# Patient Record
Sex: FEMALE | Race: WHITE | NOT HISPANIC OR LATINO | Employment: OTHER | URBAN - METROPOLITAN AREA
[De-identification: names, ages, dates, MRNs, and addresses within clinical notes are randomized per-mention and may not be internally consistent; named-entity substitution may affect disease eponyms.]

---

## 2018-01-15 NOTE — MISCELLANEOUS
Message  Patient had laparoscopic cholecystectomy done on 4/26/16 by Kirk Gill  Called today to state she feels she is not urinating "fully" Has been drinking plenty of fluids she states  Explained to patient that when she has anesthesia, it call linger in your body and make other organs sluggish  She should continue drinking fluids and if still has issues, go to emergency room for evaluation  Is also c/o cough  Explained again to her that this is related to the surgery from when they put the "tube" down her throat  She can try warm salt water gargles or Chloraseptic spray/lozenges  Active Problems    1  Atrial fibrillation (427 31) (I48 91)   2  Benign pigmented nevus (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 9) (D22 9)   3  Chest pain (786 50) (R07 9)   4  Cutaneous skin tags (701 9) (L91 8)   5  Hyperlipidemia (272 4) (E78 5)   6  Hypertension (401 9) (I10)   7  Obesity (278 00) (E66 9)   8  Seborrheic keratosis (702 19) (L82 1)   9  Thyroid disorder (246 9) (E07 9)    Current Meds   1  Betapace 80 MG Oral Tablet (Sotalol HCl); Therapy: (Recorded:14Apr2016) to Recorded   2  Diovan 320 MG Oral Tablet (Valsartan); Therapy: (Recorded:[id number]) to Recorded   3  Hydrochlorothiazide 25 MG Oral Tablet; Therapy: (Recorded:31Uvr0557) to Recorded   4  Lexapro 10 MG Oral Tablet (Escitalopram Oxalate); Therapy: (Recorded:28Qby7090) to Recorded   5  Lipitor 20 MG Oral Tablet (Atorvastatin Calcium); Therapy: (Recorded:75Mcp2270) to Recorded   6  Synthroid 175 MCG Oral Tablet (Levothyroxine Sodium); Therapy: (Recorded:[id number]) to Recorded   7  Thyrolar-1 60 (12 5-50) MG (MCG) Oral Tablet; Therapy: (Recorded:56But7522) to Recorded   8  Tiazac 360 MG Oral Capsule Extended Release 24 Hour (Diltiazem HCl ER Beads); Therapy: (Recorded:[id number]) to Recorded   9  Xarelto 20 MG Oral Tablet; Therapy: (Recorded:[id number]) to Recorded    Allergies    1  iodine   2  Penicillins    3  Nuts   4   Tape    Signatures   Electronically signed by : Mir Hsieh, ; Apr 27 2016  3:03PM EST                       (Author)

## 2018-01-15 NOTE — MISCELLANEOUS
Message  Return to work or school:   Fran French is under my professional care  She was seen in my office on 4/14/2016       Tatumevaristotorsten Mark was seen in the ER on 4/13/16 and in our office on 4/14/16  She is schedule to have surgery on 4/26/2016 and is unable to fly until after her surgery date do to her medical condition  Signatures   Electronically signed by : Renetta To OM; Apr 15 2016 11:53AM EST                       (Author)    Electronically signed by : Renetta To OM;  Apr 15 2016 11:55AM EST                       (Author)

## 2018-04-26 RX ORDER — SOTALOL HYDROCHLORIDE 80 MG/1
TABLET ORAL
COMMUNITY
End: 2018-04-27 | Stop reason: SDUPTHER

## 2018-04-26 RX ORDER — LEVOTHYROXINE SODIUM 175 UG/1
1 TABLET ORAL DAILY
COMMUNITY
End: 2018-04-27 | Stop reason: SDUPTHER

## 2018-04-26 RX ORDER — DOXYCYCLINE HYCLATE 100 MG/1
CAPSULE ORAL
Refills: 0 | COMMUNITY
Start: 2018-02-01 | End: 2019-04-29

## 2018-04-27 ENCOUNTER — OFFICE VISIT (OUTPATIENT)
Dept: FAMILY MEDICINE CLINIC | Facility: HOSPITAL | Age: 78
End: 2018-04-27
Payer: MEDICARE

## 2018-04-27 VITALS
DIASTOLIC BLOOD PRESSURE: 74 MMHG | HEART RATE: 72 BPM | SYSTOLIC BLOOD PRESSURE: 136 MMHG | WEIGHT: 224 LBS | TEMPERATURE: 99.7 F | HEIGHT: 64 IN | BODY MASS INDEX: 38.24 KG/M2

## 2018-04-27 DIAGNOSIS — I10 ESSENTIAL HYPERTENSION: ICD-10-CM

## 2018-04-27 DIAGNOSIS — E78.2 MIXED HYPERLIPIDEMIA: Primary | ICD-10-CM

## 2018-04-27 DIAGNOSIS — E66.01 CLASS 2 SEVERE OBESITY DUE TO EXCESS CALORIES WITH SERIOUS COMORBIDITY AND BODY MASS INDEX (BMI) OF 38.0 TO 38.9 IN ADULT (HCC): ICD-10-CM

## 2018-04-27 DIAGNOSIS — R73.9 HYPERGLYCEMIA: ICD-10-CM

## 2018-04-27 DIAGNOSIS — I48.19 PERSISTENT ATRIAL FIBRILLATION (HCC): ICD-10-CM

## 2018-04-27 DIAGNOSIS — E07.9 THYROID DISORDER: ICD-10-CM

## 2018-04-27 PROCEDURE — 99203 OFFICE O/P NEW LOW 30 MIN: CPT | Performed by: FAMILY MEDICINE

## 2018-04-27 NOTE — PROGRESS NOTES
Assessment/Plan:         Diagnoses and all orders for this visit:    Mixed hyperlipidemia  -     Cancel: Lipid Panel with Direct LDL reflex; Future  -     Cancel: CK (with reflex to MB); Future  -     Lipid Panel with Direct LDL reflex; Future  -     CK (with reflex to MB); Future  -     Lipid Panel with Direct LDL reflex    Essential hypertension  -     Cancel: CBC and differential; Future  -     Cancel: Comprehensive metabolic panel; Future  -     CBC and differential; Future  -     Comprehensive metabolic panel; Future  -     CBC and differential  -     Comprehensive metabolic panel    Persistent atrial fibrillation (HCC)    Thyroid disorder  -     Cancel: TSH, 3rd generation with T4 reflex; Future  -     TSH, 3rd generation with T4 reflex; Future  -     TSH, 3rd generation with T4 reflex    Class 2 severe obesity due to excess calories with serious comorbidity and body mass index (BMI) of 38 0 to 38 9 in adult (HCC)    Hyperglycemia  -     Cancel: Hemoglobin A1c; Future  -     Hemoglobin A1c; Future  -     Hemoglobin A1c          Subjective:      Patient ID: Joaquín Saldivar is a 69 y o  female  New patient introduction  Previous patient of Dr Josephine Michele for regular follow up every six months    Had a fib ablation in 2016 with good result  No a fib episodes since then  Had discussed on or off of Sotolol and Xarelto but prefers to continue    Has  and gets mail away Rxes    Had both knees replaced and did well     Had an episode of severe back pain from L4-5 disc  Did better with [facility]  The following portions of the patient's history were reviewed and updated as appropriate: allergies, current medications, past family history, past medical history, past social history, past surgical history and problem list     Review of Systems   Constitutional: Negative for fatigue and fever  HENT: Negative for congestion and sinus pressure  Respiratory: Negative  Cardiovascular: Negative  Gastrointestinal: Negative for abdominal pain  Musculoskeletal: Positive for back pain  Negative for myalgias  Psychiatric/Behavioral: Negative for dysphoric mood and sleep disturbance  Objective:      /74 (BP Location: Left arm, Patient Position: Sitting, Cuff Size: Large)   Pulse 72   Temp 99 7 °F (37 6 °C) (Tympanic)   Ht 5' 4" (1 626 m)   Wt 102 kg (224 lb)   BMI 38 45 kg/m²          Physical Exam   Constitutional: She is oriented to person, place, and time  She appears well-developed and well-nourished  Neck: Neck supple  No thyromegaly present  Cardiovascular: Normal rate, regular rhythm, normal heart sounds and intact distal pulses  No murmur heard  Musculoskeletal: She exhibits no edema  Neurological: She is alert and oriented to person, place, and time  Psychiatric: She has a normal mood and affect  Her behavior is normal  Judgment and thought content normal    Nursing note and vitals reviewed

## 2018-06-25 ENCOUNTER — OFFICE VISIT (OUTPATIENT)
Dept: FAMILY MEDICINE CLINIC | Facility: HOSPITAL | Age: 78
End: 2018-06-25
Payer: MEDICARE

## 2018-06-25 VITALS
TEMPERATURE: 96.9 F | HEART RATE: 68 BPM | SYSTOLIC BLOOD PRESSURE: 132 MMHG | WEIGHT: 219 LBS | HEIGHT: 64 IN | DIASTOLIC BLOOD PRESSURE: 78 MMHG | BODY MASS INDEX: 37.39 KG/M2

## 2018-06-25 DIAGNOSIS — L23.7 CONTACT DERMATITIS DUE TO POISON IVY: Primary | ICD-10-CM

## 2018-06-25 PROBLEM — Z98.890 HISTORY OF RADIOFREQUENCY ABLATION PROCEDURE FOR CARDIAC ARRHYTHMIA: Status: RESOLVED | Noted: 2018-06-25 | Resolved: 2018-06-25

## 2018-06-25 PROBLEM — F41.9 ANXIETY: Status: RESOLVED | Noted: 2018-06-25 | Resolved: 2018-06-25

## 2018-06-25 PROBLEM — Z98.890 HISTORY OF CARDIOVERSION: Status: RESOLVED | Noted: 2018-06-25 | Resolved: 2018-06-25

## 2018-06-25 PROBLEM — K81.9 CHOLECYSTITIS: Status: RESOLVED | Noted: 2018-06-25 | Resolved: 2018-06-25

## 2018-06-25 PROBLEM — Z92.89 HISTORY OF CARDIOVERSION: Status: RESOLVED | Noted: 2018-06-25 | Resolved: 2018-06-25

## 2018-06-25 PROCEDURE — 99213 OFFICE O/P EST LOW 20 MIN: CPT | Performed by: INTERNAL MEDICINE

## 2018-06-25 RX ORDER — PREDNISONE 10 MG/1
TABLET ORAL
Qty: 24 TABLET | Refills: 0 | Status: SHIPPED | OUTPATIENT
Start: 2018-06-25 | End: 2018-11-07 | Stop reason: ALTCHOICE

## 2018-06-25 NOTE — PROGRESS NOTES
Assessment/Plan:   Diagnoses and all orders for this visit:    Contact dermatitis due to poison ivy  Comments:  Reassured pt not severe enough I would recommend steroid shot - will send rx for Prednisone to pharmacy, encouraged to con't Benadry or Zyrtec/Claritin, may add Cimetidine or Ranitidine if itch still severe, urged not to scratch and told to call with any s/sx of secondary infection - increase redness/warmth/purulent discharge/F/C  Orders:  -     predniSONE 10 mg tablet; 3 tab PO x 1 today then 2 tab PO Bid x 3 days then 1 tab PO bid x 3 days then 1 tab PO q day x 3 days then stop          Subjective:      Patient ID: Antonella Monet is a 69 y o  female  HPI Pt here with concern over poison ivy which is spreading  She was exposed last week to poison ivy while weeding in the garden  She states the rash started on R hand and has spread and on R hand and wrist and now L wrist and R lateral abd  She notes no lesion on the face  She has been using Calamine lotion and Benadryl lotion w/o much benefit  She has used an OTC scrub and has had some drainage since then  Review of Systems   Constitutional: Negative for chills and fever  Respiratory: Negative for cough  Gastrointestinal: Negative for diarrhea and vomiting  Skin: Positive for rash  Objective:    /78   Pulse 68   Temp (!) 96 9 °F (36 1 °C)   Ht 5' 4" (1 626 m)   Wt 99 3 kg (219 lb)   BMI 37 59 kg/m²      Physical Exam   Constitutional: She appears well-developed and well-nourished  No distress  Cardiovascular: Normal rate, regular rhythm and normal heart sounds  No murmur heard  Pulmonary/Chest: Effort normal and breath sounds normal  She has no wheezes  Skin: Skin is warm and dry  Scabbed papules in clusters and lines on R > L wrist and R anterolateral abd region   Psychiatric: She has a normal mood and affect  Her behavior is normal  Judgment normal    Nursing note and vitals reviewed

## 2018-06-28 LAB
ALBUMIN SERPL-MCNC: 4 G/DL (ref 3.6–5.1)
ALBUMIN/GLOB SERPL: 1.6 (CALC) (ref 1–2.5)
ALP SERPL-CCNC: 76 U/L (ref 33–130)
ALT SERPL-CCNC: 20 U/L (ref 6–29)
AST SERPL-CCNC: 15 U/L (ref 10–35)
BASOPHILS # BLD AUTO: 40 CELLS/UL (ref 0–200)
BASOPHILS NFR BLD AUTO: 0.4 %
BILIRUB SERPL-MCNC: 0.6 MG/DL (ref 0.2–1.2)
BUN SERPL-MCNC: 18 MG/DL (ref 7–25)
BUN/CREAT SERPL: ABNORMAL (CALC) (ref 6–22)
CALCIUM SERPL-MCNC: 9.6 MG/DL (ref 8.6–10.4)
CHLORIDE SERPL-SCNC: 101 MMOL/L (ref 98–110)
CHOLEST SERPL-MCNC: 139 MG/DL
CHOLEST/HDLC SERPL: 2.7 (CALC)
CO2 SERPL-SCNC: 32 MMOL/L (ref 20–31)
CREAT SERPL-MCNC: 0.65 MG/DL (ref 0.6–0.93)
EOSINOPHIL # BLD AUTO: 59 CELLS/UL (ref 15–500)
EOSINOPHIL NFR BLD AUTO: 0.6 %
ERYTHROCYTE [DISTWIDTH] IN BLOOD BY AUTOMATED COUNT: 12.9 % (ref 11–15)
GLOBULIN SER CALC-MCNC: 2.5 G/DL (CALC) (ref 1.9–3.7)
GLUCOSE SERPL-MCNC: 105 MG/DL (ref 65–99)
HBA1C MFR BLD: 6.3 % OF TOTAL HGB
HCT VFR BLD AUTO: 41.6 % (ref 35–45)
HDLC SERPL-MCNC: 52 MG/DL
HGB BLD-MCNC: 14.1 G/DL (ref 11.7–15.5)
LDLC SERPL CALC-MCNC: 65 MG/DL (CALC)
LYMPHOCYTES # BLD AUTO: 2208 CELLS/UL (ref 850–3900)
LYMPHOCYTES NFR BLD AUTO: 22.3 %
MCH RBC QN AUTO: 29.4 PG (ref 27–33)
MCHC RBC AUTO-ENTMCNC: 33.9 G/DL (ref 32–36)
MCV RBC AUTO: 86.8 FL (ref 80–100)
MONOCYTES # BLD AUTO: 1020 CELLS/UL (ref 200–950)
MONOCYTES NFR BLD AUTO: 10.3 %
NEUTROPHILS # BLD AUTO: 6574 CELLS/UL (ref 1500–7800)
NEUTROPHILS NFR BLD AUTO: 66.4 %
NONHDLC SERPL-MCNC: 87 MG/DL (CALC)
PLATELET # BLD AUTO: 248 THOUSAND/UL (ref 140–400)
PMV BLD REES-ECKER: 9.3 FL (ref 7.5–12.5)
POTASSIUM SERPL-SCNC: 3.7 MMOL/L (ref 3.5–5.3)
PROT SERPL-MCNC: 6.5 G/DL (ref 6.1–8.1)
RBC # BLD AUTO: 4.79 MILLION/UL (ref 3.8–5.1)
SL AMB EGFR AFRICAN AMERICAN: 99 ML/MIN/1.73M2
SL AMB EGFR NON AFRICAN AMERICAN: 86 ML/MIN/1.73M2
SODIUM SERPL-SCNC: 139 MMOL/L (ref 135–146)
TRIGL SERPL-MCNC: 138 MG/DL
TSH SERPL-ACNC: 3.25 MIU/L (ref 0.4–4.5)
WBC # BLD AUTO: 9.9 THOUSAND/UL (ref 3.8–10.8)

## 2018-09-25 RX ORDER — OXYCODONE HYDROCHLORIDE AND ACETAMINOPHEN 5; 325 MG/1; MG/1
TABLET ORAL
Refills: 0 | COMMUNITY
Start: 2018-09-06 | End: 2019-04-11 | Stop reason: ALTCHOICE

## 2018-09-25 RX ORDER — GABAPENTIN 300 MG/1
300 CAPSULE ORAL 3 TIMES DAILY
Refills: 2 | COMMUNITY
Start: 2018-08-21

## 2018-09-25 RX ORDER — CYCLOBENZAPRINE HCL 10 MG
10 TABLET ORAL
Refills: 0 | COMMUNITY
Start: 2018-08-14 | End: 2021-07-14 | Stop reason: ALTCHOICE

## 2018-09-25 RX ORDER — OXYCODONE AND ACETAMINOPHEN 10; 325 MG/1; MG/1
1 TABLET ORAL EVERY 6 HOURS PRN
Refills: 0 | COMMUNITY
Start: 2018-08-14 | End: 2019-04-11 | Stop reason: ALTCHOICE

## 2018-09-25 RX ORDER — DILTIAZEM HYDROCHLORIDE 360 MG/1
CAPSULE, EXTENDED RELEASE ORAL
Refills: 0 | COMMUNITY
Start: 2018-07-30

## 2018-09-26 ENCOUNTER — OFFICE VISIT (OUTPATIENT)
Dept: FAMILY MEDICINE CLINIC | Facility: HOSPITAL | Age: 78
End: 2018-09-26
Payer: MEDICARE

## 2018-09-26 VITALS
HEIGHT: 63 IN | BODY MASS INDEX: 39.51 KG/M2 | TEMPERATURE: 97.9 F | HEART RATE: 72 BPM | SYSTOLIC BLOOD PRESSURE: 128 MMHG | DIASTOLIC BLOOD PRESSURE: 72 MMHG | WEIGHT: 223 LBS

## 2018-09-26 DIAGNOSIS — Z00.00 MEDICARE ANNUAL WELLNESS VISIT, INITIAL: Primary | ICD-10-CM

## 2018-09-26 DIAGNOSIS — L57.0 ACTINIC KERATOSIS: ICD-10-CM

## 2018-09-26 DIAGNOSIS — M54.17 LUMBOSACRAL RADICULOPATHY DUE TO INTERVERTEBRAL DISC DISORDER: ICD-10-CM

## 2018-09-26 PROCEDURE — 17000 DESTRUCT PREMALG LESION: CPT | Performed by: FAMILY MEDICINE

## 2018-09-26 PROCEDURE — G0438 PPPS, INITIAL VISIT: HCPCS | Performed by: FAMILY MEDICINE

## 2018-09-26 NOTE — PROGRESS NOTES
Lesion Destruction  Date/Time: 9/26/2018 9:10 AM  Performed by: Anup Mccarthy  Authorized by: Anup Mccarthy     Procedure Details - Lesion Destruction:     Number of Lesions:  1  Lesion 1:     Body area:  Upper extremity    Upper extremity location:  L upper arm    Malignancy: pre-malignant lesion      Destruction method: cryotherapy

## 2018-09-26 NOTE — PROGRESS NOTES
Assessment and Plan:  Problem List Items Addressed This Visit     Medicare annual wellness visit, initial - Primary    Lumbosacral radiculopathy due to intervertebral disc disorder    Relevant Orders    Ambulatory referral to Neurosurgery    Actinic keratosis    Relevant Orders    Lesion Destruction        Health Maintenance Due   Topic Date Due    DXA SCAN  1940    DTaP,Tdap,and Td Vaccines (1 - Tdap) 02/21/1962    Pneumococcal PPSV23/PCV13 65+ Years / High and Highest Risk (2 of 2 - PCV13) 10/18/2015    INFLUENZA VACCINE  09/01/2018         HPI:  Patient Active Problem List   Diagnosis    Atrial fibrillation ([address] )    Hyperlipidemia    Hypertension    Obesity    Thyroid disorder    Medicare annual wellness visit, initial    Lumbosacral radiculopathy due to intervertebral disc disorder    Actinic keratosis     Past Medical History:   Diagnosis Date    Anxiety     Cholecystitis     Chronic calculous cholecystitis     last assessed 4/25/16    History of cardioversion     History of radiofrequency ablation procedure for cardiac arrhythmia     Hyperlipidemia     Hypertension     Sleep apnea     no trouble since Afib corrected     Past Surgical History:   Procedure Laterality Date    APPENDECTOMY      BLADDER SUSPENSION      CARDIOVERSION      atrial - onset 2015 - x5 in 2014 and 2015    CHOLECYSTECTOMY      HYSTERECTOMY      KNEE ARTHROSCOPY W/ MENISCAL REPAIR Bilateral     therapeutic - last assessed 4/14/16    KNEE SURGERY      NV LAP,CHOLECYSTECTOMY N/A 4/26/2016    Procedure: CHOLECYSTECTOMY LAPAROSCOPIC;  Surgeon: Jason Quiñones MD;  Location:  MAIN OR;  Service: General    TONSILLECTOMY      WISDOM TOOTH EXTRACTION       Family History   Problem Relation Age of Onset    Cancer Mother         ovarian    Heart disease Father         cardiac disorder    Cancer Father     Heart disease Brother     Heart disease Paternal Aunt     Heart disease Paternal Uncle      History Smoking Status    Never Smoker   Smokeless Tobacco    Never Used     History   Alcohol Use    Yes     Comment: social; occasional      History   Drug Use No         Current Outpatient Prescriptions   Medication Sig Dispense Refill    atorvastatin (LIPITOR) 20 mg tablet Take 20 mg by mouth daily   cyclobenzaprine (FLEXERIL) 10 mg tablet Take 10 mg by mouth daily at bedtime  0    diltiazem (CARDIZEM CD) 360 MG 24 hr capsule   0    diltiazem (TIAZAC) 360 MG 24 hr capsule Take 360 mg by mouth daily Indications: 4/21, cardiologist office reports pt to not be taking this med  they will call pt  Fredy Bernheim doxycycline hyclate (VIBRAMYCIN) 100 mg capsule TAKE ONE CAPSULE BY MOUTH DAILY FOR FOURTEEN DAYS THEN STOP  0    escitalopram (LEXAPRO) 10 mg tablet Take 10 mg by mouth daily   gabapentin (NEURONTIN) 300 mg capsule Take 300 mg by mouth 3 (three) times a day  2    hydrochlorothiazide (HYDRODIURIL) 25 mg tablet Take 25 mg by mouth daily Indications: High Blood Pressure   levothyroxine (SYNTHROID) 175 mcg tablet Take 175 mcg by mouth daily   liotrix (THYROLAR-1) 60 (12 5-50) MG (MCG) TABS Take 1 tablet by mouth daily   oxyCODONE-acetaminophen (PERCOCET)  mg per tablet Take 1 tablet by mouth every 6 (six) hours as needed  0    oxyCODONE-acetaminophen (PERCOCET) 5-325 mg per tablet TAKE ONE TABLET BY MOUTH EVERY 6 HOURS AS NEEDED FOR 10 DAYS  0    predniSONE 10 mg tablet 3 tab PO x 1 today then 2 tab PO Bid x 3 days then 1 tab PO bid x 3 days then 1 tab PO q day x 3 days then stop 24 tablet 0    rivaroxaban (XARELTO) tablet Take 20 mg by mouth daily with dinner Indications: LD 4/22   sotalol (BETAPACE) 80 mg tablet Take 80 mg by mouth 2 (two) times a day   valsartan (DIOVAN) 320 MG tablet Take 320 mg by mouth daily Indications: High Blood Pressure  No current facility-administered medications for this visit        Allergies   Allergen Reactions    Medical Tape     Nuts     Omnipaque [Iohexol] Hives    Other Hives     Pine nuts    Penicillins Hives    Sulfa Antibiotics Hives    Iodine Rash     Immunization History   Administered Date(s) Administered    Influenza 09/11/2014    Pneumococcal Polysaccharide PPV23 10/18/2014       Patient Care Team:  Noble Almeida MD as PCP - General (Family Medicine)    Medicare Screening Tests and Risk Assessments:  Grisel San is here for her Initial Wellness visit  Last Medicare Wellness visit information reviewed, patient interviewed, no change since last AWV  Health Risk Assessment:  Patient rates overall health as very good  Patient feels that their physical health rating is Same  Eyesight was rated as Same  Hearing was rated as Same  Patient feels that their emotional and mental health rating is Same  Pain experienced by patient in the last 7 days has been Some  Patient's pain rating has been 4/10  Patient states that she has experienced no weight loss or gain in last 6 months  Emotional/Mental Health:  Patient has been feeling nervous/anxious  PHQ-9 Depression Screening:    Frequency of the following problems over the past two weeks:      1  Little interest or pleasure in doing things: 0 - not at all      2  Feeling down, depressed, or hopeless: 0 - not at all  PHQ-2 Score: 0          Broken Bones/Falls: Fall Risk Assessment:    In the past year, patient has experienced: History of falling in past year     Number of falls: 1  Patient feels unsteady standing  Patient is not taking medication that can cause feelings of lightheadedness or tiredness  Bladder/Bowel:  Patient has not leaked urine accidently in the last six months  Patient reports no loss of bowel control  Immunizations:  Patient has not had a flu vaccination within the last year  Patient has not received a pneumonia shot  Patient has not received a shingles shot        Home Safety:  Patient does not have trouble with stairs inside or outside of their home    Patient currently reports that there are no safety hazards present in home, working smoke alarms, working carbon monoxide detectors  Nutrition:  Current diet: Regular and Limited junk food with servings of the following:    Medications:  Patient is not currently taking any over-the-counter supplements  Patient is able to manage medications  Lifestyle Choices:  Patient reports no tobacco use  Patient has not smoked or used tobacco in the past   Patient reports alcohol use  Alcohol use per week: 1  Patient drives a vehicle  Patient wears seat belt  Activities of Daily Living:  Can get out of bed by his or her self, able to dress self, able to make own meals, able to do own shopping, able to bathe self, can do own laundry/housekeeping, can manage own money, pay bills and track expenses    Previous Hospitalizations:  No hospitalization or ED visit in past 12 months        Advanced Directives:  Patient has decided on a power of   Patient has spoken to designated power of   Patient has not completed advanced directive  Preventative Screening/Counseling:      Cardiovascular:      General: Risks and Benefits Discussed      Counseling: Healthy Weight and Healthy Diet          Diabetes:      General: Screening Current          Colorectal Cancer:      General: Risks and Benefits Discussed          Breast Cancer:      General: Risks and Benefits Discussed          Cervical Cancer:      General: Screening Not Indicated          Osteoporosis:      General: Risks and Benefits Discussed          AAA:      General: Screening Not Indicated          Glaucoma:      General: Screening Current          HIV:      General: Screening Not Indicated          Hepatitis C:      General: Screening Not Indicated        Advanced Directives:   Patient has living will for healthcare, has durable POA for healthcare, patient has an advanced directive         Planning on back surgery and did discuss with Dr Awa Osei, would have sabino and screws L4-5 as main focus via DaVinci   Visual Acuity Screening    Right eye Left eye Both eyes   Without correction:      With correction: 20/20 20/20 20/20       Physical Exam:  Review of Systems   Cardiovascular: Negative for chest pain, palpitations and leg swelling  Gastrointestinal: Negative for bowel incontinence  Musculoskeletal: Positive for arthralgias, back pain, gait problem, joint swelling and myalgias  Neurological: Negative for weakness  Hematological: Does not bruise/bleed easily  Psychiatric/Behavioral: The patient is not nervous/anxious  All other systems reviewed and are negative  Vitals:    09/26/18 0830   BP: 128/72   Pulse: 72   Temp: 97 9 °F (36 6 °C)   Weight: 101 kg (223 lb)   Height: 5' 3" (1 6 m)   Body mass index is 39 5 kg/m²  Physical Exam   Constitutional: She is oriented to person, place, and time  She appears well-developed and well-nourished  HENT:   Head: Normocephalic and atraumatic  Cardiovascular: Normal rate, regular rhythm, normal heart sounds and intact distal pulses  Pulmonary/Chest: Effort normal and breath sounds normal    Musculoskeletal:        Lumbar back: She exhibits decreased range of motion and spasm  Neurological: She is alert and oriented to person, place, and time  Psychiatric: She has a normal mood and affect  Her behavior is normal  Judgment and thought content normal    Nursing note and vitals reviewed

## 2018-09-26 NOTE — PATIENT INSTRUCTIONS

## 2018-11-07 ENCOUNTER — OFFICE VISIT (OUTPATIENT)
Dept: FAMILY MEDICINE CLINIC | Facility: HOSPITAL | Age: 78
End: 2018-11-07

## 2018-11-07 VITALS
HEART RATE: 60 BPM | TEMPERATURE: 97.1 F | SYSTOLIC BLOOD PRESSURE: 138 MMHG | DIASTOLIC BLOOD PRESSURE: 76 MMHG | WEIGHT: 222 LBS | HEIGHT: 63 IN | BODY MASS INDEX: 39.34 KG/M2

## 2018-11-07 DIAGNOSIS — R73.03 PREDIABETES: ICD-10-CM

## 2018-11-07 DIAGNOSIS — E66.01 CLASS 2 SEVERE OBESITY DUE TO EXCESS CALORIES WITH SERIOUS COMORBIDITY AND BODY MASS INDEX (BMI) OF 39.0 TO 39.9 IN ADULT (HCC): ICD-10-CM

## 2018-11-07 DIAGNOSIS — Z01.818 PREOP EXAMINATION: Primary | ICD-10-CM

## 2018-11-07 DIAGNOSIS — I10 ESSENTIAL HYPERTENSION: ICD-10-CM

## 2018-11-07 DIAGNOSIS — I48.20 CHRONIC ATRIAL FIBRILLATION (HCC): ICD-10-CM

## 2018-11-07 DIAGNOSIS — M54.17 LUMBOSACRAL RADICULOPATHY DUE TO INTERVERTEBRAL DISC DISORDER: ICD-10-CM

## 2018-11-07 DIAGNOSIS — F41.9 ANXIETY: ICD-10-CM

## 2018-11-07 LAB — SL AMB POCT HEMOGLOBIN AIC: 6

## 2018-11-07 PROCEDURE — 99215 OFFICE O/P EST HI 40 MIN: CPT | Performed by: FAMILY MEDICINE

## 2018-11-07 PROCEDURE — 83036 HEMOGLOBIN GLYCOSYLATED A1C: CPT | Performed by: FAMILY MEDICINE

## 2018-11-07 RX ORDER — LORAZEPAM 0.5 MG/1
0.5 TABLET ORAL EVERY 8 HOURS PRN
Qty: 20 TABLET | Refills: 0 | Status: SHIPPED | OUTPATIENT
Start: 2018-11-07

## 2018-11-07 NOTE — PROGRESS NOTES
Assessment/Plan:         Diagnoses and all orders for this visit:    Preop examination  Comments:  Rosibel Reece is medically cleared for 11/15/18 surgery with Dr Chelo Hart for robotic lumbar laminectomy L4-5, posterolateral fusion L4-5-S1 with pedicle screws and rods     Lumbosacral radiculopathy due to intervertebral disc disorder    Prediabetes  Comments:  Although preop glucose was 185, A1C done in our office today was 6 0  Dietary management reviewed, and is not a hindrance to surgery  Class 2 severe obesity due to excess calories with serious comorbidity and body mass index (BMI) of 39 0 to 39 9 in adult [facility])  Comments: Will continue on weight loss efforts with more benefit after surgery    Chronic atrial fibrillation (HCC)  Comments:  Stable per Dr Leeann Solano hypertension  Comments:  Excellent BP control    Anxiety  Comments:  Short term anxiolytic from now until surgery with low dose Lorazepam   Orders:  -     LORazepam (ATIVAN) 0 5 mg tablet; Take 1 tablet (0 5 mg total) by mouth every 8 (eight) hours as needed for anxiety          Subjective:      Patient ID: Mariely Unger is a 69 y o  female  Preop evaluation for L4-5 and S1 lumbar laminectomy to be done 11/15/18 by Dr Chelo Hart robotically    She has received cardiac clearance with Dr Lara Level as of 11/1/18  She had requested sooner surgery than the original date in January  Pain is extreme and unable to sleep  Stopping Xarelto 5 days prior to surgery  Is very anxious and wondered about a mild medication to help with this leading up to surgery  The following portions of the patient's history were reviewed and updated as appropriate: allergies, current medications, past family history, past medical history, past social history, past surgical history and problem list     Review of Systems   Constitutional: Negative for unexpected weight change  HENT: Negative for congestion  Eyes: Negative for visual disturbance  Respiratory: Negative  Cardiovascular: Negative  Gastrointestinal: Negative for abdominal pain  Genitourinary: Negative for dysuria  Musculoskeletal: Positive for arthralgias, back pain and gait problem  Neurological: Positive for weakness  Negative for tremors and headaches  Hematological: Negative  Psychiatric/Behavioral: Negative for decreased concentration  The patient is nervous/anxious  All other systems reviewed and are negative  Objective:      /76   Pulse 60   Temp (!) 97 1 °F (36 2 °C)   Ht 5' 3" (1 6 m)   Wt 101 kg (222 lb)   BMI 39 33 kg/m²          Physical Exam   Constitutional: She is oriented to person, place, and time  She appears well-developed and well-nourished  HENT:   Mouth/Throat: She has dentures  No oropharyngeal exudate  Upper and lower dentures in place   Neck: Neck supple  No thyromegaly present  Cardiovascular: Normal rate, regular rhythm and normal heart sounds  Pulmonary/Chest: Effort normal and breath sounds normal  She has no rales  Musculoskeletal: She exhibits no edema  Neurological: She is alert and oriented to person, place, and time  Gait abnormal    Slow narrow gait using cane in hand   Psychiatric: She has a normal mood and affect  Her behavior is normal  Judgment and thought content normal    Nursing note and vitals reviewed

## 2018-12-04 DIAGNOSIS — F39 MOOD DISORDER (HCC): Primary | ICD-10-CM

## 2018-12-04 RX ORDER — ESCITALOPRAM OXALATE 10 MG/1
10 TABLET ORAL DAILY
Qty: 90 TABLET | Refills: 3 | Status: SHIPPED | OUTPATIENT
Start: 2018-12-04 | End: 2018-12-05 | Stop reason: SDUPTHER

## 2018-12-04 RX ORDER — ESCITALOPRAM OXALATE 10 MG/1
10 TABLET ORAL DAILY
Qty: 30 TABLET | Refills: 5 | Status: SHIPPED | OUTPATIENT
Start: 2018-12-04 | End: 2018-12-04 | Stop reason: SDUPTHER

## 2018-12-04 NOTE — TELEPHONE ENCOUNTER
Please renew Jhonny pro    send 30d spply to [facility] pharmacy and   90d supply with refills to [employer] away

## 2018-12-05 DIAGNOSIS — F39 MOOD DISORDER (HCC): ICD-10-CM

## 2018-12-05 RX ORDER — ESCITALOPRAM OXALATE 10 MG/1
10 TABLET ORAL DAILY
Qty: 90 TABLET | Refills: 3 | Status: SHIPPED | OUTPATIENT
Start: 2018-12-05 | End: 2019-11-08 | Stop reason: SDUPTHER

## 2019-01-23 ENCOUNTER — TRANSCRIBE ORDERS (OUTPATIENT)
Dept: ADMINISTRATIVE | Facility: HOSPITAL | Age: 79
End: 2019-01-23

## 2019-01-23 DIAGNOSIS — M48.062 SPINAL STENOSIS, LUMBAR REGION WITH NEUROGENIC CLAUDICATION: Primary | ICD-10-CM

## 2019-02-22 ENCOUNTER — TELEPHONE (OUTPATIENT)
Dept: FAMILY MEDICINE CLINIC | Facility: HOSPITAL | Age: 79
End: 2019-02-22

## 2019-02-22 DIAGNOSIS — M54.17 LUMBOSACRAL RADICULOPATHY DUE TO INTERVERTEBRAL DISC DISORDER: Primary | ICD-10-CM

## 2019-02-22 RX ORDER — METHYLPREDNISOLONE 4 MG/1
TABLET ORAL
Refills: 0 | COMMUNITY
Start: 2018-12-06 | End: 2019-04-11 | Stop reason: ALTCHOICE

## 2019-02-22 RX ORDER — HYDROCODONE BITARTRATE AND ACETAMINOPHEN 5; 325 MG/1; MG/1
TABLET ORAL
Refills: 0 | COMMUNITY
Start: 2018-12-18 | End: 2019-04-11 | Stop reason: ALTCHOICE

## 2019-02-22 RX ORDER — LORAZEPAM 1 MG/1
1 TABLET ORAL
Refills: 0 | COMMUNITY
Start: 2019-01-02 | End: 2019-04-11 | Stop reason: ALTCHOICE

## 2019-02-22 RX ORDER — PREDNISONE 10 MG/1
TABLET ORAL
Refills: 0 | COMMUNITY
Start: 2019-01-02 | End: 2019-04-11 | Stop reason: ALTCHOICE

## 2019-02-22 NOTE — TELEPHONE ENCOUNTER
pts otc drugs are not helping with her back pain, she wanted to know if we can call something in to [facility]] for back pain

## 2019-02-22 NOTE — TELEPHONE ENCOUNTER
She really should call Dr Torres Torres for medication  She is very limited what can be prescribed because of being on blood thinner

## 2019-03-07 ENCOUNTER — TELEPHONE (OUTPATIENT)
Dept: FAMILY MEDICINE CLINIC | Facility: HOSPITAL | Age: 79
End: 2019-03-07

## 2019-03-07 DIAGNOSIS — I10 ESSENTIAL HYPERTENSION: Primary | ICD-10-CM

## 2019-03-07 RX ORDER — CANDESARTAN 32 MG/1
32 TABLET ORAL DAILY
Qty: 30 TABLET | Refills: 5 | Status: SHIPPED | OUTPATIENT
Start: 2019-03-07 | End: 2021-07-14 | Stop reason: ALTCHOICE

## 2019-03-07 NOTE — TELEPHONE ENCOUNTER
Pt need a refil of valsartan 320mg  Its been recalled--please send a replacement  Thanks  She uses [facility] pharm

## 2019-04-08 ENCOUNTER — OFFICE VISIT (OUTPATIENT)
Dept: FAMILY MEDICINE CLINIC | Facility: HOSPITAL | Age: 79
End: 2019-04-08
Payer: MEDICARE

## 2019-04-08 VITALS
WEIGHT: 224 LBS | DIASTOLIC BLOOD PRESSURE: 78 MMHG | SYSTOLIC BLOOD PRESSURE: 134 MMHG | HEART RATE: 57 BPM | TEMPERATURE: 96.6 F | HEIGHT: 64 IN | BODY MASS INDEX: 38.24 KG/M2

## 2019-04-08 DIAGNOSIS — I48.20 CHRONIC ATRIAL FIBRILLATION (HCC): ICD-10-CM

## 2019-04-08 DIAGNOSIS — M54.17 LUMBOSACRAL RADICULOPATHY DUE TO INTERVERTEBRAL DISC DISORDER: ICD-10-CM

## 2019-04-08 DIAGNOSIS — I10 ESSENTIAL HYPERTENSION: Primary | ICD-10-CM

## 2019-04-08 DIAGNOSIS — E78.2 MIXED HYPERLIPIDEMIA: ICD-10-CM

## 2019-04-08 DIAGNOSIS — R73.03 PREDIABETES: ICD-10-CM

## 2019-04-08 DIAGNOSIS — E03.9 ACQUIRED HYPOTHYROIDISM: ICD-10-CM

## 2019-04-08 PROCEDURE — 99214 OFFICE O/P EST MOD 30 MIN: CPT | Performed by: FAMILY MEDICINE

## 2019-04-11 ENCOUNTER — CONSULT (OUTPATIENT)
Dept: NEUROSURGERY | Facility: CLINIC | Age: 79
End: 2019-04-11
Payer: MEDICARE

## 2019-04-11 ENCOUNTER — DOCUMENTATION (OUTPATIENT)
Dept: NEUROSURGERY | Facility: CLINIC | Age: 79
End: 2019-04-11

## 2019-04-11 VITALS
RESPIRATION RATE: 16 BRPM | HEART RATE: 60 BPM | SYSTOLIC BLOOD PRESSURE: 175 MMHG | HEIGHT: 64 IN | TEMPERATURE: 97.6 F | DIASTOLIC BLOOD PRESSURE: 96 MMHG | WEIGHT: 224.6 LBS | BODY MASS INDEX: 38.35 KG/M2

## 2019-04-11 DIAGNOSIS — M48.062 SPINAL STENOSIS, LUMBAR REGION WITH NEUROGENIC CLAUDICATION: ICD-10-CM

## 2019-04-11 DIAGNOSIS — M54.17 LUMBOSACRAL RADICULOPATHY DUE TO INTERVERTEBRAL DISC DISORDER: Primary | ICD-10-CM

## 2019-04-11 DIAGNOSIS — M96.1 FAILED BACK SURGICAL SYNDROME: ICD-10-CM

## 2019-04-11 PROCEDURE — 99204 OFFICE O/P NEW MOD 45 MIN: CPT | Performed by: NEUROLOGICAL SURGERY

## 2019-04-12 ENCOUNTER — TELEPHONE (OUTPATIENT)
Dept: FAMILY MEDICINE CLINIC | Facility: HOSPITAL | Age: 79
End: 2019-04-12

## 2019-04-19 DIAGNOSIS — M54.17 LUMBOSACRAL RADICULOPATHY: Primary | ICD-10-CM

## 2019-04-29 ENCOUNTER — OFFICE VISIT (OUTPATIENT)
Dept: FAMILY MEDICINE CLINIC | Facility: HOSPITAL | Age: 79
End: 2019-04-29
Payer: MEDICARE

## 2019-04-29 VITALS
BODY MASS INDEX: 39.41 KG/M2 | HEART RATE: 66 BPM | DIASTOLIC BLOOD PRESSURE: 78 MMHG | TEMPERATURE: 97.9 F | WEIGHT: 226 LBS | SYSTOLIC BLOOD PRESSURE: 132 MMHG

## 2019-04-29 DIAGNOSIS — J98.8 BACTERIAL RESPIRATORY INFECTION: Primary | ICD-10-CM

## 2019-04-29 DIAGNOSIS — B96.89 BACTERIAL RESPIRATORY INFECTION: Primary | ICD-10-CM

## 2019-04-29 PROCEDURE — 99213 OFFICE O/P EST LOW 20 MIN: CPT | Performed by: INTERNAL MEDICINE

## 2019-04-29 RX ORDER — BENZONATATE 100 MG/1
100 CAPSULE ORAL 3 TIMES DAILY PRN
Qty: 30 CAPSULE | Refills: 0 | Status: SHIPPED | OUTPATIENT
Start: 2019-04-29 | End: 2019-05-09

## 2019-04-29 RX ORDER — DOXYCYCLINE HYCLATE 100 MG/1
100 CAPSULE ORAL EVERY 12 HOURS SCHEDULED
Qty: 14 CAPSULE | Refills: 0 | Status: SHIPPED | OUTPATIENT
Start: 2019-04-29 | End: 2019-05-06

## 2019-06-12 DIAGNOSIS — E03.9 ACQUIRED HYPOTHYROIDISM: Primary | ICD-10-CM

## 2019-06-12 RX ORDER — LEVOTHYROXINE SODIUM 175 UG/1
175 TABLET ORAL DAILY
Qty: 90 TABLET | Refills: 3 | Status: SHIPPED | OUTPATIENT
Start: 2019-06-12 | End: 2019-06-13 | Stop reason: SDUPTHER

## 2019-06-13 DIAGNOSIS — E03.9 ACQUIRED HYPOTHYROIDISM: ICD-10-CM

## 2019-06-13 RX ORDER — LEVOTHYROXINE SODIUM 175 UG/1
175 TABLET ORAL DAILY
Qty: 90 TABLET | Refills: 3 | Status: SHIPPED | OUTPATIENT
Start: 2019-06-13 | End: 2020-06-08

## 2019-08-20 ENCOUNTER — OFFICE VISIT (OUTPATIENT)
Dept: FAMILY MEDICINE CLINIC | Facility: HOSPITAL | Age: 79
End: 2019-08-20
Payer: MEDICARE

## 2019-08-20 VITALS
BODY MASS INDEX: 37.05 KG/M2 | DIASTOLIC BLOOD PRESSURE: 80 MMHG | HEIGHT: 64 IN | SYSTOLIC BLOOD PRESSURE: 130 MMHG | HEART RATE: 52 BPM | TEMPERATURE: 97 F | WEIGHT: 217 LBS

## 2019-08-20 DIAGNOSIS — L23.7 CONTACT DERMATITIS DUE TO POISON IVY: Primary | ICD-10-CM

## 2019-08-20 PROCEDURE — 99213 OFFICE O/P EST LOW 20 MIN: CPT | Performed by: INTERNAL MEDICINE

## 2019-08-20 RX ORDER — PREDNISONE 10 MG/1
TABLET ORAL
Qty: 25 TABLET | Refills: 0 | Status: SHIPPED | OUTPATIENT
Start: 2019-08-20 | End: 2019-09-03 | Stop reason: SDUPTHER

## 2019-08-20 NOTE — PROGRESS NOTES
Assessment/Plan:     Diagnoses and all orders for this visit:    Contact dermatitis due to poison ivy  Comments:  Pt not aware of any poison ivy contact but rash very typical in nature and itches like poison ivy - d/t location on face and proximity to R eye will use oral Prednisone, urged not to itch and to use OTC Claritin/Zyrtec in am and Benadryl q pm as needed, call with any S/sx of infection, SE of steroid reviewed - call with significant SE or worse rash  Orders:  -     predniSONE 10 mg tablet; 3 tab PO x 1 today then 2 tab PO bid x 3 days then 1 tab PO bid x 3 days then 1 tab daily x 3 days then stop          Subjective:      Patient ID: Dianne Cowan is a 67 y o  female  HPI Pt here with c/o rash upon wakening Sunday am   She notes no new meds/soaps/lotions/detergents  She notes the rash itches like crazy but does not hurt  Rash started R side of her face and then spread down to R ant chest  She has tried an OTC allergy med w/o great benefit  She has had no poison ivy contact that she is aware of  Review of Systems   Constitutional: Negative for chills and fever  Eyes: Positive for itching  Negative for redness  Respiratory: Negative for cough and shortness of breath  Skin: Positive for rash  Objective:    /80 (BP Location: Right arm, Patient Position: Sitting, Cuff Size: Standard)   Pulse (!) 52   Temp (!) 97 °F (36 1 °C)   Ht 5' 3 5" (1 613 m)   Wt 98 4 kg (217 lb)   BMI 37 84 kg/m²      Physical Exam   Constitutional: She appears well-developed and well-nourished  No distress  Eyes: Conjunctivae are normal  Right eye exhibits no discharge  Left eye exhibits no discharge  Skin: Skin is warm and dry  R cheek, R anterolateral neck and R chest:  R cheek with erythematous plaque of skin with scabbed papules - linear rash down R lateral neck leading to similar larger plaque on R ant chest wall  Nursing note and vitals reviewed

## 2019-09-03 DIAGNOSIS — L23.7 CONTACT DERMATITIS DUE TO POISON IVY: ICD-10-CM

## 2019-09-03 RX ORDER — PREDNISONE 10 MG/1
TABLET ORAL
Qty: 25 TABLET | Refills: 0 | Status: SHIPPED | OUTPATIENT
Start: 2019-09-03 | End: 2021-07-14 | Stop reason: ALTCHOICE

## 2019-10-08 ENCOUNTER — OFFICE VISIT (OUTPATIENT)
Dept: FAMILY MEDICINE CLINIC | Facility: HOSPITAL | Age: 79
End: 2019-10-08
Payer: MEDICARE

## 2019-10-08 VITALS
BODY MASS INDEX: 38.45 KG/M2 | HEART RATE: 57 BPM | DIASTOLIC BLOOD PRESSURE: 82 MMHG | HEIGHT: 63 IN | TEMPERATURE: 96.1 F | SYSTOLIC BLOOD PRESSURE: 138 MMHG | WEIGHT: 217 LBS

## 2019-10-08 DIAGNOSIS — E03.9 ACQUIRED HYPOTHYROIDISM: ICD-10-CM

## 2019-10-08 DIAGNOSIS — I10 ESSENTIAL HYPERTENSION: ICD-10-CM

## 2019-10-08 DIAGNOSIS — M54.17 LUMBOSACRAL RADICULOPATHY DUE TO INTERVERTEBRAL DISC DISORDER: ICD-10-CM

## 2019-10-08 DIAGNOSIS — Z00.00 MEDICARE ANNUAL WELLNESS VISIT, SUBSEQUENT: Primary | ICD-10-CM

## 2019-10-08 DIAGNOSIS — E78.2 MIXED HYPERLIPIDEMIA: ICD-10-CM

## 2019-10-08 PROBLEM — Z01.818 PREOP EXAMINATION: Status: RESOLVED | Noted: 2018-11-07 | Resolved: 2019-10-08

## 2019-10-08 PROCEDURE — G0439 PPPS, SUBSEQ VISIT: HCPCS | Performed by: FAMILY MEDICINE

## 2019-10-08 PROCEDURE — 99213 OFFICE O/P EST LOW 20 MIN: CPT | Performed by: FAMILY MEDICINE

## 2019-10-08 RX ORDER — AZITHROMYCIN 250 MG/1
TABLET, FILM COATED ORAL
Refills: 0 | COMMUNITY
Start: 2019-08-05 | End: 2021-02-24 | Stop reason: ALTCHOICE

## 2019-10-08 RX ORDER — BENZONATATE 100 MG/1
100 CAPSULE ORAL 3 TIMES DAILY PRN
Refills: 0 | COMMUNITY
Start: 2019-08-05 | End: 2021-02-24 | Stop reason: ALTCHOICE

## 2019-10-08 NOTE — PROGRESS NOTES
Assessment/Plan:         Diagnoses and all orders for this visit:    Medicare annual wellness visit, subsequent    Essential hypertension    Acquired hypothyroidism    Mixed hyperlipidemia    Lumbosacral radiculopathy due to intervertebral disc disorder    Other orders  -     azithromycin (ZITHROMAX) 250 mg tablet; TAKE 2 TABLETS BY MOUTH ON DAY 1 THEN 1 DAILY ON DAYS 2 THROUGH 5  -     benzonatate (TESSALON PERLES) 100 mg capsule; Take 100 mg by mouth 3 (three) times a day as needed          Subjective:      Patient ID: Fernando Dubon is a 67 y o  female  6 month follow up    Feeling well  Did well with 4 months of therapy  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    No recent illness or injury  Will get immunizations through volunteer office  Has been swimming at the       The following portions of the patient's history were reviewed and updated as appropriate: allergies, current medications, past family history, past medical history, past social history, past surgical history and problem list     Review of Systems   Constitutional: Positive for unexpected weight change  Eyes: Negative for visual disturbance  Respiratory: Negative  Cardiovascular: Negative  Gastrointestinal: Negative  Musculoskeletal: Positive for arthralgias, back pain and gait problem  Hematological: Negative  All other systems reviewed and are negative  Objective:      /82   Pulse 57   Temp (!) 96 1 °F (35 6 °C)   Ht 5' 3" (1 6 m)   Wt 98 4 kg (217 lb)   BMI 38 44 kg/m²          Physical Exam   Constitutional: She is oriented to person, place, and time  She appears well-developed and well-nourished  HENT:   Head: Normocephalic  Eyes: Pupils are equal, round, and reactive to light  Neck: No thyromegaly present  Cardiovascular: Normal rate, regular rhythm and normal heart sounds  Pulmonary/Chest: Effort normal and breath sounds normal    Musculoskeletal: She exhibits no edema  Neurological: She is alert and oriented to person, place, and time  Psychiatric: She has a normal mood and affect  Her behavior is normal    Nursing note and vitals reviewed

## 2019-10-08 NOTE — PROGRESS NOTES
Assessment and Plan:     Problem List Items Addressed This Visit        Endocrine    Acquired hypothyroidism       Cardiovascular and Mediastinum    Essential hypertension       Nervous and Auditory    Lumbosacral radiculopathy due to intervertebral disc disorder       Other    Mixed hyperlipidemia    Medicare annual wellness visit, subsequent - Primary        BMI Counseling: Body mass index is 38 44 kg/m²  The BMI is above normal  Nutrition recommendations include decreasing portion sizes and moderation in carbohydrate intake  Exercise recommendations include exercising 3-5 times per week  Preventive health issues were discussed with patient, and age appropriate screening tests were ordered as noted in patient's After Visit Summary  Personalized health advice and appropriate referrals for health education or preventive services given if needed, as noted in patient's After Visit Summary       History of Present Illness:     Patient presents for Medicare Annual Wellness visit    Patient Care Team:  Roberta Botello MD as PCP - General (Family Medicine)     Problem List:     Patient Active Problem List   Diagnosis    Atrial fibrillation ([facility] [address] )    Mixed hyperlipidemia    Essential hypertension    Obesity    Acquired hypothyroidism    Medicare annual wellness visit, subsequent    Lumbosacral radiculopathy due to intervertebral disc disorder    Actinic keratosis    Prediabetes    Failed back surgical syndrome      Past Medical and Surgical History:     Past Medical History:   Diagnosis Date    Anxiety     Cholecystitis     Chronic calculous cholecystitis     last assessed 4/25/16    History of cardioversion     History of radiofrequency ablation procedure for cardiac arrhythmia     Hyperlipidemia     Hypertension     Sleep apnea     no trouble since Afib corrected     Past Surgical History:   Procedure Laterality Date    APPENDECTOMY      BACK SURGERY  11/15/2018    L4-5 Screws and rods    BLADDER SUSPENSION      CARDIOVERSION      atrial - onset 2015 - x5 in 2014 and 2015    CHOLECYSTECTOMY      April 2016    HYSTERECTOMY      KNEE ARTHROSCOPY W/ MENISCAL REPAIR Bilateral     therapeutic - last assessed 4/14/16    KNEE SURGERY      OH LAP,CHOLECYSTECTOMY N/A 4/26/2016    Procedure: CHOLECYSTECTOMY LAPAROSCOPIC;  Surgeon: Tammi Maciel MD;  Location: [facility];  Service: General    TONSILLECTOMY      WISDOM TOOTH EXTRACTION        Family History:     Family History   Problem Relation Age of Onset    Cancer Mother         ovarian    Heart disease Father         cardiac disorder    Cancer Father     Heart disease Brother     Heart disease Paternal Aunt     Heart disease Paternal Uncle       Social History:     Social History     Socioeconomic History    Marital status:       Spouse name: None    Number of children: None    Years of education: None    Highest education level: None   Occupational History    Occupation: retired   Social Needs    Financial resource strain: None    Food insecurity:     Worry: None     Inability: None    Transportation needs:     Medical: None     Non-medical: None   Tobacco Use    Smoking status: Never Smoker    Smokeless tobacco: Never Used   Substance and Sexual Activity    Alcohol use: Yes     Comment: social; occasional    Drug use: No    Sexual activity: None   Lifestyle    Physical activity:     Days per week: None     Minutes per session: None    Stress: None   Relationships    Social connections:     Talks on phone: None     Gets together: None     Attends Baptism service: None     Active member of club or organization: None     Attends meetings of clubs or organizations: None     Relationship status: None    Intimate partner violence:     Fear of current or ex partner: None     Emotionally abused: None     Physically abused: None     Forced sexual activity: None   Other Topics Concern    None   [facility]ative [facility]       Medications and Allergies:     Current Outpatient Medications   Medication Sig Dispense Refill    atorvastatin (LIPITOR) 20 mg tablet Take 20 mg by mouth daily   candesartan (ATACAND) 32 MG tablet Take 1 tablet (32 mg total) by mouth daily 30 tablet 5    cyclobenzaprine (FLEXERIL) 10 mg tablet Take 10 mg by mouth daily at bedtime  0    diltiazem (CARDIZEM CD) 360 MG 24 hr capsule   0    escitalopram (LEXAPRO) 10 mg tablet Take 1 tablet (10 mg total) by mouth daily 90 tablet 3    gabapentin (NEURONTIN) 300 mg capsule Take 300 mg by mouth 3 (three) times a day  2    hydrochlorothiazide (HYDRODIURIL) 25 mg tablet Take 25 mg by mouth daily Indications: High Blood Pressure   HYDROcodone-acetaminophen (NORCO) 7 5-325 mg per tablet Take 1 tablet by mouth every 4 to 6 hours as needed  0    levothyroxine (SYNTHROID) 175 mcg tablet Take 1 tablet (175 mcg total) by mouth daily 90 tablet 3    liotrix (THYROLAR-1) 60 (12 5-50) MG (MCG) TABS Take 1 tablet by mouth daily   LORazepam (ATIVAN) 0 5 mg tablet Take 1 tablet (0 5 mg total) by mouth every 8 (eight) hours as needed for anxiety 20 tablet 0    rivaroxaban (XARELTO) tablet Take 20 mg by mouth daily with dinner Indications: LD 4/22   sotalol (BETAPACE) 80 mg tablet Take 80 mg by mouth 2 (two) times a day   azithromycin (ZITHROMAX) 250 mg tablet TAKE 2 TABLETS BY MOUTH ON DAY 1 THEN 1 DAILY ON DAYS 2 THROUGH 5  0    benzonatate (TESSALON PERLES) 100 mg capsule Take 100 mg by mouth 3 (three) times a day as needed  0    predniSONE 10 mg tablet 3 tab PO x 1 today then 2 tab PO bid x 3 days then 1 tab PO bid x 3 days then 1 tab daily x 3 days then stop (Patient not taking: Reported on 10/8/2019) 25 tablet 0     No current facility-administered medications for this visit        Allergies   Allergen Reactions    Medical Tape     Nuts     Omnipaque [Iohexol] Hives    Other Hives     Pine nuts    Penicillins Hives    Sulfa Antibiotics Hives    Iodine Rash      Immunizations:     Immunization History   Administered Date(s) Administered    INFLUENZA 09/11/2014    Pneumococcal Polysaccharide PPV23 10/18/2014      Health Maintenance:         Topic Date Due    DXA SCAN  10/08/2019 (Originally 1940)         Topic Date Due    Pneumococcal Vaccine: 65+ Years (2 of 2 - PCV13) 10/18/2015    INFLUENZA VACCINE  07/01/2019      Medicare Health Risk Assessment:     /82   Pulse 57   Temp (!) 96 1 °F (35 6 °C)   Ht 5' 3" (1 6 m)   Wt 98 4 kg (217 lb)   BMI 38 44 kg/m²      Buck Mei is here for her Subsequent Wellness visit  Health Risk Assessment:   Patient rates overall health as very good  Patient feels that their physical health rating is slightly better  Eyesight was rated as same  Hearing was rated as same  Patient feels that their emotional and mental health rating is same  Pain experienced in the last 7 days has been none  Patient states that she has experienced no weight loss or gain in last 6 months  Depression Screening:   PHQ-2 Score: 0      Fall Risk Screening: In the past year, patient has experienced: history of falling in past year    Number of falls: 1  Injured during fall?: No    Feels unsteady when standing or walking?: Yes    Worried about falling?: Yes      Urinary Incontinence Screening:   Patient has not leaked urine accidently in the last six months  Home Safety:  Patient has trouble with stairs inside or outside of their home  Patient has working smoke alarms and has no working carbon monoxide detector  Home safety hazards include: none  Nutrition:   Current diet is Regular  Medications:   Patient is currently taking over-the-counter supplements  OTC medications include: see medication list  Patient is able to manage medications       Activities of Daily Living (ADLs)/Instrumental Activities of Daily Living (IADLs):   Walk and transfer into and out of bed and chair?: Yes  Dress and groom yourself?: Yes    Bathe or shower yourself?: Yes    Feed yourself? Yes  Do your laundry/housekeeping?: Yes  Manage your money, pay your bills and track your expenses?: Yes  Make your own meals?: Yes    Do your own shopping?: Yes    Previous Hospitalizations:   Any hospitalizations or ED visits within the last 12 months?: Yes    How many hospitalizations have you had in the last year?: 1-2    Advance Care Planning:   Living will: No    Durable POA for healthcare:  Yes    Advanced directive: No    Advanced directive counseling given: Yes    Five wishes given: Yes    Patient declined ACP directive: No      Cognitive Screening:   Provider or family/friend/caregiver concerned regarding cognition?: No    PREVENTIVE SCREENINGS      Cardiovascular Screening:    General: History Lipid Disorder and Screening Current      Diabetes Screening:     General: Screening Current      Colorectal Cancer Screening:     General: Screening Current      Breast Cancer Screening:     General: Screening Current      Cervical Cancer Screening:    General: Screening Not Indicated      Osteoporosis Screening:    General: Screening Current      Abdominal Aortic Aneurysm (AAA) Screening:        General: Screening Not Indicated      Lung Cancer Screening:     General: Screening Not Indicated      Hepatitis C Screening:    General: Screening Not Indicated      Kimmie Burdick MD

## 2019-10-08 NOTE — PATIENT INSTRUCTIONS
Medicare Preventive Visit Patient Instructions  Thank you for completing your Welcome to Medicare Visit or Medicare Annual Wellness Visit today  Your next wellness visit will be due in one year (10/8/2020)  The screening/preventive services that you may require over the next 5-10 years are detailed below  Some tests may not apply to you based off risk factors and/or age  Screening tests ordered at today's visit but not completed yet may show as past due  Also, please note that scanned in results may not display below  Preventive Screenings:  Service Recommendations Previous Testing/Comments   Colorectal Cancer Screening  * Colonoscopy    * Fecal Occult Blood Test (FOBT)/Fecal Immunochemical Test (FIT)  * Fecal DNA/Cologuard Test  * Flexible Sigmoidoscopy Age: 54-65 years old   Colonoscopy: every 10 years (may be performed more frequently if at higher risk)  OR  FOBT/FIT: every 1 year  OR  Cologuard: every 3 years  OR  Sigmoidoscopy: every 5 years  Screening may be recommended earlier than age 48 if at higher risk for colorectal cancer  Also, an individualized decision between you and your healthcare provider will decide whether screening between the ages of 74-80 would be appropriate  Colonoscopy: Not on file  FOBT/FIT: Not on file  Cologuard: Not on file  Sigmoidoscopy: Not on file         Breast Cancer Screening Age: 36 years old  Frequency: every 1-2 years  Not required if history of left and right mastectomy Mammogram: Not on file       Cervical Cancer Screening Between the ages of 21-29, pap smear recommended once every 3 years  Between the ages of 33-67, can perform pap smear with HPV co-testing every 5 years     Recommendations may differ for women with a history of total hysterectomy, cervical cancer, or abnormal pap smears in past  Pap Smear: Not on file    Screening Not Indicated   Hepatitis C Screening Once for adults born between [facility]  More frequently in patients at high risk for Hepatitis C Hep C Antibody: Not on file       Diabetes Screening 1-2 times per year if you're at risk for diabetes or have pre-diabetes Fasting glucose: No results in last 5 years   A1C: 6 0    Screening Current   Cholesterol Screening Once every 5 years if you don't have a lipid disorder  May order more often based on risk factors  Lipid panel: 06/27/2018    Screening Not Indicated  History Lipid Disorder     Other Preventive Screenings Covered by Medicare:  1  Abdominal Aortic Aneurysm (AAA) Screening: covered once if your at risk  You're considered to be at risk if you have a family history of AAA  2  Lung Cancer Screening: covers low dose CT scan once per year if you meet all of the following conditions: (1) Age 50-69; (2) No signs or symptoms of lung cancer; (3) Current smoker or have quit smoking within the last 15 years; (4) You have a tobacco smoking history of at least 30 pack years (packs per day multiplied by number of years you smoked); (5) You get a written order from a healthcare provider  3  Glaucoma Screening: covered annually if you're considered high risk: (1) You have diabetes OR (2) Family history of glaucoma OR (3)  aged 48 and older OR (3)  American aged 72 and older  3  Osteoporosis Screening: covered every 2 years if you meet one of the following conditions: (1) You're estrogen deficient and at risk for osteoporosis based off medical history and other findings; (2) Have a vertebral abnormality; (3) On glucocorticoid therapy for more than 3 months; (4) Have primary hyperparathyroidism; (5) On osteoporosis medications and need to assess response to drug therapy  · Last bone density test (DXA Scan): Not on file  5  HIV Screening: covered annually if you're between the age of 12-76  Also covered annually if you are younger than 13 and older than 72 with risk factors for HIV infection   For pregnant patients, it is covered up to 3 times per pregnancy  Immunizations:  Immunization Recommendations   Influenza Vaccine Annual influenza vaccination during flu season is recommended for all persons aged >= 6 months who do not have contraindications   Pneumococcal Vaccine (Prevnar and Pneumovax)  * Prevnar = PCV13  * Pneumovax = PPSV23   Adults 25-60 years old: 1-3 doses may be recommended based on certain risk factors  Adults 72 years old: Prevnar (PCV13) vaccine recommended followed by Pneumovax (PPSV23) vaccine  If already received PPSV23 since turning 65, then PCV13 recommended at least one year after PPSV23 dose  Hepatitis B Vaccine 3 dose series if at intermediate or high risk (ex: diabetes, end stage renal disease, liver disease)   Tetanus (Td) Vaccine - COST NOT COVERED BY MEDICARE PART B Following completion of primary series, a booster dose should be given every 10 years to maintain immunity against tetanus  Td may also be given as tetanus wound prophylaxis  Tdap Vaccine - COST NOT COVERED BY MEDICARE PART B Recommended at least once for all adults  For pregnant patients, recommended with each pregnancy  Shingles Vaccine (Shingrix) - COST NOT COVERED BY MEDICARE PART B  2 shot series recommended in those aged 48 and above     Health Maintenance Due:      Topic Date Due    DXA SCAN  10/08/2019 (Originally 1940)     Immunizations Due:      Topic Date Due    Pneumococcal Vaccine: 65+ Years (2 of 2 - PCV13) 10/18/2015    INFLUENZA VACCINE  07/01/2019     Advance Directives   What are advance directives? Advance directives are legal documents that state your wishes and plans for medical care  These plans are made ahead of time in case you lose your ability to make decisions for yourself  Advance directives can apply to any medical decision, such as the treatments you want, and if you want to donate organs  What are the types of advance directives?   There are many types of advance directives, and each state has rules about how to use them  You may choose a combination of any of the following:  · Living will: This is a written record of the treatment you want  You can also choose which treatments you do not want, which to limit, and which to stop at a certain time  This includes surgery, medicine, IV fluid, and tube feedings  · Durable power of  for healthcare [facility]): This is a written record that states who you want to make healthcare choices for you when you are unable to make them for yourself  This person, called a proxy, is usually a family member or a friend  You may choose more than 1 proxy  · Do not resuscitate (DNR) order:  A DNR order is used in case your heart stops beating or you stop breathing  It is a request not to have certain forms of treatment, such as CPR  A DNR order may be included in other types of advance directives  · Medical directive: This covers the care that you want if you are in a coma, near death, or unable to make decisions for yourself  You can list the treatments you want for each condition  Treatment may include pain medicine, surgery, blood transfusions, dialysis, IV or tube feedings, and a ventilator (breathing machine)  · Values history: This document has questions about your views, beliefs, and how you feel and think about life  This information can help others choose the care that you would choose  Why are advance directives important? An advance directive helps you control your care  Although spoken wishes may be used, it is better to have your wishes written down  Spoken wishes can be misunderstood, or not followed  Treatments may be given even if you do not want them  An advance directive may make it easier for your family to make difficult choices about your care  Fall Prevention    Fall prevention  includes ways to make your home and other areas safer  It also includes ways you can move more carefully to prevent a fall   Health conditions that cause changes in your blood pressure, vision, or muscle strength and coordination may increase your risk for falls  Medicines may also increase your risk for falls if they make you dizzy, weak, or sleepy  Fall prevention tips:   · Stand or sit up slowly  · Use assistive devices as directed  · Wear shoes that fit well and have soles that   · Wear a personal alarm  · Stay active  · Manage your medical conditions  Home Safety Tips:  · Add items to prevent falls in the bathroom  · Keep paths clear  · Install bright lights in your home  · Keep items you use often on shelves within reach  · Paint or place reflective tape on the edges of your stairs  Weight Management   Why it is important to manage your weight:  Being overweight increases your risk of health conditions such as heart disease, high blood pressure, type 2 diabetes, and certain types of cancer  It can also increase your risk for osteoarthritis, sleep apnea, and other respiratory problems  Aim for a slow, steady weight loss  Even a small amount of weight loss can lower your risk of health problems  How to lose weight safely:  A safe and healthy way to lose weight is to eat fewer calories and get regular exercise  You can lose up about 1 pound a week by decreasing the number of calories you eat by 500 calories each day  Healthy meal plan for weight management:  A healthy meal plan includes a variety of foods, contains fewer calories, and helps you stay healthy  A healthy meal plan includes the following:  · Eat whole-grain foods more often  A healthy meal plan should contain fiber  Fiber is the part of grains, fruits, and vegetables that is not broken down by your body  Whole-grain foods are healthy and provide extra fiber in your diet  Some examples of whole-grain foods are whole-wheat breads and pastas, oatmeal, brown rice, and bulgur  · Eat a variety of vegetables every day    Include dark, leafy greens such as spinach, kale, stephen greens, and mustard greens  Eat yellow and orange vegetables such as carrots, sweet potatoes, and winter squash  · Eat a variety of fruits every day  Choose fresh or canned fruit (canned in its own juice or light syrup) instead of juice  Fruit juice has very little or no fiber  · Eat low-fat dairy foods  Drink fat-free (skim) milk or 1% milk  Eat fat-free yogurt and low-fat cottage cheese  Try low-fat cheeses such as mozzarella and other reduced-fat cheeses  · Choose meat and other protein foods that are low in fat  Choose beans or other legumes such as split peas or lentils  Choose fish, skinless poultry (chicken or turkey), or lean cuts of red meat (beef or pork)  Before you cook meat or poultry, cut off any visible fat  · Use less fat and oil  Try baking foods instead of frying them  Add less fat, such as margarine, sour cream, regular salad dressing and mayonnaise to foods  Eat fewer high-fat foods  Some examples of high-fat foods include french fries, doughnuts, ice cream, and cakes  · Eat fewer sweets  Limit foods and drinks that are high in sugar  This includes candy, cookies, regular soda, and sweetened drinks  Exercise:  Exercise at least 30 minutes per day on most days of the week  Some examples of exercise include walking, biking, dancing, and swimming  You can also fit in more physical activity by taking the stairs instead of the elevator or parking farther away from stores  Ask your healthcare provider about the best exercise plan for you  © Copyright [employer] 2018 Information is for End User's use only and may not be sold, redistributed or otherwise used for commercial purposes   All illustrations and images included in CareNotes® are the copyrighted property of A D A M , Inc  or [address] [place] [address] [employer]

## 2019-10-11 ENCOUNTER — TELEPHONE (OUTPATIENT)
Dept: FAMILY MEDICINE CLINIC | Facility: HOSPITAL | Age: 79
End: 2019-10-11

## 2019-10-11 DIAGNOSIS — M54.17 LUMBOSACRAL RADICULOPATHY DUE TO INTERVERTEBRAL DISC DISORDER: Primary | ICD-10-CM

## 2019-10-11 RX ORDER — HYDROCODONE BITARTRATE AND ACETAMINOPHEN 7.5; 325 MG/1; MG/1
1 TABLET ORAL EVERY 6 HOURS PRN
Qty: 30 TABLET | Refills: 0 | Status: SHIPPED | OUTPATIENT
Start: 2019-10-11 | End: 2021-02-24 | Stop reason: SDUPTHER
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
## 2019-11-08 DIAGNOSIS — F39 MOOD DISORDER (HCC): ICD-10-CM

## 2019-11-08 RX ORDER — ESCITALOPRAM OXALATE 10 MG/1
10 TABLET ORAL DAILY
Qty: 90 TABLET | Refills: 3 | Status: SHIPPED | OUTPATIENT
Start: 2019-11-08 | End: 2019-11-13 | Stop reason: SDUPTHER

## 2019-11-13 DIAGNOSIS — F39 MOOD DISORDER (HCC): ICD-10-CM

## 2019-11-13 RX ORDER — ESCITALOPRAM OXALATE 10 MG/1
10 TABLET ORAL DAILY
Qty: 90 TABLET | Refills: 3 | Status: SHIPPED | OUTPATIENT
Start: 2019-11-13 | End: 2020-12-27

## 2020-02-26 ENCOUNTER — TELEPHONE (OUTPATIENT)
Dept: FAMILY MEDICINE CLINIC | Facility: HOSPITAL | Age: 80
End: 2020-02-26

## 2020-02-26 DIAGNOSIS — R26.9 GAIT ABNORMALITY: Primary | ICD-10-CM

## 2020-06-06 DIAGNOSIS — E03.9 ACQUIRED HYPOTHYROIDISM: ICD-10-CM

## 2020-06-08 RX ORDER — LEVOTHYROXINE SODIUM 175 UG/1
TABLET ORAL
Qty: 90 TABLET | Refills: 3 | Status: SHIPPED | OUTPATIENT
Start: 2020-06-08 | End: 2021-06-01

## 2020-06-11 ENCOUNTER — OFFICE VISIT (OUTPATIENT)
Dept: FAMILY MEDICINE CLINIC | Facility: HOSPITAL | Age: 80
End: 2020-06-11
Payer: MEDICARE

## 2020-06-11 VITALS
BODY MASS INDEX: 36.39 KG/M2 | TEMPERATURE: 97.1 F | OXYGEN SATURATION: 97 % | DIASTOLIC BLOOD PRESSURE: 78 MMHG | HEIGHT: 63 IN | WEIGHT: 205.4 LBS | SYSTOLIC BLOOD PRESSURE: 130 MMHG | HEART RATE: 66 BPM

## 2020-06-11 DIAGNOSIS — E78.2 MIXED HYPERLIPIDEMIA: ICD-10-CM

## 2020-06-11 DIAGNOSIS — L57.0 MULTIPLE ACTINIC KERATOSES: ICD-10-CM

## 2020-06-11 DIAGNOSIS — E66.01 CLASS 2 SEVERE OBESITY DUE TO EXCESS CALORIES WITH SERIOUS COMORBIDITY AND BODY MASS INDEX (BMI) OF 38.0 TO 38.9 IN ADULT (HCC): ICD-10-CM

## 2020-06-11 DIAGNOSIS — F39 MOOD DISORDER (HCC): ICD-10-CM

## 2020-06-11 DIAGNOSIS — R26.9 GAIT ABNORMALITY: ICD-10-CM

## 2020-06-11 DIAGNOSIS — E03.9 ACQUIRED HYPOTHYROIDISM: ICD-10-CM

## 2020-06-11 DIAGNOSIS — I10 ESSENTIAL HYPERTENSION: Primary | ICD-10-CM

## 2020-06-11 PROCEDURE — 4040F PNEUMOC VAC/ADMIN/RCVD: CPT | Performed by: FAMILY MEDICINE

## 2020-06-11 PROCEDURE — 99214 OFFICE O/P EST MOD 30 MIN: CPT | Performed by: FAMILY MEDICINE

## 2020-06-11 PROCEDURE — 3008F BODY MASS INDEX DOCD: CPT | Performed by: FAMILY MEDICINE

## 2020-06-11 PROCEDURE — 3078F DIAST BP <80 MM HG: CPT | Performed by: FAMILY MEDICINE

## 2020-06-11 PROCEDURE — 1036F TOBACCO NON-USER: CPT | Performed by: FAMILY MEDICINE

## 2020-06-11 PROCEDURE — 1160F RVW MEDS BY RX/DR IN RCRD: CPT | Performed by: FAMILY MEDICINE

## 2020-06-11 PROCEDURE — 3075F SYST BP GE 130 - 139MM HG: CPT | Performed by: FAMILY MEDICINE

## 2020-06-11 RX ORDER — VALSARTAN 320 MG
TABLET ORAL
COMMUNITY
Start: 2020-05-06

## 2020-08-11 ENCOUNTER — TELEPHONE (OUTPATIENT)
Dept: FAMILY MEDICINE CLINIC | Facility: HOSPITAL | Age: 80
End: 2020-08-11

## 2020-08-11 NOTE — TELEPHONE ENCOUNTER
PATIENT JUST GOT HIRED AT [facility] AND NEEDS A COVID TEST PRIOR TO ATTENDING ORIENTATION - COULD WE PLEASE ORDER FOR HER?

## 2020-08-12 DIAGNOSIS — Z11.59 ENCOUNTER FOR SCREENING FOR OTHER VIRAL DISEASES: Primary | ICD-10-CM

## 2020-08-12 DIAGNOSIS — Z11.59 ENCOUNTER FOR SCREENING FOR OTHER VIRAL DISEASES: ICD-10-CM

## 2020-08-12 PROCEDURE — U0003 INFECTIOUS AGENT DETECTION BY NUCLEIC ACID (DNA OR RNA); SEVERE ACUTE RESPIRATORY SYNDROME CORONAVIRUS 2 (SARS-COV-2) (CORONAVIRUS DISEASE [COVID-19]), AMPLIFIED PROBE TECHNIQUE, MAKING USE OF HIGH THROUGHPUT TECHNOLOGIES AS DESCRIBED BY CMS-2020-01-R: HCPCS | Performed by: FAMILY MEDICINE

## 2020-08-13 LAB — SARS-COV-2 RNA SPEC QL NAA+PROBE: NOT DETECTED

## 2020-09-02 ENCOUNTER — TELEPHONE (OUTPATIENT)
Dept: FAMILY MEDICINE CLINIC | Facility: HOSPITAL | Age: 80
End: 2020-09-02

## 2020-09-02 DIAGNOSIS — S99.929A INJURY OF TOE, UNSPECIFIED LATERALITY, INITIAL ENCOUNTER: Primary | ICD-10-CM

## 2020-09-02 RX ORDER — HYDROCODONE BITARTRATE AND ACETAMINOPHEN 5; 325 MG/1; MG/1
1 TABLET ORAL EVERY 6 HOURS PRN
Qty: 20 TABLET | Refills: 0 | Status: SHIPPED | OUTPATIENT
Start: 2020-09-02 | End: 2021-02-24 | Stop reason: SDUPTHER

## 2020-09-02 NOTE — TELEPHONE ENCOUNTER
[place] urgent care provider did not give her any pain medication because she is on xarelto  She is hoping we can send pain medication to her small [facility] pharmacy  She said tylenol is not working for her pain  She is elevating and icing the injury

## 2020-09-02 NOTE — TELEPHONE ENCOUNTER
Patient seen at [facility] urgent care today for broken toe  She is asking for something for pain  Xray report in media tab of pt's chart   PCB

## 2020-11-03 ENCOUNTER — IMMUNIZATIONS (OUTPATIENT)
Dept: FAMILY MEDICINE CLINIC | Facility: HOSPITAL | Age: 80
End: 2020-11-03
Payer: MEDICARE

## 2020-11-03 DIAGNOSIS — Z23 ENCOUNTER FOR IMMUNIZATION: ICD-10-CM

## 2020-11-03 PROCEDURE — G0008 ADMIN INFLUENZA VIRUS VAC: HCPCS

## 2020-11-03 PROCEDURE — 90662 IIV NO PRSV INCREASED AG IM: CPT

## 2020-12-15 ENCOUNTER — OFFICE VISIT (OUTPATIENT)
Dept: FAMILY MEDICINE CLINIC | Facility: HOSPITAL | Age: 80
End: 2020-12-15
Payer: MEDICARE

## 2020-12-15 VITALS
HEART RATE: 62 BPM | BODY MASS INDEX: 36.02 KG/M2 | HEIGHT: 64 IN | TEMPERATURE: 96.2 F | WEIGHT: 211 LBS | SYSTOLIC BLOOD PRESSURE: 134 MMHG | DIASTOLIC BLOOD PRESSURE: 84 MMHG

## 2020-12-15 DIAGNOSIS — I48.19 PERSISTENT ATRIAL FIBRILLATION (HCC): ICD-10-CM

## 2020-12-15 DIAGNOSIS — E78.2 MIXED HYPERLIPIDEMIA: ICD-10-CM

## 2020-12-15 DIAGNOSIS — Z00.00 MEDICARE ANNUAL WELLNESS VISIT, SUBSEQUENT: Primary | ICD-10-CM

## 2020-12-15 DIAGNOSIS — E66.01 CLASS 2 SEVERE OBESITY DUE TO EXCESS CALORIES WITH SERIOUS COMORBIDITY AND BODY MASS INDEX (BMI) OF 36.0 TO 36.9 IN ADULT (HCC): ICD-10-CM

## 2020-12-15 DIAGNOSIS — E03.9 ACQUIRED HYPOTHYROIDISM: ICD-10-CM

## 2020-12-15 DIAGNOSIS — I10 ESSENTIAL HYPERTENSION: ICD-10-CM

## 2020-12-15 DIAGNOSIS — R73.03 PREDIABETES: ICD-10-CM

## 2020-12-15 PROCEDURE — G0439 PPPS, SUBSEQ VISIT: HCPCS | Performed by: FAMILY MEDICINE

## 2020-12-15 PROCEDURE — 1123F ACP DISCUSS/DSCN MKR DOCD: CPT | Performed by: FAMILY MEDICINE

## 2020-12-15 PROCEDURE — 99214 OFFICE O/P EST MOD 30 MIN: CPT | Performed by: FAMILY MEDICINE

## 2020-12-26 DIAGNOSIS — F39 MOOD DISORDER (HCC): ICD-10-CM

## 2020-12-27 RX ORDER — ESCITALOPRAM OXALATE 10 MG/1
TABLET ORAL
Qty: 90 TABLET | Refills: 3 | Status: SHIPPED | OUTPATIENT
Start: 2020-12-27 | End: 2021-12-01

## 2021-02-24 ENCOUNTER — HOSPITAL ENCOUNTER (OUTPATIENT)
Dept: RADIOLOGY | Facility: HOSPITAL | Age: 81
Discharge: HOME | End: 2021-02-24
Payer: MEDICARE

## 2021-02-24 ENCOUNTER — OFFICE VISIT (OUTPATIENT)
Dept: FAMILY MEDICINE CLINIC | Facility: HOSPITAL | Age: 81
End: 2021-02-24
Payer: MEDICARE

## 2021-02-24 VITALS
DIASTOLIC BLOOD PRESSURE: 70 MMHG | TEMPERATURE: 96.3 F | HEIGHT: 64 IN | SYSTOLIC BLOOD PRESSURE: 120 MMHG | BODY MASS INDEX: 36.47 KG/M2 | HEART RATE: 56 BPM | WEIGHT: 213.6 LBS

## 2021-02-24 DIAGNOSIS — R26.9 ABNORMAL GAIT DUE TO MUSCLE WEAKNESS: ICD-10-CM

## 2021-02-24 DIAGNOSIS — M62.81 ABNORMAL GAIT DUE TO MUSCLE WEAKNESS: ICD-10-CM

## 2021-02-24 DIAGNOSIS — S49.92XA SHOULDER INJURY, LEFT, INITIAL ENCOUNTER: ICD-10-CM

## 2021-02-24 DIAGNOSIS — S49.92XA SHOULDER INJURY, LEFT, INITIAL ENCOUNTER: Primary | ICD-10-CM

## 2021-02-24 PROCEDURE — 73030 X-RAY EXAM OF SHOULDER: CPT

## 2021-02-24 PROCEDURE — 99213 OFFICE O/P EST LOW 20 MIN: CPT | Performed by: FAMILY MEDICINE

## 2021-02-24 RX ORDER — HYDROCODONE BITARTRATE AND ACETAMINOPHEN 5; 325 MG/1; MG/1
1 TABLET ORAL EVERY 6 HOURS PRN
Qty: 20 TABLET | Refills: 0 | Status: SHIPPED | OUTPATIENT
Start: 2021-02-24 | End: 2021-03-01 | Stop reason: SDUPTHER

## 2021-03-01 ENCOUNTER — TELEPHONE (OUTPATIENT)
Dept: FAMILY MEDICINE CLINIC | Facility: HOSPITAL | Age: 81
End: 2021-03-01

## 2021-03-01 DIAGNOSIS — M25.512 ACUTE PAIN OF LEFT SHOULDER: ICD-10-CM

## 2021-03-01 DIAGNOSIS — G47.01 INSOMNIA DUE TO MEDICAL CONDITION: Primary | ICD-10-CM

## 2021-03-01 DIAGNOSIS — S49.92XA SHOULDER INJURY, LEFT, INITIAL ENCOUNTER: ICD-10-CM

## 2021-03-01 RX ORDER — HYDROCODONE BITARTRATE AND ACETAMINOPHEN 5; 325 MG/1; MG/1
1 TABLET ORAL EVERY 6 HOURS PRN
Qty: 20 TABLET | Refills: 0 | Status: SHIPPED | OUTPATIENT
Start: 2021-03-01 | End: 2021-03-19 | Stop reason: SDUPTHER

## 2021-03-01 RX ORDER — ZOLPIDEM TARTRATE 5 MG/1
5 TABLET ORAL
Qty: 15 TABLET | Refills: 0 | Status: SHIPPED | OUTPATIENT
Start: 2021-03-01 | End: 2021-03-16 | Stop reason: SDUPTHER

## 2021-03-01 NOTE — TELEPHONE ENCOUNTER
Pt reports shoulder pain is a "10"  Pain wakes her up at night   Throbbing all the time   Asked about seeing an ortho??   Please advise  thanks

## 2021-03-01 NOTE — TELEPHONE ENCOUNTER
Pt does not want to wait to see Ortho - she can't sleep  Shoulder pain throbbing  Can you send in a pain med until she is able to see Ortho? Pharmacy closes at 7 tonight  Would like something sent in before then if you could  [place]

## 2021-03-03 ENCOUNTER — OFFICE VISIT (OUTPATIENT)
Dept: OBGYN CLINIC | Facility: CLINIC | Age: 81
End: 2021-03-03

## 2021-03-03 VITALS
DIASTOLIC BLOOD PRESSURE: 82 MMHG | WEIGHT: 213 LBS | TEMPERATURE: 97.8 F | BODY MASS INDEX: 36.37 KG/M2 | SYSTOLIC BLOOD PRESSURE: 126 MMHG | HEIGHT: 64 IN

## 2021-03-03 DIAGNOSIS — M75.32 CALCIFIC TENDINITIS OF LEFT SHOULDER: Primary | ICD-10-CM

## 2021-03-03 DIAGNOSIS — M19.012 PRIMARY OSTEOARTHRITIS OF LEFT SHOULDER: ICD-10-CM

## 2021-03-03 DIAGNOSIS — M25.512 ACUTE PAIN OF LEFT SHOULDER: ICD-10-CM

## 2021-03-03 RX ORDER — LIDOCAINE HYDROCHLORIDE 10 MG/ML
5 INJECTION, SOLUTION EPIDURAL; INFILTRATION; INTRACAUDAL; PERINEURAL
Status: COMPLETED | OUTPATIENT
Start: 2021-03-03 | End: 2021-03-03

## 2021-03-03 RX ORDER — BETAMETHASONE SODIUM PHOSPHATE AND BETAMETHASONE ACETATE 3; 3 MG/ML; MG/ML
6 INJECTION, SUSPENSION INTRA-ARTICULAR; INTRALESIONAL; INTRAMUSCULAR; SOFT TISSUE
Status: COMPLETED | OUTPATIENT
Start: 2021-03-03 | End: 2021-03-03

## 2021-03-03 RX ADMIN — BETAMETHASONE SODIUM PHOSPHATE AND BETAMETHASONE ACETATE 6 MG: 3; 3 INJECTION, SUSPENSION INTRA-ARTICULAR; INTRALESIONAL; INTRAMUSCULAR; SOFT TISSUE at 14:28

## 2021-03-03 RX ADMIN — LIDOCAINE HYDROCHLORIDE 5 ML: 10 INJECTION, SOLUTION EPIDURAL; INFILTRATION; INTRACAUDAL; PERINEURAL at 14:28

## 2021-03-03 NOTE — PROGRESS NOTES
Assessment:     1  Calcific tendinitis of left shoulder    2  Primary osteoarthritis of left shoulder    3  Acute pain of left shoulder        Plan:     Problem List Items Addressed This Visit        Musculoskeletal and Integument    Calcific tendinitis of left shoulder - Primary    Relevant Medications    lidocaine (PF) (XYLOCAINE-MPF) 1 % injection 5 mL (Completed)    betamethasone acetate-betamethasone sodium phosphate (CELESTONE) injection 6 mg (Completed)    Other Relevant Orders    Large joint arthrocentesis: L subacromial bursa (Completed)    Ambulatory referral to Physical Therapy    Primary osteoarthritis of left shoulder    Relevant Medications    lidocaine (PF) (XYLOCAINE-MPF) 1 % injection 5 mL (Completed)    betamethasone acetate-betamethasone sodium phosphate (CELESTONE) injection 6 mg (Completed)    Other Relevant Orders    Large joint arthrocentesis: L subacromial bursa (Completed)    Ambulatory referral to Physical Therapy      Other Visit Diagnoses     Acute pain of left shoulder        Relevant Medications    lidocaine (PF) (XYLOCAINE-MPF) 1 % injection 5 mL (Completed)    betamethasone acetate-betamethasone sodium phosphate (CELESTONE) injection 6 mg (Completed)    Other Relevant Orders    Large joint arthrocentesis: L subacromial bursa (Completed)    Ambulatory referral to Physical Therapy          Findings consistent with left shoulder osteoarthritis and calcific tendinitis  I reviewed patient's left shoulder x-ray with her  Discussed prognosis of her shoulder condition  I spoke to the patient today the arthritis in her shoulder likely exacerbated due to the fall and she developed calcific tendinitis  Treatment options include:  Steroid injection and physical therapy  Risks and benefits were provided to the patient for the steroid injection  She was agreeable to this injection  She tolerated the injection without difficulty with some pain relief    A script for physical therapy was provided to the patient at today's visit  I instructed her to ice her shoulder today for 10-15 minutes  She may resume her normal activities as tolerated  I will see her back in 6 weeks for a clinical re-evaluation  All patient's questions were answered to her satisfaction  This note is created using dictation transcription  It may contain typographical errors, grammatical errors, improperly dictated words, background noise and other errors  Subjective:     Patient ID: Kaela Lopez is a [de-identified] y o  female  Chief Complaint:  Patient is an 81-year-old who presents here today for initial evaluation of her left shoulder pain  She states on 02/20/2021 she fell on ice with her arm extended  She states she began to develop pain 2 days later  She was seen by her primary care physician who prescribed her a pain killer and ordered x-rays  She states the pain killers did not help with pain relief  She notes a constant throbbing pain which is worse with activities  The pain will radiate down her arm  She notes that her activities of daily living are limited due to weakness in her her left arm  She is right-hand dominant  She denies any previous injury  She denies any numbness or tingling  Information on patient's intake form was reviewed      Allergy:  Allergies   Allergen Reactions    Medical Tape     Nuts     Omnipaque [Iohexol] Hives    Other Hives     Pine nuts    Penicillins Hives    Sulfa Antibiotics Hives    Iodine (Food Allergy) Rash     Medications:  all current active meds have been reviewed  Past Medical History:  Past Medical History:   Diagnosis Date    Anxiety     Cholecystitis     Chronic calculous cholecystitis     last assessed 4/25/16    History of cardioversion     History of radiofrequency ablation procedure for cardiac arrhythmia     Hyperlipidemia     Hypertension     Sleep apnea     no trouble since Afib corrected     Past Surgical History:  Past Surgical History: Procedure Laterality Date    APPENDECTOMY      BACK SURGERY  11/15/2018    L4-5 Screws and rods    BLADDER SUSPENSION      CARDIOVERSION      atrial - onset 2015 - x5 in 2014 and 2015    CHOLECYSTECTOMY      April 2016    HYSTERECTOMY      KNEE ARTHROSCOPY W/ MENISCAL REPAIR Bilateral     therapeutic - last assessed 4/14/16    KNEE SURGERY      [place] LAP,CHOLECYSTECTOMY N/A 4/26/2016    Procedure: CHOLECYSTECTOMY LAPAROSCOPIC;  Surgeon: Jason Ellis MD;  Location:  MAIN OR;  Service: General    TONSILLECTOMY      WISDOM TOOTH EXTRACTION       Family History:  Family History   Problem Relation Age of Onset    Cancer Mother         ovarian    Heart disease Father         cardiac disorder    Cancer Father     Heart disease Brother     Heart disease Paternal Aunt     Heart disease Paternal Uncle      Social History:  Social History     Substance and Sexual Activity   Alcohol Use Yes    Comment: social; occasional     Social History     Substance and Sexual Activity   Drug Use No     Social History     Tobacco Use   Smoking Status Never Smoker   Smokeless Tobacco Never Used     Review of Systems   Constitutional: Positive for activity change  HENT: Negative  Eyes: Negative  Respiratory: Negative  Cardiovascular: Negative  Gastrointestinal: Negative  Endocrine: Negative  Genitourinary: Negative  Musculoskeletal: Positive for arthralgias (Left shoulder), back pain and joint swelling  Allergic/Immunologic: Negative  Neurological: Negative  Hematological: Negative  Psychiatric/Behavioral: Negative  Objective:  BP Readings from Last 1 Encounters:   03/03/21 126/82      Wt Readings from Last 1 Encounters:   03/03/21 96 6 kg (213 lb)      BMI:   Estimated body mass index is 36 56 kg/m² as calculated from the following:    Height as of this encounter: 5' 4" (1 626 m)  Weight as of this encounter: 96 6 kg (213 lb)    BSA:   Estimated body surface area is 2 01 meters squared as calculated from the following:    Height as of this encounter: 5' 4" (1 626 m)  Weight as of this encounter: 96 6 kg (213 lb)  Physical Exam  Vitals signs and nursing note reviewed  Constitutional:       Appearance: Normal appearance  She is well-developed  HENT:      Head: Normocephalic and atraumatic  Right Ear: External ear normal       Left Ear: External ear normal    Eyes:      Extraocular Movements: Extraocular movements intact  Conjunctiva/sclera: Conjunctivae normal    Neck:      Musculoskeletal: Neck supple  Pulmonary:      Effort: Pulmonary effort is normal    Skin:     General: Skin is warm and dry  Neurological:      Mental Status: She is alert and oriented to person, place, and time  Deep Tendon Reflexes: Reflexes are normal and symmetric  Psychiatric:         Mood and Affect: Mood normal          Behavior: Behavior normal        Left Shoulder Exam     Tenderness   The patient is experiencing tenderness in the biceps tendon  Range of Motion   Active abduction:  70 (pain) abnormal   Passive abduction:  110 (pain) abnormal   Forward flexion: abnormal Left shoulder forward flexion: Pain, active 110, passive 150  Internal rotation 0 degrees: abnormal     Muscle Strength   Abduction: 4/5   Internal rotation: 4/5   External rotation: 4/5   Biceps: 4/5     Tests   Drop arm: negative    Other   Erythema: absent  Scars: absent  Sensation: normal  Pulse: present             I have personally reviewed pertinent films in PACS and my interpretation is as follows:  X-rays taken 02/24/2021 her left shoulder demonstrates mild osteoarthritis with calcific tendinitis  There is presence of osteophytes  No acute fractures dislocations appreciated  Large joint arthrocentesis: L subacromial bursa  Universal Protocol:  Consent: Verbal consent obtained    Risks and benefits: risks, benefits and alternatives were discussed  Consent given by: patient  Patient understanding: patient states understanding of the procedure being performed  Site marked: the operative site was marked  Supporting Documentation  Indications: pain and joint swelling   Procedure Details  Location: shoulder - L subacromial bursa  Needle size: 25 G  Ultrasound guidance: no  Approach: posterolateral  Medications administered: 5 mL lidocaine (PF) 1 %; 6 mg betamethasone acetate-betamethasone sodium phosphate 6 (3-3) mg/mL    Patient tolerance: patient tolerated the procedure well with no immediate complications  Dressing:  Sterile dressing applied        Scribe Attestation    I,:  Lovely Phan am acting as a scribe while in the presence of the attending physician :       I,:  Vincent Castellanos MD personally performed the services described in this documentation    as scribed in my presence :

## 2021-03-03 NOTE — PATIENT INSTRUCTIONS
Knee Exercises   AMBULATORY CARE:   What you need to know about knee exercises:  Knee exercises help strengthen the muscles around your knee  Strong muscles can help reduce pain and decrease your risk of future injury  Knee exercises also help you heal after an injury or surgery  · Start slow  These are beginning exercises  Ask your healthcare provider if you need to see a physical therapist for more advanced exercises  As you get stronger, you may be able to do more sets of each exercise or add weights  · Stop if you feel pain  It is normal to feel some discomfort at first  Regular exercise will help decrease your discomfort over time  · Do the exercises on both legs  Do this so both knees remain strong  · Warm up before you do knee exercises  Walk or ride a stationary bike for 5 or 10 minutes to warm your muscles  How to perform knee stretches safely:  Always stretch before you do strengthening exercises  Do these stretching exercises again after you do the strengthening exercises  Do these stretches 4 or 5 days a week, or as directed  · Standing calf stretch: Face a wall and place both palms flat on the wall, or hold the back of a chair for balance  Keep a slight bend in your knees  Take a big step backward with one leg  Keep your other leg directly under you  Keep both heels flat and press your hips forward  Hold the stretch for 30 seconds, and then relax for 30 seconds  Switch legs  Repeat 2 or 3 times on each leg  · Standing quadriceps stretch:  Stand and place one hand against a wall or hold the back of a chair for balance  With your weight on one leg, bend your other leg and grab your ankle  Bring your heel toward your buttocks  Hold the stretch for 30 to 60 seconds  Switch legs  Repeat 2 or 3 times on each leg  · Sitting hamstring stretch:  Sit with both legs straight in front of you  Do not point or flex your toes   Place your palms on the floor and slide your hands forward until you feel the stretch  Do not round your back  Hold the stretch for 30 seconds  Repeat 2 or 3 times  How to perform knee strengthening exercises safely:  Do these exercises 4 or 5 days a week, or as directed  · Standing half squats:  Stand with your feet shoulder-width apart  Lean your back against a wall or hold the back of a chair for balance, if needed  Slowly sit down about 10 inches, as if you are going to sit in a chair  Your body weight should be mostly over your heels  Hold the squat for 5 seconds, then rise to a standing position  Do 3 sets of 10 squats to strengthen your buttocks and thighs  · Standing hamstring curls: Face a wall and place both palms flat on the wall, or hold the back of a chair for balance  With your weight on one leg, lift your other foot as close to your buttocks as you can  Hold for 5 seconds and then lower your leg  Do 2 sets of 10 curls on each leg  This exercise strengthens the muscles in the back of your thigh  · Standing calf raises:  Face a wall and place both palms flat on the wall, or hold the back of a chair for balance  Stand up straight, and do not lean  Place all your weight on one leg by lifting the other foot off the floor  Raise the heel of the foot that is on the floor as high as you can and then lower it  Do 2 sets of 10 calf raises on each leg to strengthen your calf muscles  · Straight leg lifts:  Lie on your stomach with straight legs  Fold your arms in front of you and rest your head in your arms  Tighten your leg muscles and raise one leg as high as you can  Hold for 5 seconds, then lower your leg  Do 2 sets of 10 lifts on each leg to strengthen your buttocks  · Sitting leg lifts:  Sit in a chair  Slowly straighten and raise one leg  Squeeze your thigh muscles and hold for 5 seconds  Relax and return your foot to the floor  Do 2 sets of 10 lifts on each leg   This helps strengthen the muscles in the front of your thigh  Contact your healthcare provider if:   · You have new pain or your pain becomes worse  · You have questions or concerns about your condition or care  © Copyright [address] Information is for End User's use only and may not be sold, redistributed or otherwise used for commercial purposes  All illustrations and images included in CareNotes® are the copyrighted property of A D [employer] M [employer]  or [address] [place], [place] [address]jasiel Nguyen   The above information is an  only  It is not intended as medical advice for individual conditions or treatments  Talk to your doctor, nurse or pharmacist before following any medical regimen to see if it is safe and effective for you

## 2021-03-10 ENCOUNTER — TELEPHONE (OUTPATIENT)
Dept: FAMILY MEDICINE CLINIC | Facility: HOSPITAL | Age: 81
End: 2021-03-10

## 2021-03-16 DIAGNOSIS — G47.01 INSOMNIA DUE TO MEDICAL CONDITION: ICD-10-CM

## 2021-03-16 RX ORDER — ZOLPIDEM TARTRATE 5 MG/1
5 TABLET ORAL
Qty: 15 TABLET | Refills: 3 | Status: SHIPPED | OUTPATIENT
Start: 2021-03-16 | End: 2021-03-19 | Stop reason: SDUPTHER

## 2021-03-17 ENCOUNTER — LAB REQUISITION (OUTPATIENT)
Dept: LAB | Facility: HOSPITAL | Age: 81
End: 2021-03-17
Payer: MEDICARE

## 2021-03-17 DIAGNOSIS — Z11.59 ENCOUNTER FOR SCREENING FOR OTHER VIRAL DISEASES: ICD-10-CM

## 2021-03-17 PROCEDURE — U0003 INFECTIOUS AGENT DETECTION BY NUCLEIC ACID (DNA OR RNA); SEVERE ACUTE RESPIRATORY SYNDROME CORONAVIRUS 2 (SARS-COV-2) (CORONAVIRUS DISEASE [COVID-19]), AMPLIFIED PROBE TECHNIQUE, MAKING USE OF HIGH THROUGHPUT TECHNOLOGIES AS DESCRIBED BY CMS-2020-01-R: HCPCS | Performed by: INTERNAL MEDICINE

## 2021-03-17 PROCEDURE — U0005 INFEC AGEN DETEC AMPLI PROBE: HCPCS | Performed by: INTERNAL MEDICINE

## 2021-03-18 ENCOUNTER — TELEPHONE (OUTPATIENT)
Dept: FAMILY MEDICINE CLINIC | Facility: HOSPITAL | Age: 81
End: 2021-03-18

## 2021-03-18 ENCOUNTER — TELEPHONE (OUTPATIENT)
Dept: OBGYN CLINIC | Facility: HOSPITAL | Age: 81
End: 2021-03-18

## 2021-03-18 LAB — SARS-COV-2 RNA RESP QL NAA+PROBE: NEGATIVE

## 2021-03-18 NOTE — TELEPHONE ENCOUNTER
Patient reporting she took the [place] last night - was not helpful  Her shoulder pain is still waking her up  She is not getting any sleep  Is there something else you could give her to help her sleep or for the pain?     Patient aware you are not in the office today and this will be addressed 3/19     pcb

## 2021-03-18 NOTE — TELEPHONE ENCOUNTER
Patient sees Dr Moe Reveles for pain in the left shoulder  She was in the office on 3/3/21 and she received a cortisone injection in the left shoulder  She is still in pain and she can't sleep at night  She is in PT  Patient is requesting pain medication  She's taking Tylenol and it's not doing a thing  Pharmacy on file, [address], on file      Delicia LATHAM#[phone]

## 2021-03-18 NOTE — TELEPHONE ENCOUNTER
She is being prescribed Norco by Dr Christiano Doan for pain  She needs to speak to PCP regarding pain medication  She is to continue PT since that may help the most  It will take time though

## 2021-03-18 NOTE — TELEPHONE ENCOUNTER
Joanne Mckinley was called and I gave her the message  She totally understood  She is attending therapy

## 2021-03-19 DIAGNOSIS — G47.01 INSOMNIA DUE TO MEDICAL CONDITION: ICD-10-CM

## 2021-03-19 DIAGNOSIS — S49.92XA SHOULDER INJURY, LEFT, INITIAL ENCOUNTER: ICD-10-CM

## 2021-03-19 RX ORDER — HYDROCODONE BITARTRATE AND ACETAMINOPHEN 5; 325 MG/1; MG/1
1 TABLET ORAL EVERY 6 HOURS PRN
Qty: 20 TABLET | Refills: 0 | Status: SHIPPED | OUTPATIENT
Start: 2021-03-19 | End: 2021-04-13 | Stop reason: SDUPTHER

## 2021-03-19 RX ORDER — ZOLPIDEM TARTRATE 5 MG/1
5 TABLET ORAL
Qty: 15 TABLET | Refills: 3 | Status: SHIPPED | OUTPATIENT
Start: 2021-03-19 | End: 2021-04-13 | Stop reason: SDUPTHER

## 2021-04-07 ENCOUNTER — LAB REQUISITION (OUTPATIENT)
Dept: LAB | Facility: HOSPITAL | Age: 81
End: 2021-04-07
Payer: MEDICARE

## 2021-04-07 DIAGNOSIS — Z11.59 ENCOUNTER FOR SCREENING FOR OTHER VIRAL DISEASES: ICD-10-CM

## 2021-04-07 PROCEDURE — U0003 INFECTIOUS AGENT DETECTION BY NUCLEIC ACID (DNA OR RNA); SEVERE ACUTE RESPIRATORY SYNDROME CORONAVIRUS 2 (SARS-COV-2) (CORONAVIRUS DISEASE [COVID-19]), AMPLIFIED PROBE TECHNIQUE, MAKING USE OF HIGH THROUGHPUT TECHNOLOGIES AS DESCRIBED BY CMS-2020-01-R: HCPCS | Performed by: INTERNAL MEDICINE

## 2021-04-07 PROCEDURE — U0005 INFEC AGEN DETEC AMPLI PROBE: HCPCS | Performed by: INTERNAL MEDICINE

## 2021-04-08 LAB — SARS-COV-2 RNA RESP QL NAA+PROBE: NEGATIVE

## 2021-04-13 ENCOUNTER — OFFICE VISIT (OUTPATIENT)
Dept: OBGYN CLINIC | Facility: CLINIC | Age: 81
End: 2021-04-13
Payer: MEDICARE

## 2021-04-13 VITALS
DIASTOLIC BLOOD PRESSURE: 78 MMHG | HEIGHT: 64 IN | BODY MASS INDEX: 35.51 KG/M2 | TEMPERATURE: 97.7 F | WEIGHT: 208 LBS | SYSTOLIC BLOOD PRESSURE: 116 MMHG

## 2021-04-13 DIAGNOSIS — M19.012 PRIMARY OSTEOARTHRITIS OF LEFT SHOULDER: ICD-10-CM

## 2021-04-13 DIAGNOSIS — M75.32 CALCIFIC TENDINITIS OF LEFT SHOULDER: Primary | ICD-10-CM

## 2021-04-13 DIAGNOSIS — S49.92XA SHOULDER INJURY, LEFT, INITIAL ENCOUNTER: ICD-10-CM

## 2021-04-13 DIAGNOSIS — G47.01 INSOMNIA DUE TO MEDICAL CONDITION: ICD-10-CM

## 2021-04-13 PROCEDURE — 99213 OFFICE O/P EST LOW 20 MIN: CPT | Performed by: ORTHOPAEDIC SURGERY

## 2021-04-13 RX ORDER — HYDROCODONE BITARTRATE AND ACETAMINOPHEN 5; 325 MG/1; MG/1
1 TABLET ORAL EVERY 6 HOURS PRN
Qty: 20 TABLET | Refills: 0 | Status: SHIPPED | OUTPATIENT
Start: 2021-04-13 | End: 2021-07-14 | Stop reason: ALTCHOICE

## 2021-04-13 RX ORDER — ZOLPIDEM TARTRATE 5 MG/1
5 TABLET ORAL
Qty: 15 TABLET | Refills: 3 | Status: SHIPPED | OUTPATIENT
Start: 2021-04-13 | End: 2021-05-25

## 2021-04-13 NOTE — PROGRESS NOTES
Assessment:     1  Calcific tendinitis of left shoulder    2  Primary osteoarthritis of left shoulder        Plan:     Problem List Items Addressed This Visit        Musculoskeletal and Integument    Calcific tendinitis of left shoulder - Primary    Primary osteoarthritis of left shoulder            The patient was seen and examined by Dr Ghazal Cardoso and myself  Findings consistent with left shoulder osteoarthritis and calcific tendinitis- improving  Findings and treatment options were discussed with the patient  Patient is feeling improvement with formal physical therapy  Continue PT at this time along with Tylenol arthritis as needed  Discussed importance of doing exercises at home as well  Avoid any repetitive overhead activities  Follow-up in 6 weeks for re-evaluation  All patient's questions were answered to her satisfaction  This note is created using dictation transcription  It may contain typographical errors, grammatical errors, improperly dictated words, background noise and other errors  Subjective:     Patient ID: Angelique Soliz is a [de-identified] y o  female  Chief Complaint:  Patient is an 78-year-old female following up for left shoulder calcific tendinitis and glenohumeral osteoarthritis  She was given a cortisone injection last visit which did not provide any relief  She has been attending physical therapy at [facility] twice a week since last visit  She states starting a week ago she finally started to feel significant relief in her shoulder  She states she feels about 60% better  Her PCP had prescribed Norco, but she does not feel she needs to take it anymore  She takes Tylenol arthritis as needed  Unable to take NSAIDs due to being on Xarelto      Allergy:  Allergies   Allergen Reactions    Medical Tape     Nuts - Food Allergy     Omnipaque [Iohexol] Hives    Other Hives     Pine nuts    Penicillins Hives    Sulfa Antibiotics Hives    Iodine - Food Allergy Rash     Medications:  all current active meds have been reviewed  Past Medical History:  Past Medical History:   Diagnosis Date    Anxiety     Cholecystitis     Chronic calculous cholecystitis     last assessed 4/25/16    History of cardioversion     History of radiofrequency ablation procedure for cardiac arrhythmia     Hyperlipidemia     Hypertension     Sleep apnea     no trouble since Afib corrected     Past Surgical History:  Past Surgical History:   Procedure Laterality Date    APPENDECTOMY      BACK SURGERY  11/15/2018    L4-5 Screws and rods    BLADDER SUSPENSION      CARDIOVERSION      atrial - onset 2015 - x5 in 2014 and 2015    CHOLECYSTECTOMY      April 2016    HYSTERECTOMY      KNEE ARTHROSCOPY W/ MENISCAL REPAIR Bilateral     therapeutic - last assessed 4/14/16    KNEE SURGERY      [place] LAP,CHOLECYSTECTOMY N/A 4/26/2016    Procedure: CHOLECYSTECTOMY LAPAROSCOPIC;  Surgeon: Cindy Chanel MD;  Location:  MAIN OR;  Service: General    TONSILLECTOMY      WISDOM TOOTH EXTRACTION       Family History:  Family History   Problem Relation Age of Onset    Cancer Mother         ovarian    Heart disease Father         cardiac disorder    Cancer Father     Heart disease Brother     Heart disease Paternal Aunt     Heart disease Paternal Uncle      Social History:  Social History     Substance and Sexual Activity   Alcohol Use Yes    Comment: social; occasional     Social History     Substance and Sexual Activity   Drug Use No     Social History     Tobacco Use   Smoking Status Never Smoker   Smokeless Tobacco Never Used     Review of Systems   Constitutional: Negative  HENT: Negative  Eyes: Negative  Respiratory: Negative  Cardiovascular: Negative  Gastrointestinal: Negative  Endocrine: Negative  Genitourinary: Negative  Musculoskeletal: Positive for arthralgias (Left shoulder)  Allergic/Immunologic: Negative  Neurological: Negative  Hematological: Negative  Psychiatric/Behavioral: Negative  Objective:  BP Readings from Last 1 Encounters:   04/13/21 116/78      Wt Readings from Last 1 Encounters:   04/13/21 94 3 kg (208 lb)      BMI:   Estimated body mass index is 35 7 kg/m² as calculated from the following:    Height as of this encounter: 5' 4" (1 626 m)  Weight as of this encounter: 94 3 kg (208 lb)  BSA:   Estimated body surface area is 1 99 meters squared as calculated from the following:    Height as of this encounter: 5' 4" (1 626 m)  Weight as of this encounter: 94 3 kg (208 lb)  Physical Exam  Vitals signs and nursing note reviewed  Constitutional:       Appearance: Normal appearance  She is well-developed  HENT:      Head: Normocephalic and atraumatic  Right Ear: External ear normal       Left Ear: External ear normal    Eyes:      Extraocular Movements: Extraocular movements intact  Conjunctiva/sclera: Conjunctivae normal    Neck:      Musculoskeletal: Neck supple  Pulmonary:      Effort: Pulmonary effort is normal    Skin:     General: Skin is warm and dry  Neurological:      Mental Status: She is alert and oriented to person, place, and time  Deep Tendon Reflexes: Reflexes are normal and symmetric  Psychiatric:         Mood and Affect: Mood normal          Behavior: Behavior normal        Left Shoulder Exam     Tenderness   The patient is experiencing tenderness in the biceps tendon  Range of Motion   Active abduction:  110 (pain) abnormal   Passive abduction: 170 (pain)   Forward flexion:  120 (Pain, active 110, passive 150) abnormal   Internal rotation 0 degrees:  Lumbar abnormal     Muscle Strength   Abduction: 4/5   Internal rotation: 5/5   External rotation: 5/5   Biceps: 5/5     Tests   Drop arm: negative    Other   Erythema: absent  Scars: absent  Sensation: normal  Pulse: present             No new imaging       Procedures

## 2021-04-14 ENCOUNTER — LAB REQUISITION (OUTPATIENT)
Dept: LAB | Facility: HOSPITAL | Age: 81
End: 2021-04-14
Payer: MEDICARE

## 2021-04-14 DIAGNOSIS — Z11.59 ENCOUNTER FOR SCREENING FOR OTHER VIRAL DISEASES: ICD-10-CM

## 2021-04-14 PROCEDURE — U0005 INFEC AGEN DETEC AMPLI PROBE: HCPCS | Performed by: INTERNAL MEDICINE

## 2021-04-14 PROCEDURE — U0003 INFECTIOUS AGENT DETECTION BY NUCLEIC ACID (DNA OR RNA); SEVERE ACUTE RESPIRATORY SYNDROME CORONAVIRUS 2 (SARS-COV-2) (CORONAVIRUS DISEASE [COVID-19]), AMPLIFIED PROBE TECHNIQUE, MAKING USE OF HIGH THROUGHPUT TECHNOLOGIES AS DESCRIBED BY CMS-2020-01-R: HCPCS | Performed by: INTERNAL MEDICINE

## 2021-04-15 LAB — SARS-COV-2 RNA RESP QL NAA+PROBE: NEGATIVE

## 2021-04-21 PROCEDURE — U0005 INFEC AGEN DETEC AMPLI PROBE: HCPCS | Performed by: INTERNAL MEDICINE

## 2021-04-21 PROCEDURE — U0003 INFECTIOUS AGENT DETECTION BY NUCLEIC ACID (DNA OR RNA); SEVERE ACUTE RESPIRATORY SYNDROME CORONAVIRUS 2 (SARS-COV-2) (CORONAVIRUS DISEASE [COVID-19]), AMPLIFIED PROBE TECHNIQUE, MAKING USE OF HIGH THROUGHPUT TECHNOLOGIES AS DESCRIBED BY CMS-2020-01-R: HCPCS | Performed by: INTERNAL MEDICINE

## 2021-04-22 ENCOUNTER — LAB REQUISITION (OUTPATIENT)
Dept: LAB | Facility: HOSPITAL | Age: 81
End: 2021-04-22
Payer: MEDICARE

## 2021-04-22 DIAGNOSIS — Z11.59 ENCOUNTER FOR SCREENING FOR OTHER VIRAL DISEASES: ICD-10-CM

## 2021-04-22 LAB — SARS-COV-2 RNA RESP QL NAA+PROBE: NEGATIVE

## 2021-04-26 PROCEDURE — U0003 INFECTIOUS AGENT DETECTION BY NUCLEIC ACID (DNA OR RNA); SEVERE ACUTE RESPIRATORY SYNDROME CORONAVIRUS 2 (SARS-COV-2) (CORONAVIRUS DISEASE [COVID-19]), AMPLIFIED PROBE TECHNIQUE, MAKING USE OF HIGH THROUGHPUT TECHNOLOGIES AS DESCRIBED BY CMS-2020-01-R: HCPCS | Performed by: INTERNAL MEDICINE

## 2021-04-26 PROCEDURE — U0005 INFEC AGEN DETEC AMPLI PROBE: HCPCS | Performed by: INTERNAL MEDICINE

## 2021-04-27 ENCOUNTER — LAB REQUISITION (OUTPATIENT)
Dept: LAB | Facility: HOSPITAL | Age: 81
End: 2021-04-27
Payer: MEDICARE

## 2021-04-27 DIAGNOSIS — Z11.59 ENCOUNTER FOR SCREENING FOR OTHER VIRAL DISEASES: ICD-10-CM

## 2021-04-27 LAB — SARS-COV-2 RNA RESP QL NAA+PROBE: NEGATIVE

## 2021-05-05 ENCOUNTER — LAB REQUISITION (OUTPATIENT)
Dept: LAB | Facility: HOSPITAL | Age: 81
End: 2021-05-05
Payer: MEDICARE

## 2021-05-05 DIAGNOSIS — Z11.59 ENCOUNTER FOR SCREENING FOR OTHER VIRAL DISEASES: ICD-10-CM

## 2021-05-05 PROCEDURE — U0005 INFEC AGEN DETEC AMPLI PROBE: HCPCS | Performed by: INTERNAL MEDICINE

## 2021-05-05 PROCEDURE — U0003 INFECTIOUS AGENT DETECTION BY NUCLEIC ACID (DNA OR RNA); SEVERE ACUTE RESPIRATORY SYNDROME CORONAVIRUS 2 (SARS-COV-2) (CORONAVIRUS DISEASE [COVID-19]), AMPLIFIED PROBE TECHNIQUE, MAKING USE OF HIGH THROUGHPUT TECHNOLOGIES AS DESCRIBED BY CMS-2020-01-R: HCPCS | Performed by: INTERNAL MEDICINE

## 2021-05-06 LAB — SARS-COV-2 RNA RESP QL NAA+PROBE: NEGATIVE

## 2021-05-10 ENCOUNTER — TRANSCRIBE ORDERS (OUTPATIENT)
Dept: LAB | Facility: CLINIC | Age: 81
End: 2021-05-10

## 2021-05-12 ENCOUNTER — LAB REQUISITION (OUTPATIENT)
Dept: LAB | Facility: HOSPITAL | Age: 81
End: 2021-05-12
Payer: MEDICARE

## 2021-05-12 DIAGNOSIS — Z11.59 ENCOUNTER FOR SCREENING FOR OTHER VIRAL DISEASES: ICD-10-CM

## 2021-05-12 PROCEDURE — U0005 INFEC AGEN DETEC AMPLI PROBE: HCPCS | Performed by: INTERNAL MEDICINE

## 2021-05-12 PROCEDURE — U0003 INFECTIOUS AGENT DETECTION BY NUCLEIC ACID (DNA OR RNA); SEVERE ACUTE RESPIRATORY SYNDROME CORONAVIRUS 2 (SARS-COV-2) (CORONAVIRUS DISEASE [COVID-19]), AMPLIFIED PROBE TECHNIQUE, MAKING USE OF HIGH THROUGHPUT TECHNOLOGIES AS DESCRIBED BY CMS-2020-01-R: HCPCS | Performed by: INTERNAL MEDICINE

## 2021-05-13 LAB — SARS-COV-2 RNA RESP QL NAA+PROBE: NEGATIVE

## 2021-05-25 ENCOUNTER — OFFICE VISIT (OUTPATIENT)
Dept: OBGYN CLINIC | Facility: CLINIC | Age: 81
End: 2021-05-25
Payer: MEDICARE

## 2021-05-25 VITALS
HEIGHT: 64 IN | WEIGHT: 208 LBS | DIASTOLIC BLOOD PRESSURE: 62 MMHG | SYSTOLIC BLOOD PRESSURE: 125 MMHG | BODY MASS INDEX: 35.51 KG/M2

## 2021-05-25 DIAGNOSIS — G47.01 INSOMNIA DUE TO MEDICAL CONDITION: ICD-10-CM

## 2021-05-25 DIAGNOSIS — M75.32 CALCIFIC TENDINITIS OF LEFT SHOULDER: Primary | ICD-10-CM

## 2021-05-25 DIAGNOSIS — M19.012 PRIMARY OSTEOARTHRITIS OF LEFT SHOULDER: ICD-10-CM

## 2021-05-25 PROCEDURE — 20610 DRAIN/INJ JOINT/BURSA W/O US: CPT | Performed by: ORTHOPAEDIC SURGERY

## 2021-05-25 PROCEDURE — 99213 OFFICE O/P EST LOW 20 MIN: CPT | Performed by: ORTHOPAEDIC SURGERY

## 2021-05-25 RX ORDER — BETAMETHASONE SODIUM PHOSPHATE AND BETAMETHASONE ACETATE 3; 3 MG/ML; MG/ML
6 INJECTION, SUSPENSION INTRA-ARTICULAR; INTRALESIONAL; INTRAMUSCULAR; SOFT TISSUE
Status: COMPLETED | OUTPATIENT
Start: 2021-05-25 | End: 2021-05-25

## 2021-05-25 RX ORDER — ZOLPIDEM TARTRATE 5 MG/1
5 TABLET ORAL
Qty: 15 TABLET | Refills: 3 | Status: SHIPPED | OUTPATIENT
Start: 2021-05-25 | End: 2021-07-14 | Stop reason: SDUPTHER

## 2021-05-25 RX ORDER — LIDOCAINE HYDROCHLORIDE 10 MG/ML
5 INJECTION, SOLUTION EPIDURAL; INFILTRATION; INTRACAUDAL; PERINEURAL
Status: COMPLETED | OUTPATIENT
Start: 2021-05-25 | End: 2021-05-25

## 2021-05-25 RX ADMIN — LIDOCAINE HYDROCHLORIDE 5 ML: 10 INJECTION, SOLUTION EPIDURAL; INFILTRATION; INTRACAUDAL; PERINEURAL at 09:26

## 2021-05-25 RX ADMIN — BETAMETHASONE SODIUM PHOSPHATE AND BETAMETHASONE ACETATE 6 MG: 3; 3 INJECTION, SUSPENSION INTRA-ARTICULAR; INTRALESIONAL; INTRAMUSCULAR; SOFT TISSUE at 09:26

## 2021-05-25 NOTE — PROGRESS NOTES
Assessment:     1  Calcific tendinitis of left shoulder    2  Primary osteoarthritis of left shoulder        Plan:     Problem List Items Addressed This Visit        Musculoskeletal and Integument    Calcific tendinitis of left shoulder - Primary    Primary osteoarthritis of left shoulder          Findings consistent with calcific tendonitis and glenohumeral osteoarthritis of left shoulder  Offered patient glenohumeral cortisone injection today for arthritic pain  She accepted  Patient tolerated procedure well, ice shoulder over next few days  Physical therapy renewed since she is finding it beneficial  Avoid motions, movements which aggravate the shoulder  OTC voltaren gel and tylenol for pain  See patient back in 2-3 months  All patient's questions were answered to her satisfaction  This note is created using dictation transcription  It may contain typographical errors, grammatical errors, improperly dictated words, background noise and other errors  Subjective:     Patient ID: Pietro Foley is a [de-identified] y o  female  Chief Complaint:  Patient is an 50-year-old female following up for left shoulder calcific tendinitis and glenohumeral osteoarthritis  She has been attending physical therapy and feels she continues to make progress  Certain motions/movements still bother her shoulder  Pain anterior shoulder primarily today  She does have general dull ache in shoulder as well  We have given subacromial injection at prior visits but no real relief  On Xarelto has to use tylenol for pain       Allergy:  Allergies   Allergen Reactions    Medical Tape     Nuts - Food Allergy     Omnipaque [Iohexol] Hives    Other Hives     Pine nuts    Penicillins Hives    Sulfa Antibiotics Hives    Iodine - Food Allergy Rash     Medications:  all current active meds have been reviewed  Past Medical History:  Past Medical History:   Diagnosis Date    Anxiety     Cholecystitis     Chronic calculous cholecystitis     last assessed 4/25/16    History of cardioversion     History of radiofrequency ablation procedure for cardiac arrhythmia     Hyperlipidemia     Hypertension     Sleep apnea     no trouble since Afib corrected     Past Surgical History:  Past Surgical History:   Procedure Laterality Date    APPENDECTOMY      BACK SURGERY  11/15/2018    L4-5 Screws and rods    BLADDER SUSPENSION      CARDIOVERSION      atrial - onset 2015 - x5 in 2014 and 2015    CHOLECYSTECTOMY      April 2016    HYSTERECTOMY      KNEE ARTHROSCOPY W/ MENISCAL REPAIR Bilateral     therapeutic - last assessed 4/14/16    KNEE SURGERY      [place] LAP,CHOLECYSTECTOMY N/A 4/26/2016    Procedure: CHOLECYSTECTOMY LAPAROSCOPIC;  Surgeon: Héctor Chan MD;  Location:  MAIN OR;  Service: General    TONSILLECTOMY      WISDOM TOOTH EXTRACTION       Family History:  Family History   Problem Relation Age of Onset    Cancer Mother         ovarian    Heart disease Father         cardiac disorder    Cancer Father     Heart disease Brother     Heart disease Paternal Aunt     Heart disease Paternal Uncle      Social History:  Social History     Substance and Sexual Activity   Alcohol Use Yes    Comment: social; occasional     Social History     Substance and Sexual Activity   Drug Use No     Social History     Tobacco Use   Smoking Status Never Smoker   Smokeless Tobacco Never Used     Review of Systems   Constitutional: Negative for chills and fever  HENT: Negative for ear pain and sore throat  Eyes: Negative for pain and visual disturbance  Respiratory: Negative for cough and shortness of breath  Cardiovascular: Negative for chest pain and palpitations  Gastrointestinal: Negative for abdominal pain and vomiting  Genitourinary: Negative for dysuria and hematuria  Musculoskeletal: Positive for arthralgias (Left shoulder)  Negative for back pain, joint swelling and neck pain  Skin: Negative for color change and rash     Neurological: Negative for seizures and syncope  Psychiatric/Behavioral: Negative  All other systems reviewed and are negative  Objective:  BP Readings from Last 1 Encounters:   05/25/21 125/62      Wt Readings from Last 1 Encounters:   05/25/21 94 3 kg (208 lb)      BMI:   Estimated body mass index is 35 7 kg/m² as calculated from the following:    Height as of this encounter: 5' 4" (1 626 m)  Weight as of this encounter: 94 3 kg (208 lb)  BSA:   Estimated body surface area is 1 99 meters squared as calculated from the following:    Height as of this encounter: 5' 4" (1 626 m)  Weight as of this encounter: 94 3 kg (208 lb)  Physical Exam  Vitals signs and nursing note reviewed  Constitutional:       Appearance: Normal appearance  She is well-developed  HENT:      Head: Normocephalic and atraumatic  Right Ear: External ear normal       Left Ear: External ear normal    Eyes:      Extraocular Movements: Extraocular movements intact  Conjunctiva/sclera: Conjunctivae normal    Neck:      Musculoskeletal: Neck supple  Pulmonary:      Effort: Pulmonary effort is normal    Skin:     General: Skin is warm and dry  Neurological:      Mental Status: She is alert and oriented to person, place, and time  Deep Tendon Reflexes: Reflexes are normal and symmetric  Psychiatric:         Mood and Affect: Mood normal          Behavior: Behavior normal        Left Shoulder Exam     Tenderness   The patient is experiencing tenderness in the biceps tendon  Range of Motion   Left shoulder active abduction: 140 active with pain     Passive abduction: 170   Left shoulder forward flexion: 120 with pain, 170 passive with pain    Internal rotation 0 degrees: Sacrum (pain)     Muscle Strength   Abduction: 4/5   Internal rotation: 5/5   External rotation: 5/5     Tests   Cross arm: positive  Impingement: negative  Drop arm: negative    Other   Erythema: absent  Scars: absent  Sensation: normal  Pulse: present no new images to review today       Large joint arthrocentesis: L glenohumeral  Universal Protocol:  Consent: Verbal consent obtained    Risks and benefits: risks, benefits and alternatives were discussed  Consent given by: patient  Patient understanding: patient states understanding of the procedure being performed  Site marked: the operative site was marked  Patient identity confirmed: verbally with patient    Supporting Documentation  Indications: pain   Procedure Details  Location: shoulder - L glenohumeral  Preparation: Patient was prepped and draped in the usual sterile fashion  Needle size: 22 G  Ultrasound guidance: no  Approach: posterolateral  Medications administered: 5 mL lidocaine (PF) 1 %; 6 mg betamethasone acetate-betamethasone sodium phosphate 6 (3-3) mg/mL    Patient tolerance: patient tolerated the procedure well with no immediate complications  Dressing:  Sterile dressing applied        Scribe Attestation    I,:  Elli Avalos am acting as a scribe while in the presence of the attending physician :       I,:  Phan Turner MD personally performed the services described in this documentation    as scribed in my presence :

## 2021-06-01 DIAGNOSIS — E03.9 ACQUIRED HYPOTHYROIDISM: ICD-10-CM

## 2021-06-01 RX ORDER — LEVOTHYROXINE SODIUM 175 MCG
TABLET ORAL
Qty: 90 TABLET | Refills: 3 | Status: SHIPPED | OUTPATIENT
Start: 2021-06-01 | End: 2022-05-30

## 2021-07-14 ENCOUNTER — OFFICE VISIT (OUTPATIENT)
Dept: FAMILY MEDICINE CLINIC | Facility: HOSPITAL | Age: 81
End: 2021-07-14
Payer: MEDICARE

## 2021-07-14 VITALS
HEART RATE: 64 BPM | TEMPERATURE: 96.9 F | SYSTOLIC BLOOD PRESSURE: 130 MMHG | BODY MASS INDEX: 35.85 KG/M2 | DIASTOLIC BLOOD PRESSURE: 84 MMHG | WEIGHT: 210 LBS | HEIGHT: 64 IN

## 2021-07-14 DIAGNOSIS — G47.01 INSOMNIA DUE TO MEDICAL CONDITION: ICD-10-CM

## 2021-07-14 DIAGNOSIS — I48.19 PERSISTENT ATRIAL FIBRILLATION (HCC): ICD-10-CM

## 2021-07-14 DIAGNOSIS — E03.9 ACQUIRED HYPOTHYROIDISM: ICD-10-CM

## 2021-07-14 DIAGNOSIS — E66.01 CLASS 2 SEVERE OBESITY DUE TO EXCESS CALORIES WITH SERIOUS COMORBIDITY AND BODY MASS INDEX (BMI) OF 36.0 TO 36.9 IN ADULT (HCC): ICD-10-CM

## 2021-07-14 DIAGNOSIS — I10 ESSENTIAL HYPERTENSION: Primary | ICD-10-CM

## 2021-07-14 DIAGNOSIS — M75.32 CALCIFIC TENDINITIS OF LEFT SHOULDER: ICD-10-CM

## 2021-07-14 DIAGNOSIS — E78.2 MIXED HYPERLIPIDEMIA: ICD-10-CM

## 2021-07-14 PROCEDURE — 99214 OFFICE O/P EST MOD 30 MIN: CPT | Performed by: FAMILY MEDICINE

## 2021-07-14 RX ORDER — ZOLPIDEM TARTRATE 5 MG/1
5 TABLET ORAL
Qty: 15 TABLET | Refills: 3 | Status: SHIPPED | OUTPATIENT
Start: 2021-07-14 | End: 2021-08-03 | Stop reason: SDUPTHER

## 2021-07-14 NOTE — PROGRESS NOTES
Assessment/Plan:         Diagnoses and all orders for this visit:    Essential hypertension  Comments:  Excellent control    Acquired hypothyroidism  Comments:  Clinically euthyroid    Persistent atrial fibrillation (HCC)  Comments:  Asymptomatic, anticoagulated    Calcific tendinitis of left shoulder  Comments:  Symptoms resolved at this time    Class 2 severe obesity due to excess calories with serious comorbidity and body mass index (BMI) of 36 0 to 36 9 in [facility])    Mixed hyperlipidemia    Insomnia due to medical condition  -     zolpidem (AMBIEN) 5 mg tablet; Take 1 tablet (5 mg total) by mouth daily at bedtime as needed for sleep          Subjective:      Patient ID: Glenn Monet is a [de-identified] y o  female  6 month follow up  No recent illness or injury  Released by PT for L shoulder    Working at [employer] for the past year  Did well with COVID vaccine    Checking eyes next month with a new optometrist    Would like to have Zolpidem on hand as needed  Labs done for Dr Kristine Marion reviewed, all are very good metabolic control      The following portions of the patient's history were reviewed and updated as appropriate: allergies, current medications, past family history, past medical history, past social history, past surgical history and problem list     Review of Systems   Constitutional: Negative for fatigue and unexpected weight change  HENT: Negative  Eyes: Negative for visual disturbance  Respiratory: Negative  Cardiovascular: Negative  Gastrointestinal: Negative  Genitourinary: Negative  Musculoskeletal: Negative  Neurological: Negative  Hematological: Negative  Psychiatric/Behavioral: Positive for sleep disturbance  All other systems reviewed and are negative  Objective:      /84   Pulse 64   Temp (!) 96 9 °F (36 1 °C)   Ht 5' 4" (1 626 m)   Wt 95 3 kg (210 lb)   BMI 36 05 kg/m²          Physical Exam  Vitals and nursing note reviewed  Constitutional:       Appearance: Normal appearance  HENT:      Head: Normocephalic  Cardiovascular:      Rate and Rhythm: Normal rate and regular rhythm  Pulses: Normal pulses  Heart sounds: Normal heart sounds  Pulmonary:      Effort: Pulmonary effort is normal       Breath sounds: Normal breath sounds  Musculoskeletal:      Right lower leg: No edema  Left lower leg: No edema  Skin:     Findings: No rash  Neurological:      General: No focal deficit present  Mental Status: She is alert and oriented to person, place, and time  Psychiatric:         Mood and Affect: Mood normal          Behavior: Behavior normal          Thought Content: Thought content normal          Judgment: Judgment normal        BMI Counseling: Body mass index is 36 05 kg/m²  The BMI is above normal  Nutrition recommendations include reducing portion sizes, decreasing overall calorie intake and moderation in carbohydrate intake  Exercise recommendations include exercising 3-5 times per week

## 2021-08-03 DIAGNOSIS — G47.01 INSOMNIA DUE TO MEDICAL CONDITION: ICD-10-CM

## 2021-08-03 RX ORDER — ZOLPIDEM TARTRATE 5 MG/1
5 TABLET ORAL
Qty: 15 TABLET | Refills: 3 | Status: SHIPPED | OUTPATIENT
Start: 2021-08-03 | End: 2022-07-19 | Stop reason: SDUPTHER

## 2021-08-19 ENCOUNTER — OFFICE VISIT (OUTPATIENT)
Dept: OBGYN CLINIC | Facility: CLINIC | Age: 81
End: 2021-08-19
Payer: MEDICARE

## 2021-08-19 ENCOUNTER — APPOINTMENT (OUTPATIENT)
Dept: RADIOLOGY | Facility: CLINIC | Age: 81
End: 2021-08-19
Payer: MEDICARE

## 2021-08-19 VITALS
BODY MASS INDEX: 36.54 KG/M2 | WEIGHT: 214 LBS | SYSTOLIC BLOOD PRESSURE: 122 MMHG | DIASTOLIC BLOOD PRESSURE: 82 MMHG | HEIGHT: 64 IN

## 2021-08-19 DIAGNOSIS — M25.512 LEFT SHOULDER PAIN, UNSPECIFIED CHRONICITY: ICD-10-CM

## 2021-08-19 DIAGNOSIS — S46.912A STRAIN OF LEFT SHOULDER, INITIAL ENCOUNTER: Primary | ICD-10-CM

## 2021-08-19 DIAGNOSIS — M19.012 PRIMARY OSTEOARTHRITIS OF LEFT SHOULDER: ICD-10-CM

## 2021-08-19 PROCEDURE — 20610 DRAIN/INJ JOINT/BURSA W/O US: CPT | Performed by: PHYSICIAN ASSISTANT

## 2021-08-19 PROCEDURE — 99213 OFFICE O/P EST LOW 20 MIN: CPT | Performed by: PHYSICIAN ASSISTANT

## 2021-08-19 PROCEDURE — 73030 X-RAY EXAM OF SHOULDER: CPT

## 2021-08-19 RX ORDER — LIDOCAINE HYDROCHLORIDE 10 MG/ML
7 INJECTION, SOLUTION INFILTRATION; PERINEURAL
Status: COMPLETED | OUTPATIENT
Start: 2021-08-19 | End: 2021-08-19

## 2021-08-19 RX ORDER — BETAMETHASONE SODIUM PHOSPHATE AND BETAMETHASONE ACETATE 3; 3 MG/ML; MG/ML
6 INJECTION, SUSPENSION INTRA-ARTICULAR; INTRALESIONAL; INTRAMUSCULAR; SOFT TISSUE
Status: COMPLETED | OUTPATIENT
Start: 2021-08-19 | End: 2021-08-19

## 2021-08-19 RX ADMIN — LIDOCAINE HYDROCHLORIDE 7 ML: 10 INJECTION, SOLUTION INFILTRATION; PERINEURAL at 15:20

## 2021-08-19 RX ADMIN — BETAMETHASONE SODIUM PHOSPHATE AND BETAMETHASONE ACETATE 6 MG: 3; 3 INJECTION, SUSPENSION INTRA-ARTICULAR; INTRALESIONAL; INTRAMUSCULAR; SOFT TISSUE at 15:20

## 2021-08-19 NOTE — PROGRESS NOTES
Assessment:     1  Strain of left shoulder, initial encounter    2  Primary osteoarthritis of left shoulder        Plan:     Problem List Items Addressed This Visit        Musculoskeletal and Integument    Primary osteoarthritis of left shoulder    Relevant Medications    lidocaine (XYLOCAINE) 1 % injection 7 mL (Completed)    betamethasone acetate-betamethasone sodium phosphate (CELESTONE) injection 6 mg (Completed)    Other Relevant Orders    Large joint arthrocentesis: L glenohumeral (Completed)    Strain of left shoulder - Primary    Relevant Orders    XR shoulder 2+ vw left            The patient was seen and examined by Dr Edward Oviedo and myself  Findings consistent with left shoulder strain and aggravated glenohumeral osteoarthritis  Findings and treatment options were discussed with the patient  X-rays were reviewed with her  X-rays reveal no acute fractures  Recommend doing her home exercise program on a regular basis including stretches  She is to use Voltaren gel to the shoulder and continue Tylenol as needed  Patient requested a cortisone injection to the glenohumeral joint today  She tolerated the procedure well  Advised to apply cold compress today  Follow-up as needed if symptoms do not improve  All questions were answered to patient's satisfaction  Subjective:     Patient ID: Nancy Crawford is a [de-identified] y o  female  Chief Complaint: This is an 72-year-old white female here for evaluation of a new left shoulder injury  On August 17, 2020 wound she tripped stepping up on a curb and fell forwards on her outstretched left arm  She felt immediate pain in the left shoulder  She has been having difficulty with range of motion since  We have been treating her for left shoulder tendinitis and glenohumeral osteoarthritis since March 2021  She had a subacromial cortisone injection in March 2021 with no relief and a glenohumeral cortisone injection in May 2021 that did provide relief    She attended physical therapy during that time and was feeling improvement until the recent fall  Allergy:  Allergies   Allergen Reactions    Medical Tape     Nuts - Food Allergy     Omnipaque [Iohexol] Hives    Other Hives     Pine nuts    Penicillins Hives    Sulfa Antibiotics Hives    Iodine - Food Allergy Rash     Medications:  all current active meds have been reviewed  Past Medical History:  Past Medical History:   Diagnosis Date    Anxiety     Cholecystitis     Chronic calculous cholecystitis     last assessed 4/25/16    History of cardioversion     History of radiofrequency ablation procedure for cardiac arrhythmia     Hyperlipidemia     Hypertension     Sleep apnea     no trouble since Afib corrected     Past Surgical History:  Past Surgical History:   Procedure Laterality Date    APPENDECTOMY      BACK SURGERY  11/15/2018    L4-5 Screws and rods    BLADDER SUSPENSION      CARDIOVERSION      atrial - onset 2015 - x5 in 2014 and 2015    CHOLECYSTECTOMY      April 2016    HYSTERECTOMY      KNEE ARTHROSCOPY W/ MENISCAL REPAIR Bilateral     therapeutic - last assessed 4/14/16    KNEE SURGERY      [place] LAP,CHOLECYSTECTOMY N/A 4/26/2016    Procedure: CHOLECYSTECTOMY LAPAROSCOPIC;  Surgeon: Jocelyne Gold MD;  [facility];  Service: General    TONSILLECTOMY      WISDOM TOOTH EXTRACTION       Family History:  Family History   Problem Relation Age of Onset    Cancer Mother         ovarian    Heart disease Father         cardiac disorder    Cancer Father     Heart disease Brother     Heart disease Paternal Aunt     Heart disease Paternal Uncle      Social History:  Social History     Substance and Sexual Activity   Alcohol Use Yes    Comment: social; occasional     Social History     Substance and Sexual Activity   Drug Use No     Social History     Tobacco Use   Smoking Status Never Smoker   Smokeless Tobacco Never Used     Review of Systems   Constitutional: Negative      HENT: Negative  Eyes: Negative  Respiratory: Negative  Cardiovascular: Negative  Gastrointestinal: Negative  Endocrine: Negative  Genitourinary: Negative  Musculoskeletal: Positive for arthralgias  Skin: Negative  Allergic/Immunologic: Negative  Neurological: Negative  Hematological: Negative  Psychiatric/Behavioral: Negative  Objective:  BP Readings from Last 1 Encounters:   08/19/21 122/82      Wt Readings from Last 1 Encounters:   08/19/21 97 1 kg (214 lb)      BMI:   Estimated body mass index is 36 73 kg/m² as calculated from the following:    Height as of this encounter: 5' 4" (1 626 m)  Weight as of this encounter: 97 1 kg (214 lb)  BSA:   Estimated body surface area is 2 01 meters squared as calculated from the following:    Height as of this encounter: 5' 4" (1 626 m)  Weight as of this encounter: 97 1 kg (214 lb)  Physical Exam  Constitutional:       General: She is not in acute distress  Appearance: She is well-developed  HENT:      Head: Normocephalic  Eyes:      Conjunctiva/sclera: Conjunctivae normal       Pupils: Pupils are equal, round, and reactive to light  Pulmonary:      Effort: Pulmonary effort is normal  No respiratory distress  Skin:     General: Skin is warm and dry  Neurological:      Mental Status: She is alert and oriented to person, place, and time  Psychiatric:         Behavior: Behavior normal        Left Shoulder Exam     Tenderness   Left shoulder tenderness location: diffuse anterior      Range of Motion   Active abduction: 80   Forward flexion: 90   Internal rotation 0 degrees: Lumbar     Muscle Strength   Abduction: 5/5   Internal rotation: 5/5   External rotation: 5/5   Biceps: 5/5     Tests   Drop arm: negative    Other   Erythema: absent  Scars: absent  Sensation: normal  Pulse: present             I have personally reviewed pertinent films in PACS and my interpretation is X-rays left shoulder reveal moderate glenohumeral and AC joint osteoarthritis  No acute fractures  Large joint arthrocentesis: L glenohumeral  Universal Protocol:  Consent: Verbal consent obtained    Risks and benefits: risks, benefits and alternatives were discussed  Consent given by: patient  Patient understanding: patient states understanding of the procedure being performed    Supporting Documentation  Indications: pain   Procedure Details  Location: shoulder - L glenohumeral  Preparation: Patient was prepped and draped in the usual sterile fashion  Needle size: 22 G  Ultrasound guidance: no  Approach: posterior  Medications administered: 7 mL lidocaine 1 %; 6 mg betamethasone acetate-betamethasone sodium phosphate 6 (3-3) mg/mL    Patient tolerance: patient tolerated the procedure well with no immediate complications  Dressing:  Sterile dressing applied

## 2021-09-14 PROCEDURE — U0005 INFEC AGEN DETEC AMPLI PROBE: HCPCS | Performed by: INTERNAL MEDICINE

## 2021-09-14 PROCEDURE — U0003 INFECTIOUS AGENT DETECTION BY NUCLEIC ACID (DNA OR RNA); SEVERE ACUTE RESPIRATORY SYNDROME CORONAVIRUS 2 (SARS-COV-2) (CORONAVIRUS DISEASE [COVID-19]), AMPLIFIED PROBE TECHNIQUE, MAKING USE OF HIGH THROUGHPUT TECHNOLOGIES AS DESCRIBED BY CMS-2020-01-R: HCPCS | Performed by: INTERNAL MEDICINE

## 2021-09-15 ENCOUNTER — LAB REQUISITION (OUTPATIENT)
Dept: LAB | Facility: HOSPITAL | Age: 81
End: 2021-09-15
Payer: MEDICARE

## 2021-09-15 DIAGNOSIS — Z11.59 ENCOUNTER FOR SCREENING FOR OTHER VIRAL DISEASES: ICD-10-CM

## 2021-09-15 LAB — SARS-COV-2 RNA RESP QL NAA+PROBE: NEGATIVE

## 2021-09-22 ENCOUNTER — LAB REQUISITION (OUTPATIENT)
Dept: LAB | Facility: HOSPITAL | Age: 81
End: 2021-09-22
Payer: MEDICARE

## 2021-09-22 DIAGNOSIS — Z11.59 ENCOUNTER FOR SCREENING FOR OTHER VIRAL DISEASES: ICD-10-CM

## 2021-09-22 PROCEDURE — U0003 INFECTIOUS AGENT DETECTION BY NUCLEIC ACID (DNA OR RNA); SEVERE ACUTE RESPIRATORY SYNDROME CORONAVIRUS 2 (SARS-COV-2) (CORONAVIRUS DISEASE [COVID-19]), AMPLIFIED PROBE TECHNIQUE, MAKING USE OF HIGH THROUGHPUT TECHNOLOGIES AS DESCRIBED BY CMS-2020-01-R: HCPCS | Performed by: INTERNAL MEDICINE

## 2021-09-22 PROCEDURE — U0005 INFEC AGEN DETEC AMPLI PROBE: HCPCS | Performed by: INTERNAL MEDICINE

## 2021-09-23 LAB — SARS-COV-2 RNA RESP QL NAA+PROBE: NEGATIVE

## 2021-12-01 DIAGNOSIS — F39 MOOD DISORDER (HCC): ICD-10-CM

## 2021-12-01 RX ORDER — ESCITALOPRAM OXALATE 10 MG/1
TABLET ORAL
Qty: 90 TABLET | Refills: 3 | Status: SHIPPED | OUTPATIENT
Start: 2021-12-01

## 2021-12-10 PROCEDURE — U0003 INFECTIOUS AGENT DETECTION BY NUCLEIC ACID (DNA OR RNA); SEVERE ACUTE RESPIRATORY SYNDROME CORONAVIRUS 2 (SARS-COV-2) (CORONAVIRUS DISEASE [COVID-19]), AMPLIFIED PROBE TECHNIQUE, MAKING USE OF HIGH THROUGHPUT TECHNOLOGIES AS DESCRIBED BY CMS-2020-01-R: HCPCS | Performed by: INTERNAL MEDICINE

## 2021-12-10 PROCEDURE — U0005 INFEC AGEN DETEC AMPLI PROBE: HCPCS | Performed by: INTERNAL MEDICINE

## 2021-12-11 ENCOUNTER — LAB REQUISITION (OUTPATIENT)
Dept: LAB | Facility: HOSPITAL | Age: 81
End: 2021-12-11
Payer: MEDICARE

## 2021-12-11 DIAGNOSIS — Z11.59 ENCOUNTER FOR SCREENING FOR OTHER VIRAL DISEASES: ICD-10-CM

## 2021-12-11 LAB — SARS-COV-2 RNA RESP QL NAA+PROBE: NEGATIVE

## 2021-12-15 PROCEDURE — U0005 INFEC AGEN DETEC AMPLI PROBE: HCPCS | Performed by: INTERNAL MEDICINE

## 2021-12-15 PROCEDURE — U0003 INFECTIOUS AGENT DETECTION BY NUCLEIC ACID (DNA OR RNA); SEVERE ACUTE RESPIRATORY SYNDROME CORONAVIRUS 2 (SARS-COV-2) (CORONAVIRUS DISEASE [COVID-19]), AMPLIFIED PROBE TECHNIQUE, MAKING USE OF HIGH THROUGHPUT TECHNOLOGIES AS DESCRIBED BY CMS-2020-01-R: HCPCS | Performed by: INTERNAL MEDICINE

## 2021-12-16 ENCOUNTER — LAB REQUISITION (OUTPATIENT)
Dept: LAB | Facility: HOSPITAL | Age: 81
End: 2021-12-16
Payer: MEDICARE

## 2021-12-16 DIAGNOSIS — Z11.59 ENCOUNTER FOR SCREENING FOR OTHER VIRAL DISEASES: ICD-10-CM

## 2021-12-16 LAB — SARS-COV-2 RNA RESP QL NAA+PROBE: NEGATIVE

## 2021-12-29 PROCEDURE — U0005 INFEC AGEN DETEC AMPLI PROBE: HCPCS | Performed by: INTERNAL MEDICINE

## 2021-12-29 PROCEDURE — U0003 INFECTIOUS AGENT DETECTION BY NUCLEIC ACID (DNA OR RNA); SEVERE ACUTE RESPIRATORY SYNDROME CORONAVIRUS 2 (SARS-COV-2) (CORONAVIRUS DISEASE [COVID-19]), AMPLIFIED PROBE TECHNIQUE, MAKING USE OF HIGH THROUGHPUT TECHNOLOGIES AS DESCRIBED BY CMS-2020-01-R: HCPCS | Performed by: INTERNAL MEDICINE

## 2021-12-30 ENCOUNTER — LAB REQUISITION (OUTPATIENT)
Dept: LAB | Facility: HOSPITAL | Age: 81
End: 2021-12-30
Payer: MEDICARE

## 2021-12-30 DIAGNOSIS — Z11.59 ENCOUNTER FOR SCREENING FOR OTHER VIRAL DISEASES: ICD-10-CM

## 2021-12-31 LAB — SARS-COV-2 RNA RESP QL NAA+PROBE: NEGATIVE

## 2022-01-05 PROCEDURE — U0003 INFECTIOUS AGENT DETECTION BY NUCLEIC ACID (DNA OR RNA); SEVERE ACUTE RESPIRATORY SYNDROME CORONAVIRUS 2 (SARS-COV-2) (CORONAVIRUS DISEASE [COVID-19]), AMPLIFIED PROBE TECHNIQUE, MAKING USE OF HIGH THROUGHPUT TECHNOLOGIES AS DESCRIBED BY CMS-2020-01-R: HCPCS | Performed by: INTERNAL MEDICINE

## 2022-01-05 PROCEDURE — U0005 INFEC AGEN DETEC AMPLI PROBE: HCPCS | Performed by: INTERNAL MEDICINE

## 2022-01-06 ENCOUNTER — LAB REQUISITION (OUTPATIENT)
Dept: LAB | Facility: HOSPITAL | Age: 82
End: 2022-01-06
Payer: MEDICARE

## 2022-01-06 DIAGNOSIS — Z11.59 ENCOUNTER FOR SCREENING FOR OTHER VIRAL DISEASES: ICD-10-CM

## 2022-01-07 LAB — SARS-COV-2 RNA RESP QL NAA+PROBE: NEGATIVE

## 2022-01-18 ENCOUNTER — OFFICE VISIT (OUTPATIENT)
Dept: FAMILY MEDICINE CLINIC | Facility: HOSPITAL | Age: 82
End: 2022-01-18
Payer: MEDICARE

## 2022-01-18 VITALS
HEIGHT: 64 IN | HEART RATE: 66 BPM | WEIGHT: 211.2 LBS | TEMPERATURE: 96.7 F | BODY MASS INDEX: 36.06 KG/M2 | DIASTOLIC BLOOD PRESSURE: 85 MMHG | SYSTOLIC BLOOD PRESSURE: 135 MMHG | OXYGEN SATURATION: 97 %

## 2022-01-18 DIAGNOSIS — R73.03 PREDIABETES: ICD-10-CM

## 2022-01-18 DIAGNOSIS — E78.2 MIXED HYPERLIPIDEMIA: ICD-10-CM

## 2022-01-18 DIAGNOSIS — I10 ESSENTIAL HYPERTENSION: ICD-10-CM

## 2022-01-18 DIAGNOSIS — E66.01 CLASS 2 SEVERE OBESITY DUE TO EXCESS CALORIES WITH SERIOUS COMORBIDITY AND BODY MASS INDEX (BMI) OF 36.0 TO 36.9 IN ADULT (HCC): ICD-10-CM

## 2022-01-18 DIAGNOSIS — I48.19 PERSISTENT ATRIAL FIBRILLATION (HCC): ICD-10-CM

## 2022-01-18 DIAGNOSIS — Z00.00 MEDICARE ANNUAL WELLNESS VISIT, SUBSEQUENT: Primary | ICD-10-CM

## 2022-01-18 DIAGNOSIS — R15.2 INCONTINENCE OF FECES WITH FECAL URGENCY: ICD-10-CM

## 2022-01-18 DIAGNOSIS — R15.9 INCONTINENCE OF FECES WITH FECAL URGENCY: ICD-10-CM

## 2022-01-18 DIAGNOSIS — E03.9 ACQUIRED HYPOTHYROIDISM: ICD-10-CM

## 2022-01-18 PROBLEM — M48.061 LUMBAR SPINAL STENOSIS: Status: ACTIVE | Noted: 2022-01-18

## 2022-01-18 PROBLEM — M19.049 LOCALIZED, PRIMARY OSTEOARTHRITIS OF HAND: Status: ACTIVE | Noted: 2022-01-18

## 2022-01-18 PROBLEM — M43.10 SPONDYLOLISTHESIS, GRADE 2: Status: ACTIVE | Noted: 2022-01-18

## 2022-01-18 PROCEDURE — G0439 PPPS, SUBSEQ VISIT: HCPCS | Performed by: FAMILY MEDICINE

## 2022-01-18 PROCEDURE — 99214 OFFICE O/P EST MOD 30 MIN: CPT | Performed by: FAMILY MEDICINE

## 2022-01-18 NOTE — PROGRESS NOTES
Assessment/Plan:         Diagnoses and all orders for this visit:    Medicare annual wellness visit, subsequent    Essential hypertension  Comments:  Good BP control    Acquired hypothyroidism  Comments:  Clinically euthyroid    Persistent atrial fibrillation (HCC)    Mixed hyperlipidemia    Class 2 severe obesity due to excess calories with serious comorbidity and body mass index (BMI) of 36 0 to 36 9 in adult [facility])    Prediabetes    Incontinence of feces with fecal urgency  Comments:  Increase fiber supplements          Subjective:      Patient ID: Jose Angel Toro is a [de-identified] y o  female  6 month follow up    No recent illness or injury    Some persistent pain L shoulder from fall last winter  Working at [employer] as     Careful with her back    [place] her dog when able  She does take her to work    Having episodes of bowel incontinence gino after coffee with breakfast  Taking Imodium daily   I advised daily fiber      The following portions of the patient's history were reviewed and updated as appropriate: allergies, current medications, past family history, past medical history, past social history, past surgical history and problem list     Review of Systems   Constitutional: Negative for unexpected weight change  Eyes: Negative for visual disturbance  Respiratory: Negative  Cardiovascular: Negative  Gastrointestinal: Negative  Genitourinary: Negative  Musculoskeletal: Positive for arthralgias and back pain  Neurological: Negative  Psychiatric/Behavioral: Negative  All other systems reviewed and are negative  Objective:      /85 (BP Location: Left arm, Patient Position: Sitting, Cuff Size: Large)   Pulse 66   Temp (!) 96 7 °F (35 9 °C) (Tympanic)   Ht 5' 4" (1 626 m)   Wt 95 8 kg (211 lb 3 2 oz)   SpO2 97%   BMI 36 25 kg/m²          Physical Exam  Vitals reviewed  Constitutional:       Appearance: Normal appearance     Cardiovascular:      Rate and Rhythm: Normal rate  Rhythm irregular  Pulses: Normal pulses  Heart sounds: Normal heart sounds  Skin:     Findings: No rash  Neurological:      General: No focal deficit present  Mental Status: She is alert and oriented to person, place, and time     Psychiatric:         Mood and Affect: Mood normal

## 2022-01-18 NOTE — PROGRESS NOTES
Assessment and Plan:     Problem List Items Addressed This Visit        Endocrine    Acquired hypothyroidism       Cardiovascular and Mediastinum    Atrial fibrillation ([facility] [address] )    Essential hypertension       Other    Mixed hyperlipidemia    Class 2 severe obesity due to excess calories with serious comorbidity and body mass index (BMI) of 36 0 to 36 9 in [facility])    Medicare annual wellness visit, subsequent - Primary    Prediabetes    Incontinence of feces with fecal urgency        BMI Counseling: Body mass index is 36 25 kg/m²  The BMI is above normal  Nutrition recommendations include decreasing portion sizes and moderation in carbohydrate intake  Exercise recommendations include exercising 3-5 times per week  No pharmacotherapy was ordered  Rationale for BMI follow-up plan is due to patient being overweight or obese  Depression Screening and Follow-up Plan: Patient was screened for depression during today's encounter  They screened negative with a PHQ-2 score of 0  Falls Plan of Care: balance, strength, and gait training instructions were provided  Preventive health issues were discussed with patient, and age appropriate screening tests were ordered as noted in patient's After Visit Summary  Personalized health advice and appropriate referrals for health education or preventive services given if needed, as noted in patient's After Visit Summary       History of Present Illness:     Patient presents for Medicare Annual Wellness visit    Patient Care Team:  Denita Osei MD as PCP - General (Family Medicine)     Problem List:     Patient Active Problem List   Diagnosis    Atrial fibrillation ([facility] [address] )    Mixed hyperlipidemia    Essential hypertension    Class 2 severe obesity due to excess calories with serious comorbidity and body mass index (BMI) of 36 0 to 36 9 in [facility])    Acquired hypothyroidism    Medicare annual wellness visit, subsequent    Lumbosacral radiculopathy due to intervertebral disc disorder    Multiple actinic keratoses    Prediabetes    Failed back surgical syndrome    Gait abnormality    Mood disorder (HCC)    Calcific tendinitis of left shoulder    Primary osteoarthritis of left shoulder    Strain of left shoulder    Spondylolisthesis, grade 2    Lumbar spinal stenosis    Localized, primary osteoarthritis of hand    Incontinence of feces with fecal urgency      Past Medical and Surgical History:     Past Medical History:   Diagnosis Date    Anxiety     Cholecystitis     Chronic calculous cholecystitis     last assessed 4/25/16    History of cardioversion     History of radiofrequency ablation procedure for cardiac arrhythmia     Hyperlipidemia     Hypertension     Sleep apnea     no trouble since Afib corrected     Past Surgical History:   Procedure Laterality Date    APPENDECTOMY      BACK SURGERY  11/15/2018    L4-5 Screws and rods    BLADDER SUSPENSION      CARDIOVERSION      atrial - onset 2015 - x5 in 2014 and 2015    CHOLECYSTECTOMY      April 2016    HYSTERECTOMY      KNEE ARTHROSCOPY W/ MENISCAL REPAIR Bilateral     therapeutic - last assessed 4/14/16    KNEE SURGERY      [place] LAP,CHOLECYSTECTOMY N/A 4/26/2016    Procedure: CHOLECYSTECTOMY LAPAROSCOPIC;  Surgeon: Cheryl Moscoso MD;  Location: [facility] OR;  Service: General    TONSILLECTOMY      WISDOM TOOTH EXTRACTION        Family History:     Family History   Problem Relation Age of Onset    Cancer Mother         ovarian    Heart disease Father         cardiac disorder    Cancer Father     Heart disease Brother     Heart disease Paternal Aunt     Heart disease Paternal Uncle       Social History:     Social History     Socioeconomic History    Marital status:       Spouse name: None    Number of children: None    Years of education: None    Highest education level: None   Occupational History    Occupation: retired   Tobacco Use    Smoking status: Never Smoker  Smokeless tobacco: Never Used   Vaping Use    Vaping Use: Never used   Substance and Sexual Activity    Alcohol use: Yes     Comment: social; occasional    Drug use: No    Sexual activity: None   Other Topics Concern    None   Social History Narrative    Single as per [facility]     Social Determinants of Health     Financial Resource Strain: Not on file   Food Insecurity: Not on file   Transportation Needs: Not on file   Physical Activity: Not on file   Stress: Not on file   Social Connections: Not on file   Intimate Partner Violence: Not on file   Housing Stability: Not on file      Medications and Allergies:     Current Outpatient Medications   Medication Sig Dispense Refill    atorvastatin (LIPITOR) 20 mg tablet Take 20 mg by mouth daily   diltiazem (CARDIZEM CD) 360 MG 24 hr capsule   0    DIOVAN 320 MG tablet       escitalopram (LEXAPRO) 10 mg tablet TAKE 1 TABLET DAILY 90 tablet 3    gabapentin (NEURONTIN) 300 mg capsule Take 300 mg by mouth 3 (three) times a day  2    hydrochlorothiazide (HYDRODIURIL) 25 mg tablet Take 25 mg by mouth daily Indications: High Blood Pressure   liotrix (THYROLAR-1) 60 (12 5-50) MG (MCG) TABS Take 1 tablet by mouth daily   LORazepam (ATIVAN) 0 5 mg tablet Take 1 tablet (0 5 mg total) by mouth every 8 (eight) hours as needed for anxiety 20 tablet 0    rivaroxaban (XARELTO) tablet Take 20 mg by mouth daily with dinner Indications: LD 4/22   sotalol (BETAPACE) 80 mg tablet Take 80 mg by mouth 2 (two) times a day   Synthroid 175 MCG tablet TAKE 1 TABLET DAILY 90 tablet 3    zolpidem (AMBIEN) 5 mg tablet Take 1 tablet (5 mg total) by mouth daily at bedtime as needed for sleep 15 tablet 3     No current facility-administered medications for this visit       Allergies   Allergen Reactions    Medical Tape     Nuts - Food Allergy     Omnipaque [Iohexol] Hives    Other Hives     Pine nuts    Penicillins Hives    Sulfa Antibiotics Hives    Iodine - Food Allergy Rash      Immunizations:     Immunization History   Administered Date(s) Administered    COVID-19 MODERNA VACC 0 5 ML IM 01/28/2021, 02/18/2021    COVID-19 PFIZER VACCINE 0 3 ML IM 10/28/2021    INFLUENZA 09/11/2014    Influenza, high dose seasonal 0 7 mL 11/03/2020    Pneumococcal Polysaccharide PPV23 10/18/2014      Health Maintenance:         Topic Date Due    DXA SCAN  Never done         Topic Date Due    DTaP,Tdap,and Td Vaccines (1 - Tdap) Never done    Influenza Vaccine (1) 09/01/2021      Medicare Health Risk Assessment:     /85 (BP Location: Left arm, Patient Position: Sitting, Cuff Size: Large)   Pulse 66   Temp (!) 96 7 °F (35 9 °C) (Tympanic)   Ht 5' 4" (1 626 m)   Wt 95 8 kg (211 lb 3 2 oz)   SpO2 97%   BMI 36 25 kg/m²      Shyann Mejia is here for her Subsequent Wellness visit  Last Medicare Wellness visit information reviewed, patient interviewed and updates made to the record today  Health Risk Assessment:   Patient rates overall health as very good  Patient feels that their physical health rating is slightly better  Patient is satisfied with their life  Eyesight was rated as same  Hearing was rated as same  Patient feels that their emotional and mental health rating is same  Patients states they are never, rarely angry  Patient states they are sometimes unusually tired/fatigued  Pain experienced in the last 7 days has been none  Patient states that she has experienced no weight loss or gain in last 6 months  Depression Screening:   PHQ-2 Score: 0      Fall Risk Screening: In the past year, patient has experienced: history of falling in past year    Number of falls: 1  Injured during fall?: Yes    Feels unsteady when standing or walking?: No    Worried about falling?: No      Urinary Incontinence Screening:   Patient has not leaked urine accidently in the last six months  Home Safety:  Patient has trouble with stairs inside or outside of their home  Patient has working smoke alarms and has working carbon monoxide detector  Home safety hazards include: none  Nutrition:   Current diet is Regular  Medications:   Patient is not currently taking any over-the-counter supplements  Patient is able to manage medications  Activities of Daily Living (ADLs)/Instrumental Activities of Daily Living (IADLs):   Walk and transfer into and out of bed and chair?: Yes  Dress and groom yourself?: Yes    Bathe or shower yourself?: Yes    Feed yourself? Yes  Do your laundry/housekeeping?: Yes  Manage your money, pay your bills and track your expenses?: Yes  Make your own meals?: Yes    Do your own shopping?: Yes    Previous Hospitalizations:   Any hospitalizations or ED visits within the last 12 months?: No      Advance Care Planning:   Living will: No    Durable POA for healthcare:  Yes    Advanced directive: No    Advanced directive counseling given: Yes    Five wishes given: Yes    Patient declined ACP directive: No    End of Life Decisions reviewed with patient: Yes    Provider agrees with end of life decisions: Yes      Cognitive Screening:   Provider or family/friend/caregiver concerned regarding cognition?: No    PREVENTIVE SCREENINGS      Cardiovascular Screening:    General: Screening Not Indicated and History Lipid Disorder      Diabetes Screening:     General: Risks and Benefits Discussed    Due for: Blood Glucose      Colorectal Cancer Screening:     General: Screening Not Indicated      Breast Cancer Screening:     General: Risks and Benefits Discussed    Due for: Mammogram        Cervical Cancer Screening:    General: Screening Not Indicated      Osteoporosis Screening:    General: Risks and Benefits Discussed    Due for: DXA Axial      Abdominal Aortic Aneurysm (AAA) Screening:        General: Screening Not Indicated      Lung Cancer Screening:     General: Screening Not Indicated      Hepatitis C Screening:    General: Screening Not Indicated    Screening, Brief Intervention, and Referral to Treatment (SBIRT)    Screening  Typical number of drinks in a day: 0  Typical number of drinks in a week: 0  Interpretation: Low risk drinking behavior      Single Item Drug Screening:  How often have you used an illegal drug (including marijuana) or a prescription medication for non-medical reasons in the past year? never    Single Item Drug Screen Score: 0  Interpretation: Negative screen for possible drug use disorder      Gracia Gomez MD

## 2022-05-30 DIAGNOSIS — E03.9 ACQUIRED HYPOTHYROIDISM: ICD-10-CM

## 2022-05-30 RX ORDER — LEVOTHYROXINE SODIUM 175 UG/1
TABLET ORAL
Qty: 90 TABLET | Refills: 3 | Status: SHIPPED | OUTPATIENT
Start: 2022-05-30

## 2022-06-27 ENCOUNTER — OFFICE VISIT (OUTPATIENT)
Dept: URGENT CARE | Facility: CLINIC | Age: 82
End: 2022-06-27
Payer: MEDICARE

## 2022-06-27 VITALS
HEART RATE: 56 BPM | BODY MASS INDEX: 34.33 KG/M2 | TEMPERATURE: 97.9 F | RESPIRATION RATE: 16 BRPM | OXYGEN SATURATION: 97 % | WEIGHT: 200 LBS

## 2022-06-27 DIAGNOSIS — S93.402A SPRAIN OF LEFT ANKLE, UNSPECIFIED LIGAMENT, INITIAL ENCOUNTER: Primary | ICD-10-CM

## 2022-06-27 PROCEDURE — 99213 OFFICE O/P EST LOW 20 MIN: CPT

## 2022-06-27 PROCEDURE — G0463 HOSPITAL OUTPT CLINIC VISIT: HCPCS

## 2022-06-27 NOTE — PROGRESS NOTES
[facility]        NAME: Eladio De La Paz is a 81 y o  female  : 1940    MRN: [id number]  DATE: 2022  TIME: 12:01 PM    Assessment and Plan   Sprain of left ankle, unspecified ligament, initial encounter [S93 402A]  1  Sprain of left ankle, unspecified ligament, initial encounter  XR ankle 3+ vw left     Patient presents with complaints of left ankle pain and swelling after tripping and twisting it a week ago  Has been trying ice and tylenol  Has been ambulating and weight bearing  TTP over medial and lateral ankle  Mild swelling and TTP noted to left lateral  And medial malleolus  No bruising  Xray performed in office indicated no obvious abnormalities  Advised to continue with ice, rest and elevation  Ace wrap provided  F/U with PCP as needed    Patient Instructions       Follow up with PCP as needed    Chief Complaint     Chief Complaint   Patient presents with    Ankle Injury     Pt tripped 1 week ago, twisted L ankle  Currently pain 7/10 and swelling @ lateral malleolus, worsens with weight-bearing and walking           History of Present Illness       Patient presents with complaints of left ankle pain and swelling after tripping and twisting it a week ago  Has been trying ice and tylenol  Has been ambulating and weight bearing  TTP over medial and lateral ankle  Ankle Injury   Pertinent negatives include no numbness  Review of Systems   Review of Systems   Constitutional: Negative for chills, fatigue and fever  HENT: Negative for postnasal drip and sore throat  Respiratory: Negative for cough and shortness of breath  Cardiovascular: Negative for chest pain and palpitations  Gastrointestinal: Negative for abdominal pain, nausea and vomiting  Genitourinary: Negative for dysuria  Musculoskeletal: Positive for arthralgias and joint swelling  Negative for gait problem and myalgias  Skin: Negative for rash     Neurological: Negative for dizziness, syncope, light-headedness, numbness and headaches  Psychiatric/Behavioral: Negative for agitation and confusion  All other systems reviewed and are negative  Current Medications       Current Outpatient Medications:     atorvastatin (LIPITOR) 20 mg tablet, Take 20 mg by mouth daily  , Disp: , Rfl:     diltiazem (CARDIZEM CD) 360 MG 24 hr capsule, , Disp: , Rfl: 0    DIOVAN 320 MG tablet, , Disp: , Rfl:     escitalopram (LEXAPRO) 10 mg tablet, TAKE 1 TABLET DAILY, Disp: 90 tablet, Rfl: 3    gabapentin (NEURONTIN) 300 mg capsule, Take 300 mg by mouth 3 (three) times a day, Disp: , Rfl: 2    hydrochlorothiazide (HYDRODIURIL) 25 mg tablet, Take 25 mg by mouth daily Indications: High Blood Pressure , Disp: , Rfl:     Levoxyl 175 MCG tablet, TAKE 1 TABLET DAILY, Disp: 90 tablet, Rfl: 3    liotrix (THYROLAR-1) 60 (12 5-50) MG (MCG) TABS, Take 1 tablet by mouth daily  , Disp: , Rfl:     LORazepam (ATIVAN) 0 5 mg tablet, Take 1 tablet (0 5 mg total) by mouth every 8 (eight) hours as needed for anxiety, Disp: 20 tablet, Rfl: 0    rivaroxaban (XARELTO) tablet, Take 20 mg by mouth daily with dinner Indications: LD 4/22   , Disp: , Rfl:     sotalol (BETAPACE) 80 mg tablet, Take 80 mg by mouth 2 (two) times a day , Disp: , Rfl:     zolpidem (AMBIEN) 5 mg tablet, Take 1 tablet (5 mg total) by mouth daily at bedtime as needed for sleep, Disp: 15 tablet, Rfl: 3    Current Allergies     Allergies as of 06/27/2022 - Reviewed 06/27/2022   Allergen Reaction Noted    Medical tape  04/14/2016    Nuts - food allergy  04/14/2016    Omnipaque [iohexol] Hives 04/13/2016    Other Hives 04/21/2016    Penicillins Hives 04/13/2016    Sulfa antibiotics Hives 04/21/2016    Iodine - food allergy Rash 04/13/2016            The following portions of the patient's history were reviewed and updated as appropriate: allergies, current medications, past family history, past medical history, past social history, past surgical history and problem list      Past Medical History:   Diagnosis Date    Anxiety     Cholecystitis     Chronic calculous cholecystitis     last assessed 4/25/16    History of cardioversion     History of radiofrequency ablation procedure for cardiac arrhythmia     Hyperlipidemia     Hypertension     Sleep apnea     no trouble since Afib corrected       Past Surgical History:   Procedure Laterality Date    APPENDECTOMY      BACK SURGERY  11/15/2018    L4-5 Screws and rods    BLADDER SUSPENSION      CARDIOVERSION      atrial - onset 2015 - x5 in 2014 and 2015    CHOLECYSTECTOMY      April 2016    HYSTERECTOMY      KNEE ARTHROSCOPY W/ MENISCAL REPAIR Bilateral     therapeutic - last assessed 4/14/16    KNEE SURGERY      [place] LAP,CHOLECYSTECTOMY N/A 4/26/2016    Procedure: CHOLECYSTECTOMY LAPAROSCOPIC;  Surgeon: Abimbola Gooden MD;  Location: [facility];  Service: General    TONSILLECTOMY      WISDOM TOOTH EXTRACTION         Family History   Problem Relation Age of Onset    Cancer Mother         ovarian    Heart disease Father         cardiac disorder    Cancer Father     Heart disease Brother     Heart disease Paternal Aunt     Heart disease Paternal Uncle          Medications have been verified  Objective   Pulse 56   Temp 97 9 °F (36 6 °C)   Resp 16   Wt 90 7 kg (200 lb)   SpO2 97%   BMI 34 33 kg/m²   No LMP recorded  Patient is postmenopausal        Physical Exam     Physical Exam  Vitals reviewed  Constitutional:       General: She is not in acute distress  Appearance: Normal appearance  She is not ill-appearing  HENT:      Head: Normocephalic and atraumatic  Eyes:      Extraocular Movements: Extraocular movements intact  Conjunctiva/sclera: Conjunctivae normal    Musculoskeletal:         General: Swelling, tenderness and signs of injury present  Cervical back: Normal range of motion  Left foot: Normal range of motion          Feet:    Feet:      Comments: Swelling and TTP noted  Skin:     General: Skin is warm  Neurological:      General: No focal deficit present  Mental Status: She is alert     Psychiatric:         Mood and Affect: Mood normal          Behavior: Behavior normal          Judgment: Judgment normal

## 2022-07-11 ENCOUNTER — RA CDI HCC (OUTPATIENT)
Dept: OTHER | Facility: HOSPITAL | Age: 82
End: 2022-07-11

## 2022-07-11 NOTE — PROGRESS NOTES
[facility] [address]  coding opportunities       Chart reviewed, no opportunity found: CHART REVIEWED, NO OPPORTUNITY FOUND        Patients Insurance     Medicare Insurance: Medicare

## 2022-07-19 ENCOUNTER — OFFICE VISIT (OUTPATIENT)
Dept: FAMILY MEDICINE CLINIC | Facility: HOSPITAL | Age: 82
End: 2022-07-19
Payer: MEDICARE

## 2022-07-19 VITALS
DIASTOLIC BLOOD PRESSURE: 85 MMHG | BODY MASS INDEX: 35.27 KG/M2 | HEIGHT: 64 IN | OXYGEN SATURATION: 96 % | SYSTOLIC BLOOD PRESSURE: 120 MMHG | TEMPERATURE: 97.4 F | HEART RATE: 60 BPM | WEIGHT: 206.6 LBS

## 2022-07-19 DIAGNOSIS — E03.9 ACQUIRED HYPOTHYROIDISM: ICD-10-CM

## 2022-07-19 DIAGNOSIS — G47.01 INSOMNIA DUE TO MEDICAL CONDITION: ICD-10-CM

## 2022-07-19 DIAGNOSIS — F51.02 ADJUSTMENT INSOMNIA: ICD-10-CM

## 2022-07-19 DIAGNOSIS — M48.062 SPINAL STENOSIS OF LUMBAR REGION WITH NEUROGENIC CLAUDICATION: ICD-10-CM

## 2022-07-19 DIAGNOSIS — I10 ESSENTIAL HYPERTENSION: Primary | ICD-10-CM

## 2022-07-19 DIAGNOSIS — Z23 ENCOUNTER FOR IMMUNIZATION: ICD-10-CM

## 2022-07-19 DIAGNOSIS — I48.0 PAROXYSMAL ATRIAL FIBRILLATION (HCC): ICD-10-CM

## 2022-07-19 DIAGNOSIS — E66.01 CLASS 2 SEVERE OBESITY DUE TO EXCESS CALORIES WITH SERIOUS COMORBIDITY AND BODY MASS INDEX (BMI) OF 35.0 TO 35.9 IN ADULT (HCC): ICD-10-CM

## 2022-07-19 DIAGNOSIS — Z79.01 LONG TERM (CURRENT) USE OF ANTICOAGULANTS: ICD-10-CM

## 2022-07-19 PROBLEM — J84.10 DIFFUSE INTERSTITIAL PULMONARY FIBROSIS (HCC): Status: ACTIVE | Noted: 2022-07-19

## 2022-07-19 PROBLEM — M17.9 OSTEOARTHRITIS OF KNEE: Status: ACTIVE | Noted: 2022-07-19

## 2022-07-19 PROBLEM — M17.10 OSTEOARTHRITIS OF KNEE: Status: ACTIVE | Noted: 2022-07-19

## 2022-07-19 PROBLEM — G47.33 OBSTRUCTIVE SLEEP APNEA: Status: ACTIVE | Noted: 2022-07-19

## 2022-07-19 PROCEDURE — G0009 ADMIN PNEUMOCOCCAL VACCINE: HCPCS | Performed by: FAMILY MEDICINE

## 2022-07-19 PROCEDURE — 90677 PCV20 VACCINE IM: CPT | Performed by: FAMILY MEDICINE

## 2022-07-19 PROCEDURE — 99214 OFFICE O/P EST MOD 30 MIN: CPT | Performed by: FAMILY MEDICINE

## 2022-07-19 RX ORDER — ZOLPIDEM TARTRATE 5 MG/1
5 TABLET ORAL
Qty: 15 TABLET | Refills: 3 | Status: SHIPPED | OUTPATIENT
Start: 2022-07-19 | End: 2022-09-27

## 2022-07-19 NOTE — PROGRESS NOTES
Assessment/Plan:         Diagnoses and all orders for this visit:    Essential hypertension  Comments:  Excellent BP control    Spinal stenosis of lumbar region with neurogenic claudication  Comments:  Continue stretches and walking    Acquired hypothyroidism  Comments:  Clinically euthyroid    Class 2 severe obesity due to excess calories with serious comorbidity and body mass index (BMI) of 35 0 to 35 9 in adult [facility])    Paroxysmal atrial fibrillation (HCC)  Comments:  F/U with Dr Christian Trevino term (current) use of anticoagulants    Adjustment insomnia    Insomnia due to medical condition  -     zolpidem (AMBIEN) 5 mg tablet; Take 1 tablet (5 mg total) by mouth daily at bedtime as needed for sleep          Subjective:      Patient ID: Pietro Foley is a 81 y o  female  6 month follow up  Recent trip to [place] to see her family  25year old dog passed away    Sprained her ankle and was seen in Urgent Care  Not the best balance    Sleep is not the best right now, taking Melatonin  Would like refill of low dose Ambien as last year    Due to see Dr Codie Naylor for Cardiology and will be having blood work done  The following portions of the patient's history were reviewed and updated as appropriate: allergies, current medications, past family history, past medical history, past social history, past surgical history and problem list     Review of Systems   Constitutional: Negative for fatigue and unexpected weight change  Respiratory: Negative  Cardiovascular: Negative  Genitourinary: Negative  Musculoskeletal: Positive for arthralgias, back pain and gait problem  Psychiatric/Behavioral: Positive for sleep disturbance  All other systems reviewed and are negative          Objective:      /85 (BP Location: Left arm, Patient Position: Sitting, Cuff Size: Large)   Pulse 60   Temp (!) 97 4 °F (36 3 °C) (Tympanic)   Ht 5' 4" (1 626 m)   Wt 93 7 kg (206 lb 9 6 oz)   SpO2 96% BMI 35 46 kg/m²          Physical Exam  Vitals and nursing note reviewed  Constitutional:       Appearance: Normal appearance  Neck:      Vascular: No carotid bruit  Cardiovascular:      Rate and Rhythm: Normal rate and regular rhythm  Pulses: Normal pulses  Heart sounds: Normal heart sounds  Pulmonary:      Effort: Pulmonary effort is normal       Breath sounds: Normal breath sounds  Musculoskeletal:      Right lower leg: No edema  Left lower leg: No edema  Lymphadenopathy:      Cervical: No cervical adenopathy  Neurological:      Mental Status: She is alert  BMI Counseling: Body mass index is 35 46 kg/m²  The BMI is above normal  Nutrition recommendations include reducing portion sizes, decreasing overall calorie intake and moderation in carbohydrate intake  Exercise recommendations include exercising 3-5 times per week

## 2022-07-20 ENCOUNTER — TELEPHONE (OUTPATIENT)
Dept: FAMILY MEDICINE CLINIC | Facility: HOSPITAL | Age: 82
End: 2022-07-20

## 2022-07-20 NOTE — TELEPHONE ENCOUNTER
Calling with a reaction to Pneumonia vaccine she received in the office yesterday 7/19  Developed hives on her face and neck around 7pm last night 7/19  Says the hive rash is terrribly itchy and uncomfortable  She has not taken any otc mediations or used any topical cream  She is asking what Dr Ana Linda recommends?  PCB

## 2022-07-20 NOTE — TELEPHONE ENCOUNTER
Advise an antihistamine- Zyrtec or Xyzal one once or twice a day or Benadryl every 4-6 hours  If still really flaring tomorrow she may need Prednisone but try the antihistamine

## 2022-07-21 DIAGNOSIS — L50.9 URTICARIA: Primary | ICD-10-CM

## 2022-07-21 RX ORDER — PREDNISONE 10 MG/1
TABLET ORAL
Qty: 16 TABLET | Refills: 0 | Status: SHIPPED | OUTPATIENT
Start: 2022-07-21 | End: 2022-12-19 | Stop reason: ALTCHOICE

## 2022-07-21 NOTE — TELEPHONE ENCOUNTER
Patient called back, hives are not any better  Antihistamine did not help  She is asking if Prednisone can be called in to [facility]  Please call patient to advise

## 2022-07-25 ENCOUNTER — TELEPHONE (OUTPATIENT)
Dept: FAMILY MEDICINE CLINIC | Facility: HOSPITAL | Age: 82
End: 2022-07-25

## 2022-07-25 DIAGNOSIS — L50.9 URTICARIA: Primary | ICD-10-CM

## 2022-07-25 RX ORDER — HYDROXYZINE HYDROCHLORIDE 10 MG/1
10 TABLET, FILM COATED ORAL EVERY 6 HOURS PRN
Qty: 30 TABLET | Refills: 0 | Status: SHIPPED | OUTPATIENT
Start: 2022-07-25 | End: 2023-07-24

## 2022-07-25 NOTE — TELEPHONE ENCOUNTER
Pt states she had hives from a pneumonia shot last week  She tried the predniSONE 10 mg tablet and it got a little bit better but she is still breaking out and itching  Is there something else she can take?   Pt can be reached at [phone]

## 2022-07-28 ENCOUNTER — TELEPHONE (OUTPATIENT)
Dept: FAMILY MEDICINE CLINIC | Facility: HOSPITAL | Age: 82
End: 2022-07-28

## 2022-07-28 DIAGNOSIS — L50.9 URTICARIA: Primary | ICD-10-CM

## 2022-07-28 RX ORDER — METHYLPREDNISOLONE 4 MG/1
TABLET ORAL
Qty: 21 EACH | Refills: 0 | Status: SHIPPED | OUTPATIENT
Start: 2022-07-28 | End: 2022-12-19 | Stop reason: ALTCHOICE

## 2022-07-28 NOTE — TELEPHONE ENCOUNTER
Patient does not think Atarax is helping  Has not tried Benadryl, only has used Prednisone and the Atarax  Advised at this point since the hives are not going away, she should be evaluated in person  States she feels that would be wasting everyone's time and she knows what it is  Wants another refill of prednisone  Please advise

## 2022-09-26 DIAGNOSIS — G47.01 INSOMNIA DUE TO MEDICAL CONDITION: ICD-10-CM

## 2022-09-27 RX ORDER — ZOLPIDEM TARTRATE 5 MG/1
5 TABLET ORAL
Qty: 15 TABLET | Refills: 3 | Status: SHIPPED | OUTPATIENT
Start: 2022-09-27

## 2022-10-31 DIAGNOSIS — G47.01 INSOMNIA DUE TO MEDICAL CONDITION: ICD-10-CM

## 2022-11-01 RX ORDER — ZOLPIDEM TARTRATE 5 MG/1
5 TABLET ORAL
Qty: 15 TABLET | Refills: 3 | Status: SHIPPED | OUTPATIENT
Start: 2022-11-01

## 2022-11-18 ENCOUNTER — TELEPHONE (OUTPATIENT)
Dept: FAMILY MEDICINE CLINIC | Facility: HOSPITAL | Age: 82
End: 2022-11-18

## 2022-11-18 DIAGNOSIS — G47.01 INSOMNIA DUE TO MEDICAL CONDITION: ICD-10-CM

## 2022-11-18 NOTE — TELEPHONE ENCOUNTER
zolpidem (AMBIEN) 5 mg tablet    PLEASE REFILL  SHE IS GOING OUT OF TOWN AND NEEDS AT LEAST 15      [address] Geoffrey Clive  [phone]

## 2022-11-19 RX ORDER — ZOLPIDEM TARTRATE 5 MG/1
5 TABLET ORAL
Qty: 15 TABLET | Refills: 3 | Status: SHIPPED | OUTPATIENT
Start: 2022-11-19

## 2022-11-28 DIAGNOSIS — F39 MOOD DISORDER (HCC): ICD-10-CM

## 2022-11-28 RX ORDER — ESCITALOPRAM OXALATE 10 MG/1
TABLET ORAL
Qty: 90 TABLET | Refills: 3 | Status: SHIPPED | OUTPATIENT
Start: 2022-11-28

## 2022-12-12 DIAGNOSIS — I10 ESSENTIAL HYPERTENSION: Primary | ICD-10-CM

## 2022-12-12 RX ORDER — VALSARTAN 320 MG
320 TABLET ORAL DAILY
Qty: 90 TABLET | Refills: 1 | Status: SHIPPED | OUTPATIENT
Start: 2022-12-12

## 2022-12-19 ENCOUNTER — OFFICE VISIT (OUTPATIENT)
Dept: FAMILY MEDICINE CLINIC | Facility: HOSPITAL | Age: 82
End: 2022-12-19

## 2022-12-19 VITALS
HEART RATE: 68 BPM | TEMPERATURE: 97 F | WEIGHT: 210.8 LBS | HEIGHT: 64 IN | BODY MASS INDEX: 35.99 KG/M2 | SYSTOLIC BLOOD PRESSURE: 126 MMHG | DIASTOLIC BLOOD PRESSURE: 74 MMHG | OXYGEN SATURATION: 97 %

## 2022-12-19 DIAGNOSIS — R39.9 UTI SYMPTOMS: Primary | ICD-10-CM

## 2022-12-19 LAB
SL AMB  POCT GLUCOSE, UA: ABNORMAL
SL AMB LEUKOCYTE ESTERASE,UA: ABNORMAL
SL AMB POCT BILIRUBIN,UA: ABNORMAL
SL AMB POCT BLOOD,UA: ABNORMAL
SL AMB POCT CLARITY,UA: CLEAR
SL AMB POCT COLOR,UA: YELLOW
SL AMB POCT KETONES,UA: ABNORMAL
SL AMB POCT NITRITE,UA: ABNORMAL
SL AMB POCT PH,UA: 6
SL AMB POCT SPECIFIC GRAVITY,UA: 1.01
SL AMB POCT URINE PROTEIN: ABNORMAL
SL AMB POCT UROBILINOGEN: ABNORMAL

## 2022-12-19 RX ORDER — LEVOFLOXACIN 500 MG/1
500 TABLET, FILM COATED ORAL EVERY 24 HOURS
Qty: 3 TABLET | Refills: 0 | Status: SHIPPED | OUTPATIENT
Start: 2022-12-19 | End: 2022-12-22

## 2022-12-19 NOTE — PROGRESS NOTES
Assessment/Plan:     Urine dip positive so will treat presumptively for UTI  Will send urine for culture  Instructed to call with no improvement or worsening symptoms  Diagnoses and all orders for this visit:    UTI symptoms          Subjective:     Patient ID: Shweta Worrell is a 81 y o  female  Has dysuria and blood in urine for the past week  No urinary frequency  No urgency  No back or abdominal pain, fever or chills  No recnet UTIs  Review of Systems   Constitutional: Negative for chills and fever  Gastrointestinal: Negative for abdominal pain  Genitourinary: Positive for dysuria and hematuria  Negative for flank pain, frequency and urgency  Musculoskeletal: Negative for back pain  The following portions of the patient's history were reviewed and updated as appropriate: allergies, current medications, past family history, past medical history, past social history, past surgical history and problem list     Objective:  Vitals:    12/19/22 1118   BP: 126/74   Pulse: 68   Temp: (!) 97 °F (36 1 °C)   SpO2: 97%      Physical Exam  Constitutional:       Appearance: Normal appearance  She is obese  Cardiovascular:      Rate and Rhythm: Normal rate and regular rhythm  Heart sounds: Normal heart sounds  No murmur heard  Pulmonary:      Effort: Pulmonary effort is normal       Breath sounds: Normal breath sounds  Abdominal:      General: Bowel sounds are normal       Palpations: Abdomen is soft  There is no hepatomegaly or splenomegaly  Tenderness: There is no abdominal tenderness  There is no right CVA tenderness or left CVA tenderness  Skin:     General: Skin is warm and dry  Neurological:      Mental Status: She is alert and oriented to person, place, and time  Psychiatric:         Mood and Affect: Mood normal          Behavior: Behavior normal          Thought Content:  Thought content normal          Judgment: Judgment normal

## 2023-01-01 ENCOUNTER — APPOINTMENT (INPATIENT)
Dept: RADIOLOGY | Facility: HOSPITAL | Age: 83
DRG: 441 | End: 2023-01-01
Payer: MEDICARE

## 2023-01-01 ENCOUNTER — APPOINTMENT (INPATIENT)
Dept: CT IMAGING | Facility: HOSPITAL | Age: 83
DRG: 441 | End: 2023-01-01
Payer: MEDICARE

## 2023-01-01 ENCOUNTER — OFFICE VISIT (OUTPATIENT)
Dept: FAMILY MEDICINE CLINIC | Facility: HOSPITAL | Age: 83
End: 2023-01-01

## 2023-01-01 ENCOUNTER — APPOINTMENT (OUTPATIENT)
Dept: INTERVENTIONAL RADIOLOGY/VASCULAR | Facility: HOSPITAL | Age: 83
DRG: 441 | End: 2023-01-01
Attending: INTERNAL MEDICINE
Payer: MEDICARE

## 2023-01-01 ENCOUNTER — APPOINTMENT (INPATIENT)
Dept: ULTRASOUND IMAGING | Facility: HOSPITAL | Age: 83
DRG: 441 | End: 2023-01-01
Payer: MEDICARE

## 2023-01-01 ENCOUNTER — APPOINTMENT (INPATIENT)
Dept: GASTROENTEROLOGY | Facility: HOSPITAL | Age: 83
DRG: 441 | End: 2023-01-01
Attending: INTERNAL MEDICINE
Payer: MEDICARE

## 2023-01-01 ENCOUNTER — APPOINTMENT (INPATIENT)
Dept: NEUROLOGY | Facility: HOSPITAL | Age: 83
DRG: 441 | End: 2023-01-01
Payer: MEDICARE

## 2023-01-01 ENCOUNTER — APPOINTMENT (INPATIENT)
Dept: MRI IMAGING | Facility: HOSPITAL | Age: 83
DRG: 441 | End: 2023-01-01
Payer: MEDICARE

## 2023-01-01 ENCOUNTER — APPOINTMENT (EMERGENCY)
Dept: CT IMAGING | Facility: HOSPITAL | Age: 83
DRG: 441 | End: 2023-01-01
Payer: MEDICARE

## 2023-01-01 ENCOUNTER — HOSPITAL ENCOUNTER (INPATIENT)
Facility: HOSPITAL | Age: 83
LOS: 16 days | DRG: 441 | End: 2023-10-18
Attending: EMERGENCY MEDICINE | Admitting: INTERNAL MEDICINE
Payer: MEDICARE

## 2023-01-01 ENCOUNTER — APPOINTMENT (INPATIENT)
Dept: NON INVASIVE DIAGNOSTICS | Facility: HOSPITAL | Age: 83
DRG: 441 | End: 2023-01-01
Payer: MEDICARE

## 2023-01-01 VITALS
SYSTOLIC BLOOD PRESSURE: 114 MMHG | WEIGHT: 217.4 LBS | BODY MASS INDEX: 37.11 KG/M2 | DIASTOLIC BLOOD PRESSURE: 68 MMHG | OXYGEN SATURATION: 98 % | TEMPERATURE: 97.5 F | HEIGHT: 64 IN | HEART RATE: 55 BPM

## 2023-01-01 VITALS
BODY MASS INDEX: 45.41 KG/M2 | WEIGHT: 266 LBS | OXYGEN SATURATION: 42 % | TEMPERATURE: 98.1 F | HEART RATE: 59 BPM | HEIGHT: 64 IN | RESPIRATION RATE: 12 BRPM | DIASTOLIC BLOOD PRESSURE: 52 MMHG | SYSTOLIC BLOOD PRESSURE: 79 MMHG

## 2023-01-01 DIAGNOSIS — R74.01 ELEVATED TRANSAMINASE LEVEL: ICD-10-CM

## 2023-01-01 DIAGNOSIS — F39 MOOD DISORDER (HCC): ICD-10-CM

## 2023-01-01 DIAGNOSIS — R26.9 GAIT ABNORMALITY: ICD-10-CM

## 2023-01-01 DIAGNOSIS — E72.20 HYPERAMMONEMIA (HCC): ICD-10-CM

## 2023-01-01 DIAGNOSIS — R17 JAUNDICE: ICD-10-CM

## 2023-01-01 DIAGNOSIS — R17 ELEVATED BILIRUBIN: ICD-10-CM

## 2023-01-01 DIAGNOSIS — I10 ESSENTIAL HYPERTENSION: Primary | ICD-10-CM

## 2023-01-01 DIAGNOSIS — E78.2 MIXED HYPERLIPIDEMIA: ICD-10-CM

## 2023-01-01 DIAGNOSIS — J96.00 ACUTE RESPIRATORY FAILURE, UNSPECIFIED WHETHER WITH HYPOXIA OR HYPERCAPNIA (HCC): Primary | ICD-10-CM

## 2023-01-01 DIAGNOSIS — R04.2 HEMOPTYSIS: ICD-10-CM

## 2023-01-01 DIAGNOSIS — G47.33 OBSTRUCTIVE SLEEP APNEA: ICD-10-CM

## 2023-01-01 DIAGNOSIS — I48.0 PAROXYSMAL ATRIAL FIBRILLATION (HCC): ICD-10-CM

## 2023-01-01 DIAGNOSIS — D69.6 THROMBOCYTOPENIA (HCC): ICD-10-CM

## 2023-01-01 DIAGNOSIS — E03.9 ACQUIRED HYPOTHYROIDISM: ICD-10-CM

## 2023-01-01 DIAGNOSIS — G56.03 BILATERAL CARPAL TUNNEL SYNDROME: ICD-10-CM

## 2023-01-01 DIAGNOSIS — R74.01 TRANSAMINITIS: Primary | ICD-10-CM

## 2023-01-01 DIAGNOSIS — N17.9 AKI (ACUTE KIDNEY INJURY) (HCC): ICD-10-CM

## 2023-01-01 LAB
1,25(OH)2D3 SERPL-MCNC: 45.4 PG/ML (ref 24.8–81.5)
25(OH)D3 SERPL-MCNC: 22.6 NG/ML (ref 30–100)
ABO GROUP BLD BPU: NORMAL
ABO GROUP BLD: NORMAL
ABO GROUP BLD: NORMAL
ACE SERPL-CCNC: 16 U/L (ref 14–82)
ACTIN IGG SERPL-ACNC: 126 UNITS (ref 0–19)
AFP-TM SERPL-MCNC: 16.91 NG/ML (ref 0–9)
ALBUMIN SERPL BCP-MCNC: 1.6 G/DL (ref 3.5–5)
ALBUMIN SERPL BCP-MCNC: 1.8 G/DL (ref 3.5–5)
ALBUMIN SERPL BCP-MCNC: 1.9 G/DL (ref 3.5–5)
ALBUMIN SERPL BCP-MCNC: 2 G/DL (ref 3.5–5)
ALBUMIN SERPL BCP-MCNC: 2.8 G/DL (ref 3.5–5)
ALBUMIN SERPL BCP-MCNC: 3.9 G/DL (ref 3.5–5)
ALBUMIN SERPL BCP-MCNC: 4.1 G/DL (ref 3.5–5)
ALBUMIN SERPL BCP-MCNC: 4.2 G/DL (ref 3.5–5)
ALBUMIN SERPL BCP-MCNC: 4.6 G/DL (ref 3.5–5)
ALBUMIN SERPL BCP-MCNC: 4.7 G/DL (ref 3.5–5)
ALBUMIN SERPL ELPH-MCNC: 4.54 G/DL (ref 3.2–5.1)
ALBUMIN SERPL ELPH-MCNC: 70.9 % (ref 48–70)
ALBUMIN UR ELPH-MCNC: 50.3 %
ALP SERPL-CCNC: 109 U/L (ref 34–104)
ALP SERPL-CCNC: 115 U/L (ref 34–104)
ALP SERPL-CCNC: 116 U/L (ref 34–104)
ALP SERPL-CCNC: 116 U/L (ref 34–104)
ALP SERPL-CCNC: 118 U/L (ref 34–104)
ALP SERPL-CCNC: 123 U/L (ref 34–104)
ALP SERPL-CCNC: 126 U/L (ref 34–104)
ALP SERPL-CCNC: 128 U/L (ref 34–104)
ALP SERPL-CCNC: 133 U/L (ref 34–104)
ALP SERPL-CCNC: 136 U/L (ref 34–104)
ALP SERPL-CCNC: 39 U/L (ref 34–104)
ALP SERPL-CCNC: 45 U/L (ref 34–104)
ALP SERPL-CCNC: 45 U/L (ref 34–104)
ALP SERPL-CCNC: 60 U/L (ref 34–104)
ALP SERPL-CCNC: 64 U/L (ref 34–104)
ALP SERPL-CCNC: 94 U/L (ref 34–104)
ALP SERPL-CCNC: 97 U/L (ref 34–104)
ALPHA1 GLOB MFR UR ELPH: 2.8 %
ALPHA1 GLOB SERPL ELPH-MCNC: 0.12 G/DL (ref 0.15–0.47)
ALPHA1 GLOB SERPL ELPH-MCNC: 1.9 % (ref 1.8–7)
ALPHA2 GLOB MFR UR ELPH: 3.7 %
ALPHA2 GLOB SERPL ELPH-MCNC: 0.28 G/DL (ref 0.42–1.04)
ALPHA2 GLOB SERPL ELPH-MCNC: 4.4 % (ref 5.9–14.9)
ALT SERPL W P-5'-P-CCNC: 127 U/L (ref 7–52)
ALT SERPL W P-5'-P-CCNC: 168 U/L (ref 7–52)
ALT SERPL W P-5'-P-CCNC: 191 U/L (ref 7–52)
ALT SERPL W P-5'-P-CCNC: 293 U/L (ref 7–52)
ALT SERPL W P-5'-P-CCNC: 355 U/L (ref 7–52)
ALT SERPL W P-5'-P-CCNC: 374 U/L (ref 7–52)
ALT SERPL W P-5'-P-CCNC: 392 U/L (ref 7–52)
ALT SERPL W P-5'-P-CCNC: 407 U/L (ref 7–52)
ALT SERPL W P-5'-P-CCNC: 410 U/L (ref 7–52)
ALT SERPL W P-5'-P-CCNC: 451 U/L (ref 7–52)
ALT SERPL W P-5'-P-CCNC: 455 U/L (ref 7–52)
ALT SERPL W P-5'-P-CCNC: 457 U/L (ref 7–52)
ALT SERPL W P-5'-P-CCNC: 466 U/L (ref 7–52)
ALT SERPL W P-5'-P-CCNC: 477 U/L (ref 7–52)
ALT SERPL W P-5'-P-CCNC: 491 U/L (ref 7–52)
ALT SERPL W P-5'-P-CCNC: 61 U/L (ref 7–52)
ALT SERPL W P-5'-P-CCNC: 81 U/L (ref 7–52)
AMMONIA PLAS-SCNC: 119 UMOL/L (ref 18–72)
AMMONIA PLAS-SCNC: 122 UMOL/L (ref 18–72)
AMMONIA PLAS-SCNC: 127 UMOL/L (ref 18–72)
AMMONIA PLAS-SCNC: 133 UMOL/L (ref 18–72)
AMMONIA PLAS-SCNC: 139 UMOL/L (ref 18–72)
AMMONIA PLAS-SCNC: 140 UMOL/L (ref 18–72)
AMMONIA PLAS-SCNC: 145 UMOL/L (ref 18–72)
AMMONIA PLAS-SCNC: 148 UMOL/L (ref 18–72)
AMMONIA PLAS-SCNC: 149 UMOL/L (ref 18–72)
AMMONIA PLAS-SCNC: 161 UMOL/L (ref 18–72)
AMMONIA PLAS-SCNC: 166 UMOL/L (ref 18–72)
AMMONIA PLAS-SCNC: 182 UMOL/L (ref 18–72)
AMMONIA PLAS-SCNC: 184 UMOL/L (ref 18–72)
AMMONIA PLAS-SCNC: 189 UMOL/L (ref 18–72)
AMMONIA PLAS-SCNC: 193 UMOL/L (ref 18–72)
AMMONIA PLAS-SCNC: 201 UMOL/L (ref 18–72)
AMMONIA PLAS-SCNC: 246 UMOL/L (ref 18–72)
AMMONIA PLAS-SCNC: 82 UMOL/L (ref 18–72)
ANA SER QL IA: NEGATIVE
ANION GAP SERPL CALCULATED.3IONS-SCNC: 0 MMOL/L
ANION GAP SERPL CALCULATED.3IONS-SCNC: 1 MMOL/L
ANION GAP SERPL CALCULATED.3IONS-SCNC: 11 MMOL/L
ANION GAP SERPL CALCULATED.3IONS-SCNC: 11 MMOL/L
ANION GAP SERPL CALCULATED.3IONS-SCNC: 12 MMOL/L
ANION GAP SERPL CALCULATED.3IONS-SCNC: 13 MMOL/L
ANION GAP SERPL CALCULATED.3IONS-SCNC: 14 MMOL/L
ANION GAP SERPL CALCULATED.3IONS-SCNC: 15 MMOL/L
ANION GAP SERPL CALCULATED.3IONS-SCNC: 16 MMOL/L
ANION GAP SERPL CALCULATED.3IONS-SCNC: 16 MMOL/L
ANION GAP SERPL CALCULATED.3IONS-SCNC: 17 MMOL/L
ANION GAP SERPL CALCULATED.3IONS-SCNC: 2 MMOL/L
ANION GAP SERPL CALCULATED.3IONS-SCNC: 3 MMOL/L
ANION GAP SERPL CALCULATED.3IONS-SCNC: 6 MMOL/L
ANION GAP SERPL CALCULATED.3IONS-SCNC: 8 MMOL/L
ANION GAP SERPL CALCULATED.3IONS-SCNC: 9 MMOL/L
ANISOCYTOSIS BLD QL SMEAR: PRESENT
ANISOCYTOSIS BLD QL SMEAR: PRESENT
AORTIC ROOT: 3.1 CM
APAP SERPL-MCNC: <10 UG/ML (ref 10–20)
APICAL FOUR CHAMBER EJECTION FRACTION: 69 %
APTT PPP: 70 SECONDS (ref 23–37)
ARTERIAL PATENCY WRIST A: NO
ASCENDING AORTA: 3.1 CM
AST SERPL W P-5'-P-CCNC: 105 U/L (ref 13–39)
AST SERPL W P-5'-P-CCNC: 193 U/L (ref 13–39)
AST SERPL W P-5'-P-CCNC: 26 U/L (ref 13–39)
AST SERPL W P-5'-P-CCNC: 36 U/L (ref 13–39)
AST SERPL W P-5'-P-CCNC: 376 U/L (ref 13–39)
AST SERPL W P-5'-P-CCNC: 63 U/L (ref 13–39)
AST SERPL W P-5'-P-CCNC: 643 U/L (ref 13–39)
AST SERPL W P-5'-P-CCNC: 665 U/L (ref 13–39)
AST SERPL W P-5'-P-CCNC: 679 U/L (ref 13–39)
AST SERPL W P-5'-P-CCNC: 683 U/L (ref 13–39)
AST SERPL W P-5'-P-CCNC: 690 U/L (ref 13–39)
AST SERPL W P-5'-P-CCNC: 698 U/L (ref 13–39)
AST SERPL W P-5'-P-CCNC: 699 U/L (ref 13–39)
AST SERPL W P-5'-P-CCNC: 707 U/L (ref 13–39)
AST SERPL W P-5'-P-CCNC: 731 U/L (ref 13–39)
AST SERPL W P-5'-P-CCNC: 743 U/L (ref 13–39)
AST SERPL W P-5'-P-CCNC: 92 U/L (ref 13–39)
ATRIAL RATE: 58 BPM
ATRIAL RATE: 61 BPM
ATRIAL RATE: 61 BPM
ATRIAL RATE: 65 BPM
ATRIAL RATE: 70 BPM
ATRIAL RATE: 72 BPM
ATRIAL RATE: 91 BPM
ATRIAL RATE: 93 BPM
B-GLOBULIN MFR UR ELPH: 6.9 %
BACTERIA BLD CULT: NORMAL
BACTERIA BLD CULT: NORMAL
BACTERIA BRONCH AEROBE CULT: NO GROWTH
BACTERIA UR CULT: ABNORMAL
BACTERIA UR QL AUTO: ABNORMAL /HPF
BASE EX.OXY STD BLDV CALC-SCNC: 59.2 % (ref 60–80)
BASE EX.OXY STD BLDV CALC-SCNC: 94.2 % (ref 60–80)
BASE EXCESS BLDA CALC-SCNC: -10 MMOL/L
BASE EXCESS BLDA CALC-SCNC: -3.3 MMOL/L
BASE EXCESS BLDA CALC-SCNC: -5.7 MMOL/L
BASE EXCESS BLDA CALC-SCNC: -6 MMOL/L (ref -2–3)
BASE EXCESS BLDA CALC-SCNC: -6 MMOL/L (ref -2–3)
BASE EXCESS BLDA CALC-SCNC: -6.2 MMOL/L
BASE EXCESS BLDA CALC-SCNC: -6.2 MMOL/L
BASE EXCESS BLDA CALC-SCNC: -7 MMOL/L
BASE EXCESS BLDA CALC-SCNC: -7 MMOL/L (ref -2–3)
BASE EXCESS BLDA CALC-SCNC: -9 MMOL/L (ref -2–3)
BASE EXCESS BLDA CALC-SCNC: -9.1 MMOL/L
BASE EXCESS BLDA CALC-SCNC: -9.8 MMOL/L
BASE EXCESS BLDV CALC-SCNC: -8.3 MMOL/L
BASE EXCESS BLDV CALC-SCNC: -8.3 MMOL/L
BASOPHILS # BLD AUTO: 0 THOUSANDS/ÂΜL (ref 0–0.1)
BASOPHILS # BLD AUTO: 0.01 THOUSANDS/ÂΜL (ref 0–0.1)
BASOPHILS # BLD AUTO: 0.02 THOUSANDS/ÂΜL (ref 0–0.1)
BASOPHILS # BLD AUTO: 0.04 THOUSANDS/ÂΜL (ref 0–0.1)
BASOPHILS # BLD AUTO: 0.08 THOUSANDS/ÂΜL (ref 0–0.1)
BASOPHILS # BLD AUTO: 0.09 THOUSANDS/ÂΜL (ref 0–0.1)
BASOPHILS # BLD AUTO: 0.11 THOUSANDS/ÂΜL (ref 0–0.1)
BASOPHILS # BLD MANUAL: 0 THOUSAND/UL (ref 0–0.1)
BASOPHILS NFR BLD AUTO: 0 % (ref 0–1)
BASOPHILS NFR BLD AUTO: 1 % (ref 0–1)
BASOPHILS NFR BLD AUTO: 2 % (ref 0–1)
BASOPHILS NFR MAR MANUAL: 0 % (ref 0–1)
BETA GLOB ABNORMAL SERPL ELPH-MCNC: 0.16 G/DL (ref 0.31–0.57)
BETA1 GLOB SERPL ELPH-MCNC: 2.5 % (ref 4.7–7.7)
BETA2 GLOB SERPL ELPH-MCNC: 2.8 % (ref 3.1–7.9)
BETA2+GAMMA GLOB SERPL ELPH-MCNC: 0.18 G/DL (ref 0.2–0.58)
BILIRUB DIRECT SERPL-MCNC: 6.72 MG/DL (ref 0–0.2)
BILIRUB DIRECT SERPL-MCNC: 7.42 MG/DL (ref 0–0.2)
BILIRUB DIRECT SERPL-MCNC: 7.5 MG/DL (ref 0–0.2)
BILIRUB DIRECT SERPL-MCNC: 7.66 MG/DL (ref 0–0.2)
BILIRUB DIRECT SERPL-MCNC: 7.78 MG/DL (ref 0–0.2)
BILIRUB DIRECT SERPL-MCNC: 8.01 MG/DL (ref 0–0.2)
BILIRUB DIRECT SERPL-MCNC: 8.04 MG/DL (ref 0–0.2)
BILIRUB DIRECT SERPL-MCNC: 8.69 MG/DL (ref 0–0.2)
BILIRUB DIRECT SERPL-MCNC: 8.71 MG/DL (ref 0–0.2)
BILIRUB DIRECT SERPL-MCNC: 9.58 MG/DL (ref 0–0.2)
BILIRUB SERPL-MCNC: 11.97 MG/DL (ref 0.2–1)
BILIRUB SERPL-MCNC: 12.14 MG/DL (ref 0.2–1)
BILIRUB SERPL-MCNC: 12.19 MG/DL (ref 0.2–1)
BILIRUB SERPL-MCNC: 12.2 MG/DL (ref 0.2–1)
BILIRUB SERPL-MCNC: 13.09 MG/DL (ref 0.2–1)
BILIRUB SERPL-MCNC: 13.5 MG/DL (ref 0.2–1)
BILIRUB SERPL-MCNC: 13.97 MG/DL (ref 0.2–1)
BILIRUB SERPL-MCNC: 14.44 MG/DL (ref 0.2–1)
BILIRUB SERPL-MCNC: 14.53 MG/DL (ref 0.2–1)
BILIRUB SERPL-MCNC: 14.74 MG/DL (ref 0.2–1)
BILIRUB SERPL-MCNC: 15.13 MG/DL (ref 0.2–1)
BILIRUB SERPL-MCNC: 15.85 MG/DL (ref 0.2–1)
BILIRUB SERPL-MCNC: 16.75 MG/DL (ref 0.2–1)
BILIRUB SERPL-MCNC: 17.73 MG/DL (ref 0.2–1)
BILIRUB SERPL-MCNC: 18.05 MG/DL (ref 0.2–1)
BILIRUB SERPL-MCNC: 19.16 MG/DL (ref 0.2–1)
BILIRUB SERPL-MCNC: 20.19 MG/DL (ref 0.2–1)
BILIRUB UR QL STRIP: ABNORMAL
BLD GP AB SCN SERPL QL: NEGATIVE
BLD GP AB SCN SERPL QL: NEGATIVE
BLD SMEAR INTERP: NORMAL
BNP SERPL-MCNC: 2902 PG/ML (ref 0–100)
BPU ID: NORMAL
BUN SERPL-MCNC: 14 MG/DL (ref 5–25)
BUN SERPL-MCNC: 14 MG/DL (ref 5–25)
BUN SERPL-MCNC: 15 MG/DL (ref 5–25)
BUN SERPL-MCNC: 15 MG/DL (ref 5–25)
BUN SERPL-MCNC: 18 MG/DL (ref 5–25)
BUN SERPL-MCNC: 20 MG/DL (ref 5–25)
BUN SERPL-MCNC: 22 MG/DL (ref 5–25)
BUN SERPL-MCNC: 22 MG/DL (ref 5–25)
BUN SERPL-MCNC: 27 MG/DL (ref 5–25)
BUN SERPL-MCNC: 32 MG/DL (ref 5–25)
BUN SERPL-MCNC: 33 MG/DL (ref 5–25)
BUN SERPL-MCNC: 40 MG/DL (ref 5–25)
BUN SERPL-MCNC: 45 MG/DL (ref 5–25)
BUN SERPL-MCNC: 48 MG/DL (ref 5–25)
BUN SERPL-MCNC: 49 MG/DL (ref 5–25)
BUN SERPL-MCNC: 55 MG/DL (ref 5–25)
BUN SERPL-MCNC: 63 MG/DL (ref 5–25)
BUN SERPL-MCNC: 64 MG/DL (ref 5–25)
BUN SERPL-MCNC: 70 MG/DL (ref 5–25)
BUN SERPL-MCNC: 70 MG/DL (ref 5–25)
BUN SERPL-MCNC: 72 MG/DL (ref 5–25)
BUN SERPL-MCNC: 75 MG/DL (ref 5–25)
BUN SERPL-MCNC: 75 MG/DL (ref 5–25)
BUN SERPL-MCNC: 77 MG/DL (ref 5–25)
BURR CELLS BLD QL SMEAR: PRESENT
C-ANCA TITR SER IF: ABNORMAL TITER
C-ANCA TITR SER IF: ABNORMAL TITER
CA-I BLD-SCNC: 1.18 MMOL/L (ref 1.12–1.32)
CA-I BLD-SCNC: 1.19 MMOL/L (ref 1.12–1.32)
CA-I BLD-SCNC: 1.21 MMOL/L (ref 1.12–1.32)
CA-I BLD-SCNC: 1.22 MMOL/L (ref 1.12–1.32)
CA-I BLD-SCNC: 1.24 MMOL/L (ref 1.12–1.32)
CA-I BLD-SCNC: 1.26 MMOL/L (ref 1.12–1.32)
CA-I BLD-SCNC: 1.29 MMOL/L (ref 1.12–1.32)
CA-I BLD-SCNC: 1.3 MMOL/L (ref 1.12–1.32)
CA-I BLD-SCNC: 1.31 MMOL/L (ref 1.12–1.32)
CA-I BLD-SCNC: 1.33 MMOL/L (ref 1.12–1.32)
CA-I BLD-SCNC: 1.34 MMOL/L (ref 1.12–1.32)
CA-I BLD-SCNC: 1.37 MMOL/L (ref 1.12–1.32)
CA-I BLD-SCNC: 1.44 MMOL/L (ref 1.12–1.32)
CALCIUM ALBUM COR SERPL-MCNC: 10 MG/DL (ref 8.3–10.1)
CALCIUM ALBUM COR SERPL-MCNC: 10.2 MG/DL (ref 8.3–10.1)
CALCIUM ALBUM COR SERPL-MCNC: 10.4 MG/DL (ref 8.3–10.1)
CALCIUM ALBUM COR SERPL-MCNC: 9.4 MG/DL (ref 8.3–10.1)
CALCIUM SERPL-MCNC: 10 MG/DL (ref 8.4–10.2)
CALCIUM SERPL-MCNC: 10.1 MG/DL (ref 8.4–10.2)
CALCIUM SERPL-MCNC: 10.2 MG/DL (ref 8.4–10.2)
CALCIUM SERPL-MCNC: 10.3 MG/DL (ref 8.4–10.2)
CALCIUM SERPL-MCNC: 10.4 MG/DL (ref 8.4–10.2)
CALCIUM SERPL-MCNC: 10.6 MG/DL (ref 8.4–10.2)
CALCIUM SERPL-MCNC: 10.7 MG/DL (ref 8.4–10.2)
CALCIUM SERPL-MCNC: 10.8 MG/DL (ref 8.4–10.2)
CALCIUM SERPL-MCNC: 10.8 MG/DL (ref 8.4–10.2)
CALCIUM SERPL-MCNC: 10.9 MG/DL (ref 8.4–10.2)
CALCIUM SERPL-MCNC: 11.2 MG/DL (ref 8.4–10.2)
CALCIUM SERPL-MCNC: 8.2 MG/DL (ref 8.4–10.2)
CALCIUM SERPL-MCNC: 8.2 MG/DL (ref 8.4–10.2)
CALCIUM SERPL-MCNC: 8.3 MG/DL (ref 8.4–10.2)
CALCIUM SERPL-MCNC: 8.4 MG/DL (ref 8.4–10.2)
CALCIUM SERPL-MCNC: 8.5 MG/DL (ref 8.4–10.2)
CALCIUM SERPL-MCNC: 8.7 MG/DL (ref 8.4–10.2)
CALCIUM SERPL-MCNC: 9.1 MG/DL (ref 8.4–10.2)
CALCIUM SERPL-MCNC: 9.6 MG/DL (ref 8.4–10.2)
CALCIUM SERPL-MCNC: 9.7 MG/DL (ref 8.4–10.2)
CALCIUM SERPL-MCNC: 9.8 MG/DL (ref 8.4–10.2)
CALCIUM SERPL-MCNC: 9.9 MG/DL (ref 8.4–10.2)
CALCIUM SERPL-MCNC: 9.9 MG/DL (ref 8.4–10.2)
CAMPYLOBACTER DNA SPEC NAA+PROBE: NORMAL
CANCER AG19-9 SERPL-ACNC: 171 U/ML (ref 0–35)
CAOX CRY URNS QL MICRO: ABNORMAL /HPF
CARDIAC TROPONIN I PNL SERPL HS: 264 NG/L (ref 8–18)
CARDIAC TROPONIN I PNL SERPL HS: 398 NG/L (ref 8–18)
CCP AB SER IA-ACNC: 5.8
CEA SERPL-MCNC: 1.8 NG/ML (ref 0–3)
CERULOPLASMIN SERPL-MCNC: 16.4 MG/DL (ref 19–39)
CHLORIDE SERPL-SCNC: 103 MMOL/L (ref 96–108)
CHLORIDE SERPL-SCNC: 103 MMOL/L (ref 96–108)
CHLORIDE SERPL-SCNC: 104 MMOL/L (ref 96–108)
CHLORIDE SERPL-SCNC: 105 MMOL/L (ref 96–108)
CHLORIDE SERPL-SCNC: 106 MMOL/L (ref 96–108)
CHLORIDE SERPL-SCNC: 107 MMOL/L (ref 96–108)
CHLORIDE SERPL-SCNC: 107 MMOL/L (ref 96–108)
CHLORIDE SERPL-SCNC: 108 MMOL/L (ref 96–108)
CHLORIDE SERPL-SCNC: 109 MMOL/L (ref 96–108)
CHLORIDE SERPL-SCNC: 109 MMOL/L (ref 96–108)
CHLORIDE SERPL-SCNC: 111 MMOL/L (ref 96–108)
CHLORIDE SERPL-SCNC: 112 MMOL/L (ref 96–108)
CHLORIDE SERPL-SCNC: 112 MMOL/L (ref 96–108)
CHLORIDE SERPL-SCNC: 113 MMOL/L (ref 96–108)
CHLORIDE SERPL-SCNC: 114 MMOL/L (ref 96–108)
CHLORIDE SERPL-SCNC: 115 MMOL/L (ref 96–108)
CHLORIDE SERPL-SCNC: 115 MMOL/L (ref 96–108)
CK SERPL-CCNC: 41 U/L (ref 26–192)
CLARITY UR: CLEAR
CMV DNA SERPL NAA+PROBE-ACNC: ABNORMAL [IU]/ML
CMV IGG SERPL IA-ACNC: >10 U/ML (ref 0–0.59)
CMV IGM SERPL IA-ACNC: 40.9 AU/ML (ref 0–29.9)
CO2 SERPL-SCNC: 15 MMOL/L (ref 21–32)
CO2 SERPL-SCNC: 15 MMOL/L (ref 21–32)
CO2 SERPL-SCNC: 16 MMOL/L (ref 21–32)
CO2 SERPL-SCNC: 17 MMOL/L (ref 21–32)
CO2 SERPL-SCNC: 18 MMOL/L (ref 21–32)
CO2 SERPL-SCNC: 19 MMOL/L (ref 21–32)
CO2 SERPL-SCNC: 20 MMOL/L (ref 21–32)
CO2 SERPL-SCNC: 21 MMOL/L (ref 21–32)
CO2 SERPL-SCNC: 23 MMOL/L (ref 21–32)
CO2 SERPL-SCNC: 23 MMOL/L (ref 21–32)
CO2 SERPL-SCNC: 24 MMOL/L (ref 21–32)
CO2 SERPL-SCNC: 26 MMOL/L (ref 21–32)
CO2 SERPL-SCNC: 27 MMOL/L (ref 21–32)
COLOR UR: ABNORMAL
CORTIS AM PEAK SERPL-MCNC: 3.3 UG/DL (ref 6.7–22.6)
CREAT SERPL-MCNC: 0.63 MG/DL (ref 0.6–1.3)
CREAT SERPL-MCNC: 0.63 MG/DL (ref 0.6–1.3)
CREAT SERPL-MCNC: 0.68 MG/DL (ref 0.6–1.3)
CREAT SERPL-MCNC: 0.69 MG/DL (ref 0.6–1.3)
CREAT SERPL-MCNC: 0.79 MG/DL (ref 0.6–1.3)
CREAT SERPL-MCNC: 0.8 MG/DL (ref 0.6–1.3)
CREAT SERPL-MCNC: 0.8 MG/DL (ref 0.6–1.3)
CREAT SERPL-MCNC: 0.84 MG/DL (ref 0.6–1.3)
CREAT SERPL-MCNC: 1.49 MG/DL (ref 0.6–1.3)
CREAT SERPL-MCNC: 1.64 MG/DL (ref 0.6–1.3)
CREAT SERPL-MCNC: 1.74 MG/DL (ref 0.6–1.3)
CREAT SERPL-MCNC: 1.76 MG/DL (ref 0.6–1.3)
CREAT SERPL-MCNC: 1.97 MG/DL (ref 0.6–1.3)
CREAT SERPL-MCNC: 2.05 MG/DL (ref 0.6–1.3)
CREAT SERPL-MCNC: 2.09 MG/DL (ref 0.6–1.3)
CREAT SERPL-MCNC: 2.2 MG/DL (ref 0.6–1.3)
CREAT SERPL-MCNC: 2.23 MG/DL (ref 0.6–1.3)
CREAT SERPL-MCNC: 2.26 MG/DL (ref 0.6–1.3)
CREAT SERPL-MCNC: 2.27 MG/DL (ref 0.6–1.3)
CREAT SERPL-MCNC: 2.28 MG/DL (ref 0.6–1.3)
CREAT SERPL-MCNC: 2.28 MG/DL (ref 0.6–1.3)
CREAT SERPL-MCNC: 2.32 MG/DL (ref 0.6–1.3)
CREAT SERPL-MCNC: 2.48 MG/DL (ref 0.6–1.3)
CREAT SERPL-MCNC: 2.48 MG/DL (ref 0.6–1.3)
CREAT SERPL-MCNC: 2.53 MG/DL (ref 0.6–1.3)
CREAT SERPL-MCNC: 2.85 MG/DL (ref 0.6–1.3)
CREAT SERPL-MCNC: 2.92 MG/DL (ref 0.6–1.3)
CREAT SERPL-MCNC: 3.04 MG/DL (ref 0.6–1.3)
CREAT SERPL-MCNC: 3.28 MG/DL (ref 0.6–1.3)
CROSSMATCH: NORMAL
CRYOGLOB RF SER-ACNC: ABNORMAL [IU]/ML
D DIMER PPP FEU-MCNC: 7.1 UG/ML FEU
DAT POLY-SP REAG RBC QL: NEGATIVE
DEPRECATED AT III PPP: 22 % OF NORMAL (ref 92–136)
DOP CALC LVOT AREA: 3.14 CM2
DOP CALC LVOT DIAMETER: 2 CM
E WAVE DECELERATION TIME: 205 MS
E/A RATIO: 1.06
ENDOMYSIUM IGA SER QL: NEGATIVE
EOSINOPHIL # BLD AUTO: 0 THOUSAND/ÂΜL (ref 0–0.61)
EOSINOPHIL # BLD AUTO: 0.12 THOUSAND/ÂΜL (ref 0–0.61)
EOSINOPHIL # BLD AUTO: 0.5 THOUSAND/ÂΜL (ref 0–0.61)
EOSINOPHIL # BLD AUTO: 0.65 THOUSAND/ÂΜL (ref 0–0.61)
EOSINOPHIL # BLD AUTO: 0.72 THOUSAND/ÂΜL (ref 0–0.61)
EOSINOPHIL # BLD MANUAL: 0 THOUSAND/UL (ref 0–0.4)
EOSINOPHIL NFR BLD AUTO: 0 % (ref 0–6)
EOSINOPHIL NFR BLD AUTO: 10 % (ref 0–6)
EOSINOPHIL NFR BLD AUTO: 11 % (ref 0–6)
EOSINOPHIL NFR BLD AUTO: 2 % (ref 0–6)
EOSINOPHIL NFR BLD AUTO: 8 % (ref 0–6)
EOSINOPHIL NFR BLD MANUAL: 0 % (ref 0–6)
ERYTHROCYTE [DISTWIDTH] IN BLOOD BY AUTOMATED COUNT: 18.9 % (ref 11.6–15.1)
ERYTHROCYTE [DISTWIDTH] IN BLOOD BY AUTOMATED COUNT: 19.1 % (ref 11.6–15.1)
ERYTHROCYTE [DISTWIDTH] IN BLOOD BY AUTOMATED COUNT: 19.4 % (ref 11.6–15.1)
ERYTHROCYTE [DISTWIDTH] IN BLOOD BY AUTOMATED COUNT: 19.9 % (ref 11.6–15.1)
ERYTHROCYTE [DISTWIDTH] IN BLOOD BY AUTOMATED COUNT: 20 % (ref 11.6–15.1)
ERYTHROCYTE [DISTWIDTH] IN BLOOD BY AUTOMATED COUNT: 20.1 % (ref 11.6–15.1)
ERYTHROCYTE [DISTWIDTH] IN BLOOD BY AUTOMATED COUNT: 20.2 % (ref 11.6–15.1)
ERYTHROCYTE [DISTWIDTH] IN BLOOD BY AUTOMATED COUNT: 20.2 % (ref 11.6–15.1)
ERYTHROCYTE [DISTWIDTH] IN BLOOD BY AUTOMATED COUNT: 20.3 % (ref 11.6–15.1)
ERYTHROCYTE [DISTWIDTH] IN BLOOD BY AUTOMATED COUNT: 20.5 % (ref 11.6–15.1)
ERYTHROCYTE [DISTWIDTH] IN BLOOD BY AUTOMATED COUNT: 20.9 % (ref 11.6–15.1)
ERYTHROCYTE [DISTWIDTH] IN BLOOD BY AUTOMATED COUNT: 21.1 % (ref 11.6–15.1)
ERYTHROCYTE [DISTWIDTH] IN BLOOD BY AUTOMATED COUNT: 21.4 % (ref 11.6–15.1)
ERYTHROCYTE [DISTWIDTH] IN BLOOD BY AUTOMATED COUNT: 21.8 % (ref 11.6–15.1)
ETHANOL SERPL-MCNC: <10 MG/DL
FDP BLD QL AGGL: >10 <20
FERRITIN SERPL-MCNC: 138 NG/ML (ref 11–307)
FERRITIN SERPL-MCNC: 164 NG/ML (ref 11–307)
FIBRINOGEN PPP-MCNC: 107 MG/DL (ref 207–520)
FIBRINOGEN PPP-MCNC: 119 MG/DL (ref 207–520)
FIBRINOGEN PPP-MCNC: 162 MG/DL (ref 207–520)
FIBRINOGEN PPP-MCNC: 162 MG/DL (ref 207–520)
FIBRINOGEN PPP-MCNC: 169 MG/DL (ref 207–520)
FIBRINOGEN PPP-MCNC: 180 MG/DL (ref 207–520)
FIBRINOGEN PPP-MCNC: 190 MG/DL (ref 207–520)
FIBRINOGEN PPP-MCNC: 204 MG/DL (ref 207–520)
FIBRINOGEN PPP-MCNC: 228 MG/DL (ref 207–520)
FIBRINOGEN PPP-MCNC: 254 MG/DL (ref 207–520)
FIBRINOGEN PPP-MCNC: 63 MG/DL (ref 207–520)
FIBRINOGEN PPP-MCNC: <60 MG/DL (ref 207–520)
FIBRINOGEN PPP-MCNC: <60 MG/DL (ref 207–520)
FIO2 GAS DIL.REBREATH: 40 L
FIO2 GAS DIL.REBREATH: 40 L
FIO2 GAS DIL.REBREATH: 60 L
FLUAV RNA RESP QL NAA+PROBE: NEGATIVE
FLUBV RNA RESP QL NAA+PROBE: NEGATIVE
FRACTIONAL SHORTENING: 36 (ref 28–44)
FUNGUS SPEC CULT: NORMAL
GAMMA GLOB ABNORMAL SERPL ELPH-MCNC: 1.12 G/DL (ref 0.4–1.66)
GAMMA GLOB MFR UR ELPH: 36.3 %
GAMMA GLOB SERPL ELPH-MCNC: 17.5 % (ref 6.9–22.3)
GFR SERPL CREATININE-BSD FRML MDRD: 12 ML/MIN/1.73SQ M
GFR SERPL CREATININE-BSD FRML MDRD: 13 ML/MIN/1.73SQ M
GFR SERPL CREATININE-BSD FRML MDRD: 14 ML/MIN/1.73SQ M
GFR SERPL CREATININE-BSD FRML MDRD: 14 ML/MIN/1.73SQ M
GFR SERPL CREATININE-BSD FRML MDRD: 17 ML/MIN/1.73SQ M
GFR SERPL CREATININE-BSD FRML MDRD: 19 ML/MIN/1.73SQ M
GFR SERPL CREATININE-BSD FRML MDRD: 20 ML/MIN/1.73SQ M
GFR SERPL CREATININE-BSD FRML MDRD: 21 ML/MIN/1.73SQ M
GFR SERPL CREATININE-BSD FRML MDRD: 22 ML/MIN/1.73SQ M
GFR SERPL CREATININE-BSD FRML MDRD: 23 ML/MIN/1.73SQ M
GFR SERPL CREATININE-BSD FRML MDRD: 26 ML/MIN/1.73SQ M
GFR SERPL CREATININE-BSD FRML MDRD: 26 ML/MIN/1.73SQ M
GFR SERPL CREATININE-BSD FRML MDRD: 28 ML/MIN/1.73SQ M
GFR SERPL CREATININE-BSD FRML MDRD: 32 ML/MIN/1.73SQ M
GFR SERPL CREATININE-BSD FRML MDRD: 64 ML/MIN/1.73SQ M
GFR SERPL CREATININE-BSD FRML MDRD: 68 ML/MIN/1.73SQ M
GFR SERPL CREATININE-BSD FRML MDRD: 68 ML/MIN/1.73SQ M
GFR SERPL CREATININE-BSD FRML MDRD: 69 ML/MIN/1.73SQ M
GFR SERPL CREATININE-BSD FRML MDRD: 81 ML/MIN/1.73SQ M
GFR SERPL CREATININE-BSD FRML MDRD: 81 ML/MIN/1.73SQ M
GFR SERPL CREATININE-BSD FRML MDRD: 83 ML/MIN/1.73SQ M
GFR SERPL CREATININE-BSD FRML MDRD: 83 ML/MIN/1.73SQ M
GGT SERPL-CCNC: 76 U/L (ref 9–64)
GLIADIN PEPTIDE IGA SER-ACNC: 3 UNITS (ref 0–19)
GLIADIN PEPTIDE IGG SER-ACNC: 1 UNITS (ref 0–19)
GLUCOSE SERPL-MCNC: 100 MG/DL (ref 65–140)
GLUCOSE SERPL-MCNC: 108 MG/DL (ref 65–140)
GLUCOSE SERPL-MCNC: 111 MG/DL (ref 65–140)
GLUCOSE SERPL-MCNC: 114 MG/DL (ref 65–140)
GLUCOSE SERPL-MCNC: 114 MG/DL (ref 65–140)
GLUCOSE SERPL-MCNC: 117 MG/DL (ref 65–140)
GLUCOSE SERPL-MCNC: 119 MG/DL (ref 65–140)
GLUCOSE SERPL-MCNC: 121 MG/DL (ref 65–140)
GLUCOSE SERPL-MCNC: 122 MG/DL (ref 65–140)
GLUCOSE SERPL-MCNC: 124 MG/DL (ref 65–140)
GLUCOSE SERPL-MCNC: 126 MG/DL (ref 65–140)
GLUCOSE SERPL-MCNC: 126 MG/DL (ref 65–140)
GLUCOSE SERPL-MCNC: 127 MG/DL (ref 65–140)
GLUCOSE SERPL-MCNC: 128 MG/DL (ref 65–140)
GLUCOSE SERPL-MCNC: 131 MG/DL (ref 65–140)
GLUCOSE SERPL-MCNC: 131 MG/DL (ref 65–140)
GLUCOSE SERPL-MCNC: 132 MG/DL (ref 65–140)
GLUCOSE SERPL-MCNC: 133 MG/DL (ref 65–140)
GLUCOSE SERPL-MCNC: 134 MG/DL (ref 65–140)
GLUCOSE SERPL-MCNC: 135 MG/DL (ref 65–140)
GLUCOSE SERPL-MCNC: 136 MG/DL (ref 65–140)
GLUCOSE SERPL-MCNC: 136 MG/DL (ref 65–140)
GLUCOSE SERPL-MCNC: 139 MG/DL (ref 65–140)
GLUCOSE SERPL-MCNC: 139 MG/DL (ref 65–140)
GLUCOSE SERPL-MCNC: 140 MG/DL (ref 65–140)
GLUCOSE SERPL-MCNC: 142 MG/DL (ref 65–140)
GLUCOSE SERPL-MCNC: 143 MG/DL (ref 65–140)
GLUCOSE SERPL-MCNC: 144 MG/DL (ref 65–140)
GLUCOSE SERPL-MCNC: 145 MG/DL (ref 65–140)
GLUCOSE SERPL-MCNC: 145 MG/DL (ref 65–140)
GLUCOSE SERPL-MCNC: 146 MG/DL (ref 65–140)
GLUCOSE SERPL-MCNC: 147 MG/DL (ref 65–140)
GLUCOSE SERPL-MCNC: 147 MG/DL (ref 65–140)
GLUCOSE SERPL-MCNC: 148 MG/DL (ref 65–140)
GLUCOSE SERPL-MCNC: 148 MG/DL (ref 65–140)
GLUCOSE SERPL-MCNC: 149 MG/DL (ref 65–140)
GLUCOSE SERPL-MCNC: 149 MG/DL (ref 65–140)
GLUCOSE SERPL-MCNC: 150 MG/DL (ref 65–140)
GLUCOSE SERPL-MCNC: 151 MG/DL (ref 65–140)
GLUCOSE SERPL-MCNC: 152 MG/DL (ref 65–140)
GLUCOSE SERPL-MCNC: 153 MG/DL (ref 65–140)
GLUCOSE SERPL-MCNC: 156 MG/DL (ref 65–140)
GLUCOSE SERPL-MCNC: 157 MG/DL (ref 65–140)
GLUCOSE SERPL-MCNC: 157 MG/DL (ref 65–140)
GLUCOSE SERPL-MCNC: 158 MG/DL (ref 65–140)
GLUCOSE SERPL-MCNC: 159 MG/DL (ref 65–140)
GLUCOSE SERPL-MCNC: 161 MG/DL (ref 65–140)
GLUCOSE SERPL-MCNC: 162 MG/DL (ref 65–140)
GLUCOSE SERPL-MCNC: 164 MG/DL (ref 65–140)
GLUCOSE SERPL-MCNC: 165 MG/DL (ref 65–140)
GLUCOSE SERPL-MCNC: 166 MG/DL (ref 65–140)
GLUCOSE SERPL-MCNC: 167 MG/DL (ref 65–140)
GLUCOSE SERPL-MCNC: 168 MG/DL (ref 65–140)
GLUCOSE SERPL-MCNC: 170 MG/DL (ref 65–140)
GLUCOSE SERPL-MCNC: 171 MG/DL (ref 65–140)
GLUCOSE SERPL-MCNC: 171 MG/DL (ref 65–140)
GLUCOSE SERPL-MCNC: 172 MG/DL (ref 65–140)
GLUCOSE SERPL-MCNC: 173 MG/DL (ref 65–140)
GLUCOSE SERPL-MCNC: 174 MG/DL (ref 65–140)
GLUCOSE SERPL-MCNC: 175 MG/DL (ref 65–140)
GLUCOSE SERPL-MCNC: 177 MG/DL (ref 65–140)
GLUCOSE SERPL-MCNC: 177 MG/DL (ref 65–140)
GLUCOSE SERPL-MCNC: 178 MG/DL (ref 65–140)
GLUCOSE SERPL-MCNC: 179 MG/DL (ref 65–140)
GLUCOSE SERPL-MCNC: 179 MG/DL (ref 65–140)
GLUCOSE SERPL-MCNC: 180 MG/DL (ref 65–140)
GLUCOSE SERPL-MCNC: 181 MG/DL (ref 65–140)
GLUCOSE SERPL-MCNC: 183 MG/DL (ref 65–140)
GLUCOSE SERPL-MCNC: 188 MG/DL (ref 65–140)
GLUCOSE SERPL-MCNC: 189 MG/DL (ref 65–140)
GLUCOSE SERPL-MCNC: 189 MG/DL (ref 65–140)
GLUCOSE SERPL-MCNC: 190 MG/DL (ref 65–140)
GLUCOSE SERPL-MCNC: 198 MG/DL (ref 65–140)
GLUCOSE SERPL-MCNC: 199 MG/DL (ref 65–140)
GLUCOSE SERPL-MCNC: 206 MG/DL (ref 65–140)
GLUCOSE SERPL-MCNC: 215 MG/DL (ref 65–140)
GLUCOSE SERPL-MCNC: 230 MG/DL (ref 65–140)
GLUCOSE SERPL-MCNC: 64 MG/DL (ref 65–140)
GLUCOSE SERPL-MCNC: 74 MG/DL (ref 65–140)
GLUCOSE SERPL-MCNC: 74 MG/DL (ref 65–140)
GLUCOSE SERPL-MCNC: 75 MG/DL (ref 65–140)
GLUCOSE SERPL-MCNC: 77 MG/DL (ref 65–140)
GLUCOSE SERPL-MCNC: 79 MG/DL (ref 65–140)
GLUCOSE SERPL-MCNC: 82 MG/DL (ref 65–140)
GLUCOSE SERPL-MCNC: 84 MG/DL (ref 65–140)
GLUCOSE SERPL-MCNC: 91 MG/DL (ref 65–140)
GLUCOSE SERPL-MCNC: 91 MG/DL (ref 65–140)
GLUCOSE SERPL-MCNC: 99 MG/DL (ref 65–140)
GLUCOSE UR STRIP-MCNC: NEGATIVE MG/DL
GRAM STN SPEC: NORMAL
HAPTOGLOB SERPL-MCNC: <10 MG/DL (ref 41–333)
HAV AB SER QL IA: NORMAL
HAV IGM SER QL: NORMAL
HBV CORE IGM SER QL: NORMAL
HBV SURFACE AG SER QL: NORMAL
HBV SURFACE AG SER QL: NORMAL
HCO3 BLDA-SCNC: 16.1 MMOL/L (ref 22–28)
HCO3 BLDA-SCNC: 16.4 MMOL/L (ref 22–28)
HCO3 BLDA-SCNC: 16.9 MMOL/L (ref 22–28)
HCO3 BLDA-SCNC: 17 MMOL/L (ref 22–28)
HCO3 BLDA-SCNC: 17.9 MMOL/L (ref 22–28)
HCO3 BLDA-SCNC: 18.4 MMOL/L (ref 22–28)
HCO3 BLDA-SCNC: 18.5 MMOL/L (ref 22–28)
HCO3 BLDA-SCNC: 18.6 MMOL/L (ref 22–28)
HCO3 BLDA-SCNC: 19.8 MMOL/L (ref 22–28)
HCO3 BLDA-SCNC: 20.3 MMOL/L (ref 22–28)
HCO3 BLDA-SCNC: 21 MMOL/L (ref 22–28)
HCO3 BLDA-SCNC: 21.4 MMOL/L (ref 22–28)
HCO3 BLDV-SCNC: 15.7 MMOL/L (ref 24–30)
HCO3 BLDV-SCNC: 19.9 MMOL/L (ref 24–30)
HCT VFR BLD AUTO: 20.3 % (ref 34.8–46.1)
HCT VFR BLD AUTO: 21.7 % (ref 34.8–46.1)
HCT VFR BLD AUTO: 22.1 % (ref 34.8–46.1)
HCT VFR BLD AUTO: 22.4 % (ref 34.8–46.1)
HCT VFR BLD AUTO: 22.7 % (ref 34.8–46.1)
HCT VFR BLD AUTO: 23.9 % (ref 34.8–46.1)
HCT VFR BLD AUTO: 24 % (ref 34.8–46.1)
HCT VFR BLD AUTO: 24.2 % (ref 34.8–46.1)
HCT VFR BLD AUTO: 24.7 % (ref 34.8–46.1)
HCT VFR BLD AUTO: 26.2 % (ref 34.8–46.1)
HCT VFR BLD AUTO: 27 % (ref 34.8–46.1)
HCT VFR BLD AUTO: 27.3 % (ref 34.8–46.1)
HCT VFR BLD AUTO: 28.3 % (ref 34.8–46.1)
HCT VFR BLD AUTO: 29.1 % (ref 34.8–46.1)
HCT VFR BLD AUTO: 33.2 % (ref 34.8–46.1)
HCT VFR BLD AUTO: 33.7 % (ref 34.8–46.1)
HCT VFR BLD AUTO: 34.4 % (ref 34.8–46.1)
HCT VFR BLD AUTO: 34.6 % (ref 34.8–46.1)
HCT VFR BLD AUTO: 35.5 % (ref 34.8–46.1)
HCT VFR BLD AUTO: 35.6 % (ref 34.8–46.1)
HCT VFR BLD AUTO: 35.9 % (ref 34.8–46.1)
HCT VFR BLD AUTO: 35.9 % (ref 34.8–46.1)
HCT VFR BLD AUTO: 36.6 % (ref 34.8–46.1)
HCT VFR BLD CALC: 24 % (ref 34.8–46.1)
HCT VFR BLD CALC: 25 % (ref 34.8–46.1)
HCT VFR BLD CALC: 26 % (ref 34.8–46.1)
HCT VFR BLD CALC: 28 % (ref 34.8–46.1)
HCV AB SER QL: NORMAL
HEMOCCULT STL QL: POSITIVE
HGB BLD-MCNC: 11.1 G/DL (ref 11.5–15.4)
HGB BLD-MCNC: 11.3 G/DL (ref 11.5–15.4)
HGB BLD-MCNC: 11.5 G/DL (ref 11.5–15.4)
HGB BLD-MCNC: 11.6 G/DL (ref 11.5–15.4)
HGB BLD-MCNC: 11.7 G/DL (ref 11.5–15.4)
HGB BLD-MCNC: 11.8 G/DL (ref 11.5–15.4)
HGB BLD-MCNC: 12 G/DL (ref 11.5–15.4)
HGB BLD-MCNC: 12 G/DL (ref 11.5–15.4)
HGB BLD-MCNC: 12.1 G/DL (ref 11.5–15.4)
HGB BLD-MCNC: 6.7 G/DL (ref 11.5–15.4)
HGB BLD-MCNC: 7 G/DL (ref 11.5–15.4)
HGB BLD-MCNC: 7.2 G/DL (ref 11.5–15.4)
HGB BLD-MCNC: 7.4 G/DL (ref 11.5–15.4)
HGB BLD-MCNC: 7.4 G/DL (ref 11.5–15.4)
HGB BLD-MCNC: 7.7 G/DL (ref 11.5–15.4)
HGB BLD-MCNC: 7.9 G/DL (ref 11.5–15.4)
HGB BLD-MCNC: 8 G/DL (ref 11.5–15.4)
HGB BLD-MCNC: 8 G/DL (ref 11.5–15.4)
HGB BLD-MCNC: 8.1 G/DL (ref 11.5–15.4)
HGB BLD-MCNC: 8.2 G/DL (ref 11.5–15.4)
HGB BLD-MCNC: 8.6 G/DL (ref 11.5–15.4)
HGB BLD-MCNC: 8.7 G/DL (ref 11.5–15.4)
HGB BLD-MCNC: 9.1 G/DL (ref 11.5–15.4)
HGB BLD-MCNC: 9.2 G/DL (ref 11.5–15.4)
HGB BLD-MCNC: 9.6 G/DL (ref 11.5–15.4)
HGB BLDA-MCNC: 8.2 G/DL (ref 11.5–15.4)
HGB BLDA-MCNC: 8.5 G/DL (ref 11.5–15.4)
HGB BLDA-MCNC: 8.8 G/DL (ref 11.5–15.4)
HGB BLDA-MCNC: 9.5 G/DL (ref 11.5–15.4)
HGB UR QL STRIP.AUTO: ABNORMAL
HOROWITZ INDEX BLDA+IHG-RTO: 40 MM[HG]
HOROWITZ INDEX BLDA+IHG-RTO: 40 MM[HG]
HSV1 DNA SPEC QL NAA+PROBE: NEGATIVE
HSV2 DNA SPEC QL NAA+PROBE: NEGATIVE
HYALINE CASTS #/AREA URNS LPF: ABNORMAL /LPF
HYPERCHROMIA BLD QL SMEAR: PRESENT
HYPERCHROMIA BLD QL SMEAR: PRESENT
IGA SERPL-MCNC: 319 MG/DL (ref 66–433)
IGA SERPL-MCNC: 536 MG/DL (ref 64–422)
IGG SERPL-MCNC: 2232 MG/DL (ref 635–1741)
IGG SERPL-MCNC: 2483 MG/DL (ref 586–1602)
IGG/ALB SER: 2.44 {RATIO} (ref 1.1–1.8)
IGG1 SER-MCNC: 1976 MG/DL (ref 248–810)
IGG2 SER-MCNC: 140 MG/DL (ref 130–555)
IGG3 SER-MCNC: 41 MG/DL (ref 15–102)
IGG4 SER-MCNC: 34 MG/DL (ref 2–96)
IGM SERPL-MCNC: 100 MG/DL (ref 45–281)
IMM GRANULOCYTES # BLD AUTO: 0.03 THOUSAND/UL (ref 0–0.2)
IMM GRANULOCYTES # BLD AUTO: 0.03 THOUSAND/UL (ref 0–0.2)
IMM GRANULOCYTES # BLD AUTO: 0.04 THOUSAND/UL (ref 0–0.2)
IMM GRANULOCYTES # BLD AUTO: 0.04 THOUSAND/UL (ref 0–0.2)
IMM GRANULOCYTES # BLD AUTO: 0.05 THOUSAND/UL (ref 0–0.2)
IMM GRANULOCYTES # BLD AUTO: 0.08 THOUSAND/UL (ref 0–0.2)
IMM GRANULOCYTES # BLD AUTO: 0.08 THOUSAND/UL (ref 0–0.2)
IMM GRANULOCYTES # BLD AUTO: 0.1 THOUSAND/UL (ref 0–0.2)
IMM GRANULOCYTES # BLD AUTO: 0.11 THOUSAND/UL (ref 0–0.2)
IMM GRANULOCYTES # BLD AUTO: 0.12 THOUSAND/UL (ref 0–0.2)
IMM GRANULOCYTES # BLD AUTO: 0.12 THOUSAND/UL (ref 0–0.2)
IMM GRANULOCYTES NFR BLD AUTO: 1 % (ref 0–2)
IMM GRANULOCYTES NFR BLD AUTO: 2 % (ref 0–2)
INR PPP: 1.88 (ref 0.84–1.19)
INR PPP: 2.26 (ref 0.84–1.19)
INR PPP: 2.39 (ref 0.84–1.19)
INR PPP: 2.58 (ref 0.84–1.19)
INR PPP: 2.89 (ref 0.84–1.19)
INR PPP: 2.91 (ref 0.84–1.19)
INR PPP: 3.13 (ref 0.84–1.19)
INR PPP: 3.19 (ref 0.84–1.19)
INR PPP: 3.22 (ref 0.84–1.19)
INR PPP: 3.38 (ref 0.84–1.19)
INR PPP: 3.46 (ref 0.84–1.19)
INR PPP: 3.64 (ref 0.84–1.19)
INR PPP: 3.78 (ref 0.84–1.19)
INR PPP: 3.82 (ref 0.84–1.19)
INR PPP: 3.82 (ref 0.84–1.19)
INR PPP: 3.91 (ref 0.84–1.19)
INR PPP: 4.32 (ref 0.84–1.19)
INR PPP: 4.45 (ref 0.84–1.19)
INR PPP: 4.78 (ref 0.84–1.19)
INR PPP: 4.87 (ref 0.84–1.19)
INR PPP: 5.1 (ref 0.84–1.19)
INR PPP: 5.12 (ref 0.84–1.19)
INR PPP: 5.4 (ref 0.84–1.19)
INR PPP: 5.48 (ref 0.84–1.19)
INR PPP: 6.11 (ref 0.84–1.19)
INR PPP: 6.48 (ref 0.84–1.19)
INR PPP: 9.04 (ref 0.84–1.19)
INR PPP: 9.94 (ref 0.84–1.19)
INR PPP: >15 (ref 0.84–1.19)
INTERPRETATION UR IFE-IMP: NORMAL
INTERVENTRICULAR SEPTUM IN DIASTOLE (PARASTERNAL SHORT AXIS VIEW): 1 CM
INTERVENTRICULAR SEPTUM: 1 CM (ref 0.6–1.1)
IRON SERPL-MCNC: 203 UG/DL (ref 50–212)
KAPPA LC FREE SER-MCNC: 88 MG/L (ref 3.3–19.4)
KAPPA LC FREE/LAMBDA FREE SER: 2.69 {RATIO} (ref 0.26–1.65)
KAPPA LC UR-MCNC: 256.12 MG/L (ref 1.17–86.46)
KAPPA LC/LAMBDA UR: 7.66 {RATIO} (ref 1.83–14.26)
KETONES UR STRIP-MCNC: NEGATIVE MG/DL
LAAS-AP2: 25.5 CM2
LAAS-AP4: 21.6 CM2
LACTATE SERPL-SCNC: 1.9 MMOL/L (ref 0.5–2)
LACTATE SERPL-SCNC: 2.6 MMOL/L (ref 0.5–2)
LACTATE SERPL-SCNC: 3.5 MMOL/L (ref 0.5–2)
LACTATE SERPL-SCNC: 7.8 MMOL/L (ref 0.5–2)
LACTATE SERPL-SCNC: 7.9 MMOL/L (ref 0.5–2)
LAMBDA LC FREE SERPL-MCNC: 32.7 MG/L (ref 5.7–26.3)
LAMBDA LC UR-MCNC: 33.45 MG/L (ref 0.27–15.21)
LDH SERPL-CCNC: 436 U/L (ref 140–271)
LEFT ATRIUM AREA SYSTOLE SINGLE PLANE A4C: 21.5 CM2
LEFT ATRIUM SIZE: 4.1 CM
LEFT ATRIUM VOLUME (MOD BIPLANE): 72 ML
LEFT ATRIUM VOLUME INDEX (MOD BIPLANE): 34.4 ML/M2
LEFT INTERNAL DIMENSION IN SYSTOLE: 2.9 CM (ref 2.1–4)
LEFT VENTRICULAR INTERNAL DIMENSION IN DIASTOLE: 4.5 CM (ref 3.5–6)
LEFT VENTRICULAR POSTERIOR WALL IN END DIASTOLE: 0.9 CM
LEFT VENTRICULAR STROKE VOLUME: 61 ML
LEUKOCYTE ESTERASE UR QL STRIP: ABNORMAL
LIPASE SERPL-CCNC: 1591 U/L (ref 11–82)
LIPASE SERPL-CCNC: 57 U/L (ref 11–82)
LVSV (TEICH): 61 ML
LYMPHOCYTES # BLD AUTO: 0.56 THOUSANDS/ÂΜL (ref 0.6–4.47)
LYMPHOCYTES # BLD AUTO: 0.57 THOUSANDS/ÂΜL (ref 0.6–4.47)
LYMPHOCYTES # BLD AUTO: 0.84 THOUSANDS/ÂΜL (ref 0.6–4.47)
LYMPHOCYTES # BLD AUTO: 0.98 THOUSAND/UL (ref 0.6–4.47)
LYMPHOCYTES # BLD AUTO: 1.18 THOUSANDS/ÂΜL (ref 0.6–4.47)
LYMPHOCYTES # BLD AUTO: 1.3 THOUSANDS/ÂΜL (ref 0.6–4.47)
LYMPHOCYTES # BLD AUTO: 1.64 THOUSANDS/ÂΜL (ref 0.6–4.47)
LYMPHOCYTES # BLD AUTO: 1.67 THOUSANDS/ÂΜL (ref 0.6–4.47)
LYMPHOCYTES # BLD AUTO: 1.68 THOUSANDS/ÂΜL (ref 0.6–4.47)
LYMPHOCYTES # BLD AUTO: 1.76 THOUSANDS/ÂΜL (ref 0.6–4.47)
LYMPHOCYTES # BLD AUTO: 1.81 THOUSANDS/ÂΜL (ref 0.6–4.47)
LYMPHOCYTES # BLD AUTO: 2 THOUSANDS/ÂΜL (ref 0.6–4.47)
LYMPHOCYTES # BLD AUTO: 7 % (ref 14–44)
LYMPHOCYTES NFR BLD AUTO: 12 % (ref 14–44)
LYMPHOCYTES NFR BLD AUTO: 13 % (ref 14–44)
LYMPHOCYTES NFR BLD AUTO: 14 % (ref 14–44)
LYMPHOCYTES NFR BLD AUTO: 17 % (ref 14–44)
LYMPHOCYTES NFR BLD AUTO: 18 % (ref 14–44)
LYMPHOCYTES NFR BLD AUTO: 19 % (ref 14–44)
LYMPHOCYTES NFR BLD AUTO: 21 % (ref 14–44)
LYMPHOCYTES NFR BLD AUTO: 21 % (ref 14–44)
LYMPHOCYTES NFR BLD AUTO: 26 % (ref 14–44)
LYMPHOCYTES NFR BLD AUTO: 27 % (ref 14–44)
LYMPHOCYTES NFR BLD AUTO: 30 % (ref 14–44)
LYMPHOCYTES NFR BLD AUTO: 6 %
LYMPHOCYTES NFR BLD: 16 % (ref 14–44)
M PEAK ID 2: 3.5 %
M PROTEIN 1 MFR SERPL ELPH: 4.5 %
M PROTEIN 1 SERPL ELPH-MCNC: 0.29 G/DL
M PROTEIN 2 SERPL ELPH-MCNC: 0.22 G/DL
MACROPHAGES NFR FLD: 61 %
MAGNESIUM SERPL-MCNC: 1.5 MG/DL (ref 1.9–2.7)
MAGNESIUM SERPL-MCNC: 1.7 MG/DL (ref 1.9–2.7)
MAGNESIUM SERPL-MCNC: 2.1 MG/DL (ref 1.9–2.7)
MAGNESIUM SERPL-MCNC: 2.2 MG/DL (ref 1.9–2.7)
MAGNESIUM SERPL-MCNC: 2.3 MG/DL (ref 1.9–2.7)
MAGNESIUM SERPL-MCNC: 2.5 MG/DL (ref 1.9–2.7)
MAGNESIUM SERPL-MCNC: 2.6 MG/DL (ref 1.9–2.7)
MAGNESIUM SERPL-MCNC: 2.7 MG/DL (ref 1.9–2.7)
MAGNESIUM SERPL-MCNC: 2.8 MG/DL (ref 1.9–2.7)
MCH RBC QN AUTO: 28.8 PG (ref 26.8–34.3)
MCH RBC QN AUTO: 28.8 PG (ref 26.8–34.3)
MCH RBC QN AUTO: 29.1 PG (ref 26.8–34.3)
MCH RBC QN AUTO: 29.2 PG (ref 26.8–34.3)
MCH RBC QN AUTO: 29.4 PG (ref 26.8–34.3)
MCH RBC QN AUTO: 29.5 PG (ref 26.8–34.3)
MCH RBC QN AUTO: 29.6 PG (ref 26.8–34.3)
MCH RBC QN AUTO: 29.8 PG (ref 26.8–34.3)
MCH RBC QN AUTO: 29.9 PG (ref 26.8–34.3)
MCH RBC QN AUTO: 29.9 PG (ref 26.8–34.3)
MCH RBC QN AUTO: 30 PG (ref 26.8–34.3)
MCH RBC QN AUTO: 30.3 PG (ref 26.8–34.3)
MCH RBC QN AUTO: 30.4 PG (ref 26.8–34.3)
MCHC RBC AUTO-ENTMCNC: 31.9 G/DL (ref 31.4–37.4)
MCHC RBC AUTO-ENTMCNC: 32.2 G/DL (ref 31.4–37.4)
MCHC RBC AUTO-ENTMCNC: 32.3 G/DL (ref 31.4–37.4)
MCHC RBC AUTO-ENTMCNC: 32.4 G/DL (ref 31.4–37.4)
MCHC RBC AUTO-ENTMCNC: 32.5 G/DL (ref 31.4–37.4)
MCHC RBC AUTO-ENTMCNC: 32.6 G/DL (ref 31.4–37.4)
MCHC RBC AUTO-ENTMCNC: 32.6 G/DL (ref 31.4–37.4)
MCHC RBC AUTO-ENTMCNC: 32.8 G/DL (ref 31.4–37.4)
MCHC RBC AUTO-ENTMCNC: 32.9 G/DL (ref 31.4–37.4)
MCHC RBC AUTO-ENTMCNC: 33 G/DL (ref 31.4–37.4)
MCHC RBC AUTO-ENTMCNC: 33.1 G/DL (ref 31.4–37.4)
MCHC RBC AUTO-ENTMCNC: 33.2 G/DL (ref 31.4–37.4)
MCHC RBC AUTO-ENTMCNC: 33.4 G/DL (ref 31.4–37.4)
MCHC RBC AUTO-ENTMCNC: 33.4 G/DL (ref 31.4–37.4)
MCHC RBC AUTO-ENTMCNC: 33.5 G/DL (ref 31.4–37.4)
MCHC RBC AUTO-ENTMCNC: 33.7 G/DL (ref 31.4–37.4)
MCHC RBC AUTO-ENTMCNC: 33.7 G/DL (ref 31.4–37.4)
MCHC RBC AUTO-ENTMCNC: 33.8 G/DL (ref 31.4–37.4)
MCHC RBC AUTO-ENTMCNC: 33.9 G/DL (ref 31.4–37.4)
MCHC RBC AUTO-ENTMCNC: 34 G/DL (ref 31.4–37.4)
MCHC RBC AUTO-ENTMCNC: 34.4 G/DL (ref 31.4–37.4)
MCV RBC AUTO: 86 FL (ref 82–98)
MCV RBC AUTO: 87 FL (ref 82–98)
MCV RBC AUTO: 88 FL (ref 82–98)
MCV RBC AUTO: 88 FL (ref 82–98)
MCV RBC AUTO: 89 FL (ref 82–98)
MCV RBC AUTO: 89 FL (ref 82–98)
MCV RBC AUTO: 90 FL (ref 82–98)
MCV RBC AUTO: 90 FL (ref 82–98)
MCV RBC AUTO: 91 FL (ref 82–98)
MCV RBC AUTO: 92 FL (ref 82–98)
MCV RBC AUTO: 92 FL (ref 82–98)
MCV RBC AUTO: 93 FL (ref 82–98)
MCV RBC AUTO: 93 FL (ref 82–98)
MCV RBC AUTO: 94 FL (ref 82–98)
MONOCYTES # BLD AUTO: 0.14 THOUSAND/ÂΜL (ref 0.17–1.22)
MONOCYTES # BLD AUTO: 0.19 THOUSAND/ÂΜL (ref 0.17–1.22)
MONOCYTES # BLD AUTO: 0.23 THOUSAND/ÂΜL (ref 0.17–1.22)
MONOCYTES # BLD AUTO: 0.28 THOUSAND/UL (ref 0–1.22)
MONOCYTES # BLD AUTO: 0.28 THOUSAND/ÂΜL (ref 0.17–1.22)
MONOCYTES # BLD AUTO: 0.74 THOUSAND/ÂΜL (ref 0.17–1.22)
MONOCYTES # BLD AUTO: 0.91 THOUSAND/ÂΜL (ref 0.17–1.22)
MONOCYTES # BLD AUTO: 1.01 THOUSAND/ÂΜL (ref 0.17–1.22)
MONOCYTES # BLD AUTO: 1.04 THOUSAND/ÂΜL (ref 0.17–1.22)
MONOCYTES # BLD AUTO: 1.11 THOUSAND/ÂΜL (ref 0.17–1.22)
MONOCYTES # BLD AUTO: 1.21 THOUSAND/ÂΜL (ref 0.17–1.22)
MONOCYTES # BLD AUTO: 1.29 THOUSAND/ÂΜL (ref 0.17–1.22)
MONOCYTES NFR BLD AUTO: 10 % (ref 4–12)
MONOCYTES NFR BLD AUTO: 12 % (ref 4–12)
MONOCYTES NFR BLD AUTO: 13 % (ref 4–12)
MONOCYTES NFR BLD AUTO: 14 % (ref 4–12)
MONOCYTES NFR BLD AUTO: 15 % (ref 4–12)
MONOCYTES NFR BLD AUTO: 15 % (ref 4–12)
MONOCYTES NFR BLD AUTO: 16 % (ref 4–12)
MONOCYTES NFR BLD AUTO: 4 % (ref 4–12)
MONOCYTES NFR BLD AUTO: 4 % (ref 4–12)
MONOCYTES NFR BLD AUTO: 5 % (ref 4–12)
MONOCYTES NFR BLD AUTO: 5 % (ref 4–12)
MONOCYTES NFR BLD AUTO: 9 % (ref 4–12)
MONOCYTES NFR BLD: 2 % (ref 4–12)
MUCOUS THREADS UR QL AUTO: ABNORMAL
MV PEAK A VEL: 0.68 M/S
MV PEAK E VEL: 72 CM/S
MV STENOSIS PRESSURE HALF TIME: 60 MS
MV VALVE AREA P 1/2 METHOD: 3.67
MYELOPEROXIDASE AB SER IA-ACNC: <0.2 UNITS (ref 0–0.9)
MYELOPEROXIDASE AB SER IA-ACNC: <0.2 UNITS (ref 0–0.9)
NEUTROPHILS # BLD AUTO: 2.88 THOUSANDS/ÂΜL (ref 1.85–7.62)
NEUTROPHILS # BLD AUTO: 3.11 THOUSANDS/ÂΜL (ref 1.85–7.62)
NEUTROPHILS # BLD AUTO: 3.13 THOUSANDS/ÂΜL (ref 1.85–7.62)
NEUTROPHILS # BLD AUTO: 3.17 THOUSANDS/ÂΜL (ref 1.85–7.62)
NEUTROPHILS # BLD AUTO: 3.86 THOUSANDS/ÂΜL (ref 1.85–7.62)
NEUTROPHILS # BLD AUTO: 3.86 THOUSANDS/ÂΜL (ref 1.85–7.62)
NEUTROPHILS # BLD AUTO: 4.09 THOUSANDS/ÂΜL (ref 1.85–7.62)
NEUTROPHILS # BLD AUTO: 4.38 THOUSANDS/ÂΜL (ref 1.85–7.62)
NEUTROPHILS # BLD AUTO: 4.83 THOUSANDS/ÂΜL (ref 1.85–7.62)
NEUTROPHILS # BLD AUTO: 6.84 THOUSANDS/ÂΜL (ref 1.85–7.62)
NEUTROPHILS # BLD AUTO: 9.93 THOUSANDS/ÂΜL (ref 1.85–7.62)
NEUTROPHILS # BLD MANUAL: 12.71 THOUSAND/UL (ref 1.85–7.62)
NEUTS BAND NFR BLD MANUAL: 2 % (ref 0–8)
NEUTS BAND NFR BLD MANUAL: 3 THOUSAND/UL
NEUTS SEG NFR BLD AUTO: 33 %
NEUTS SEG NFR BLD AUTO: 41 % (ref 43–75)
NEUTS SEG NFR BLD AUTO: 47 % (ref 43–75)
NEUTS SEG NFR BLD AUTO: 48 % (ref 43–75)
NEUTS SEG NFR BLD AUTO: 62 % (ref 43–75)
NEUTS SEG NFR BLD AUTO: 67 % (ref 43–75)
NEUTS SEG NFR BLD AUTO: 68 % (ref 43–75)
NEUTS SEG NFR BLD AUTO: 69 % (ref 45–77)
NEUTS SEG NFR BLD AUTO: 70 % (ref 43–75)
NEUTS SEG NFR BLD AUTO: 77 % (ref 43–75)
NEUTS SEG NFR BLD AUTO: 77 % (ref 43–75)
NEUTS SEG NFR BLD AUTO: 81 % (ref 43–75)
NEUTS SEG NFR BLD AUTO: 81 % (ref 43–75)
NEUTS SEG NFR BLD AUTO: 89 % (ref 43–75)
NITRITE UR QL STRIP: POSITIVE
NON-SQ EPI CELLS URNS QL MICRO: ABNORMAL /HPF
NRBC BLD AUTO-RTO: 0 /100 WBCS
NRBC BLD AUTO-RTO: 1 /100 WBCS
NRBC BLD AUTO-RTO: 2 /100 WBC (ref 0–2)
NRBC BLD AUTO-RTO: 2 /100 WBCS
O2 CT BLDA-SCNC: 11.6 ML/DL (ref 16–23)
O2 CT BLDA-SCNC: 11.8 ML/DL (ref 16–23)
O2 CT BLDA-SCNC: 12.3 ML/DL (ref 16–23)
O2 CT BLDA-SCNC: 12.3 ML/DL (ref 16–23)
O2 CT BLDA-SCNC: 12.5 ML/DL (ref 16–23)
O2 CT BLDA-SCNC: 12.6 ML/DL (ref 16–23)
O2 CT BLDA-SCNC: 12.8 ML/DL (ref 16–23)
O2 CT BLDA-SCNC: 13.9 ML/DL (ref 16–23)
O2 CT BLDV-SCNC: 13 ML/DL
O2 CT BLDV-SCNC: 8.4 ML/DL
OSMOLALITY UR/SERPL-RTO: 306 MMOL/KG (ref 282–298)
OSMOLALITY UR: 357 MMOL/KG
OXYHGB MFR BLDA: 89.3 % (ref 94–97)
OXYHGB MFR BLDA: 95.6 % (ref 94–97)
OXYHGB MFR BLDA: 96.1 % (ref 94–97)
OXYHGB MFR BLDA: 96.3 % (ref 94–97)
OXYHGB MFR BLDA: 96.6 % (ref 94–97)
OXYHGB MFR BLDA: 96.8 % (ref 94–97)
OXYHGB MFR BLDA: 96.9 % (ref 94–97)
OXYHGB MFR BLDA: 97.8 % (ref 94–97)
P AXIS: 46 DEGREES
P AXIS: 52 DEGREES
P AXIS: 53 DEGREES
P AXIS: 62 DEGREES
P AXIS: 62 DEGREES
P AXIS: 72 DEGREES
P AXIS: 78 DEGREES
P-ANCA ATYPICAL TITR SER IF: ABNORMAL TITER
P-ANCA ATYPICAL TITR SER IF: ABNORMAL TITER
P-ANCA TITR SER IF: ABNORMAL TITER
P-ANCA TITR SER IF: ABNORMAL TITER
PCO2 BLD: 17 MMOL/L (ref 21–32)
PCO2 BLD: 18 MMOL/L (ref 21–32)
PCO2 BLD: 20.9 MM HG (ref 36–44)
PCO2 BLD: 22 MMOL/L (ref 21–32)
PCO2 BLD: 22 MMOL/L (ref 21–32)
PCO2 BLD: 35.9 MM HG (ref 36–44)
PCO2 BLD: 46.5 MM HG (ref 36–44)
PCO2 BLD: 48.7 MM HG (ref 36–44)
PCO2 BLDA: 32.9 MM HG (ref 36–44)
PCO2 BLDA: 33.7 MM HG (ref 36–44)
PCO2 BLDA: 33.8 MM HG (ref 36–44)
PCO2 BLDA: 36.8 MM HG (ref 36–44)
PCO2 BLDA: 37.4 MM HG (ref 36–44)
PCO2 BLDA: 39 MM HG (ref 36–44)
PCO2 BLDA: 41.5 MM HG (ref 36–44)
PCO2 BLDA: 47 MM HG (ref 36–44)
PCO2 BLDV: 27.6 MM HG (ref 42–50)
PCO2 BLDV: 53.8 MM HG (ref 42–50)
PEEP RESPIRATORY: 8 CM[H2O]
PEEP RESPIRATORY: 8 CM[H2O]
PH BLD: 7.24 [PH] (ref 7.35–7.45)
PH BLD: 7.25 [PH] (ref 7.35–7.45)
PH BLD: 7.28 [PH] (ref 7.35–7.45)
PH BLD: 7.5 [PH] (ref 7.35–7.45)
PH BLDA: 7.2 [PH] (ref 7.35–7.45)
PH BLDA: 7.23 [PH] (ref 7.35–7.45)
PH BLDA: 7.3 [PH] (ref 7.35–7.45)
PH BLDA: 7.31 [PH] (ref 7.35–7.45)
PH BLDA: 7.32 [PH] (ref 7.35–7.45)
PH BLDA: 7.36 [PH] (ref 7.35–7.45)
PH BLDA: 7.37 [PH] (ref 7.35–7.45)
PH BLDA: 7.38 [PH] (ref 7.35–7.45)
PH BLDV: 7.19 [PH] (ref 7.3–7.4)
PH BLDV: 7.37 [PH] (ref 7.3–7.4)
PH UR STRIP.AUTO: 6 [PH]
PHOSPHATE SERPL-MCNC: 2.3 MG/DL (ref 2.3–4.1)
PHOSPHATE SERPL-MCNC: 3 MG/DL (ref 2.3–4.1)
PHOSPHATE SERPL-MCNC: 3 MG/DL (ref 2.3–4.1)
PHOSPHATE SERPL-MCNC: 3.2 MG/DL (ref 2.3–4.1)
PHOSPHATE SERPL-MCNC: 3.3 MG/DL (ref 2.3–4.1)
PHOSPHATE SERPL-MCNC: 3.3 MG/DL (ref 2.3–4.1)
PHOSPHATE SERPL-MCNC: 3.5 MG/DL (ref 2.3–4.1)
PHOSPHATE SERPL-MCNC: 3.6 MG/DL (ref 2.3–4.1)
PHOSPHATE SERPL-MCNC: 4.1 MG/DL (ref 2.3–4.1)
PHOSPHATE SERPL-MCNC: 4.3 MG/DL (ref 2.3–4.1)
PLATELET # BLD AUTO: 105 THOUSANDS/UL (ref 149–390)
PLATELET # BLD AUTO: 107 THOUSANDS/UL (ref 149–390)
PLATELET # BLD AUTO: 108 THOUSANDS/UL (ref 149–390)
PLATELET # BLD AUTO: 110 THOUSANDS/UL (ref 149–390)
PLATELET # BLD AUTO: 112 THOUSANDS/UL (ref 149–390)
PLATELET # BLD AUTO: 115 THOUSANDS/UL (ref 149–390)
PLATELET # BLD AUTO: 130 THOUSANDS/UL (ref 149–390)
PLATELET # BLD AUTO: 137 THOUSANDS/UL (ref 149–390)
PLATELET # BLD AUTO: 139 THOUSANDS/UL (ref 149–390)
PLATELET # BLD AUTO: 143 THOUSANDS/UL (ref 149–390)
PLATELET # BLD AUTO: 144 THOUSANDS/UL (ref 149–390)
PLATELET # BLD AUTO: 145 THOUSANDS/UL (ref 149–390)
PLATELET # BLD AUTO: 152 THOUSANDS/UL (ref 149–390)
PLATELET # BLD AUTO: 50 THOUSANDS/UL (ref 149–390)
PLATELET # BLD AUTO: 55 THOUSANDS/UL (ref 149–390)
PLATELET # BLD AUTO: 66 THOUSANDS/UL (ref 149–390)
PLATELET # BLD AUTO: 70 THOUSANDS/UL (ref 149–390)
PLATELET # BLD AUTO: 76 THOUSANDS/UL (ref 149–390)
PLATELET # BLD AUTO: 80 THOUSANDS/UL (ref 149–390)
PLATELET # BLD AUTO: 81 THOUSANDS/UL (ref 149–390)
PLATELET # BLD AUTO: 84 THOUSANDS/UL (ref 149–390)
PLATELET # BLD AUTO: 85 THOUSANDS/UL (ref 149–390)
PLATELET BLD QL SMEAR: ABNORMAL
PLATELET BLD QL SMEAR: ABNORMAL
PMV BLD AUTO: 10 FL (ref 8.9–12.7)
PMV BLD AUTO: 10 FL (ref 8.9–12.7)
PMV BLD AUTO: 10.3 FL (ref 8.9–12.7)
PMV BLD AUTO: 10.6 FL (ref 8.9–12.7)
PMV BLD AUTO: 10.6 FL (ref 8.9–12.7)
PMV BLD AUTO: 10.7 FL (ref 8.9–12.7)
PMV BLD AUTO: 10.8 FL (ref 8.9–12.7)
PMV BLD AUTO: 11 FL (ref 8.9–12.7)
PMV BLD AUTO: 8.9 FL (ref 8.9–12.7)
PMV BLD AUTO: 9 FL (ref 8.9–12.7)
PMV BLD AUTO: 9.1 FL (ref 8.9–12.7)
PMV BLD AUTO: 9.2 FL (ref 8.9–12.7)
PMV BLD AUTO: 9.4 FL (ref 8.9–12.7)
PMV BLD AUTO: 9.5 FL (ref 8.9–12.7)
PMV BLD AUTO: 9.5 FL (ref 8.9–12.7)
PMV BLD AUTO: 9.6 FL (ref 8.9–12.7)
PMV BLD AUTO: 9.7 FL (ref 8.9–12.7)
PO2 BLD: 207 MM HG (ref 75–129)
PO2 BLD: 67 MM HG (ref 75–129)
PO2 BLD: 92 MM HG (ref 75–129)
PO2 BLD: 94 MM HG (ref 75–129)
PO2 BLDA: 102 MM HG (ref 75–129)
PO2 BLDA: 110.8 MM HG (ref 75–129)
PO2 BLDA: 111.7 MM HG (ref 75–129)
PO2 BLDA: 119.9 MM HG (ref 75–129)
PO2 BLDA: 143.2 MM HG (ref 75–129)
PO2 BLDA: 149.3 MM HG (ref 75–129)
PO2 BLDA: 212.5 MM HG (ref 75–129)
PO2 BLDA: 72.2 MM HG (ref 75–129)
PO2 BLDV: 109.1 MM HG (ref 35–45)
PO2 BLDV: 40.3 MM HG (ref 35–45)
POLYCHROMASIA BLD QL SMEAR: PRESENT
POTASSIUM BLD-SCNC: 3.2 MMOL/L (ref 3.5–5.3)
POTASSIUM BLD-SCNC: 3.3 MMOL/L (ref 3.5–5.3)
POTASSIUM BLD-SCNC: 3.8 MMOL/L (ref 3.5–5.3)
POTASSIUM BLD-SCNC: 4.1 MMOL/L (ref 3.5–5.3)
POTASSIUM SERPL-SCNC: 3 MMOL/L (ref 3.5–5.3)
POTASSIUM SERPL-SCNC: 3 MMOL/L (ref 3.5–5.3)
POTASSIUM SERPL-SCNC: 3.1 MMOL/L (ref 3.5–5.3)
POTASSIUM SERPL-SCNC: 3.2 MMOL/L (ref 3.5–5.3)
POTASSIUM SERPL-SCNC: 3.2 MMOL/L (ref 3.5–5.3)
POTASSIUM SERPL-SCNC: 3.3 MMOL/L (ref 3.5–5.3)
POTASSIUM SERPL-SCNC: 3.3 MMOL/L (ref 3.5–5.3)
POTASSIUM SERPL-SCNC: 3.4 MMOL/L (ref 3.5–5.3)
POTASSIUM SERPL-SCNC: 3.5 MMOL/L (ref 3.5–5.3)
POTASSIUM SERPL-SCNC: 3.6 MMOL/L (ref 3.5–5.3)
POTASSIUM SERPL-SCNC: 3.6 MMOL/L (ref 3.5–5.3)
POTASSIUM SERPL-SCNC: 3.7 MMOL/L (ref 3.5–5.3)
POTASSIUM SERPL-SCNC: 3.8 MMOL/L (ref 3.5–5.3)
POTASSIUM SERPL-SCNC: 3.9 MMOL/L (ref 3.5–5.3)
POTASSIUM SERPL-SCNC: 4.1 MMOL/L (ref 3.5–5.3)
POTASSIUM SERPL-SCNC: 4.2 MMOL/L (ref 3.5–5.3)
POTASSIUM SERPL-SCNC: 4.2 MMOL/L (ref 3.5–5.3)
POTASSIUM SERPL-SCNC: 4.3 MMOL/L (ref 3.5–5.3)
POTASSIUM SERPL-SCNC: 4.3 MMOL/L (ref 3.5–5.3)
PR INTERVAL: 156 MS
PR INTERVAL: 158 MS
PR INTERVAL: 160 MS
PR INTERVAL: 166 MS
PR INTERVAL: 178 MS
PR INTERVAL: 186 MS
PR INTERVAL: 208 MS
PROCALCITONIN SERPL-MCNC: 0.38 NG/ML
PROCALCITONIN SERPL-MCNC: 0.4 NG/ML
PROT PATTERN SERPL ELPH-IMP: ABNORMAL
PROT PATTERN UR ELPH-IMP: NORMAL
PROT SERPL-MCNC: 6.1 G/DL (ref 6.4–8.4)
PROT SERPL-MCNC: 6.3 G/DL (ref 6.4–8.4)
PROT SERPL-MCNC: 6.3 G/DL (ref 6.4–8.4)
PROT SERPL-MCNC: 6.4 G/DL (ref 6.4–8.4)
PROT SERPL-MCNC: 6.6 G/DL (ref 6.4–8.4)
PROT SERPL-MCNC: 6.8 G/DL (ref 6.4–8.4)
PROT SERPL-MCNC: 7 G/DL (ref 6.4–8.4)
PROT SERPL-MCNC: 7.1 G/DL (ref 6.4–8.4)
PROT SERPL-MCNC: 7.2 G/DL (ref 6.4–8.4)
PROT SERPL-MCNC: 7.2 G/DL (ref 6.4–8.4)
PROT SERPL-MCNC: 7.3 G/DL (ref 6.4–8.4)
PROT UR STRIP-MCNC: ABNORMAL MG/DL
PROT UR-MCNC: 177.2 MG/DL
PROTEINASE3 AB SER IA-ACNC: <0.2 UNITS (ref 0–0.9)
PROTEINASE3 AB SER IA-ACNC: <0.2 UNITS (ref 0–0.9)
PROTHROMBIN TIME: 113.6 SECONDS (ref 11.6–14.5)
PROTHROMBIN TIME: 22.1 SECONDS (ref 11.6–14.5)
PROTHROMBIN TIME: 25.4 SECONDS (ref 11.6–14.5)
PROTHROMBIN TIME: 26.6 SECONDS (ref 11.6–14.5)
PROTHROMBIN TIME: 28.2 SECONDS (ref 11.6–14.5)
PROTHROMBIN TIME: 30.8 SECONDS (ref 11.6–14.5)
PROTHROMBIN TIME: 30.9 SECONDS (ref 11.6–14.5)
PROTHROMBIN TIME: 32.8 SECONDS (ref 11.6–14.5)
PROTHROMBIN TIME: 33.2 SECONDS (ref 11.6–14.5)
PROTHROMBIN TIME: 33.4 SECONDS (ref 11.6–14.5)
PROTHROMBIN TIME: 34.7 SECONDS (ref 11.6–14.5)
PROTHROMBIN TIME: 35.4 SECONDS (ref 11.6–14.5)
PROTHROMBIN TIME: 36.7 SECONDS (ref 11.6–14.5)
PROTHROMBIN TIME: 37.8 SECONDS (ref 11.6–14.5)
PROTHROMBIN TIME: 38.1 SECONDS (ref 11.6–14.5)
PROTHROMBIN TIME: 38.2 SECONDS (ref 11.6–14.5)
PROTHROMBIN TIME: 38.8 SECONDS (ref 11.6–14.5)
PROTHROMBIN TIME: 41.9 SECONDS (ref 11.6–14.5)
PROTHROMBIN TIME: 42.9 SECONDS (ref 11.6–14.5)
PROTHROMBIN TIME: 45.3 SECONDS (ref 11.6–14.5)
PROTHROMBIN TIME: 45.9 SECONDS (ref 11.6–14.5)
PROTHROMBIN TIME: 47.6 SECONDS (ref 11.6–14.5)
PROTHROMBIN TIME: 47.8 SECONDS (ref 11.6–14.5)
PROTHROMBIN TIME: 49.7 SECONDS (ref 11.6–14.5)
PROTHROMBIN TIME: 50.3 SECONDS (ref 11.6–14.5)
PROTHROMBIN TIME: 54.7 SECONDS (ref 11.6–14.5)
PROTHROMBIN TIME: 57.2 SECONDS (ref 11.6–14.5)
PROTHROMBIN TIME: 74 SECONDS (ref 11.6–14.5)
PROTHROMBIN TIME: 79.6 SECONDS (ref 11.6–14.5)
PROTHROMBIN TIME: >120 SECONDS (ref 11.6–14.5)
PTH-INTACT SERPL-MCNC: 103.8 PG/ML (ref 12–88)
QRS AXIS: 1 DEGREES
QRS AXIS: 21 DEGREES
QRS AXIS: 25 DEGREES
QRS AXIS: 26 DEGREES
QRS AXIS: 37 DEGREES
QRS AXIS: 39 DEGREES
QRS AXIS: 39 DEGREES
QRS AXIS: 41 DEGREES
QRSD INTERVAL: 100 MS
QRSD INTERVAL: 100 MS
QRSD INTERVAL: 102 MS
QRSD INTERVAL: 88 MS
QRSD INTERVAL: 90 MS
QRSD INTERVAL: 92 MS
QRSD INTERVAL: 92 MS
QRSD INTERVAL: 96 MS
QT INTERVAL: 304 MS
QT INTERVAL: 418 MS
QT INTERVAL: 434 MS
QT INTERVAL: 454 MS
QT INTERVAL: 492 MS
QT INTERVAL: 508 MS
QT INTERVAL: 514 MS
QT INTERVAL: 524 MS
QTC INTERVAL: 398 MS
QTC INTERVAL: 468 MS
QTC INTERVAL: 497 MS
QTC INTERVAL: 504 MS
QTC INTERVAL: 511 MS
QTC INTERVAL: 511 MS
QTC INTERVAL: 519 MS
QTC INTERVAL: 527 MS
RBC # BLD AUTO: 2.24 MILLION/UL (ref 3.81–5.12)
RBC # BLD AUTO: 2.38 MILLION/UL (ref 3.81–5.12)
RBC # BLD AUTO: 2.41 MILLION/UL (ref 3.81–5.12)
RBC # BLD AUTO: 2.54 MILLION/UL (ref 3.81–5.12)
RBC # BLD AUTO: 2.6 MILLION/UL (ref 3.81–5.12)
RBC # BLD AUTO: 2.65 MILLION/UL (ref 3.81–5.12)
RBC # BLD AUTO: 2.67 MILLION/UL (ref 3.81–5.12)
RBC # BLD AUTO: 2.83 MILLION/UL (ref 3.81–5.12)
RBC # BLD AUTO: 2.9 MILLION/UL (ref 3.81–5.12)
RBC # BLD AUTO: 3.07 MILLION/UL (ref 3.81–5.12)
RBC # BLD AUTO: 3.09 MILLION/UL (ref 3.81–5.12)
RBC # BLD AUTO: 3.29 MILLION/UL (ref 3.81–5.12)
RBC # BLD AUTO: 3.78 MILLION/UL (ref 3.81–5.12)
RBC # BLD AUTO: 3.88 MILLION/UL (ref 3.81–5.12)
RBC # BLD AUTO: 3.97 MILLION/UL (ref 3.81–5.12)
RBC # BLD AUTO: 3.97 MILLION/UL (ref 3.81–5.12)
RBC # BLD AUTO: 3.99 MILLION/UL (ref 3.81–5.12)
RBC # BLD AUTO: 4.1 MILLION/UL (ref 3.81–5.12)
RBC # BLD AUTO: 4.12 MILLION/UL (ref 3.81–5.12)
RBC # BLD AUTO: 4.13 MILLION/UL (ref 3.81–5.12)
RBC # BLD AUTO: 4.15 MILLION/UL (ref 3.81–5.12)
RBC #/AREA URNS AUTO: ABNORMAL /HPF
RBC MORPH BLD: PRESENT
RH BLD: POSITIVE
RH BLD: POSITIVE
RHEUMATOID FACT SER QL LA: POSITIVE
RHODAMINE-AURAMINE STN SPEC: NORMAL
RIGHT ATRIUM AREA SYSTOLE A4C: 35.9 CM2
RIGHT VENTRICLE ID DIMENSION: 4.4 CM
RSV RNA RESP QL NAA+PROBE: NEGATIVE
SALICYLATES SERPL-MCNC: <5 MG/DL (ref 3–20)
SALMONELLA DNA SPEC QL NAA+PROBE: NORMAL
SAO2 % BLD FROM PO2: 100 % (ref 60–85)
SAO2 % BLD FROM PO2: 95 % (ref 60–85)
SAO2 % BLD FROM PO2: 96 % (ref 60–85)
SAO2 % BLD FROM PO2: 96 % (ref 60–85)
SARS-COV-2 RNA RESP QL NAA+PROBE: NEGATIVE
SHIGA TOXIN STX GENE SPEC NAA+PROBE: NORMAL
SHIGELLA DNA SPEC QL NAA+PROBE: NORMAL
SL CV LEFT ATRIUM LENGTH A2C: 6.5 CM
SL CV LV EF: 55
SL CV PED ECHO LEFT VENTRICLE DIASTOLIC VOLUME (MOD BIPLANE) 2D: 92 ML
SL CV PED ECHO LEFT VENTRICLE SYSTOLIC VOLUME (MOD BIPLANE) 2D: 31 ML
SL CV TR MAX PG ANTEGRADE: 15 MMHG
SODIUM 24H UR-SCNC: 42 MOL/L
SODIUM BLD-SCNC: 146 MMOL/L (ref 136–145)
SODIUM BLD-SCNC: 149 MMOL/L (ref 136–145)
SODIUM BLD-SCNC: 149 MMOL/L (ref 136–145)
SODIUM BLD-SCNC: 153 MMOL/L (ref 136–145)
SODIUM SERPL-SCNC: 129 MMOL/L (ref 135–147)
SODIUM SERPL-SCNC: 130 MMOL/L (ref 135–147)
SODIUM SERPL-SCNC: 130 MMOL/L (ref 135–147)
SODIUM SERPL-SCNC: 131 MMOL/L (ref 135–147)
SODIUM SERPL-SCNC: 131 MMOL/L (ref 135–147)
SODIUM SERPL-SCNC: 132 MMOL/L (ref 135–147)
SODIUM SERPL-SCNC: 133 MMOL/L (ref 135–147)
SODIUM SERPL-SCNC: 134 MMOL/L (ref 135–147)
SODIUM SERPL-SCNC: 136 MMOL/L (ref 135–147)
SODIUM SERPL-SCNC: 140 MMOL/L (ref 135–147)
SODIUM SERPL-SCNC: 141 MMOL/L (ref 135–147)
SODIUM SERPL-SCNC: 142 MMOL/L (ref 135–147)
SODIUM SERPL-SCNC: 142 MMOL/L (ref 135–147)
SODIUM SERPL-SCNC: 143 MMOL/L (ref 135–147)
SODIUM SERPL-SCNC: 145 MMOL/L (ref 135–147)
SODIUM SERPL-SCNC: 145 MMOL/L (ref 135–147)
SODIUM SERPL-SCNC: 146 MMOL/L (ref 135–147)
SODIUM SERPL-SCNC: 146 MMOL/L (ref 135–147)
SODIUM SERPL-SCNC: 147 MMOL/L (ref 135–147)
SODIUM SERPL-SCNC: 147 MMOL/L (ref 135–147)
SODIUM SERPL-SCNC: 148 MMOL/L (ref 135–147)
SODIUM SERPL-SCNC: 148 MMOL/L (ref 135–147)
SODIUM SERPL-SCNC: 150 MMOL/L (ref 135–147)
SODIUM SERPL-SCNC: 150 MMOL/L (ref 135–147)
SODIUM SERPL-SCNC: 151 MMOL/L (ref 135–147)
SP GR UR STRIP.AUTO: 1.02 (ref 1–1.03)
SPECIMEN EXPIRATION DATE: NORMAL
SPECIMEN EXPIRATION DATE: NORMAL
SPECIMEN SOURCE: ABNORMAL
T WAVE AXIS: -12 DEGREES
T WAVE AXIS: 1 DEGREES
T WAVE AXIS: 11 DEGREES
T WAVE AXIS: 2 DEGREES
T WAVE AXIS: 238 DEGREES
T WAVE AXIS: 24 DEGREES
T WAVE AXIS: 44 DEGREES
T WAVE AXIS: 45 DEGREES
T4 FREE SERPL-MCNC: 0.87 NG/DL (ref 0.61–1.12)
TIBC SERPL-MCNC: <258 UG/DL (ref 250–450)
TOTAL CELLS COUNTED SPEC: 100
TOTAL CELLS COUNTED SPEC: 100
TPA PPP QL CHRO: 21 % OF NORMAL (ref 77–138)
TR MAX PG: 16 MMHG
TR PEAK VELOCITY: 2 M/S
TRICUSPID ANNULAR PLANE SYSTOLIC EXCURSION: 1.6 CM
TRICUSPID VALVE PEAK REGURGITATION VELOCITY: 1.98 M/S
TRIGL SERPL-MCNC: 96 MG/DL
TSH SERPL DL<=0.05 MIU/L-ACNC: 0.03 UIU/ML (ref 0.45–4.5)
TTG IGA SER-ACNC: <2 U/ML (ref 0–3)
TTG IGG SER-ACNC: <2 U/ML (ref 0–5)
TV MEAN GRADIENT: 7 MMHG
TV MV D: 1.15 M/S
TV VTI: 41.6 CM
UIBC SERPL-MCNC: <55 UG/DL (ref 155–355)
UNIT DISPENSE STATUS: NORMAL
UNIT PRODUCT CODE: NORMAL
UNIT PRODUCT VOLUME: 125 ML
UNIT PRODUCT VOLUME: 201 ML
UNIT PRODUCT VOLUME: 202 ML
UNIT PRODUCT VOLUME: 206 ML
UNIT PRODUCT VOLUME: 219 ML
UNIT PRODUCT VOLUME: 224 ML
UNIT PRODUCT VOLUME: 280 ML
UNIT PRODUCT VOLUME: 300 ML
UNIT PRODUCT VOLUME: 301 ML
UNIT PRODUCT VOLUME: 322 ML
UNIT PRODUCT VOLUME: 332 ML
UNIT PRODUCT VOLUME: 344 ML
UNIT PRODUCT VOLUME: 350 ML
UNIT RH: NORMAL
UROBILINOGEN UR STRIP-ACNC: 4 MG/DL
VARIANT LYMPHS BLD QL SMEAR: 2 % (ref 0–0)
VENT AC: 18
VENT AC: 18
VENTRICULAR RATE: 103 BPM
VENTRICULAR RATE: 58 BPM
VENTRICULAR RATE: 61 BPM
VENTRICULAR RATE: 61 BPM
VENTRICULAR RATE: 65 BPM
VENTRICULAR RATE: 70 BPM
VENTRICULAR RATE: 72 BPM
VENTRICULAR RATE: 93 BPM
VT SETTING VENT: 400 ML
VT SETTING VENT: 400 ML
WBC # BLD AUTO: 10.08 THOUSAND/UL (ref 4.31–10.16)
WBC # BLD AUTO: 12.79 THOUSAND/UL (ref 4.31–10.16)
WBC # BLD AUTO: 13.6 THOUSAND/UL (ref 4.31–10.16)
WBC # BLD AUTO: 13.73 THOUSAND/UL (ref 4.31–10.16)
WBC # BLD AUTO: 13.96 THOUSAND/UL (ref 4.31–10.16)
WBC # BLD AUTO: 13.97 THOUSAND/UL (ref 4.31–10.16)
WBC # BLD AUTO: 3.88 THOUSAND/UL (ref 4.31–10.16)
WBC # BLD AUTO: 4.75 THOUSAND/UL (ref 4.31–10.16)
WBC # BLD AUTO: 4.98 THOUSAND/UL (ref 4.31–10.16)
WBC # BLD AUTO: 5.18 THOUSAND/UL (ref 4.31–10.16)
WBC # BLD AUTO: 5.43 THOUSAND/UL (ref 4.31–10.16)
WBC # BLD AUTO: 5.65 THOUSAND/UL (ref 4.31–10.16)
WBC # BLD AUTO: 6.13 THOUSAND/UL (ref 4.31–10.16)
WBC # BLD AUTO: 6.16 THOUSAND/UL (ref 4.31–10.16)
WBC # BLD AUTO: 6.45 THOUSAND/UL (ref 4.31–10.16)
WBC # BLD AUTO: 6.55 THOUSAND/UL (ref 4.31–10.16)
WBC # BLD AUTO: 6.6 THOUSAND/UL (ref 4.31–10.16)
WBC # BLD AUTO: 6.76 THOUSAND/UL (ref 4.31–10.16)
WBC # BLD AUTO: 7.05 THOUSAND/UL (ref 4.31–10.16)
WBC # BLD AUTO: 7.62 THOUSAND/UL (ref 4.31–10.16)
WBC # BLD AUTO: 7.84 THOUSAND/UL (ref 4.31–10.16)
WBC # BLD AUTO: 8.05 THOUSAND/UL (ref 4.31–10.16)
WBC # BLD AUTO: 8.77 THOUSAND/UL (ref 4.31–10.16)
WBC #/AREA URNS AUTO: ABNORMAL /HPF
WBC CLUMPS # UR AUTO: PRESENT /UL

## 2023-01-01 PROCEDURE — 80048 BASIC METABOLIC PNL TOTAL CA: CPT | Performed by: NURSE PRACTITIONER

## 2023-01-01 PROCEDURE — 80076 HEPATIC FUNCTION PANEL: CPT

## 2023-01-01 PROCEDURE — 94003 VENT MGMT INPAT SUBQ DAY: CPT

## 2023-01-01 PROCEDURE — 80053 COMPREHEN METABOLIC PANEL: CPT | Performed by: INTERNAL MEDICINE

## 2023-01-01 PROCEDURE — 82652 VIT D 1 25-DIHYDROXY: CPT | Performed by: INTERNAL MEDICINE

## 2023-01-01 PROCEDURE — 31500 INSERT EMERGENCY AIRWAY: CPT | Performed by: NURSE PRACTITIONER

## 2023-01-01 PROCEDURE — 84100 ASSAY OF PHOSPHORUS: CPT | Performed by: PHYSICIAN ASSISTANT

## 2023-01-01 PROCEDURE — 85025 COMPLETE CBC W/AUTO DIFF WBC: CPT | Performed by: INTERNAL MEDICINE

## 2023-01-01 PROCEDURE — 84100 ASSAY OF PHOSPHORUS: CPT | Performed by: INTERNAL MEDICINE

## 2023-01-01 PROCEDURE — 85384 FIBRINOGEN ACTIVITY: CPT | Performed by: NURSE PRACTITIONER

## 2023-01-01 PROCEDURE — 82947 ASSAY GLUCOSE BLOOD QUANT: CPT

## 2023-01-01 PROCEDURE — 82550 ASSAY OF CK (CPK): CPT | Performed by: PHYSICIAN ASSISTANT

## 2023-01-01 PROCEDURE — 99285 EMERGENCY DEPT VISIT HI MDM: CPT

## 2023-01-01 PROCEDURE — 83735 ASSAY OF MAGNESIUM: CPT | Performed by: PHYSICIAN ASSISTANT

## 2023-01-01 PROCEDURE — 83930 ASSAY OF BLOOD OSMOLALITY: CPT | Performed by: PHYSICIAN ASSISTANT

## 2023-01-01 PROCEDURE — 85027 COMPLETE CBC AUTOMATED: CPT | Performed by: NURSE PRACTITIONER

## 2023-01-01 PROCEDURE — 82140 ASSAY OF AMMONIA: CPT

## 2023-01-01 PROCEDURE — 88112 CYTOPATH CELL ENHANCE TECH: CPT | Performed by: PATHOLOGY

## 2023-01-01 PROCEDURE — 80048 BASIC METABOLIC PNL TOTAL CA: CPT | Performed by: INTERNAL MEDICINE

## 2023-01-01 PROCEDURE — 82803 BLOOD GASES ANY COMBINATION: CPT

## 2023-01-01 PROCEDURE — 84478 ASSAY OF TRIGLYCERIDES: CPT | Performed by: PHYSICIAN ASSISTANT

## 2023-01-01 PROCEDURE — 85014 HEMATOCRIT: CPT

## 2023-01-01 PROCEDURE — 99232 SBSQ HOSP IP/OBS MODERATE 35: CPT | Performed by: INTERNAL MEDICINE

## 2023-01-01 PROCEDURE — 82948 REAGENT STRIP/BLOOD GLUCOSE: CPT

## 2023-01-01 PROCEDURE — 99291 CRITICAL CARE FIRST HOUR: CPT | Performed by: INTERNAL MEDICINE

## 2023-01-01 PROCEDURE — 93005 ELECTROCARDIOGRAM TRACING: CPT

## 2023-01-01 PROCEDURE — 02HV33Z INSERTION OF INFUSION DEVICE INTO SUPERIOR VENA CAVA, PERCUTANEOUS APPROACH: ICD-10-PCS | Performed by: INTERNAL MEDICINE

## 2023-01-01 PROCEDURE — 80076 HEPATIC FUNCTION PANEL: CPT | Performed by: INTERNAL MEDICINE

## 2023-01-01 PROCEDURE — 87206 SMEAR FLUORESCENT/ACID STAI: CPT | Performed by: INTERNAL MEDICINE

## 2023-01-01 PROCEDURE — P9012 CRYOPRECIPITATE EACH UNIT: HCPCS

## 2023-01-01 PROCEDURE — 86038 ANTINUCLEAR ANTIBODIES: CPT

## 2023-01-01 PROCEDURE — 83520 IMMUNOASSAY QUANT NOS NONAB: CPT | Performed by: NURSE PRACTITIONER

## 2023-01-01 PROCEDURE — 87205 SMEAR GRAM STAIN: CPT | Performed by: INTERNAL MEDICINE

## 2023-01-01 PROCEDURE — 90945 DIALYSIS ONE EVALUATION: CPT

## 2023-01-01 PROCEDURE — 85384 FIBRINOGEN ACTIVITY: CPT | Performed by: PHYSICIAN ASSISTANT

## 2023-01-01 PROCEDURE — 86901 BLOOD TYPING SEROLOGIC RH(D): CPT | Performed by: NURSE PRACTITIONER

## 2023-01-01 PROCEDURE — P9017 PLASMA 1 DONOR FRZ W/IN 8 HR: HCPCS

## 2023-01-01 PROCEDURE — 74176 CT ABD & PELVIS W/O CONTRAST: CPT

## 2023-01-01 PROCEDURE — 83735 ASSAY OF MAGNESIUM: CPT | Performed by: NURSE PRACTITIONER

## 2023-01-01 PROCEDURE — 85610 PROTHROMBIN TIME: CPT | Performed by: PHYSICIAN ASSISTANT

## 2023-01-01 PROCEDURE — 85007 BL SMEAR W/DIFF WBC COUNT: CPT | Performed by: INTERNAL MEDICINE

## 2023-01-01 PROCEDURE — 82728 ASSAY OF FERRITIN: CPT | Performed by: INTERNAL MEDICINE

## 2023-01-01 PROCEDURE — 71045 X-RAY EXAM CHEST 1 VIEW: CPT

## 2023-01-01 PROCEDURE — 82533 TOTAL CORTISOL: CPT | Performed by: INTERNAL MEDICINE

## 2023-01-01 PROCEDURE — C9113 INJ PANTOPRAZOLE SODIUM, VIA: HCPCS | Performed by: NURSE PRACTITIONER

## 2023-01-01 PROCEDURE — 94002 VENT MGMT INPAT INIT DAY: CPT

## 2023-01-01 PROCEDURE — 99223 1ST HOSP IP/OBS HIGH 75: CPT

## 2023-01-01 PROCEDURE — 82728 ASSAY OF FERRITIN: CPT | Performed by: PHYSICIAN ASSISTANT

## 2023-01-01 PROCEDURE — 87505 NFCT AGENT DETECTION GI: CPT | Performed by: INTERNAL MEDICINE

## 2023-01-01 PROCEDURE — 99284 EMERGENCY DEPT VISIT MOD MDM: CPT

## 2023-01-01 PROCEDURE — 75970 VASCULAR BIOPSY: CPT | Performed by: RADIOLOGY

## 2023-01-01 PROCEDURE — 94664 DEMO&/EVAL PT USE INHALER: CPT

## 2023-01-01 PROCEDURE — 85610 PROTHROMBIN TIME: CPT | Performed by: INTERNAL MEDICINE

## 2023-01-01 PROCEDURE — 93308 TTE F-UP OR LMTD: CPT

## 2023-01-01 PROCEDURE — NC001 PR NO CHARGE: Performed by: PHYSICIAN ASSISTANT

## 2023-01-01 PROCEDURE — 99222 1ST HOSP IP/OBS MODERATE 55: CPT | Performed by: INTERNAL MEDICINE

## 2023-01-01 PROCEDURE — 85025 COMPLETE CBC W/AUTO DIFF WBC: CPT

## 2023-01-01 PROCEDURE — 83970 ASSAY OF PARATHORMONE: CPT | Performed by: INTERNAL MEDICINE

## 2023-01-01 PROCEDURE — 85610 PROTHROMBIN TIME: CPT

## 2023-01-01 PROCEDURE — 87186 SC STD MICRODIL/AGAR DIL: CPT | Performed by: PHYSICIAN ASSISTANT

## 2023-01-01 PROCEDURE — 88185 FLOWCYTOMETRY/TC ADD-ON: CPT | Performed by: INTERNAL MEDICINE

## 2023-01-01 PROCEDURE — 36012 PLACE CATHETER IN VEIN: CPT | Performed by: RADIOLOGY

## 2023-01-01 PROCEDURE — 85027 COMPLETE CBC AUTOMATED: CPT | Performed by: INTERNAL MEDICINE

## 2023-01-01 PROCEDURE — P9035 PLATELET PHERES LEUKOREDUCED: HCPCS

## 2023-01-01 PROCEDURE — 30233R1 TRANSFUSION OF NONAUTOLOGOUS PLATELETS INTO PERIPHERAL VEIN, PERCUTANEOUS APPROACH: ICD-10-PCS | Performed by: INTERNAL MEDICINE

## 2023-01-01 PROCEDURE — 84132 ASSAY OF SERUM POTASSIUM: CPT

## 2023-01-01 PROCEDURE — 84295 ASSAY OF SERUM SODIUM: CPT

## 2023-01-01 PROCEDURE — 85007 BL SMEAR W/DIFF WBC COUNT: CPT | Performed by: NURSE PRACTITIONER

## 2023-01-01 PROCEDURE — P9016 RBC LEUKOCYTES REDUCED: HCPCS

## 2023-01-01 PROCEDURE — 82140 ASSAY OF AMMONIA: CPT | Performed by: PHYSICIAN ASSISTANT

## 2023-01-01 PROCEDURE — 36600 WITHDRAWAL OF ARTERIAL BLOOD: CPT

## 2023-01-01 PROCEDURE — 99233 SBSQ HOSP IP/OBS HIGH 50: CPT | Performed by: INTERNAL MEDICINE

## 2023-01-01 PROCEDURE — 83605 ASSAY OF LACTIC ACID: CPT | Performed by: NURSE PRACTITIONER

## 2023-01-01 PROCEDURE — 82787 IGG 1 2 3 OR 4 EACH: CPT | Performed by: PHYSICIAN ASSISTANT

## 2023-01-01 PROCEDURE — 99291 CRITICAL CARE FIRST HOUR: CPT | Performed by: NURSE PRACTITIONER

## 2023-01-01 PROCEDURE — 85049 AUTOMATED PLATELET COUNT: CPT | Performed by: NURSE PRACTITIONER

## 2023-01-01 PROCEDURE — 86015 ACTIN ANTIBODY EACH: CPT | Performed by: PHYSICIAN ASSISTANT

## 2023-01-01 PROCEDURE — 86850 RBC ANTIBODY SCREEN: CPT | Performed by: PHYSICIAN ASSISTANT

## 2023-01-01 PROCEDURE — G1004 CDSM NDSC: HCPCS

## 2023-01-01 PROCEDURE — 82330 ASSAY OF CALCIUM: CPT | Performed by: PHYSICIAN ASSISTANT

## 2023-01-01 PROCEDURE — 83520 IMMUNOASSAY QUANT NOS NONAB: CPT | Performed by: INTERNAL MEDICINE

## 2023-01-01 PROCEDURE — 36556 INSERT NON-TUNNEL CV CATH: CPT

## 2023-01-01 PROCEDURE — 93010 ELECTROCARDIOGRAM REPORT: CPT | Performed by: INTERNAL MEDICINE

## 2023-01-01 PROCEDURE — 88307 TISSUE EXAM BY PATHOLOGIST: CPT | Performed by: PATHOLOGY

## 2023-01-01 PROCEDURE — 76942 ECHO GUIDE FOR BIOPSY: CPT

## 2023-01-01 PROCEDURE — 0241U HB NFCT DS VIR RESP RNA 4 TRGT: CPT | Performed by: INTERNAL MEDICINE

## 2023-01-01 PROCEDURE — 83605 ASSAY OF LACTIC ACID: CPT | Performed by: PHYSICIAN ASSISTANT

## 2023-01-01 PROCEDURE — 80179 DRUG ASSAY SALICYLATE: CPT

## 2023-01-01 PROCEDURE — 37200 TRANSCATHETER BIOPSY: CPT

## 2023-01-01 PROCEDURE — 80053 COMPREHEN METABOLIC PANEL: CPT | Performed by: PHYSICIAN ASSISTANT

## 2023-01-01 PROCEDURE — 99233 SBSQ HOSP IP/OBS HIGH 50: CPT | Performed by: STUDENT IN AN ORGANIZED HEALTH CARE EDUCATION/TRAINING PROGRAM

## 2023-01-01 PROCEDURE — 74018 RADEX ABDOMEN 1 VIEW: CPT

## 2023-01-01 PROCEDURE — 82105 ALPHA-FETOPROTEIN SERUM: CPT

## 2023-01-01 PROCEDURE — 99292 CRITICAL CARE ADDL 30 MIN: CPT

## 2023-01-01 PROCEDURE — 71250 CT THORAX DX C-: CPT

## 2023-01-01 PROCEDURE — 85025 COMPLETE CBC W/AUTO DIFF WBC: CPT | Performed by: PHYSICIAN ASSISTANT

## 2023-01-01 PROCEDURE — 87015 SPECIMEN INFECT AGNT CONCNTJ: CPT | Performed by: INTERNAL MEDICINE

## 2023-01-01 PROCEDURE — 84166 PROTEIN E-PHORESIS/URINE/CSF: CPT | Performed by: INTERNAL MEDICINE

## 2023-01-01 PROCEDURE — 87086 URINE CULTURE/COLONY COUNT: CPT | Performed by: PHYSICIAN ASSISTANT

## 2023-01-01 PROCEDURE — 80048 BASIC METABOLIC PNL TOTAL CA: CPT | Performed by: PHYSICIAN ASSISTANT

## 2023-01-01 PROCEDURE — 82140 ASSAY OF AMMONIA: CPT | Performed by: NURSE PRACTITIONER

## 2023-01-01 PROCEDURE — 75889 VEIN X-RAY LIVER W/HEMODYNAM: CPT | Performed by: RADIOLOGY

## 2023-01-01 PROCEDURE — 93976 VASCULAR STUDY: CPT

## 2023-01-01 PROCEDURE — 94150 VITAL CAPACITY TEST: CPT

## 2023-01-01 PROCEDURE — 30233N1 TRANSFUSION OF NONAUTOLOGOUS RED BLOOD CELLS INTO PERIPHERAL VEIN, PERCUTANEOUS APPROACH: ICD-10-PCS | Performed by: INTERNAL MEDICINE

## 2023-01-01 PROCEDURE — 37200 TRANSCATHETER BIOPSY: CPT | Performed by: RADIOLOGY

## 2023-01-01 PROCEDURE — 94760 N-INVAS EAR/PLS OXIMETRY 1: CPT

## 2023-01-01 PROCEDURE — C1769 GUIDE WIRE: HCPCS

## 2023-01-01 PROCEDURE — 89051 BODY FLUID CELL COUNT: CPT | Performed by: INTERNAL MEDICINE

## 2023-01-01 PROCEDURE — C1757 CATH, THROMBECTOMY/EMBOLECT: HCPCS

## 2023-01-01 PROCEDURE — 84100 ASSAY OF PHOSPHORUS: CPT | Performed by: NURSE PRACTITIONER

## 2023-01-01 PROCEDURE — 82306 VITAMIN D 25 HYDROXY: CPT | Performed by: INTERNAL MEDICINE

## 2023-01-01 PROCEDURE — 86364 TISS TRNSGLTMNASE EA IG CLAS: CPT | Performed by: PHYSICIAN ASSISTANT

## 2023-01-01 PROCEDURE — 99232 SBSQ HOSP IP/OBS MODERATE 35: CPT | Performed by: PHYSICIAN ASSISTANT

## 2023-01-01 PROCEDURE — 87281 PNEUMOCYSTIS CARINII AG IF: CPT | Performed by: INTERNAL MEDICINE

## 2023-01-01 PROCEDURE — 86037 ANCA TITER EACH ANTIBODY: CPT | Performed by: INTERNAL MEDICINE

## 2023-01-01 PROCEDURE — 94640 AIRWAY INHALATION TREATMENT: CPT

## 2023-01-01 PROCEDURE — 0B9K8ZX DRAINAGE OF RIGHT LUNG, VIA NATURAL OR ARTIFICIAL OPENING ENDOSCOPIC, DIAGNOSTIC: ICD-10-PCS | Performed by: INTERNAL MEDICINE

## 2023-01-01 PROCEDURE — 85027 COMPLETE CBC AUTOMATED: CPT | Performed by: PHYSICIAN ASSISTANT

## 2023-01-01 PROCEDURE — 82330 ASSAY OF CALCIUM: CPT

## 2023-01-01 PROCEDURE — 84300 ASSAY OF URINE SODIUM: CPT | Performed by: INTERNAL MEDICINE

## 2023-01-01 PROCEDURE — 86431 RHEUMATOID FACTOR QUANT: CPT | Performed by: INTERNAL MEDICINE

## 2023-01-01 PROCEDURE — 82977 ASSAY OF GGT: CPT | Performed by: PHYSICIAN ASSISTANT

## 2023-01-01 PROCEDURE — 85025 COMPLETE CBC W/AUTO DIFF WBC: CPT | Performed by: NURSE PRACTITIONER

## 2023-01-01 PROCEDURE — 86880 COOMBS TEST DIRECT: CPT | Performed by: NURSE PRACTITIONER

## 2023-01-01 PROCEDURE — 82805 BLOOD GASES W/O2 SATURATION: CPT | Performed by: PHYSICIAN ASSISTANT

## 2023-01-01 PROCEDURE — 85379 FIBRIN DEGRADATION QUANT: CPT | Performed by: NURSE PRACTITIONER

## 2023-01-01 PROCEDURE — 85420 FIBRINOLYTIC PLASMINOGEN: CPT | Performed by: NURSE PRACTITIONER

## 2023-01-01 PROCEDURE — 83690 ASSAY OF LIPASE: CPT

## 2023-01-01 PROCEDURE — 85610 PROTHROMBIN TIME: CPT | Performed by: NURSE PRACTITIONER

## 2023-01-01 PROCEDURE — 86037 ANCA TITER EACH ANTIBODY: CPT | Performed by: NURSE PRACTITIONER

## 2023-01-01 PROCEDURE — 36620 INSERTION CATHETER ARTERY: CPT | Performed by: NURSE PRACTITIONER

## 2023-01-01 PROCEDURE — 82077 ASSAY SPEC XCP UR&BREATH IA: CPT

## 2023-01-01 PROCEDURE — 99232 SBSQ HOSP IP/OBS MODERATE 35: CPT | Performed by: STUDENT IN AN ORGANIZED HEALTH CARE EDUCATION/TRAINING PROGRAM

## 2023-01-01 PROCEDURE — NC001 PR NO CHARGE: Performed by: INTERNAL MEDICINE

## 2023-01-01 PROCEDURE — 4A133B1 MONITORING OF ARTERIAL PRESSURE, PERIPHERAL, PERCUTANEOUS APPROACH: ICD-10-PCS | Performed by: INTERNAL MEDICINE

## 2023-01-01 PROCEDURE — 88341 IMHCHEM/IMCYTCHM EA ADD ANTB: CPT | Performed by: PATHOLOGY

## 2023-01-01 PROCEDURE — 86258 DGP ANTIBODY EACH IG CLASS: CPT | Performed by: PHYSICIAN ASSISTANT

## 2023-01-01 PROCEDURE — 84484 ASSAY OF TROPONIN QUANT: CPT | Performed by: NURSE PRACTITIONER

## 2023-01-01 PROCEDURE — 83521 IG LIGHT CHAINS FREE EACH: CPT | Performed by: NURSE PRACTITIONER

## 2023-01-01 PROCEDURE — 5A1945Z RESPIRATORY VENTILATION, 24-96 CONSECUTIVE HOURS: ICD-10-PCS | Performed by: INTERNAL MEDICINE

## 2023-01-01 PROCEDURE — 30233M1 TRANSFUSION OF NONAUTOLOGOUS PLASMA CRYOPRECIPITATE INTO PERIPHERAL VEIN, PERCUTANEOUS APPROACH: ICD-10-PCS | Performed by: INTERNAL MEDICINE

## 2023-01-01 PROCEDURE — 5A1D90Z PERFORMANCE OF URINARY FILTRATION, CONTINUOUS, GREATER THAN 18 HOURS PER DAY: ICD-10-PCS | Performed by: INTERNAL MEDICINE

## 2023-01-01 PROCEDURE — 86901 BLOOD TYPING SEROLOGIC RH(D): CPT | Performed by: PHYSICIAN ASSISTANT

## 2023-01-01 PROCEDURE — 86335 IMMUNFIX E-PHORSIS/URINE/CSF: CPT | Performed by: PATHOLOGY

## 2023-01-01 PROCEDURE — 86900 BLOOD TYPING SEROLOGIC ABO: CPT | Performed by: PHYSICIAN ASSISTANT

## 2023-01-01 PROCEDURE — 80053 COMPREHEN METABOLIC PANEL: CPT | Performed by: NURSE PRACTITIONER

## 2023-01-01 PROCEDURE — 87077 CULTURE AEROBIC IDENTIFY: CPT | Performed by: PHYSICIAN ASSISTANT

## 2023-01-01 PROCEDURE — 02H633Z INSERTION OF INFUSION DEVICE INTO RIGHT ATRIUM, PERCUTANEOUS APPROACH: ICD-10-PCS | Performed by: INTERNAL MEDICINE

## 2023-01-01 PROCEDURE — 93306 TTE W/DOPPLER COMPLETE: CPT

## 2023-01-01 PROCEDURE — 88305 TISSUE EXAM BY PATHOLOGIST: CPT | Performed by: PATHOLOGY

## 2023-01-01 PROCEDURE — 83935 ASSAY OF URINE OSMOLALITY: CPT | Performed by: INTERNAL MEDICINE

## 2023-01-01 PROCEDURE — 80048 BASIC METABOLIC PNL TOTAL CA: CPT

## 2023-01-01 PROCEDURE — 80076 HEPATIC FUNCTION PANEL: CPT | Performed by: PHYSICIAN ASSISTANT

## 2023-01-01 PROCEDURE — 03HY32Z INSERTION OF MONITORING DEVICE INTO UPPER ARTERY, PERCUTANEOUS APPROACH: ICD-10-PCS | Performed by: INTERNAL MEDICINE

## 2023-01-01 PROCEDURE — 84439 ASSAY OF FREE THYROXINE: CPT | Performed by: NURSE PRACTITIONER

## 2023-01-01 PROCEDURE — 99152 MOD SED SAME PHYS/QHP 5/>YRS: CPT

## 2023-01-01 PROCEDURE — 82784 ASSAY IGA/IGD/IGG/IGM EACH: CPT | Performed by: STUDENT IN AN ORGANIZED HEALTH CARE EDUCATION/TRAINING PROGRAM

## 2023-01-01 PROCEDURE — 82248 BILIRUBIN DIRECT: CPT | Performed by: INTERNAL MEDICINE

## 2023-01-01 PROCEDURE — 82330 ASSAY OF CALCIUM: CPT | Performed by: INTERNAL MEDICINE

## 2023-01-01 PROCEDURE — 99152 MOD SED SAME PHYS/QHP 5/>YRS: CPT | Performed by: RADIOLOGY

## 2023-01-01 PROCEDURE — 87070 CULTURE OTHR SPECIMN AEROBIC: CPT | Performed by: INTERNAL MEDICINE

## 2023-01-01 PROCEDURE — 81001 URINALYSIS AUTO W/SCOPE: CPT

## 2023-01-01 PROCEDURE — C1894 INTRO/SHEATH, NON-LASER: HCPCS

## 2023-01-01 PROCEDURE — 86200 CCP ANTIBODY: CPT | Performed by: INTERNAL MEDICINE

## 2023-01-01 PROCEDURE — 83010 ASSAY OF HAPTOGLOBIN QUANT: CPT | Performed by: NURSE PRACTITIONER

## 2023-01-01 PROCEDURE — 83540 ASSAY OF IRON: CPT | Performed by: PHYSICIAN ASSISTANT

## 2023-01-01 PROCEDURE — 86850 RBC ANTIBODY SCREEN: CPT | Performed by: NURSE PRACTITIONER

## 2023-01-01 PROCEDURE — 88342 IMHCHEM/IMCYTCHM 1ST ANTB: CPT | Performed by: PATHOLOGY

## 2023-01-01 PROCEDURE — 83880 ASSAY OF NATRIURETIC PEPTIDE: CPT | Performed by: NURSE PRACTITIONER

## 2023-01-01 PROCEDURE — 82805 BLOOD GASES W/O2 SATURATION: CPT | Performed by: NURSE PRACTITIONER

## 2023-01-01 PROCEDURE — 85018 HEMOGLOBIN: CPT | Performed by: NURSE PRACTITIONER

## 2023-01-01 PROCEDURE — 82164 ANGIOTENSIN I ENZYME TEST: CPT | Performed by: INTERNAL MEDICINE

## 2023-01-01 PROCEDURE — 87040 BLOOD CULTURE FOR BACTERIA: CPT | Performed by: PHYSICIAN ASSISTANT

## 2023-01-01 PROCEDURE — 86708 HEPATITIS A ANTIBODY: CPT | Performed by: PHYSICIAN ASSISTANT

## 2023-01-01 PROCEDURE — 88313 SPECIAL STAINS GROUP 2: CPT | Performed by: PATHOLOGY

## 2023-01-01 PROCEDURE — 31500 INSERT EMERGENCY AIRWAY: CPT

## 2023-01-01 PROCEDURE — 82805 BLOOD GASES W/O2 SATURATION: CPT

## 2023-01-01 PROCEDURE — 83735 ASSAY OF MAGNESIUM: CPT | Performed by: INTERNAL MEDICINE

## 2023-01-01 PROCEDURE — 84165 PROTEIN E-PHORESIS SERUM: CPT | Performed by: PATHOLOGY

## 2023-01-01 PROCEDURE — 97116 GAIT TRAINING THERAPY: CPT

## 2023-01-01 PROCEDURE — 85730 THROMBOPLASTIN TIME PARTIAL: CPT | Performed by: NURSE PRACTITIONER

## 2023-01-01 PROCEDURE — A9585 GADOBUTROL INJECTION: HCPCS | Performed by: PHYSICIAN ASSISTANT

## 2023-01-01 PROCEDURE — 86430 RHEUMATOID FACTOR TEST QUAL: CPT | Performed by: INTERNAL MEDICINE

## 2023-01-01 PROCEDURE — 76937 US GUIDE VASCULAR ACCESS: CPT | Performed by: RADIOLOGY

## 2023-01-01 PROCEDURE — 82330 ASSAY OF CALCIUM: CPT | Performed by: NURSE PRACTITIONER

## 2023-01-01 PROCEDURE — 83521 IG LIGHT CHAINS FREE EACH: CPT | Performed by: INTERNAL MEDICINE

## 2023-01-01 PROCEDURE — 86900 BLOOD TYPING SEROLOGIC ABO: CPT | Performed by: NURSE PRACTITIONER

## 2023-01-01 PROCEDURE — 82140 ASSAY OF AMMONIA: CPT | Performed by: INTERNAL MEDICINE

## 2023-01-01 PROCEDURE — 30233L1 TRANSFUSION OF NONAUTOLOGOUS FRESH PLASMA INTO PERIPHERAL VEIN, PERCUTANEOUS APPROACH: ICD-10-PCS | Performed by: INTERNAL MEDICINE

## 2023-01-01 PROCEDURE — 86645 CMV ANTIBODY IGM: CPT | Performed by: STUDENT IN AN ORGANIZED HEALTH CARE EDUCATION/TRAINING PROGRAM

## 2023-01-01 PROCEDURE — 82784 ASSAY IGA/IGD/IGG/IGM EACH: CPT | Performed by: PHYSICIAN ASSISTANT

## 2023-01-01 PROCEDURE — 87102 FUNGUS ISOLATION CULTURE: CPT | Performed by: INTERNAL MEDICINE

## 2023-01-01 PROCEDURE — 99223 1ST HOSP IP/OBS HIGH 75: CPT | Performed by: INTERNAL MEDICINE

## 2023-01-01 PROCEDURE — 4A133J1 MONITORING OF ARTERIAL PULSE, PERIPHERAL, PERCUTANEOUS APPROACH: ICD-10-PCS | Performed by: INTERNAL MEDICINE

## 2023-01-01 PROCEDURE — 80074 ACUTE HEPATITIS PANEL: CPT

## 2023-01-01 PROCEDURE — 99153 MOD SED SAME PHYS/QHP EA: CPT

## 2023-01-01 PROCEDURE — 82390 ASSAY OF CERULOPLASMIN: CPT | Performed by: STUDENT IN AN ORGANIZED HEALTH CARE EDUCATION/TRAINING PROGRAM

## 2023-01-01 PROCEDURE — 82378 CARCINOEMBRYONIC ANTIGEN: CPT

## 2023-01-01 PROCEDURE — 84145 PROCALCITONIN (PCT): CPT | Performed by: NURSE PRACTITIONER

## 2023-01-01 PROCEDURE — 87529 HSV DNA AMP PROBE: CPT | Performed by: STUDENT IN AN ORGANIZED HEALTH CARE EDUCATION/TRAINING PROGRAM

## 2023-01-01 PROCEDURE — 70450 CT HEAD/BRAIN W/O DYE: CPT

## 2023-01-01 PROCEDURE — 86644 CMV ANTIBODY: CPT | Performed by: STUDENT IN AN ORGANIZED HEALTH CARE EDUCATION/TRAINING PROGRAM

## 2023-01-01 PROCEDURE — 82272 OCCULT BLD FECES 1-3 TESTS: CPT | Performed by: NURSE PRACTITIONER

## 2023-01-01 PROCEDURE — 84443 ASSAY THYROID STIM HORMONE: CPT | Performed by: NURSE PRACTITIONER

## 2023-01-01 PROCEDURE — 96360 HYDRATION IV INFUSION INIT: CPT

## 2023-01-01 PROCEDURE — 85362 FIBRIN DEGRADATION PRODUCTS: CPT | Performed by: NURSE PRACTITIONER

## 2023-01-01 PROCEDURE — 80076 HEPATIC FUNCTION PANEL: CPT | Performed by: NURSE PRACTITIONER

## 2023-01-01 PROCEDURE — 36556 INSERT NON-TUNNEL CV CATH: CPT | Performed by: PHYSICIAN ASSISTANT

## 2023-01-01 PROCEDURE — 83615 LACTATE (LD) (LDH) ENZYME: CPT | Performed by: NURSE PRACTITIONER

## 2023-01-01 PROCEDURE — 83521 IG LIGHT CHAINS FREE EACH: CPT | Performed by: STUDENT IN AN ORGANIZED HEALTH CARE EDUCATION/TRAINING PROGRAM

## 2023-01-01 PROCEDURE — NC001 PR NO CHARGE

## 2023-01-01 PROCEDURE — 97163 PT EVAL HIGH COMPLEX 45 MIN: CPT

## 2023-01-01 PROCEDURE — 86037 ANCA TITER EACH ANTIBODY: CPT | Performed by: PHYSICIAN ASSISTANT

## 2023-01-01 PROCEDURE — 83520 IMMUNOASSAY QUANT NOS NONAB: CPT | Performed by: PHYSICIAN ASSISTANT

## 2023-01-01 PROCEDURE — 87252 VIRUS INOCULATION TISSUE: CPT | Performed by: INTERNAL MEDICINE

## 2023-01-01 PROCEDURE — 0FD03ZX EXTRACTION OF LIVER, PERCUTANEOUS APPROACH, DIAGNOSTIC: ICD-10-PCS | Performed by: RADIOLOGY

## 2023-01-01 PROCEDURE — 75970 VASCULAR BIOPSY: CPT

## 2023-01-01 PROCEDURE — 87116 MYCOBACTERIA CULTURE: CPT | Performed by: INTERNAL MEDICINE

## 2023-01-01 PROCEDURE — 86231 EMA EACH IG CLASS: CPT | Performed by: PHYSICIAN ASSISTANT

## 2023-01-01 PROCEDURE — 86301 IMMUNOASSAY TUMOR CA 19-9: CPT

## 2023-01-01 PROCEDURE — 86335 IMMUNFIX E-PHORSIS/URINE/CSF: CPT | Performed by: INTERNAL MEDICINE

## 2023-01-01 PROCEDURE — 86923 COMPATIBILITY TEST ELECTRIC: CPT

## 2023-01-01 PROCEDURE — 88184 FLOWCYTOMETRY/ TC 1 MARKER: CPT | Performed by: INTERNAL MEDICINE

## 2023-01-01 PROCEDURE — 87340 HEPATITIS B SURFACE AG IA: CPT | Performed by: PHYSICIAN ASSISTANT

## 2023-01-01 PROCEDURE — 84166 PROTEIN E-PHORESIS/URINE/CSF: CPT | Performed by: PATHOLOGY

## 2023-01-01 PROCEDURE — 85300 ANTITHROMBIN III ACTIVITY: CPT | Performed by: NURSE PRACTITIONER

## 2023-01-01 PROCEDURE — 83690 ASSAY OF LIPASE: CPT | Performed by: PHYSICIAN ASSISTANT

## 2023-01-01 PROCEDURE — 97530 THERAPEUTIC ACTIVITIES: CPT

## 2023-01-01 PROCEDURE — 74183 MRI ABD W/O CNTR FLWD CNTR: CPT

## 2023-01-01 PROCEDURE — 82397 CHEMILUMINESCENT ASSAY: CPT | Performed by: INTERNAL MEDICINE

## 2023-01-01 PROCEDURE — 93306 TTE W/DOPPLER COMPLETE: CPT | Performed by: INTERNAL MEDICINE

## 2023-01-01 PROCEDURE — 84165 PROTEIN E-PHORESIS SERUM: CPT | Performed by: INTERNAL MEDICINE

## 2023-01-01 PROCEDURE — 0BH17EZ INSERTION OF ENDOTRACHEAL AIRWAY INTO TRACHEA, VIA NATURAL OR ARTIFICIAL OPENING: ICD-10-PCS | Performed by: INTERNAL MEDICINE

## 2023-01-01 PROCEDURE — 83550 IRON BINDING TEST: CPT | Performed by: PHYSICIAN ASSISTANT

## 2023-01-01 PROCEDURE — 47000 NEEDLE BIOPSY OF LIVER PERQ: CPT

## 2023-01-01 PROCEDURE — 97167 OT EVAL HIGH COMPLEX 60 MIN: CPT

## 2023-01-01 PROCEDURE — 36415 COLL VENOUS BLD VENIPUNCTURE: CPT

## 2023-01-01 PROCEDURE — 80143 DRUG ASSAY ACETAMINOPHEN: CPT

## 2023-01-01 PROCEDURE — 99291 CRITICAL CARE FIRST HOUR: CPT

## 2023-01-01 RX ORDER — METHYLPREDNISOLONE SODIUM SUCCINATE 40 MG/ML
40 INJECTION, POWDER, LYOPHILIZED, FOR SOLUTION INTRAMUSCULAR; INTRAVENOUS DAILY
Status: DISCONTINUED | OUTPATIENT
Start: 2023-01-01 | End: 2023-01-01

## 2023-01-01 RX ORDER — FUROSEMIDE 10 MG/ML
40 INJECTION INTRAMUSCULAR; INTRAVENOUS ONCE
Status: DISCONTINUED | OUTPATIENT
Start: 2023-01-01 | End: 2023-01-01

## 2023-01-01 RX ORDER — LIDOCAINE HYDROCHLORIDE 20 MG/ML
INJECTION, SOLUTION EPIDURAL; INFILTRATION; INTRACAUDAL; PERINEURAL
Status: COMPLETED
Start: 2023-01-01 | End: 2023-01-01

## 2023-01-01 RX ORDER — POTASSIUM CHLORIDE 29.8 MG/ML
40 INJECTION INTRAVENOUS ONCE
Status: DISCONTINUED | OUTPATIENT
Start: 2023-01-01 | End: 2023-01-01

## 2023-01-01 RX ORDER — ALBUTEROL SULFATE 2.5 MG/3ML
2.5 SOLUTION RESPIRATORY (INHALATION) EVERY 6 HOURS PRN
Status: DISCONTINUED | OUTPATIENT
Start: 2023-01-01 | End: 2023-01-01

## 2023-01-01 RX ORDER — IBUPROFEN 400 MG/1
400 TABLET ORAL EVERY 8 HOURS PRN
Status: DISCONTINUED | OUTPATIENT
Start: 2023-01-01 | End: 2023-01-01

## 2023-01-01 RX ORDER — FENTANYL CITRATE 50 UG/ML
100 INJECTION, SOLUTION INTRAMUSCULAR; INTRAVENOUS ONCE
Status: COMPLETED | OUTPATIENT
Start: 2023-01-01 | End: 2023-01-01

## 2023-01-01 RX ORDER — MILRINONE LACTATE 0.2 MG/ML
0.13 INJECTION, SOLUTION INTRAVENOUS CONTINUOUS
Status: DISCONTINUED | OUTPATIENT
Start: 2023-01-01 | End: 2023-01-01

## 2023-01-01 RX ORDER — OCTREOTIDE ACETATE 100 UG/ML
100 INJECTION, SOLUTION INTRAVENOUS; SUBCUTANEOUS EVERY 8 HOURS SCHEDULED
Status: DISCONTINUED | OUTPATIENT
Start: 2023-01-01 | End: 2023-01-01

## 2023-01-01 RX ORDER — DEXTROSE MONOHYDRATE 50 MG/ML
50 INJECTION, SOLUTION INTRAVENOUS CONTINUOUS
Status: DISCONTINUED | OUTPATIENT
Start: 2023-01-01 | End: 2023-01-01

## 2023-01-01 RX ORDER — POTASSIUM CHLORIDE 14.9 MG/ML
20 INJECTION INTRAVENOUS
Status: DISPENSED | OUTPATIENT
Start: 2023-01-01 | End: 2023-01-01

## 2023-01-01 RX ORDER — FENTANYL CITRATE 50 UG/ML
50 INJECTION, SOLUTION INTRAMUSCULAR; INTRAVENOUS EVERY 4 HOURS PRN
Status: DISCONTINUED | OUTPATIENT
Start: 2023-01-01 | End: 2023-01-01

## 2023-01-01 RX ORDER — LIDOCAINE HYDROCHLORIDE 10 MG/ML
INJECTION, SOLUTION EPIDURAL; INFILTRATION; INTRACAUDAL; PERINEURAL
Status: COMPLETED
Start: 2023-01-01 | End: 2023-01-01

## 2023-01-01 RX ORDER — PHYTONADIONE 10 MG/ML
10 INJECTION, EMULSION INTRAMUSCULAR; INTRAVENOUS; SUBCUTANEOUS ONCE
Status: DISCONTINUED | OUTPATIENT
Start: 2023-01-01 | End: 2023-01-01

## 2023-01-01 RX ORDER — INSULIN LISPRO 100 [IU]/ML
1-6 INJECTION, SOLUTION INTRAVENOUS; SUBCUTANEOUS
Status: DISCONTINUED | OUTPATIENT
Start: 2023-01-01 | End: 2023-01-01

## 2023-01-01 RX ORDER — ALBUMIN, HUMAN INJ 5% 5 %
SOLUTION INTRAVENOUS
Status: COMPLETED
Start: 2023-01-01 | End: 2023-01-01

## 2023-01-01 RX ORDER — MIDAZOLAM HYDROCHLORIDE 2 MG/2ML
INJECTION, SOLUTION INTRAMUSCULAR; INTRAVENOUS
Status: COMPLETED
Start: 2023-01-01 | End: 2023-01-01

## 2023-01-01 RX ORDER — LORAZEPAM 2 MG/ML
1 INJECTION INTRAMUSCULAR
Status: DISCONTINUED | OUTPATIENT
Start: 2023-01-01 | End: 2023-01-01 | Stop reason: HOSPADM

## 2023-01-01 RX ORDER — NYSTATIN 100000 U/G
CREAM TOPICAL 2 TIMES DAILY
Status: DISCONTINUED | OUTPATIENT
Start: 2023-01-01 | End: 2023-01-01

## 2023-01-01 RX ORDER — POTASSIUM CHLORIDE 20 MEQ/1
40 TABLET, EXTENDED RELEASE ORAL ONCE
Status: COMPLETED | OUTPATIENT
Start: 2023-01-01 | End: 2023-01-01

## 2023-01-01 RX ORDER — FUROSEMIDE 10 MG/ML
40 INJECTION INTRAMUSCULAR; INTRAVENOUS
Status: DISCONTINUED | OUTPATIENT
Start: 2023-01-01 | End: 2023-01-01

## 2023-01-01 RX ORDER — SODIUM CHLORIDE 9 MG/ML
200 INJECTION, SOLUTION INTRAVENOUS CONTINUOUS
Status: DISCONTINUED | OUTPATIENT
Start: 2023-01-01 | End: 2023-01-01

## 2023-01-01 RX ORDER — MAGNESIUM SULFATE HEPTAHYDRATE 40 MG/ML
2 INJECTION, SOLUTION INTRAVENOUS ONCE
Status: COMPLETED | OUTPATIENT
Start: 2023-01-01 | End: 2023-01-01

## 2023-01-01 RX ORDER — SODIUM BICARBONATE 650 MG/1
1300 TABLET ORAL
Status: DISCONTINUED | OUTPATIENT
Start: 2023-01-01 | End: 2023-01-01

## 2023-01-01 RX ORDER — TORSEMIDE 10 MG/1
10 TABLET ORAL DAILY
Status: DISCONTINUED | OUTPATIENT
Start: 2023-01-01 | End: 2023-01-01

## 2023-01-01 RX ORDER — LORAZEPAM 2 MG/ML
2 INJECTION INTRAMUSCULAR ONCE
Status: COMPLETED | OUTPATIENT
Start: 2023-01-01 | End: 2023-01-01

## 2023-01-01 RX ORDER — VECURONIUM BROMIDE 1 MG/ML
INJECTION, POWDER, LYOPHILIZED, FOR SOLUTION INTRAVENOUS
Status: COMPLETED
Start: 2023-01-01 | End: 2023-01-01

## 2023-01-01 RX ORDER — LACTULOSE 20 G/30ML
40 SOLUTION ORAL 4 TIMES DAILY
Status: DISCONTINUED | OUTPATIENT
Start: 2023-01-01 | End: 2023-01-01

## 2023-01-01 RX ORDER — LEVALBUTEROL INHALATION SOLUTION 1.25 MG/3ML
1.25 SOLUTION RESPIRATORY (INHALATION) EVERY 4 HOURS PRN
Status: DISCONTINUED | OUTPATIENT
Start: 2023-01-01 | End: 2023-01-01

## 2023-01-01 RX ORDER — POTASSIUM CHLORIDE 14.9 MG/ML
20 INJECTION INTRAVENOUS ONCE
Status: COMPLETED | OUTPATIENT
Start: 2023-01-01 | End: 2023-01-01

## 2023-01-01 RX ORDER — ALBUMIN, HUMAN INJ 5% 5 %
25 SOLUTION INTRAVENOUS ONCE
Status: COMPLETED | OUTPATIENT
Start: 2023-01-01 | End: 2023-01-01

## 2023-01-01 RX ORDER — PROPOFOL 10 MG/ML
5-50 INJECTION, EMULSION INTRAVENOUS
Status: DISCONTINUED | OUTPATIENT
Start: 2023-01-01 | End: 2023-01-01

## 2023-01-01 RX ORDER — POTASSIUM CHLORIDE 14.9 MG/ML
20 INJECTION INTRAVENOUS
Status: COMPLETED | OUTPATIENT
Start: 2023-01-01 | End: 2023-01-01

## 2023-01-01 RX ORDER — GABAPENTIN 100 MG/1
300 CAPSULE ORAL 3 TIMES DAILY
Qty: 90 CAPSULE | Refills: 1 | Status: SHIPPED | OUTPATIENT
Start: 2023-01-01 | End: 2023-01-01

## 2023-01-01 RX ORDER — HYDRALAZINE HYDROCHLORIDE 20 MG/ML
10 INJECTION INTRAMUSCULAR; INTRAVENOUS EVERY 4 HOURS PRN
Status: DISCONTINUED | OUTPATIENT
Start: 2023-01-01 | End: 2023-01-01

## 2023-01-01 RX ORDER — HALOPERIDOL 5 MG/ML
0.5 INJECTION INTRAMUSCULAR EVERY 2 HOUR PRN
Status: DISCONTINUED | OUTPATIENT
Start: 2023-01-01 | End: 2023-01-01 | Stop reason: HOSPADM

## 2023-01-01 RX ORDER — VECURONIUM BROMIDE 1 MG/ML
10 INJECTION, POWDER, LYOPHILIZED, FOR SOLUTION INTRAVENOUS ONCE
Status: COMPLETED | OUTPATIENT
Start: 2023-01-01 | End: 2023-01-01

## 2023-01-01 RX ORDER — ALBUMIN, HUMAN INJ 5% 5 %
12.5 SOLUTION INTRAVENOUS ONCE
Status: COMPLETED | OUTPATIENT
Start: 2023-01-01 | End: 2023-01-01

## 2023-01-01 RX ORDER — POTASSIUM CHLORIDE 29.8 MG/ML
40 INJECTION INTRAVENOUS ONCE
Status: COMPLETED | OUTPATIENT
Start: 2023-01-01 | End: 2023-01-01

## 2023-01-01 RX ORDER — LACTULOSE 20 G/30ML
20 SOLUTION ORAL 2 TIMES DAILY
Status: DISCONTINUED | OUTPATIENT
Start: 2023-01-01 | End: 2023-01-01

## 2023-01-01 RX ORDER — CALCIUM GLUCONATE 20 MG/ML
1 INJECTION, SOLUTION INTRAVENOUS ONCE
Status: DISCONTINUED | OUTPATIENT
Start: 2023-01-01 | End: 2023-01-01

## 2023-01-01 RX ORDER — MINERAL OIL AND PETROLATUM 150; 830 MG/G; MG/G
OINTMENT OPHTHALMIC EVERY 4 HOURS
Status: DISCONTINUED | OUTPATIENT
Start: 2023-01-01 | End: 2023-01-01

## 2023-01-01 RX ORDER — ALBUMIN (HUMAN) 12.5 G/50ML
25 SOLUTION INTRAVENOUS EVERY 6 HOURS
Status: DISCONTINUED | OUTPATIENT
Start: 2023-01-01 | End: 2023-01-01

## 2023-01-01 RX ORDER — FENTANYL CITRATE 50 UG/ML
INJECTION, SOLUTION INTRAMUSCULAR; INTRAVENOUS AS NEEDED
Status: COMPLETED | OUTPATIENT
Start: 2023-01-01 | End: 2023-01-01

## 2023-01-01 RX ORDER — DIPHENHYDRAMINE HCL 25 MG
50 TABLET ORAL
Status: DISCONTINUED | OUTPATIENT
Start: 2023-01-01 | End: 2023-01-01

## 2023-01-01 RX ORDER — AZTREONAM 1 G/1
1000 INJECTION, POWDER, LYOPHILIZED, FOR SOLUTION INTRAMUSCULAR; INTRAVENOUS EVERY 12 HOURS
Status: DISCONTINUED | OUTPATIENT
Start: 2023-01-01 | End: 2023-01-01

## 2023-01-01 RX ORDER — FUROSEMIDE 10 MG/ML
40 INJECTION INTRAMUSCULAR; INTRAVENOUS ONCE
Status: COMPLETED | OUTPATIENT
Start: 2023-01-01 | End: 2023-01-01

## 2023-01-01 RX ORDER — SOTALOL HYDROCHLORIDE 80 MG/1
80 TABLET ORAL 2 TIMES DAILY
Status: DISCONTINUED | OUTPATIENT
Start: 2023-01-01 | End: 2023-01-01

## 2023-01-01 RX ORDER — DEXTROSE MONOHYDRATE 50 MG/ML
100 INJECTION, SOLUTION INTRAVENOUS CONTINUOUS
Status: DISCONTINUED | OUTPATIENT
Start: 2023-01-01 | End: 2023-01-01

## 2023-01-01 RX ORDER — LACTULOSE 20 G/30ML
30 SOLUTION ORAL
Status: DISCONTINUED | OUTPATIENT
Start: 2023-01-01 | End: 2023-01-01

## 2023-01-01 RX ORDER — SODIUM CHLORIDE 1 G/1
1 TABLET ORAL 2 TIMES DAILY WITH MEALS
Status: DISCONTINUED | OUTPATIENT
Start: 2023-01-01 | End: 2023-01-01

## 2023-01-01 RX ORDER — PHYTONADIONE 10 MG/ML
10 INJECTION, EMULSION INTRAMUSCULAR; INTRAVENOUS; SUBCUTANEOUS ONCE
Status: DISCONTINUED | OUTPATIENT
Start: 2023-01-01 | End: 2023-01-01 | Stop reason: SDUPTHER

## 2023-01-01 RX ORDER — METHYLPREDNISOLONE 4 MG/1
32 TABLET ORAL
Status: COMPLETED | OUTPATIENT
Start: 2023-01-01 | End: 2023-01-01

## 2023-01-01 RX ORDER — ACETAMINOPHEN 325 MG/1
650 TABLET ORAL ONCE
Status: DISCONTINUED | OUTPATIENT
Start: 2023-01-01 | End: 2023-01-01

## 2023-01-01 RX ORDER — FENTANYL CITRATE-0.9 % NACL/PF 10 MCG/ML
50 PLASTIC BAG, INJECTION (ML) INTRAVENOUS CONTINUOUS
Status: DISCONTINUED | OUTPATIENT
Start: 2023-01-01 | End: 2023-01-01

## 2023-01-01 RX ORDER — ESCITALOPRAM OXALATE 10 MG/1
10 TABLET ORAL DAILY
Status: DISCONTINUED | OUTPATIENT
Start: 2023-01-01 | End: 2023-01-01

## 2023-01-01 RX ORDER — SODIUM CHLORIDE 9 MG/ML
100 INJECTION, SOLUTION INTRAVENOUS CONTINUOUS
Status: DISCONTINUED | OUTPATIENT
Start: 2023-01-01 | End: 2023-01-01

## 2023-01-01 RX ORDER — SODIUM CHLORIDE 1 G/1
2 TABLET ORAL 2 TIMES DAILY WITH MEALS
Status: DISCONTINUED | OUTPATIENT
Start: 2023-01-01 | End: 2023-01-01

## 2023-01-01 RX ORDER — GUAIFENESIN/DEXTROMETHORPHAN 100-10MG/5
10 SYRUP ORAL EVERY 8 HOURS PRN
Status: DISCONTINUED | OUTPATIENT
Start: 2023-01-01 | End: 2023-01-01

## 2023-01-01 RX ORDER — LOSARTAN POTASSIUM 50 MG/1
100 TABLET ORAL DAILY
Status: DISCONTINUED | OUTPATIENT
Start: 2023-01-01 | End: 2023-01-01

## 2023-01-01 RX ORDER — LACTULOSE 20 G/30ML
30 SOLUTION ORAL ONCE
Status: COMPLETED | OUTPATIENT
Start: 2023-01-01 | End: 2023-01-01

## 2023-01-01 RX ORDER — LACTULOSE 20 G/30ML
30 SOLUTION ORAL 4 TIMES DAILY
Status: DISCONTINUED | OUTPATIENT
Start: 2023-01-01 | End: 2023-01-01

## 2023-01-01 RX ORDER — MIDODRINE HYDROCHLORIDE 5 MG/1
10 TABLET ORAL
Status: DISCONTINUED | OUTPATIENT
Start: 2023-01-01 | End: 2023-01-01

## 2023-01-01 RX ORDER — METHYLPREDNISOLONE 4 MG/1
32 TABLET ORAL
Status: DISCONTINUED | OUTPATIENT
Start: 2023-01-01 | End: 2023-01-01

## 2023-01-01 RX ORDER — POTASSIUM CHLORIDE 20MEQ/15ML
40 LIQUID (ML) ORAL ONCE
Status: COMPLETED | OUTPATIENT
Start: 2023-01-01 | End: 2023-01-01

## 2023-01-01 RX ORDER — PREDNISONE 20 MG/1
40 TABLET ORAL DAILY
Status: DISCONTINUED | OUTPATIENT
Start: 2023-01-01 | End: 2023-01-01

## 2023-01-01 RX ORDER — PANTOPRAZOLE SODIUM 40 MG/10ML
40 INJECTION, POWDER, LYOPHILIZED, FOR SOLUTION INTRAVENOUS EVERY 12 HOURS SCHEDULED
Status: DISCONTINUED | OUTPATIENT
Start: 2023-01-01 | End: 2023-01-01

## 2023-01-01 RX ORDER — CHLORHEXIDINE GLUCONATE ORAL RINSE 1.2 MG/ML
15 SOLUTION DENTAL EVERY 12 HOURS SCHEDULED
Status: DISCONTINUED | OUTPATIENT
Start: 2023-01-01 | End: 2023-01-01

## 2023-01-01 RX ORDER — HYDRALAZINE HYDROCHLORIDE 20 MG/ML
10 INJECTION INTRAMUSCULAR; INTRAVENOUS EVERY 6 HOURS PRN
Status: DISCONTINUED | OUTPATIENT
Start: 2023-01-01 | End: 2023-01-01

## 2023-01-01 RX ORDER — LIDOCAINE HYDROCHLORIDE 20 MG/ML
JELLY TOPICAL
Status: COMPLETED
Start: 2023-01-01 | End: 2023-01-01

## 2023-01-01 RX ORDER — SUCCINYLCHOLINE/SOD CL,ISO/PF 100 MG/5ML
1 SYRINGE (ML) INTRAVENOUS ONCE
Status: COMPLETED | OUTPATIENT
Start: 2023-01-01 | End: 2023-01-01

## 2023-01-01 RX ORDER — MIDAZOLAM HYDROCHLORIDE 2 MG/2ML
2 INJECTION, SOLUTION INTRAMUSCULAR; INTRAVENOUS ONCE
Status: COMPLETED | OUTPATIENT
Start: 2023-01-01 | End: 2023-01-01

## 2023-01-01 RX ORDER — ETOMIDATE 2 MG/ML
20 INJECTION INTRAVENOUS ONCE
Status: COMPLETED | OUTPATIENT
Start: 2023-01-01 | End: 2023-01-01

## 2023-01-01 RX ORDER — MIDAZOLAM HYDROCHLORIDE 2 MG/2ML
INJECTION, SOLUTION INTRAMUSCULAR; INTRAVENOUS AS NEEDED
Status: COMPLETED | OUTPATIENT
Start: 2023-01-01 | End: 2023-01-01

## 2023-01-01 RX ORDER — SODIUM CHLORIDE 9 MG/ML
INJECTION, SOLUTION INTRAVENOUS
Status: COMPLETED | OUTPATIENT
Start: 2023-01-01 | End: 2023-01-01

## 2023-01-01 RX ORDER — MIDODRINE HYDROCHLORIDE 5 MG/1
5 TABLET ORAL
Status: DISCONTINUED | OUTPATIENT
Start: 2023-01-01 | End: 2023-01-01

## 2023-01-01 RX ORDER — LEVALBUTEROL INHALATION SOLUTION 1.25 MG/3ML
1.25 SOLUTION RESPIRATORY (INHALATION)
Status: DISCONTINUED | OUTPATIENT
Start: 2023-01-01 | End: 2023-01-01

## 2023-01-01 RX ORDER — INSULIN LISPRO 100 [IU]/ML
1-6 INJECTION, SOLUTION INTRAVENOUS; SUBCUTANEOUS EVERY 6 HOURS SCHEDULED
Status: DISCONTINUED | OUTPATIENT
Start: 2023-01-01 | End: 2023-01-01

## 2023-01-01 RX ORDER — HYDRALAZINE HYDROCHLORIDE 20 MG/ML
20 INJECTION INTRAMUSCULAR; INTRAVENOUS ONCE
Status: COMPLETED | OUTPATIENT
Start: 2023-01-01 | End: 2023-01-01

## 2023-01-01 RX ORDER — MICONAZOLE NITRATE 20 MG/G
CREAM TOPICAL 2 TIMES DAILY
Status: DISCONTINUED | OUTPATIENT
Start: 2023-01-01 | End: 2023-01-01

## 2023-01-01 RX ORDER — HYDROCODONE BITARTRATE AND ACETAMINOPHEN 5; 325 MG/1; MG/1
1 TABLET ORAL
Qty: 20 TABLET | Refills: 0 | Status: SHIPPED | OUTPATIENT
Start: 2023-01-01 | End: 2023-01-01 | Stop reason: SDUPTHER

## 2023-01-01 RX ORDER — INSULIN LISPRO 100 [IU]/ML
1-5 INJECTION, SOLUTION INTRAVENOUS; SUBCUTANEOUS
Status: DISCONTINUED | OUTPATIENT
Start: 2023-01-01 | End: 2023-01-01

## 2023-01-01 RX ORDER — HYDROCHLOROTHIAZIDE 12.5 MG/1
12.5 TABLET ORAL DAILY
Status: DISCONTINUED | OUTPATIENT
Start: 2023-01-01 | End: 2023-01-01

## 2023-01-01 RX ORDER — GADOBUTROL 604.72 MG/ML
10 INJECTION INTRAVENOUS
Status: COMPLETED | OUTPATIENT
Start: 2023-01-01 | End: 2023-01-01

## 2023-01-01 RX ORDER — POTASSIUM CHLORIDE 20MEQ/15ML
40 LIQUID (ML) ORAL 2 TIMES DAILY
Status: COMPLETED | OUTPATIENT
Start: 2023-01-01 | End: 2023-01-01

## 2023-01-01 RX ORDER — HYDRALAZINE HYDROCHLORIDE 20 MG/ML
20 INJECTION INTRAMUSCULAR; INTRAVENOUS EVERY 4 HOURS PRN
Status: DISCONTINUED | OUTPATIENT
Start: 2023-01-01 | End: 2023-01-01

## 2023-01-01 RX ORDER — HYDROMORPHONE HCL/PF 1 MG/ML
0.3 SYRINGE (ML) INJECTION
Status: DISCONTINUED | OUTPATIENT
Start: 2023-01-01 | End: 2023-01-01 | Stop reason: HOSPADM

## 2023-01-01 RX ORDER — SODIUM BICARBONATE 650 MG/1
650 TABLET ORAL
Status: DISCONTINUED | OUTPATIENT
Start: 2023-01-01 | End: 2023-01-01

## 2023-01-01 RX ADMIN — POTASSIUM CHLORIDE 40 MEQ: 1.5 SOLUTION ORAL at 10:26

## 2023-01-01 RX ADMIN — Medication 50 MCG/HR: at 14:09

## 2023-01-01 RX ADMIN — LORAZEPAM 1 MG: 2 INJECTION INTRAMUSCULAR; INTRAVENOUS at 14:05

## 2023-01-01 RX ADMIN — VECURONIUM BROMIDE 10 MG: 1 INJECTION, POWDER, LYOPHILIZED, FOR SOLUTION INTRAVENOUS at 09:35

## 2023-01-01 RX ADMIN — POTASSIUM CHLORIDE 40 MEQ: 1500 TABLET, EXTENDED RELEASE ORAL at 20:20

## 2023-01-01 RX ADMIN — VECURONIUM BROMIDE 10 MG: 1 INJECTION, POWDER, LYOPHILIZED, FOR SOLUTION INTRAVENOUS at 07:03

## 2023-01-01 RX ADMIN — NYSTATIN: 100000 CREAM TOPICAL at 19:05

## 2023-01-01 RX ADMIN — LOSARTAN POTASSIUM 100 MG: 50 TABLET, FILM COATED ORAL at 08:45

## 2023-01-01 RX ADMIN — LACTULOSE 30 G: 20 SOLUTION ORAL at 17:46

## 2023-01-01 RX ADMIN — NYSTATIN: 100000 CREAM TOPICAL at 09:36

## 2023-01-01 RX ADMIN — INSULIN LISPRO 1 UNITS: 100 INJECTION, SOLUTION INTRAVENOUS; SUBCUTANEOUS at 11:02

## 2023-01-01 RX ADMIN — Medication 12.5 MG: at 11:22

## 2023-01-01 RX ADMIN — LOSARTAN POTASSIUM 100 MG: 50 TABLET, FILM COATED ORAL at 09:09

## 2023-01-01 RX ADMIN — ALBUMIN (HUMAN) 25 G: 0.25 INJECTION, SOLUTION INTRAVENOUS at 14:07

## 2023-01-01 RX ADMIN — LEVALBUTEROL HYDROCHLORIDE 1.25 MG: 1.25 SOLUTION RESPIRATORY (INHALATION) at 19:45

## 2023-01-01 RX ADMIN — SOTALOL HYDROCHLORIDE 80 MG: 80 TABLET ORAL at 18:17

## 2023-01-01 RX ADMIN — PANTOPRAZOLE SODIUM 40 MG: 40 INJECTION, POWDER, FOR SOLUTION INTRAVENOUS at 20:30

## 2023-01-01 RX ADMIN — OCTREOTIDE ACETATE 100 MCG: 100 INJECTION, SOLUTION INTRAVENOUS; SUBCUTANEOUS at 14:10

## 2023-01-01 RX ADMIN — SODIUM CHLORIDE 75 ML/HR: 0.9 INJECTION, SOLUTION INTRAVENOUS at 09:02

## 2023-01-01 RX ADMIN — MAGNESIUM SULFATE HEPTAHYDRATE 2 G: 2 INJECTION, SOLUTION INTRAVENOUS at 10:07

## 2023-01-01 RX ADMIN — INSULIN LISPRO 1 UNITS: 100 INJECTION, SOLUTION INTRAVENOUS; SUBCUTANEOUS at 11:44

## 2023-01-01 RX ADMIN — SODIUM CHLORIDE 2 G: 1 TABLET ORAL at 17:20

## 2023-01-01 RX ADMIN — SODIUM CHLORIDE 2 G: 1 TABLET ORAL at 18:17

## 2023-01-01 RX ADMIN — CHLORHEXIDINE GLUCONATE 15 ML: 1.2 RINSE ORAL at 21:08

## 2023-01-01 RX ADMIN — Medication 12.5 MG: at 10:26

## 2023-01-01 RX ADMIN — FENTANYL CITRATE 100 MCG: 50 INJECTION INTRAMUSCULAR; INTRAVENOUS at 11:30

## 2023-01-01 RX ADMIN — POTASSIUM CHLORIDE 20 MEQ: 14.9 INJECTION, SOLUTION INTRAVENOUS at 16:03

## 2023-01-01 RX ADMIN — HYDROCORTISONE SODIUM SUCCINATE 50 MG: 100 INJECTION, POWDER, FOR SOLUTION INTRAMUSCULAR; INTRAVENOUS at 09:35

## 2023-01-01 RX ADMIN — LACTULOSE 30 G: 20 SOLUTION ORAL at 15:14

## 2023-01-01 RX ADMIN — METHYLPREDNISOLONE SODIUM SUCCINATE 500 MG: 500 INJECTION INTRAMUSCULAR; INTRAVENOUS at 21:52

## 2023-01-01 RX ADMIN — SODIUM BICARBONATE 650 MG TABLET 1300 MG: at 17:46

## 2023-01-01 RX ADMIN — LEVOTHYROXINE SODIUM 175 MCG: 100 TABLET ORAL at 06:47

## 2023-01-01 RX ADMIN — POTASSIUM CHLORIDE 40 MEQ: 1500 TABLET, EXTENDED RELEASE ORAL at 08:27

## 2023-01-01 RX ADMIN — ESCITALOPRAM OXALATE 10 MG: 10 TABLET ORAL at 08:12

## 2023-01-01 RX ADMIN — ALBUMIN (HUMAN) 25 G: 12.5 INJECTION, SOLUTION INTRAVENOUS at 22:34

## 2023-01-01 RX ADMIN — ALBUMIN (HUMAN) 25 G: 0.25 INJECTION, SOLUTION INTRAVENOUS at 07:57

## 2023-01-01 RX ADMIN — IPRATROPIUM BROMIDE 0.5 MG: 0.5 SOLUTION RESPIRATORY (INHALATION) at 06:50

## 2023-01-01 RX ADMIN — ESCITALOPRAM OXALATE 10 MG: 10 TABLET ORAL at 08:27

## 2023-01-01 RX ADMIN — PHYTONADIONE 10 MG: 10 INJECTION, EMULSION INTRAMUSCULAR; INTRAVENOUS; SUBCUTANEOUS at 10:27

## 2023-01-01 RX ADMIN — PANTOPRAZOLE SODIUM 40 MG: 40 INJECTION, POWDER, FOR SOLUTION INTRAVENOUS at 08:32

## 2023-01-01 RX ADMIN — ALBUMIN (HUMAN) 25 G: 0.25 INJECTION, SOLUTION INTRAVENOUS at 10:00

## 2023-01-01 RX ADMIN — CHLORHEXIDINE GLUCONATE 15 ML: 1.2 RINSE ORAL at 08:03

## 2023-01-01 RX ADMIN — RIFAXIMIN 550 MG: 550 TABLET ORAL at 11:53

## 2023-01-01 RX ADMIN — MIDODRINE HYDROCHLORIDE 10 MG: 5 TABLET ORAL at 15:23

## 2023-01-01 RX ADMIN — PROPOFOL 30 MCG/KG/MIN: 10 INJECTION, EMULSION INTRAVENOUS at 11:05

## 2023-01-01 RX ADMIN — POTASSIUM CHLORIDE 20 MEQ: 14.9 INJECTION, SOLUTION INTRAVENOUS at 14:05

## 2023-01-01 RX ADMIN — IPRATROPIUM BROMIDE 0.5 MG: 0.5 SOLUTION RESPIRATORY (INHALATION) at 07:07

## 2023-01-01 RX ADMIN — LACTULOSE 30 G: 20 SOLUTION ORAL at 08:41

## 2023-01-01 RX ADMIN — ESCITALOPRAM OXALATE 10 MG: 10 TABLET ORAL at 09:09

## 2023-01-01 RX ADMIN — PHYTONADIONE 10 MG: 10 INJECTION, EMULSION INTRAMUSCULAR; INTRAVENOUS; SUBCUTANEOUS at 15:26

## 2023-01-01 RX ADMIN — SODIUM BICARBONATE 650 MG TABLET 650 MG: at 09:01

## 2023-01-01 RX ADMIN — POTASSIUM CHLORIDE 20 MEQ: 14.9 INJECTION, SOLUTION INTRAVENOUS at 07:28

## 2023-01-01 RX ADMIN — CHLORHEXIDINE GLUCONATE 15 ML: 1.2 RINSE ORAL at 21:15

## 2023-01-01 RX ADMIN — RIFAXIMIN 550 MG: 550 TABLET ORAL at 20:22

## 2023-01-01 RX ADMIN — MILRINONE LACTATE IN DEXTROSE 0.13 MCG/KG/MIN: 200 INJECTION, SOLUTION INTRAVENOUS at 11:15

## 2023-01-01 RX ADMIN — SODIUM CHLORIDE 2 G: 1 TABLET ORAL at 17:13

## 2023-01-01 RX ADMIN — METHYLPREDNISOLONE 32 MG: 4 TABLET ORAL at 00:57

## 2023-01-01 RX ADMIN — INSULIN LISPRO 1 UNITS: 100 INJECTION, SOLUTION INTRAVENOUS; SUBCUTANEOUS at 12:07

## 2023-01-01 RX ADMIN — OCTREOTIDE ACETATE 100 MCG: 100 INJECTION, SOLUTION INTRAVENOUS; SUBCUTANEOUS at 14:02

## 2023-01-01 RX ADMIN — LACTULOSE 40 G: 20 SOLUTION ORAL at 10:23

## 2023-01-01 RX ADMIN — FENTANYL CITRATE 25 MCG: 50 INJECTION, SOLUTION INTRAMUSCULAR; INTRAVENOUS at 13:50

## 2023-01-01 RX ADMIN — Medication 12.5 MG: at 21:08

## 2023-01-01 RX ADMIN — PANTOPRAZOLE SODIUM 40 MG: 40 INJECTION, POWDER, FOR SOLUTION INTRAVENOUS at 22:27

## 2023-01-01 RX ADMIN — SODIUM CHLORIDE 2 G: 1 TABLET ORAL at 09:09

## 2023-01-01 RX ADMIN — SODIUM BICARBONATE 50 MEQ: 84 INJECTION INTRAVENOUS at 08:18

## 2023-01-01 RX ADMIN — LACTULOSE 30 G: 20 SOLUTION ORAL at 20:22

## 2023-01-01 RX ADMIN — IBUPROFEN 400 MG: 400 TABLET ORAL at 22:33

## 2023-01-01 RX ADMIN — DIPHENHYDRAMINE HYDROCHLORIDE 50 MG: 25 TABLET ORAL at 12:43

## 2023-01-01 RX ADMIN — FENTANYL CITRATE 25 MCG: 50 INJECTION, SOLUTION INTRAMUSCULAR; INTRAVENOUS at 13:59

## 2023-01-01 RX ADMIN — SODIUM BICARBONATE 125 ML/HR: 84 INJECTION, SOLUTION INTRAVENOUS at 08:48

## 2023-01-01 RX ADMIN — CHLORHEXIDINE GLUCONATE 15 ML: 1.2 RINSE ORAL at 10:23

## 2023-01-01 RX ADMIN — INSULIN LISPRO 1 UNITS: 100 INJECTION, SOLUTION INTRAVENOUS; SUBCUTANEOUS at 21:00

## 2023-01-01 RX ADMIN — POTASSIUM CHLORIDE 40 MEQ: 1.5 SOLUTION ORAL at 15:14

## 2023-01-01 RX ADMIN — INSULIN LISPRO 1 UNITS: 100 INJECTION, SOLUTION INTRAVENOUS; SUBCUTANEOUS at 12:15

## 2023-01-01 RX ADMIN — CHLORHEXIDINE GLUCONATE 15 ML: 1.2 RINSE ORAL at 20:46

## 2023-01-01 RX ADMIN — PHYTONADIONE 10 MG: 10 INJECTION, EMULSION INTRAMUSCULAR; INTRAVENOUS; SUBCUTANEOUS at 06:15

## 2023-01-01 RX ADMIN — ALBUMIN (HUMAN) 25 G: 0.25 INJECTION, SOLUTION INTRAVENOUS at 20:43

## 2023-01-01 RX ADMIN — PHYTONADIONE 10 MG: 10 INJECTION, EMULSION INTRAMUSCULAR; INTRAVENOUS; SUBCUTANEOUS at 01:18

## 2023-01-01 RX ADMIN — LACTULOSE 200 G: 10 SOLUTION ORAL at 14:04

## 2023-01-01 RX ADMIN — CHLORHEXIDINE GLUCONATE 15 ML: 1.2 RINSE ORAL at 09:39

## 2023-01-01 RX ADMIN — OCTREOTIDE ACETATE 100 MCG: 100 INJECTION, SOLUTION INTRAVENOUS; SUBCUTANEOUS at 14:41

## 2023-01-01 RX ADMIN — OCTREOTIDE ACETATE 100 MCG: 100 INJECTION, SOLUTION INTRAVENOUS; SUBCUTANEOUS at 21:19

## 2023-01-01 RX ADMIN — POTASSIUM CHLORIDE 20 MEQ: 14.9 INJECTION, SOLUTION INTRAVENOUS at 11:25

## 2023-01-01 RX ADMIN — MICONAZOLE NITRATE: 20 CREAM TOPICAL at 08:56

## 2023-01-01 RX ADMIN — IPRATROPIUM BROMIDE 0.5 MG: 0.5 SOLUTION RESPIRATORY (INHALATION) at 19:55

## 2023-01-01 RX ADMIN — MIDAZOLAM HYDROCHLORIDE 2 MG: 2 INJECTION, SOLUTION INTRAMUSCULAR; INTRAVENOUS at 04:26

## 2023-01-01 RX ADMIN — FENTANYL CITRATE 50 MCG: 50 INJECTION INTRAMUSCULAR; INTRAVENOUS at 17:40

## 2023-01-01 RX ADMIN — LEVALBUTEROL HYDROCHLORIDE 1.25 MG: 1.25 SOLUTION RESPIRATORY (INHALATION) at 03:05

## 2023-01-01 RX ADMIN — MAGNESIUM SULFATE IN WATER 2 G: 40 INJECTION, SOLUTION INTRAVENOUS at 08:28

## 2023-01-01 RX ADMIN — LEVALBUTEROL HYDROCHLORIDE 1.25 MG: 1.25 SOLUTION RESPIRATORY (INHALATION) at 17:53

## 2023-01-01 RX ADMIN — POTASSIUM CHLORIDE 40 MEQ: 1500 TABLET, EXTENDED RELEASE ORAL at 08:44

## 2023-01-01 RX ADMIN — MICONAZOLE NITRATE: 20 CREAM TOPICAL at 09:01

## 2023-01-01 RX ADMIN — MIDAZOLAM HYDROCHLORIDE 2 MG: 2 INJECTION, SOLUTION INTRAMUSCULAR; INTRAVENOUS at 09:33

## 2023-01-01 RX ADMIN — ESCITALOPRAM OXALATE 10 MG: 10 TABLET ORAL at 08:09

## 2023-01-01 RX ADMIN — PHYTONADIONE 10 MG: 10 INJECTION, EMULSION INTRAMUSCULAR; INTRAVENOUS; SUBCUTANEOUS at 23:52

## 2023-01-01 RX ADMIN — LEVALBUTEROL HYDROCHLORIDE 1.25 MG: 1.25 SOLUTION RESPIRATORY (INHALATION) at 07:27

## 2023-01-01 RX ADMIN — POTASSIUM CHLORIDE 40 MEQ: 1.5 SOLUTION ORAL at 18:59

## 2023-01-01 RX ADMIN — LACTULOSE 40 G: 20 SOLUTION ORAL at 18:00

## 2023-01-01 RX ADMIN — MIDAZOLAM 2 MG: 1 INJECTION INTRAMUSCULAR; INTRAVENOUS at 04:26

## 2023-01-01 RX ADMIN — LACTULOSE 200 G: 10 SOLUTION ORAL at 10:14

## 2023-01-01 RX ADMIN — FENTANYL CITRATE 50 MCG: 50 INJECTION INTRAMUSCULAR; INTRAVENOUS at 06:47

## 2023-01-01 RX ADMIN — RIFAXIMIN 550 MG: 550 TABLET ORAL at 21:08

## 2023-01-01 RX ADMIN — ESCITALOPRAM OXALATE 10 MG: 10 TABLET ORAL at 09:01

## 2023-01-01 RX ADMIN — ALBUMIN (HUMAN) 25 G: 0.25 INJECTION, SOLUTION INTRAVENOUS at 02:32

## 2023-01-01 RX ADMIN — GADOBUTROL 10 ML: 604.72 INJECTION INTRAVENOUS at 23:48

## 2023-01-01 RX ADMIN — IPRATROPIUM BROMIDE 0.5 MG: 0.5 SOLUTION RESPIRATORY (INHALATION) at 13:34

## 2023-01-01 RX ADMIN — POTASSIUM CHLORIDE 20 MEQ: 14.9 INJECTION, SOLUTION INTRAVENOUS at 09:35

## 2023-01-01 RX ADMIN — LACTULOSE 30 G: 20 SOLUTION ORAL at 11:22

## 2023-01-01 RX ADMIN — PROPOFOL 20 MCG/KG/MIN: 10 INJECTION, EMULSION INTRAVENOUS at 06:21

## 2023-01-01 RX ADMIN — LEVOTHYROXINE SODIUM 175 MCG: 100 TABLET ORAL at 05:37

## 2023-01-01 RX ADMIN — SODIUM BICARBONATE 650 MG TABLET 1300 MG: at 18:07

## 2023-01-01 RX ADMIN — MAGNESIUM SULFATE HEPTAHYDRATE 2 G: 2 INJECTION, SOLUTION INTRAVENOUS at 16:24

## 2023-01-01 RX ADMIN — ALBUMIN (HUMAN) 25 G: 0.25 INJECTION, SOLUTION INTRAVENOUS at 14:00

## 2023-01-01 RX ADMIN — PROPOFOL 30 MCG/KG/MIN: 10 INJECTION, EMULSION INTRAVENOUS at 15:39

## 2023-01-01 RX ADMIN — MICONAZOLE NITRATE: 20 CREAM TOPICAL at 09:44

## 2023-01-01 RX ADMIN — ESCITALOPRAM OXALATE 10 MG: 10 TABLET ORAL at 07:32

## 2023-01-01 RX ADMIN — Medication 50 MCG/HR: at 04:27

## 2023-01-01 RX ADMIN — HYDRALAZINE HYDROCHLORIDE 20 MG: 20 INJECTION, SOLUTION INTRAMUSCULAR; INTRAVENOUS at 16:07

## 2023-01-01 RX ADMIN — HYDRALAZINE HYDROCHLORIDE 20 MG: 20 INJECTION, SOLUTION INTRAMUSCULAR; INTRAVENOUS at 09:30

## 2023-01-01 RX ADMIN — NYSTATIN: 100000 CREAM TOPICAL at 18:08

## 2023-01-01 RX ADMIN — SODIUM CHLORIDE 2 G: 1 TABLET ORAL at 08:28

## 2023-01-01 RX ADMIN — SOTALOL HYDROCHLORIDE 80 MG: 80 TABLET ORAL at 08:12

## 2023-01-01 RX ADMIN — FENTANYL CITRATE 50 MCG: 50 INJECTION INTRAMUSCULAR; INTRAVENOUS at 18:23

## 2023-01-01 RX ADMIN — AZTREONAM 1000 MG: 1 INJECTION, POWDER, LYOPHILIZED, FOR SOLUTION INTRAMUSCULAR; INTRAVENOUS at 20:33

## 2023-01-01 RX ADMIN — Medication 12.5 MG: at 20:21

## 2023-01-01 RX ADMIN — OCTREOTIDE ACETATE 100 MCG: 100 INJECTION, SOLUTION INTRAVENOUS; SUBCUTANEOUS at 06:13

## 2023-01-01 RX ADMIN — SODIUM BICARBONATE 650 MG TABLET 1300 MG: at 17:41

## 2023-01-01 RX ADMIN — SODIUM CHLORIDE 0.5 UNITS/HR: 9 INJECTION, SOLUTION INTRAVENOUS at 12:31

## 2023-01-01 RX ADMIN — SODIUM CHLORIDE 500 ML: 0.9 INJECTION, SOLUTION INTRAVENOUS at 05:16

## 2023-01-01 RX ADMIN — LIDOCAINE HYDROCHLORIDE: 20 JELLY TOPICAL at 11:48

## 2023-01-01 RX ADMIN — POTASSIUM CHLORIDE 20 MEQ: 14.9 INJECTION, SOLUTION INTRAVENOUS at 22:34

## 2023-01-01 RX ADMIN — METHYLPREDNISOLONE SODIUM SUCCINATE 500 MG: 500 INJECTION INTRAMUSCULAR; INTRAVENOUS at 20:28

## 2023-01-01 RX ADMIN — OCTREOTIDE ACETATE 100 MCG: 100 INJECTION, SOLUTION INTRAVENOUS; SUBCUTANEOUS at 05:08

## 2023-01-01 RX ADMIN — LACTULOSE 40 G: 20 SOLUTION ORAL at 21:08

## 2023-01-01 RX ADMIN — LACTULOSE 30 G: 20 SOLUTION ORAL at 11:52

## 2023-01-01 RX ADMIN — LACTULOSE 30 G: 20 SOLUTION ORAL at 04:39

## 2023-01-01 RX ADMIN — MIDODRINE HYDROCHLORIDE 10 MG: 5 TABLET ORAL at 12:16

## 2023-01-01 RX ADMIN — ALBUMIN (HUMAN) 25 G: 0.25 INJECTION, SOLUTION INTRAVENOUS at 14:01

## 2023-01-01 RX ADMIN — POTASSIUM CHLORIDE 20 MEQ: 14.9 INJECTION, SOLUTION INTRAVENOUS at 14:01

## 2023-01-01 RX ADMIN — MICONAZOLE NITRATE: 20 CREAM TOPICAL at 08:57

## 2023-01-01 RX ADMIN — MAGNESIUM SULFATE HEPTAHYDRATE 2 G: 2 INJECTION, SOLUTION INTRAVENOUS at 15:41

## 2023-01-01 RX ADMIN — OCTREOTIDE ACETATE 100 MCG: 100 INJECTION, SOLUTION INTRAVENOUS; SUBCUTANEOUS at 21:28

## 2023-01-01 RX ADMIN — NOREPINEPHRINE BITARTRATE 8 MCG/MIN: 1 SOLUTION INTRAVENOUS at 00:49

## 2023-01-01 RX ADMIN — ESCITALOPRAM OXALATE 10 MG: 10 TABLET ORAL at 10:01

## 2023-01-01 RX ADMIN — VASOPRESSIN 0.04 UNITS/MIN: 20 INJECTION INTRAVENOUS at 09:42

## 2023-01-01 RX ADMIN — ALBUMIN (HUMAN) 25 G: 0.25 INJECTION, SOLUTION INTRAVENOUS at 01:56

## 2023-01-01 RX ADMIN — OCTREOTIDE ACETATE 100 MCG: 100 INJECTION, SOLUTION INTRAVENOUS; SUBCUTANEOUS at 21:43

## 2023-01-01 RX ADMIN — LEVALBUTEROL HYDROCHLORIDE 1.25 MG: 1.25 SOLUTION RESPIRATORY (INHALATION) at 07:07

## 2023-01-01 RX ADMIN — PANTOPRAZOLE SODIUM 40 MG: 40 INJECTION, POWDER, FOR SOLUTION INTRAVENOUS at 08:42

## 2023-01-01 RX ADMIN — SODIUM BICARBONATE 650 MG TABLET 650 MG: at 13:30

## 2023-01-01 RX ADMIN — IPRATROPIUM BROMIDE 0.5 MG: 0.5 SOLUTION RESPIRATORY (INHALATION) at 02:29

## 2023-01-01 RX ADMIN — LACTULOSE 200 G: 10 SOLUTION ORAL at 19:06

## 2023-01-01 RX ADMIN — DEXTROSE 100 ML/HR: 5 SOLUTION INTRAVENOUS at 10:25

## 2023-01-01 RX ADMIN — HYDROCHLOROTHIAZIDE 12.5 MG: 12.5 TABLET ORAL at 08:09

## 2023-01-01 RX ADMIN — LACTULOSE 30 G: 20 SOLUTION ORAL at 08:37

## 2023-01-01 RX ADMIN — POTASSIUM CHLORIDE 40 MEQ: 1.5 SOLUTION ORAL at 18:00

## 2023-01-01 RX ADMIN — LEVOTHYROXINE SODIUM 175 MCG: 100 TABLET ORAL at 06:21

## 2023-01-01 RX ADMIN — IPRATROPIUM BROMIDE 0.5 MG: 0.5 SOLUTION RESPIRATORY (INHALATION) at 03:05

## 2023-01-01 RX ADMIN — WHITE PETROLATUM 57.7 %-MINERAL OIL 31.9 % EYE OINTMENT: at 10:10

## 2023-01-01 RX ADMIN — LEVOTHYROXINE SODIUM 175 MCG: 100 TABLET ORAL at 05:23

## 2023-01-01 RX ADMIN — SODIUM CHLORIDE 50 ML/HR: 4 INJECTION, SOLUTION, CONCENTRATE INTRAVENOUS at 10:08

## 2023-01-01 RX ADMIN — FENTANYL CITRATE 50 MCG: 50 INJECTION INTRAMUSCULAR; INTRAVENOUS at 09:18

## 2023-01-01 RX ADMIN — LEVALBUTEROL HYDROCHLORIDE 1.25 MG: 1.25 SOLUTION RESPIRATORY (INHALATION) at 02:29

## 2023-01-01 RX ADMIN — LEVOTHYROXINE SODIUM 175 MCG: 100 TABLET ORAL at 06:13

## 2023-01-01 RX ADMIN — ALBUMIN (HUMAN) 12.5 G: 12.5 INJECTION, SOLUTION INTRAVENOUS at 08:25

## 2023-01-01 RX ADMIN — ALBUMIN (HUMAN) 25 G: 0.25 INJECTION, SOLUTION INTRAVENOUS at 20:38

## 2023-01-01 RX ADMIN — WHITE PETROLATUM 57.7 %-MINERAL OIL 31.9 % EYE OINTMENT: at 13:30

## 2023-01-01 RX ADMIN — HYDROCHLOROTHIAZIDE 12.5 MG: 12.5 TABLET ORAL at 08:12

## 2023-01-01 RX ADMIN — HYDROCHLOROTHIAZIDE 12.5 MG: 12.5 TABLET ORAL at 08:27

## 2023-01-01 RX ADMIN — SODIUM CHLORIDE 2 G: 1 TABLET ORAL at 07:48

## 2023-01-01 RX ADMIN — SOTALOL HYDROCHLORIDE 80 MG: 80 TABLET ORAL at 17:14

## 2023-01-01 RX ADMIN — RIVAROXABAN 20 MG: 20 TABLET, FILM COATED ORAL at 20:23

## 2023-01-01 RX ADMIN — PANTOPRAZOLE SODIUM 40 MG: 40 INJECTION, POWDER, FOR SOLUTION INTRAVENOUS at 20:27

## 2023-01-01 RX ADMIN — INSULIN LISPRO 2 UNITS: 100 INJECTION, SOLUTION INTRAVENOUS; SUBCUTANEOUS at 23:45

## 2023-01-01 RX ADMIN — SODIUM CHLORIDE 2 G: 1 TABLET ORAL at 08:07

## 2023-01-01 RX ADMIN — ESCITALOPRAM OXALATE 10 MG: 10 TABLET ORAL at 08:06

## 2023-01-01 RX ADMIN — ALBUMIN (HUMAN) 25 G: 0.25 INJECTION, SOLUTION INTRAVENOUS at 19:29

## 2023-01-01 RX ADMIN — LACTULOSE 30 G: 20 SOLUTION ORAL at 18:07

## 2023-01-01 RX ADMIN — MICONAZOLE NITRATE: 20 CREAM TOPICAL at 17:46

## 2023-01-01 RX ADMIN — PANTOPRAZOLE SODIUM 40 MG: 40 INJECTION, POWDER, FOR SOLUTION INTRAVENOUS at 21:08

## 2023-01-01 RX ADMIN — SODIUM CHLORIDE 500 ML: 0.9 INJECTION, SOLUTION INTRAVENOUS at 16:17

## 2023-01-01 RX ADMIN — INSULIN LISPRO 1 UNITS: 100 INJECTION, SOLUTION INTRAVENOUS; SUBCUTANEOUS at 15:32

## 2023-01-01 RX ADMIN — OCTREOTIDE ACETATE 100 MCG: 100 INJECTION, SOLUTION INTRAVENOUS; SUBCUTANEOUS at 07:27

## 2023-01-01 RX ADMIN — IOHEXOL 4 ML: 350 INJECTION, SOLUTION INTRAVENOUS at 15:53

## 2023-01-01 RX ADMIN — ESCITALOPRAM OXALATE 10 MG: 10 TABLET ORAL at 09:08

## 2023-01-01 RX ADMIN — SODIUM CHLORIDE 2 G: 1 TABLET ORAL at 08:12

## 2023-01-01 RX ADMIN — PANTOPRAZOLE SODIUM 40 MG: 40 INJECTION, POWDER, FOR SOLUTION INTRAVENOUS at 09:46

## 2023-01-01 RX ADMIN — LACTULOSE 40 G: 20 SOLUTION ORAL at 17:41

## 2023-01-01 RX ADMIN — OCTREOTIDE ACETATE 100 MCG: 100 INJECTION, SOLUTION INTRAVENOUS; SUBCUTANEOUS at 14:06

## 2023-01-01 RX ADMIN — OCTREOTIDE ACETATE 100 MCG: 100 INJECTION, SOLUTION INTRAVENOUS; SUBCUTANEOUS at 05:14

## 2023-01-01 RX ADMIN — NOREPINEPHRINE BITARTRATE 6 MCG/MIN: 1 SOLUTION INTRAVENOUS at 21:42

## 2023-01-01 RX ADMIN — SOTALOL HYDROCHLORIDE 80 MG: 80 TABLET ORAL at 09:09

## 2023-01-01 RX ADMIN — SOTALOL HYDROCHLORIDE 80 MG: 80 TABLET ORAL at 20:21

## 2023-01-01 RX ADMIN — ALBUMIN (HUMAN) 25 G: 0.25 INJECTION, SOLUTION INTRAVENOUS at 09:35

## 2023-01-01 RX ADMIN — PROPOFOL 10 MCG/KG/MIN: 10 INJECTION, EMULSION INTRAVENOUS at 07:18

## 2023-01-01 RX ADMIN — HYDRALAZINE HYDROCHLORIDE 10 MG: 20 INJECTION, SOLUTION INTRAMUSCULAR; INTRAVENOUS at 14:02

## 2023-01-01 RX ADMIN — RIFAXIMIN 550 MG: 550 TABLET ORAL at 08:27

## 2023-01-01 RX ADMIN — OCTREOTIDE ACETATE 100 MCG: 100 INJECTION, SOLUTION INTRAVENOUS; SUBCUTANEOUS at 21:02

## 2023-01-01 RX ADMIN — CHLORHEXIDINE GLUCONATE 15 ML: 1.2 RINSE ORAL at 08:23

## 2023-01-01 RX ADMIN — NOREPINEPHRINE BITARTRATE 15 MCG/MIN: 1 SOLUTION INTRAVENOUS at 08:27

## 2023-01-01 RX ADMIN — NYSTATIN: 100000 CREAM TOPICAL at 10:01

## 2023-01-01 RX ADMIN — POTASSIUM CHLORIDE 40 MEQ: 1.5 SOLUTION ORAL at 15:29

## 2023-01-01 RX ADMIN — CHLORHEXIDINE GLUCONATE 15 ML: 1.2 RINSE ORAL at 20:22

## 2023-01-01 RX ADMIN — LEVALBUTEROL HYDROCHLORIDE 1.25 MG: 1.25 SOLUTION RESPIRATORY (INHALATION) at 14:45

## 2023-01-01 RX ADMIN — PHYTONADIONE 10 MG: 10 INJECTION, EMULSION INTRAMUSCULAR; INTRAVENOUS; SUBCUTANEOUS at 09:04

## 2023-01-01 RX ADMIN — LORAZEPAM 2 MG: 2 INJECTION INTRAMUSCULAR; INTRAVENOUS at 11:30

## 2023-01-01 RX ADMIN — SODIUM CHLORIDE 2 G: 1 TABLET ORAL at 15:34

## 2023-01-01 RX ADMIN — LACTULOSE SOLUTION USP, 10 G/15 ML 200 G: 10 SOLUTION ORAL; RECTAL at 20:27

## 2023-01-01 RX ADMIN — POTASSIUM CHLORIDE 20 MEQ: 14.9 INJECTION, SOLUTION INTRAVENOUS at 15:14

## 2023-01-01 RX ADMIN — POTASSIUM CHLORIDE 20 MEQ: 14.9 INJECTION, SOLUTION INTRAVENOUS at 17:42

## 2023-01-01 RX ADMIN — MICONAZOLE NITRATE: 20 CREAM TOPICAL at 10:01

## 2023-01-01 RX ADMIN — LACTULOSE 30 G: 20 SOLUTION ORAL at 00:51

## 2023-01-01 RX ADMIN — LACTULOSE 20 G: 20 SOLUTION ORAL at 10:37

## 2023-01-01 RX ADMIN — NYSTATIN: 100000 CREAM TOPICAL at 09:09

## 2023-01-01 RX ADMIN — SODIUM BICARBONATE 650 MG TABLET 1300 MG: at 08:41

## 2023-01-01 RX ADMIN — RIFAXIMIN 550 MG: 550 TABLET ORAL at 10:23

## 2023-01-01 RX ADMIN — Medication 50 MEQ: at 08:18

## 2023-01-01 RX ADMIN — SOTALOL HYDROCHLORIDE 80 MG: 80 TABLET ORAL at 17:15

## 2023-01-01 RX ADMIN — SOTALOL HYDROCHLORIDE 80 MG: 80 TABLET ORAL at 17:13

## 2023-01-01 RX ADMIN — PREDNISONE 40 MG: 20 TABLET ORAL at 11:02

## 2023-01-01 RX ADMIN — SODIUM CHLORIDE 2 G: 1 TABLET ORAL at 07:32

## 2023-01-01 RX ADMIN — SOTALOL HYDROCHLORIDE 80 MG: 80 TABLET ORAL at 08:09

## 2023-01-01 RX ADMIN — SODIUM BICARBONATE 100 ML/HR: 84 INJECTION, SOLUTION INTRAVENOUS at 09:03

## 2023-01-01 RX ADMIN — LACTULOSE 30 G: 20 SOLUTION ORAL at 02:43

## 2023-01-01 RX ADMIN — SODIUM CHLORIDE 2 G: 1 TABLET ORAL at 08:27

## 2023-01-01 RX ADMIN — PROPOFOL 10 MCG/KG/MIN: 10 INJECTION, EMULSION INTRAVENOUS at 09:51

## 2023-01-01 RX ADMIN — HYDROCHLOROTHIAZIDE 12.5 MG: 12.5 TABLET ORAL at 08:44

## 2023-01-01 RX ADMIN — LEVALBUTEROL HYDROCHLORIDE 1.25 MG: 1.25 SOLUTION RESPIRATORY (INHALATION) at 19:55

## 2023-01-01 RX ADMIN — LEVOTHYROXINE SODIUM 175 MCG: 100 TABLET ORAL at 05:17

## 2023-01-01 RX ADMIN — FENTANYL CITRATE 50 MCG: 50 INJECTION INTRAMUSCULAR; INTRAVENOUS at 06:32

## 2023-01-01 RX ADMIN — LEVOTHYROXINE SODIUM 175 MCG: 100 TABLET ORAL at 05:07

## 2023-01-01 RX ADMIN — PREDNISONE 40 MG: 20 TABLET ORAL at 10:01

## 2023-01-01 RX ADMIN — VECURONIUM BROMIDE 10 MG: 1 INJECTION, POWDER, LYOPHILIZED, FOR SOLUTION INTRAVENOUS at 04:26

## 2023-01-01 RX ADMIN — VASOPRESSIN 0.04 UNITS/MIN: 20 INJECTION INTRAVENOUS at 05:14

## 2023-01-01 RX ADMIN — PHYTONADIONE 10 MG: 10 INJECTION, EMULSION INTRAMUSCULAR; INTRAVENOUS; SUBCUTANEOUS at 11:37

## 2023-01-01 RX ADMIN — LACTULOSE 30 G: 20 SOLUTION ORAL at 07:48

## 2023-01-01 RX ADMIN — HYDROMORPHONE HYDROCHLORIDE 0.3 MG: 1 INJECTION, SOLUTION INTRAMUSCULAR; INTRAVENOUS; SUBCUTANEOUS at 14:05

## 2023-01-01 RX ADMIN — ALBUMIN (HUMAN) 25 G: 0.25 INJECTION, SOLUTION INTRAVENOUS at 08:54

## 2023-01-01 RX ADMIN — MICONAZOLE NITRATE: 20 CREAM TOPICAL at 17:42

## 2023-01-01 RX ADMIN — ALBUMIN (HUMAN) 25 G: 0.25 INJECTION, SOLUTION INTRAVENOUS at 20:41

## 2023-01-01 RX ADMIN — LACTULOSE 30 G: 20 SOLUTION ORAL at 22:18

## 2023-01-01 RX ADMIN — SODIUM BICARBONATE 650 MG TABLET 650 MG: at 17:19

## 2023-01-01 RX ADMIN — LACTULOSE 200 G: 10 SOLUTION ORAL at 02:28

## 2023-01-01 RX ADMIN — LACTULOSE 30 G: 20 SOLUTION ORAL at 02:44

## 2023-01-01 RX ADMIN — ALBUMIN (HUMAN) 25 G: 0.25 INJECTION, SOLUTION INTRAVENOUS at 01:37

## 2023-01-01 RX ADMIN — LACTULOSE 200 G: 10 SOLUTION ORAL at 05:41

## 2023-01-01 RX ADMIN — FENTANYL CITRATE 100 MCG: 50 INJECTION INTRAMUSCULAR; INTRAVENOUS at 07:03

## 2023-01-01 RX ADMIN — MICONAZOLE NITRATE: 20 CREAM TOPICAL at 18:08

## 2023-01-01 RX ADMIN — Medication 50 MCG/HR: at 21:32

## 2023-01-01 RX ADMIN — NYSTATIN: 100000 CREAM TOPICAL at 08:56

## 2023-01-01 RX ADMIN — IPRATROPIUM BROMIDE 0.5 MG: 0.5 SOLUTION RESPIRATORY (INHALATION) at 19:45

## 2023-01-01 RX ADMIN — ALBUMIN (HUMAN) 12.5 G: 12.5 INJECTION, SOLUTION INTRAVENOUS at 11:02

## 2023-01-01 RX ADMIN — ALBUMIN (HUMAN) 25 G: 0.25 INJECTION, SOLUTION INTRAVENOUS at 01:40

## 2023-01-01 RX ADMIN — SODIUM CHLORIDE 2 G: 1 TABLET ORAL at 09:01

## 2023-01-01 RX ADMIN — NOREPINEPHRINE BITARTRATE 1 MCG/MIN: 1 SOLUTION INTRAVENOUS at 07:15

## 2023-01-01 RX ADMIN — NYSTATIN: 100000 CREAM TOPICAL at 12:37

## 2023-01-01 RX ADMIN — SODIUM CHLORIDE 0.5 UNITS/HR: 9 INJECTION, SOLUTION INTRAVENOUS at 00:50

## 2023-01-01 RX ADMIN — OCTREOTIDE ACETATE 100 MCG: 100 INJECTION, SOLUTION INTRAVENOUS; SUBCUTANEOUS at 23:00

## 2023-01-01 RX ADMIN — SOTALOL HYDROCHLORIDE 80 MG: 80 TABLET ORAL at 18:52

## 2023-01-01 RX ADMIN — ALBUMIN (HUMAN) 25 G: 0.25 INJECTION, SOLUTION INTRAVENOUS at 09:01

## 2023-01-01 RX ADMIN — POLYETHYLENE GLYCOL 3350, SODIUM SULFATE ANHYDROUS, SODIUM BICARBONATE, SODIUM CHLORIDE, POTASSIUM CHLORIDE 4000 ML: 236; 22.74; 6.74; 5.86; 2.97 POWDER, FOR SOLUTION ORAL at 01:52

## 2023-01-01 RX ADMIN — SOTALOL HYDROCHLORIDE 80 MG: 80 TABLET ORAL at 18:11

## 2023-01-01 RX ADMIN — SODIUM CHLORIDE 75 ML/HR: 0.9 INJECTION, SOLUTION INTRAVENOUS at 03:36

## 2023-01-01 RX ADMIN — LOSARTAN POTASSIUM 100 MG: 50 TABLET, FILM COATED ORAL at 08:27

## 2023-01-01 RX ADMIN — ALBUMIN, HUMAN INJ 5% 12.5 G: 5 SOLUTION at 08:25

## 2023-01-01 RX ADMIN — PROPOFOL 25 MCG/KG/MIN: 10 INJECTION, EMULSION INTRAVENOUS at 01:43

## 2023-01-01 RX ADMIN — ETOMIDATE 20 MG: 20 INJECTION, SOLUTION INTRAVENOUS at 20:14

## 2023-01-01 RX ADMIN — LEVOTHYROXINE SODIUM 175 MCG: 100 TABLET ORAL at 05:34

## 2023-01-01 RX ADMIN — LACTULOSE 30 G: 20 SOLUTION ORAL at 21:34

## 2023-01-01 RX ADMIN — LIDOCAINE HYDROCHLORIDE: 20 INJECTION, SOLUTION EPIDURAL; INFILTRATION; INTRACAUDAL; PERINEURAL at 11:48

## 2023-01-01 RX ADMIN — SOTALOL HYDROCHLORIDE 80 MG: 80 TABLET ORAL at 08:27

## 2023-01-01 RX ADMIN — POTASSIUM CHLORIDE 40 MEQ: 1500 TABLET, EXTENDED RELEASE ORAL at 12:52

## 2023-01-01 RX ADMIN — METHYLPREDNISOLONE SODIUM SUCCINATE 500 MG: 500 INJECTION INTRAMUSCULAR; INTRAVENOUS at 10:26

## 2023-01-01 RX ADMIN — SODIUM CHLORIDE 2 G: 1 TABLET ORAL at 20:20

## 2023-01-01 RX ADMIN — VASOPRESSIN 0.04 UNITS/MIN: 20 INJECTION INTRAVENOUS at 00:45

## 2023-01-01 RX ADMIN — IPRATROPIUM BROMIDE 0.5 MG: 0.5 SOLUTION RESPIRATORY (INHALATION) at 14:45

## 2023-01-01 RX ADMIN — Medication 20000 ML: at 20:12

## 2023-01-01 RX ADMIN — FENTANYL CITRATE 100 MCG: 50 INJECTION INTRAMUSCULAR; INTRAVENOUS at 09:32

## 2023-01-01 RX ADMIN — ACETYLCYSTEINE 5000 MG: 200 INJECTION, SOLUTION INTRAVENOUS at 18:11

## 2023-01-01 RX ADMIN — SOTALOL HYDROCHLORIDE 80 MG: 80 TABLET ORAL at 17:18

## 2023-01-01 RX ADMIN — SODIUM CHLORIDE 75 ML/HR: 0.9 INJECTION, SOLUTION INTRAVENOUS at 13:51

## 2023-01-01 RX ADMIN — Medication 20000 ML: at 11:41

## 2023-01-01 RX ADMIN — SODIUM CHLORIDE 2 G: 1 TABLET ORAL at 15:32

## 2023-01-01 RX ADMIN — PROPOFOL 10 MCG/KG/MIN: 10 INJECTION, EMULSION INTRAVENOUS at 17:59

## 2023-01-01 RX ADMIN — METHYLPREDNISOLONE SODIUM SUCCINATE 40 MG: 40 INJECTION, POWDER, FOR SOLUTION INTRAMUSCULAR; INTRAVENOUS at 08:42

## 2023-01-01 RX ADMIN — PHYTONADIONE 10 MG: 10 INJECTION, EMULSION INTRAMUSCULAR; INTRAVENOUS; SUBCUTANEOUS at 06:19

## 2023-01-01 RX ADMIN — LACTULOSE 30 G: 20 SOLUTION ORAL at 04:40

## 2023-01-01 RX ADMIN — OCTREOTIDE ACETATE 100 MCG: 100 INJECTION, SOLUTION INTRAVENOUS; SUBCUTANEOUS at 13:27

## 2023-01-01 RX ADMIN — METHYLPREDNISOLONE SODIUM SUCCINATE 500 MG: 500 INJECTION INTRAMUSCULAR; INTRAVENOUS at 12:21

## 2023-01-01 RX ADMIN — Medication 25 MCG/HR: at 11:46

## 2023-01-01 RX ADMIN — FUROSEMIDE 40 MG: 10 INJECTION, SOLUTION INTRAMUSCULAR; INTRAVENOUS at 10:39

## 2023-01-01 RX ADMIN — LACTULOSE 30 G: 20 SOLUTION ORAL at 21:28

## 2023-01-01 RX ADMIN — ESCITALOPRAM OXALATE 10 MG: 10 TABLET ORAL at 08:28

## 2023-01-01 RX ADMIN — SODIUM BICARBONATE 650 MG TABLET 1300 MG: at 08:27

## 2023-01-01 RX ADMIN — TORSEMIDE 10 MG: 10 TABLET ORAL at 11:52

## 2023-01-01 RX ADMIN — Medication 20000 ML: at 16:00

## 2023-01-01 RX ADMIN — ACETYLCYSTEINE 10000 MG: 200 INJECTION, SOLUTION INTRAVENOUS at 23:20

## 2023-01-01 RX ADMIN — MAGNESIUM SULFATE IN WATER 2 G: 40 INJECTION, SOLUTION INTRAVENOUS at 09:03

## 2023-01-01 RX ADMIN — HYDROMORPHONE HYDROCHLORIDE 0.3 MG: 1 INJECTION, SOLUTION INTRAMUSCULAR; INTRAVENOUS; SUBCUTANEOUS at 14:28

## 2023-01-01 RX ADMIN — LEVOTHYROXINE SODIUM 175 MCG: 100 TABLET ORAL at 05:22

## 2023-01-01 RX ADMIN — MIDODRINE HYDROCHLORIDE 10 MG: 5 TABLET ORAL at 10:33

## 2023-01-01 RX ADMIN — PROPOFOL 10 MCG/KG/MIN: 10 INJECTION, EMULSION INTRAVENOUS at 12:34

## 2023-01-01 RX ADMIN — ALBUMIN (HUMAN) 25 G: 12.5 INJECTION, SOLUTION INTRAVENOUS at 23:58

## 2023-01-01 RX ADMIN — OCTREOTIDE ACETATE 100 MCG: 100 INJECTION, SOLUTION INTRAVENOUS; SUBCUTANEOUS at 13:32

## 2023-01-01 RX ADMIN — ESCITALOPRAM OXALATE 10 MG: 10 TABLET ORAL at 08:44

## 2023-01-01 RX ADMIN — LEVOTHYROXINE SODIUM 175 MCG: 100 TABLET ORAL at 05:06

## 2023-01-01 RX ADMIN — PROPOFOL 20 MCG/KG/MIN: 10 INJECTION, EMULSION INTRAVENOUS at 00:51

## 2023-01-01 RX ADMIN — LEVALBUTEROL HYDROCHLORIDE 1.25 MG: 1.25 SOLUTION RESPIRATORY (INHALATION) at 06:51

## 2023-01-01 RX ADMIN — SODIUM CHLORIDE 500 ML: 0.9 INJECTION, SOLUTION INTRAVENOUS at 04:39

## 2023-01-01 RX ADMIN — LACTULOSE SOLUTION USP, 10 G/15 ML 200 G: 10 SOLUTION ORAL; RECTAL at 04:54

## 2023-01-01 RX ADMIN — MIDAZOLAM 2 MG: 1 INJECTION INTRAMUSCULAR; INTRAVENOUS at 09:33

## 2023-01-01 RX ADMIN — LACTULOSE 40 G: 20 SOLUTION ORAL at 13:26

## 2023-01-01 RX ADMIN — Medication 50 MCG/HR: at 08:47

## 2023-01-01 RX ADMIN — SOTALOL HYDROCHLORIDE 80 MG: 80 TABLET ORAL at 07:31

## 2023-01-01 RX ADMIN — ALBUMIN (HUMAN) 25 G: 0.25 INJECTION, SOLUTION INTRAVENOUS at 15:23

## 2023-01-01 RX ADMIN — ACETYLCYSTEINE 15000 MG: 200 INJECTION, SOLUTION INTRAVENOUS at 17:04

## 2023-01-01 RX ADMIN — PROPOFOL 10 MCG/KG/MIN: 10 INJECTION, EMULSION INTRAVENOUS at 06:05

## 2023-01-01 RX ADMIN — RACEPINEPHRINE HYDROCHLORIDE 0.5 ML: 11.25 SOLUTION RESPIRATORY (INHALATION) at 19:35

## 2023-01-01 RX ADMIN — SODIUM BICARBONATE 650 MG TABLET 650 MG: at 11:36

## 2023-01-01 RX ADMIN — SODIUM CHLORIDE 200 ML/HR: 0.9 INJECTION, SOLUTION INTRAVENOUS at 01:02

## 2023-01-01 RX ADMIN — SODIUM CHLORIDE 2 G: 1 TABLET ORAL at 17:16

## 2023-01-01 RX ADMIN — MICONAZOLE NITRATE: 20 CREAM TOPICAL at 18:07

## 2023-01-01 RX ADMIN — SODIUM CHLORIDE 50 ML/HR: 4 INJECTION, SOLUTION, CONCENTRATE INTRAVENOUS at 05:00

## 2023-01-01 RX ADMIN — NYSTATIN: 100000 CREAM TOPICAL at 17:42

## 2023-01-01 RX ADMIN — LEVALBUTEROL HYDROCHLORIDE 1.25 MG: 1.25 SOLUTION RESPIRATORY (INHALATION) at 13:34

## 2023-01-01 RX ADMIN — INSULIN LISPRO 1 UNITS: 100 INJECTION, SOLUTION INTRAVENOUS; SUBCUTANEOUS at 17:45

## 2023-01-01 RX ADMIN — POTASSIUM CHLORIDE 20 MEQ: 14.9 INJECTION, SOLUTION INTRAVENOUS at 09:23

## 2023-01-01 RX ADMIN — SODIUM CHLORIDE 2 G: 1 TABLET ORAL at 16:48

## 2023-01-01 RX ADMIN — LEVOTHYROXINE SODIUM 175 MCG: 100 TABLET ORAL at 05:41

## 2023-01-01 RX ADMIN — NYSTATIN: 100000 CREAM TOPICAL at 10:43

## 2023-01-01 RX ADMIN — LACTULOSE 30 G: 20 SOLUTION ORAL at 08:32

## 2023-01-01 RX ADMIN — LIDOCAINE HYDROCHLORIDE: 10 INJECTION, SOLUTION EPIDURAL; INFILTRATION; INTRACAUDAL; PERINEURAL at 11:48

## 2023-01-01 RX ADMIN — RIVAROXABAN 20 MG: 20 TABLET, FILM COATED ORAL at 16:48

## 2023-01-01 RX ADMIN — ALBUTEROL SULFATE 2.5 MG: 2.5 SOLUTION RESPIRATORY (INHALATION) at 09:11

## 2023-01-01 RX ADMIN — AZTREONAM 1000 MG: 1 INJECTION, POWDER, LYOPHILIZED, FOR SOLUTION INTRAMUSCULAR; INTRAVENOUS at 20:52

## 2023-01-01 RX ADMIN — NYSTATIN: 100000 CREAM TOPICAL at 18:53

## 2023-01-01 RX ADMIN — PREDNISONE 40 MG: 20 TABLET ORAL at 09:01

## 2023-01-01 RX ADMIN — PROPOFOL 5 MCG/KG/MIN: 10 INJECTION, EMULSION INTRAVENOUS at 20:11

## 2023-01-01 RX ADMIN — FUROSEMIDE 40 MG: 10 INJECTION, SOLUTION INTRAMUSCULAR; INTRAVENOUS at 18:59

## 2023-01-01 RX ADMIN — Medication 20000 ML: at 07:16

## 2023-01-01 RX ADMIN — SODIUM BICARBONATE 650 MG TABLET 1300 MG: at 11:53

## 2023-01-01 RX ADMIN — MIDAZOLAM 0.5 MG: 1 INJECTION INTRAMUSCULAR; INTRAVENOUS at 13:50

## 2023-01-01 RX ADMIN — MICONAZOLE NITRATE: 20 CREAM TOPICAL at 17:20

## 2023-01-01 RX ADMIN — MIDODRINE HYDROCHLORIDE 10 MG: 5 TABLET ORAL at 05:34

## 2023-01-01 RX ADMIN — IPRATROPIUM BROMIDE 0.5 MG: 0.5 SOLUTION RESPIRATORY (INHALATION) at 07:27

## 2023-01-01 RX ADMIN — OCTREOTIDE ACETATE 100 MCG: 100 INJECTION, SOLUTION INTRAVENOUS; SUBCUTANEOUS at 15:23

## 2023-01-01 RX ADMIN — OCTREOTIDE ACETATE 100 MCG: 100 INJECTION, SOLUTION INTRAVENOUS; SUBCUTANEOUS at 21:08

## 2023-01-01 RX ADMIN — LEVOTHYROXINE SODIUM 175 MCG: 100 TABLET ORAL at 05:14

## 2023-01-01 RX ADMIN — SODIUM CHLORIDE 2 G: 1 TABLET ORAL at 15:54

## 2023-01-01 RX ADMIN — LOSARTAN POTASSIUM 100 MG: 50 TABLET, FILM COATED ORAL at 07:31

## 2023-01-01 RX ADMIN — OCTREOTIDE ACETATE 100 MCG: 100 INJECTION, SOLUTION INTRAVENOUS; SUBCUTANEOUS at 05:26

## 2023-01-01 RX ADMIN — METHYLPREDNISOLONE SODIUM SUCCINATE 500 MG: 500 INJECTION INTRAMUSCULAR; INTRAVENOUS at 21:23

## 2023-01-01 RX ADMIN — ALBUMIN (HUMAN) 25 G: 0.25 INJECTION, SOLUTION INTRAVENOUS at 14:04

## 2023-01-01 RX ADMIN — ALBUMIN (HUMAN) 25 G: 0.25 INJECTION, SOLUTION INTRAVENOUS at 02:24

## 2023-01-01 RX ADMIN — PREDNISONE 40 MG: 20 TABLET ORAL at 08:33

## 2023-01-01 RX ADMIN — MICONAZOLE NITRATE: 20 CREAM TOPICAL at 18:53

## 2023-01-01 RX ADMIN — POTASSIUM CHLORIDE 40 MEQ: 1500 TABLET, EXTENDED RELEASE ORAL at 15:32

## 2023-01-01 RX ADMIN — LORAZEPAM 1 MG: 2 INJECTION INTRAMUSCULAR; INTRAVENOUS at 14:31

## 2023-01-01 RX ADMIN — MIDAZOLAM 0.5 MG: 1 INJECTION INTRAMUSCULAR; INTRAVENOUS at 13:59

## 2023-01-01 RX ADMIN — Medication 98 MG: at 20:14

## 2023-01-01 RX ADMIN — MIDODRINE HYDROCHLORIDE 10 MG: 5 TABLET ORAL at 06:47

## 2023-01-01 RX ADMIN — ALBUMIN (HUMAN) 25 G: 0.25 INJECTION, SOLUTION INTRAVENOUS at 19:47

## 2023-01-01 RX ADMIN — OCTREOTIDE ACETATE 100 MCG: 100 INJECTION, SOLUTION INTRAVENOUS; SUBCUTANEOUS at 14:07

## 2023-01-01 RX ADMIN — LACTULOSE 30 G: 20 SOLUTION ORAL at 05:13

## 2023-01-01 RX ADMIN — SODIUM CHLORIDE 2 G: 1 TABLET ORAL at 18:32

## 2023-01-01 RX ADMIN — POTASSIUM CHLORIDE 20 MEQ: 14.9 INJECTION, SOLUTION INTRAVENOUS at 20:27

## 2023-01-01 RX ADMIN — NYSTATIN: 100000 CREAM TOPICAL at 17:46

## 2023-01-01 RX ADMIN — AZTREONAM 1000 MG: 1 INJECTION, POWDER, LYOPHILIZED, FOR SOLUTION INTRAMUSCULAR; INTRAVENOUS at 07:37

## 2023-01-01 RX ADMIN — SODIUM CHLORIDE 2 G: 1 TABLET ORAL at 08:45

## 2023-01-01 RX ADMIN — CALCIUM GLUCONATE 1 G: 20 INJECTION, SOLUTION INTRAVENOUS at 13:27

## 2023-01-01 RX ADMIN — SOTALOL HYDROCHLORIDE 80 MG: 80 TABLET ORAL at 08:45

## 2023-01-01 RX ADMIN — NYSTATIN: 100000 CREAM TOPICAL at 10:40

## 2023-01-01 RX ADMIN — DEXTROSE 50 ML/HR: 5 SOLUTION INTRAVENOUS at 00:23

## 2023-01-01 RX ADMIN — NYSTATIN: 100000 CREAM TOPICAL at 17:20

## 2023-01-01 RX ADMIN — GUAIFENESIN SYRUP AND DEXTROMETHORPHAN 10 ML: 100; 10 SYRUP ORAL at 19:51

## 2023-01-01 RX ADMIN — LOSARTAN POTASSIUM 100 MG: 50 TABLET, FILM COATED ORAL at 08:09

## 2023-01-01 RX ADMIN — MICONAZOLE NITRATE: 20 CREAM TOPICAL at 19:05

## 2023-01-01 RX ADMIN — INSULIN LISPRO 1 UNITS: 100 INJECTION, SOLUTION INTRAVENOUS; SUBCUTANEOUS at 17:19

## 2023-01-01 RX ADMIN — ALBUMIN (HUMAN) 12.5 G: 12.5 INJECTION, SOLUTION INTRAVENOUS at 05:00

## 2023-01-01 RX ADMIN — MIDODRINE HYDROCHLORIDE 10 MG: 5 TABLET ORAL at 17:19

## 2023-01-01 RX ADMIN — HYDROCHLOROTHIAZIDE 12.5 MG: 12.5 TABLET ORAL at 09:09

## 2023-01-01 RX ADMIN — LEVOTHYROXINE SODIUM 175 MCG: 100 TABLET ORAL at 05:35

## 2023-01-01 RX ADMIN — SODIUM BICARBONATE 650 MG TABLET 1300 MG: at 11:22

## 2023-01-01 RX ADMIN — POTASSIUM CHLORIDE 40 MEQ: 29.8 INJECTION, SOLUTION INTRAVENOUS at 05:14

## 2023-01-01 RX ADMIN — LACTULOSE 200 G: 10 SOLUTION ORAL at 22:52

## 2023-01-01 RX ADMIN — HYDROCHLOROTHIAZIDE 12.5 MG: 12.5 TABLET ORAL at 07:31

## 2023-01-01 RX ADMIN — POTASSIUM CHLORIDE 20 MEQ: 14.9 INJECTION, SOLUTION INTRAVENOUS at 07:23

## 2023-01-01 RX ADMIN — RIFAXIMIN 550 MG: 550 TABLET ORAL at 21:33

## 2023-01-01 RX ADMIN — FENTANYL CITRATE 50 MCG: 50 INJECTION INTRAMUSCULAR; INTRAVENOUS at 10:12

## 2023-01-01 RX ADMIN — ALBUMIN (HUMAN) 25 G: 0.25 INJECTION, SOLUTION INTRAVENOUS at 02:38

## 2023-01-01 RX ADMIN — LACTULOSE SOLUTION USP, 10 G/15 ML 200 G: 10 SOLUTION ORAL; RECTAL at 01:35

## 2023-01-01 RX ADMIN — ALBUMIN (HUMAN) 25 G: 0.25 INJECTION, SOLUTION INTRAVENOUS at 13:31

## 2023-01-01 RX ADMIN — OCTREOTIDE ACETATE 100 MCG: 100 INJECTION, SOLUTION INTRAVENOUS; SUBCUTANEOUS at 05:34

## 2023-01-01 RX ADMIN — MICONAZOLE NITRATE: 20 CREAM TOPICAL at 10:40

## 2023-01-01 RX ADMIN — METHYLPREDNISOLONE SODIUM SUCCINATE 500 MG: 500 INJECTION INTRAMUSCULAR; INTRAVENOUS at 09:20

## 2023-01-01 RX ADMIN — Medication 0.5 MG: at 06:50

## 2023-01-01 RX ADMIN — MICONAZOLE NITRATE: 20 CREAM TOPICAL at 09:36

## 2023-01-01 RX ADMIN — OCTREOTIDE ACETATE 100 MCG: 100 INJECTION, SOLUTION INTRAVENOUS; SUBCUTANEOUS at 22:18

## 2023-01-01 RX ADMIN — PHYTONADIONE 10 MG: 10 INJECTION, EMULSION INTRAMUSCULAR; INTRAVENOUS; SUBCUTANEOUS at 21:29

## 2023-01-01 RX ADMIN — ESCITALOPRAM OXALATE 10 MG: 10 TABLET ORAL at 10:37

## 2023-01-01 RX ADMIN — POTASSIUM CHLORIDE 40 MEQ: 400 INJECTION, SOLUTION INTRAVENOUS at 13:27

## 2023-01-01 RX ADMIN — OCTREOTIDE ACETATE 100 MCG: 100 INJECTION, SOLUTION INTRAVENOUS; SUBCUTANEOUS at 06:20

## 2023-01-01 RX ADMIN — AZTREONAM 1000 MG: 1 INJECTION, POWDER, LYOPHILIZED, FOR SOLUTION INTRAMUSCULAR; INTRAVENOUS at 09:00

## 2023-01-01 RX ADMIN — SODIUM CHLORIDE 2 G: 1 TABLET ORAL at 17:18

## 2023-01-01 RX ADMIN — METHYLPREDNISOLONE 32 MG: 4 TABLET ORAL at 12:03

## 2023-01-01 RX ADMIN — RIFAXIMIN 550 MG: 550 TABLET ORAL at 08:33

## 2023-01-17 ENCOUNTER — RA CDI HCC (OUTPATIENT)
Dept: OTHER | Facility: HOSPITAL | Age: 83
End: 2023-01-17

## 2023-01-24 ENCOUNTER — OFFICE VISIT (OUTPATIENT)
Dept: FAMILY MEDICINE CLINIC | Facility: HOSPITAL | Age: 83
End: 2023-01-24

## 2023-01-24 VITALS
OXYGEN SATURATION: 97 % | TEMPERATURE: 96.3 F | HEIGHT: 64 IN | HEART RATE: 59 BPM | SYSTOLIC BLOOD PRESSURE: 128 MMHG | WEIGHT: 212.8 LBS | DIASTOLIC BLOOD PRESSURE: 78 MMHG | BODY MASS INDEX: 36.33 KG/M2

## 2023-01-24 DIAGNOSIS — I48.0 PAROXYSMAL ATRIAL FIBRILLATION (HCC): ICD-10-CM

## 2023-01-24 DIAGNOSIS — I10 ESSENTIAL HYPERTENSION: ICD-10-CM

## 2023-01-24 DIAGNOSIS — E66.01 CLASS 2 SEVERE OBESITY WITH SERIOUS COMORBIDITY AND BODY MASS INDEX (BMI) OF 36.0 TO 36.9 IN ADULT, UNSPECIFIED OBESITY TYPE (HCC): ICD-10-CM

## 2023-01-24 DIAGNOSIS — E03.9 ACQUIRED HYPOTHYROIDISM: ICD-10-CM

## 2023-01-24 DIAGNOSIS — G47.01 INSOMNIA DUE TO MEDICAL CONDITION: ICD-10-CM

## 2023-01-24 DIAGNOSIS — J84.10 DIFFUSE INTERSTITIAL PULMONARY FIBROSIS (HCC): ICD-10-CM

## 2023-01-24 DIAGNOSIS — F39 MOOD DISORDER (HCC): ICD-10-CM

## 2023-01-24 DIAGNOSIS — Z00.00 MEDICARE ANNUAL WELLNESS VISIT, SUBSEQUENT: Primary | ICD-10-CM

## 2023-01-24 DIAGNOSIS — R31.9 HEMATURIA, UNDIAGNOSED CAUSE: ICD-10-CM

## 2023-01-24 PROBLEM — M51.27 HERNIATED NUCLEUS PULPOSUS, L5-S1: Status: ACTIVE | Noted: 2023-01-24

## 2023-01-24 PROBLEM — S92.351A DISPLACED FRACTURE OF FIFTH METATARSAL BONE, RIGHT FOOT, INITIAL ENCOUNTER FOR CLOSED FRACTURE: Status: ACTIVE | Noted: 2023-01-24

## 2023-01-24 PROBLEM — M18.12 ARTHRITIS OF CARPOMETACARPAL (CMC) JOINT OF LEFT THUMB: Status: ACTIVE | Noted: 2023-01-24

## 2023-01-24 RX ORDER — HYDROCODONE BITARTRATE AND ACETAMINOPHEN 10; 325 MG/1; MG/1
1 TABLET ORAL EVERY 6 HOURS PRN
COMMUNITY
Start: 2022-11-10

## 2023-01-24 RX ORDER — ZOLPIDEM TARTRATE 5 MG/1
5 TABLET ORAL
Qty: 15 TABLET | Refills: 3 | Status: SHIPPED | OUTPATIENT
Start: 2023-01-24

## 2023-01-24 RX ORDER — CYCLOBENZAPRINE HCL 10 MG
TABLET ORAL
COMMUNITY
Start: 2022-11-18

## 2023-01-24 NOTE — PROGRESS NOTES
Assessment and Plan:     Problem List Items Addressed This Visit        Endocrine    Acquired hypothyroidism     Clinically euthyroid            Respiratory    Diffuse interstitial pulmonary fibrosis (HCC)     Stable, asymptomatic            Cardiovascular and Mediastinum    Paroxysmal atrial fibrillation (HCC)     Current sinus rhythm, F/U Cardiology         Essential hypertension     Excellent BP control            Genitourinary    Hematuria, undiagnosed cause    Relevant Orders    Cytology, urine       Other    Class 2 severe obesity due to excess calories with serious comorbidity and body mass index (BMI) of 35 0 to 35 9 in adult [facility])    Medicare annual wellness visit, subsequent - Primary    Mood disorder ([facility] [address] )     BMI Counseling: Body mass index is 36 53 kg/m²  The BMI is above normal  Nutrition recommendations include decreasing portion sizes, moderation in carbohydrate intake and reducing intake of cholesterol  Exercise recommendations include strength training exercises  No pharmacotherapy was ordered  Rationale for BMI follow-up plan is due to patient being overweight or obese  Depression Screening and Follow-up Plan: Patient was screened for depression during today's encounter  They screened negative with a PHQ-2 score of 1  Preventive health issues were discussed with patient, and age appropriate screening tests were ordered as noted in patient's After Visit Summary  Personalized health advice and appropriate referrals for health education or preventive services given if needed, as noted in patient's After Visit Summary       History of Present Illness:     Patient presents for a Medicare Wellness Visit    AWV and F/U medical issues    Having episodic dysuria with discoloration of urine  Urine is brownish at times, not red blood  Nocturia but because she drinks so much water    Fractured R foot with good healing and started with [facility] PT  Had a boot for 9 weeks and was very frustrated with immobility    Upset about increase in rent $300+ per month     Patient Care Team:  Kenneth Gil MD as PCP - General (Family Medicine)     Review of Systems:     Review of Systems   HENT: Negative  Respiratory: Negative  Cardiovascular: Negative  Genitourinary: Positive for dysuria and hematuria  Musculoskeletal: Positive for gait problem  Hematological: Negative  Psychiatric/Behavioral: Positive for dysphoric mood and sleep disturbance  All other systems reviewed and are negative         Problem List:     Patient Active Problem List   Diagnosis   • Paroxysmal atrial fibrillation (HCC)   • Mixed hyperlipidemia   • Essential hypertension   • Class 2 severe obesity due to excess calories with serious comorbidity and body mass index (BMI) of 35 0 to 35 9 in adult [facility])   • Acquired hypothyroidism   • Medicare annual wellness visit, subsequent   • Lumbosacral radiculopathy due to intervertebral disc disorder   • Multiple actinic keratoses   • Prediabetes   • Failed back surgical syndrome   • Gait abnormality   • Mood disorder ([address] )   • Calcific tendinitis of left shoulder   • Primary osteoarthritis of left shoulder   • Strain of left shoulder   • Spondylolisthesis, grade 2   • Lumbar spinal stenosis   • Localized, primary osteoarthritis of hand   • Incontinence of feces with fecal urgency   • Obstructive sleep apnea   • Osteoarthritis of knee   • Diffuse interstitial pulmonary fibrosis (HCC)   • Long term (current) use of anticoagulants   • Adjustment insomnia   • Herniated nucleus pulposus, L5-S1   • Displaced fracture of fifth metatarsal bone, right foot, initial encounter for closed fracture   • Arthritis of carpometacarpal (CMC) joint of left thumb   • Hematuria, undiagnosed cause      Past Medical and Surgical History:     Past Medical History:   Diagnosis Date   • Anxiety    • Cholecystitis    • Chronic calculous cholecystitis     last assessed 4/25/16   • History of cardioversion    • History of radiofrequency ablation procedure for cardiac arrhythmia    • Hyperlipidemia    • Hypertension    • Sleep apnea     no trouble since Afib corrected     Past Surgical History:   Procedure Laterality Date   • APPENDECTOMY     • BACK SURGERY  11/15/2018    L4-5 Screws and rods   • BLADDER SUSPENSION     • CARDIOVERSION      atrial - onset 2015 - x5 in 2014 and 2015   • CHOLECYSTECTOMY      April 2016   • HYSTERECTOMY     • KNEE ARTHROSCOPY W/ MENISCAL REPAIR Bilateral     therapeutic - last assessed 4/14/16   • KNEE SURGERY     • MT LAPAROSCOPY SURG CHOLECYSTECTOMY N/A 4/26/2016    Procedure: CHOLECYSTECTOMY LAPAROSCOPIC;  Surgeon: Olivia Yanez MD;  Location:  MAIN OR;  Service: General   • TONSILLECTOMY     • WISDOM TOOTH EXTRACTION        Family History:     Family History   Problem Relation Age of Onset   • Cancer Mother         ovarian   • Heart disease Father         cardiac disorder   • Cancer Father    • Heart disease Brother    • Heart disease Paternal Aunt    • Heart disease Paternal Uncle       Social History:     Social History     Socioeconomic History   • Marital status:      Spouse name: None   • Number of children: None   • Years of education: None   • Highest education level: None   Occupational History   • Occupation: retired   Tobacco Use   • Smoking status: Never   • Smokeless tobacco: Never   Vaping Use   • Vaping Use: Never used   Substance and Sexual Activity   • Alcohol use: Yes     Comment: social; occasional   • Drug use: No   • Sexual activity: None   Other Topics Concern   • None   Social History Narrative    Single as per [employer]     Social Determinants of Health     Financial Resource Strain: Medium Risk   • Difficulty of Paying Living Expenses: Somewhat hard   Food Insecurity: Not on file   Transportation Needs: No Transportation Needs   • Lack of Transportation (Medical): No   • Lack of Transportation (Non-Medical):  No   Physical Activity: Not on file   Stress: Not on file   Social Connections: Not on file   Intimate Partner Violence: Not on file   Housing Stability: Not on file      Medications and Allergies:     Current Outpatient Medications   Medication Sig Dispense Refill   • atorvastatin (LIPITOR) 20 mg tablet Take 20 mg by mouth daily  • cyclobenzaprine (FLEXERIL) 10 mg tablet take 1 tablet by mouth at bedtime if needed for MUSCLE RELAXATION     • diltiazem (CARDIZEM CD) 360 MG 24 hr capsule   0   • Diovan 320 MG tablet Take 1 tablet (320 mg total) by mouth daily 90 tablet 1   • escitalopram (LEXAPRO) 10 mg tablet TAKE 1 TABLET DAILY 90 tablet 3   • gabapentin (NEURONTIN) 300 mg capsule Take 300 mg by mouth 3 (three) times a day  2   • hydrochlorothiazide (HYDRODIURIL) 25 mg tablet Take 25 mg by mouth daily Indications: High Blood Pressure  • HYDROcodone-acetaminophen (NORCO)  mg per tablet Take 1 tablet by mouth every 6 (six) hours as needed     • hydrOXYzine HCL (ATARAX) 10 mg tablet Take 1 tablet (10 mg total) by mouth every 6 (six) hours as needed for itching 30 tablet 0   • Levoxyl 175 MCG tablet TAKE 1 TABLET DAILY 90 tablet 3   • liotrix (THYROLAR-1) 60 (12 5-50) MG (MCG) TABS Take 1 tablet by mouth daily  • LORazepam (ATIVAN) 0 5 mg tablet Take 1 tablet (0 5 mg total) by mouth every 8 (eight) hours as needed for anxiety 20 tablet 0   • rivaroxaban (XARELTO) 20 mg tablet Take 20 mg by mouth daily with dinner Indications: LD 4/22  • sotalol (BETAPACE) 80 mg tablet Take 80 mg by mouth 2 (two) times a day  • zolpidem (AMBIEN) 5 mg tablet Take 1 tablet (5 mg total) by mouth daily at bedtime as needed for sleep 15 tablet 3     No current facility-administered medications for this visit       Allergies   Allergen Reactions   • Pneumovax [Pneumococcal Polysaccharide Vaccine] Hives   • Medical Tape    • Nuts - Food Allergy    • Omnipaque [Iohexol] Hives   • Other Hives     Pine nuts   • Penicillins Hives   • Sulfa Antibiotics Hives   • Iodine - Food Allergy Rash      Immunizations:     Immunization History   Administered Date(s) Administered   • COVID-19 PFIZER VACCINE 0 3 ML IM 01/28/2021, 02/18/2021, 10/28/2021   • INFLUENZA 09/11/2014   • Influenza, high dose seasonal 0 7 mL 11/03/2020   • Pneumococcal Conjugate Vaccine 20-valent (Pcv20), Polysace 07/19/2022   • Pneumococcal Polysaccharide PPV23 10/18/2014      Health Maintenance:         Topic Date Due   • DXA SCAN  Never done         Topic Date Due   • COVID-19 Vaccine (4 - Booster for [employer] series) 12/23/2021   • Influenza Vaccine (1) 09/01/2022      Medicare Screening Tests and Risk Assessments:     Allie Munson is here for her Subsequent Wellness visit  Last Medicare Wellness visit information reviewed, patient interviewed and updates made to the record today  Health Risk Assessment:   Patient rates overall health as good  Patient feels that their physical health rating is slightly worse  Patient is satisfied with their life  Eyesight was rated as same  Hearing was rated as same  Patient feels that their emotional and mental health rating is same  Patients states they are never, rarely angry  Patient states they are sometimes unusually tired/fatigued  Pain experienced in the last 7 days has been some  Patient's pain rating has been 3/10  Patient states that she has experienced no weight loss or gain in last 6 months  Depression Screening:   PHQ-2 Score: 1      Fall Risk Screening: In the past year, patient has experienced: history of falling in past year    Number of falls: 1  Injured during fall?: No    Feels unsteady when standing or walking?: No    Worried about falling?: No      Urinary Incontinence Screening:   Patient has leaked urine accidently in the last six months  Home Safety:  Patient has trouble with stairs inside or outside of their home  Patient has no working smoke alarms and has no working carbon monoxide detector  Home safety hazards include: none       Nutrition: Current diet is Regular  Medications:   Patient is not currently taking any over-the-counter supplements  Patient is able to manage medications  Activities of Daily Living (ADLs)/Instrumental Activities of Daily Living (IADLs):   Walk and transfer into and out of bed and chair?: Yes  Dress and groom yourself?: Yes    Bathe or shower yourself?: Yes    Feed yourself? Yes  Do your laundry/housekeeping?: Yes  Manage your money, pay your bills and track your expenses?: Yes  Make your own meals?: Yes    Do your own shopping?: Yes    Previous Hospitalizations:   Any hospitalizations or ED visits within the last 12 months?: Yes    How many hospitalizations have you had in the last year?: 1-2    Advance Care Planning:   Living will: No    Durable POA for healthcare:  Yes    Advanced directive: No    Advanced directive counseling given: Yes    Five wishes given: Yes    Patient declined ACP directive: No    End of Life Decisions reviewed with patient: Yes    Provider agrees with end of life decisions: Yes      Cognitive Screening:   Provider or family/friend/caregiver concerned regarding cognition?: No    PREVENTIVE SCREENINGS      Cardiovascular Screening:    General: Screening Not Indicated and History Lipid Disorder      Diabetes Screening:     General: Risks and Benefits Discussed    Due for: Blood Glucose      Colorectal Cancer Screening:     General: Screening Not Indicated      Breast Cancer Screening:     General: Risks and Benefits Discussed    Due for: Mammogram        Cervical Cancer Screening:    General: Screening Not Indicated      Osteoporosis Screening:    General: Risks and Benefits Discussed    Due for: DXA Axial      Abdominal Aortic Aneurysm (AAA) Screening:        General: Screening Not Indicated      Lung Cancer Screening:     General: Screening Not Indicated      Hepatitis C Screening:    General: Screening Not Indicated    Screening, Brief Intervention, and Referral to Treatment (SBIRT)    Screening  Typical number of drinks in a day: 0  Typical number of drinks in a week: 0  Interpretation: Low risk drinking behavior  Single Item Drug Screening:  How often have you used an illegal drug (including marijuana) or a prescription medication for non-medical reasons in the past year? never    Single Item Drug Screen Score: 0  Interpretation: Negative screen for possible drug use disorder    No results found  Physical Exam:     /78 (BP Location: Left arm, Patient Position: Sitting, Cuff Size: Large)   Pulse 59   Temp (!) 96 3 °F (35 7 °C) (Tympanic)   Ht 5' 4" (1 626 m)   Wt 96 5 kg (212 lb 12 8 oz)   SpO2 97%   BMI 36 53 kg/m²     Physical Exam  Vitals and nursing note reviewed  Constitutional:       Appearance: Normal appearance  Cardiovascular:      Rate and Rhythm: Normal rate and regular rhythm  Pulses: Normal pulses  Heart sounds: Normal heart sounds  Pulmonary:      Effort: Pulmonary effort is normal       Breath sounds: Normal breath sounds  Musculoskeletal:      Right lower leg: No edema  Left lower leg: No edema  Skin:     Findings: No rash  Psychiatric:         Attention and Perception: Attention normal          Mood and Affect: Mood is anxious and depressed  Speech: Speech normal          Behavior: Behavior normal          Thought Content:  Thought content normal          Cognition and Memory: Cognition normal           Eric Patel MD

## 2023-01-27 DIAGNOSIS — R31.9 HEMATURIA, UNDIAGNOSED CAUSE: Primary | ICD-10-CM

## 2023-02-03 ENCOUNTER — HOSPITAL ENCOUNTER (OUTPATIENT)
Dept: ULTRASOUND IMAGING | Facility: HOSPITAL | Age: 83
End: 2023-02-03

## 2023-02-03 DIAGNOSIS — R31.9 HEMATURIA, UNDIAGNOSED CAUSE: ICD-10-CM

## 2023-02-13 ENCOUNTER — TELEPHONE (OUTPATIENT)
Dept: FAMILY MEDICINE CLINIC | Facility: HOSPITAL | Age: 83
End: 2023-02-13

## 2023-02-13 NOTE — TELEPHONE ENCOUNTER
Patient asking for referral to Dr Diallo Emerson, uro-gyn at [facility]  Wasn't sure if she needs uro-gyn services or regular uro before I put an order in for her?

## 2023-02-14 DIAGNOSIS — R31.9 HEMATURIA, UNDIAGNOSED CAUSE: Primary | ICD-10-CM

## 2023-02-14 DIAGNOSIS — N20.0 KIDNEY STONES: ICD-10-CM

## 2023-02-20 ENCOUNTER — TELEPHONE (OUTPATIENT)
Dept: UROLOGY | Facility: AMBULATORY SURGERY CENTER | Age: 83
End: 2023-02-20

## 2023-02-20 ENCOUNTER — TELEPHONE (OUTPATIENT)
Dept: FAMILY MEDICINE CLINIC | Facility: HOSPITAL | Age: 83
End: 2023-02-20

## 2023-02-20 NOTE — TELEPHONE ENCOUNTER
WE REFERRED ARNAV TO A URO/GYNECOLOGY AND SAID THAT DR Carlos GALVAN WOULD BE A GREAT CHOICE BUT THE OFFICE CALLED ARNAV BACK SAID SAID HE IS STRICTLY GNECOLOGY - SHE IS ASKING WHO DR Inocencia Herrera WOULD REFER HER TO:    HER FRIEND TOLD HER ABOUT DR Maddy Heaton  / DR HARRY     SHE WILL GO TO [facility] OR [place] WHOEVER DR Inocencia Herrera THINKS IS A GOOD CHOICE

## 2023-02-20 NOTE — TELEPHONE ENCOUNTER
Pt agreed to next Tuesday 2/28 at 10:30am with Dr Ricci in the [facility] office  Address and phone number provided

## 2023-02-20 NOTE — TELEPHONE ENCOUNTER
What is the reason for the patient’s appointment? NP- Bladder Stone  I didn't see any openings until April  IMPRESSION:  A 3 5 cm echogenic focus in the urinary bladder with questionable twinkle artifact and posterior acoustic shadowing as described above, likely representing an adherent urinary bladder calculus and less likely a polypoid urinary bladder mass lesion  Further differentiation with cystoscopy or CT abdomen pelvis is recommended  Patient can be reached at [phone]    What office location does the patient prefer? [place]    Any imaging done: u/s in [facility]      Do we accept the patient's insurance or is the patient Self-Pay? [facility]    Has the patient had any previous Urologist(s)? No    Have patient records been requested? No    Has the patient had any outside testing done? No    Does the patient have a personal history of cancer?  No

## 2023-02-20 NOTE — TELEPHONE ENCOUNTER
Spoke w/ patient  Number for [facility] given  She will call there and also [facility] and go wherever she can be seen sooner  Apparently, Dr Sonia Phillips only sees if GYN and URO issues, not just URO  No further needs at this time

## 2023-03-07 ENCOUNTER — TELEPHONE (OUTPATIENT)
Dept: FAMILY MEDICINE CLINIC | Facility: HOSPITAL | Age: 83
End: 2023-03-07

## 2023-03-07 DIAGNOSIS — G47.01 INSOMNIA DUE TO MEDICAL CONDITION: ICD-10-CM

## 2023-03-07 RX ORDER — ZOLPIDEM TARTRATE 10 MG/1
10 TABLET ORAL
Qty: 20 TABLET | Refills: 0 | Status: SHIPPED | OUTPATIENT
Start: 2023-03-07 | End: 2023-03-28 | Stop reason: SDUPTHER

## 2023-03-07 NOTE — TELEPHONE ENCOUNTER
Patient said Ambien is not working  She is asking if she can take a higher dosage (in which case she would need another script) or is there something else she can take instead? She is very concerned about her 3/22 surgery

## 2023-03-14 ENCOUNTER — TELEPHONE (OUTPATIENT)
Dept: FAMILY MEDICINE CLINIC | Facility: HOSPITAL | Age: 83
End: 2023-03-14

## 2023-03-14 NOTE — TELEPHONE ENCOUNTER
Pt called, did we receive paperwork Grecia from [facility][1], [address]  States they sent it yesterday, and she wants make sure we received it  If you did receive, Are you able to sign and fax back    Please advise, thank you

## 2023-03-28 DIAGNOSIS — G47.01 INSOMNIA DUE TO MEDICAL CONDITION: ICD-10-CM

## 2023-03-28 RX ORDER — ZOLPIDEM TARTRATE 10 MG/1
10 TABLET ORAL
Qty: 20 TABLET | Refills: 0 | Status: SHIPPED | OUTPATIENT
Start: 2023-03-28

## 2023-04-24 ENCOUNTER — TELEPHONE (OUTPATIENT)
Dept: OBGYN CLINIC | Facility: HOSPITAL | Age: 83
End: 2023-04-24

## 2023-04-24 ENCOUNTER — TELEPHONE (OUTPATIENT)
Dept: FAMILY MEDICINE CLINIC | Facility: HOSPITAL | Age: 83
End: 2023-04-24

## 2023-04-24 NOTE — TELEPHONE ENCOUNTER
Spoke to patient and advised that she can add tylenol 500mg with each of per Norco doses for added pain relief  Ice/heat 20 on 20 off, aspercream with lidocaine gel  Verbalized understanding  Appt on for tomorrow with Dr Rony Brewer

## 2023-04-24 NOTE — TELEPHONE ENCOUNTER
Caller: Patient    Doctor: Rony Brewer     Reason for call: Scheduled appt 4/25 for shoulder pain  Took Hydrocodone at 7am, and then at 2 any recommendations on how to get thru the night?  [facility]     Call back#: [phone]

## 2023-04-24 NOTE — TELEPHONE ENCOUNTER
Pt states she would like a refill for zolpidem (AMBIEN) 10 mg tablet but she would like it increased  10 mg doesn't seem to be enough     Pt can be reached at [phone]

## 2023-04-25 ENCOUNTER — OFFICE VISIT (OUTPATIENT)
Dept: OBGYN CLINIC | Facility: CLINIC | Age: 83
End: 2023-04-25

## 2023-04-25 VITALS
WEIGHT: 212 LBS | SYSTOLIC BLOOD PRESSURE: 137 MMHG | HEIGHT: 64 IN | DIASTOLIC BLOOD PRESSURE: 71 MMHG | HEART RATE: 66 BPM | BODY MASS INDEX: 36.19 KG/M2

## 2023-04-25 DIAGNOSIS — M77.8 LEFT SHOULDER TENDONITIS: ICD-10-CM

## 2023-04-25 DIAGNOSIS — M19.012 PRIMARY OSTEOARTHRITIS OF LEFT SHOULDER: Primary | ICD-10-CM

## 2023-04-25 RX ORDER — BUPIVACAINE HYDROCHLORIDE 2.5 MG/ML
2 INJECTION, SOLUTION INFILTRATION; PERINEURAL
Status: COMPLETED | OUTPATIENT
Start: 2023-04-25 | End: 2023-04-25

## 2023-04-25 RX ORDER — BETAMETHASONE SODIUM PHOSPHATE AND BETAMETHASONE ACETATE 3; 3 MG/ML; MG/ML
6 INJECTION, SUSPENSION INTRA-ARTICULAR; INTRALESIONAL; INTRAMUSCULAR; SOFT TISSUE
Status: COMPLETED | OUTPATIENT
Start: 2023-04-25 | End: 2023-04-25

## 2023-04-25 RX ADMIN — BETAMETHASONE SODIUM PHOSPHATE AND BETAMETHASONE ACETATE 6 MG: 3; 3 INJECTION, SUSPENSION INTRA-ARTICULAR; INTRALESIONAL; INTRAMUSCULAR; SOFT TISSUE at 09:52

## 2023-04-25 RX ADMIN — BUPIVACAINE HYDROCHLORIDE 2 ML: 2.5 INJECTION, SOLUTION INFILTRATION; PERINEURAL at 09:52

## 2023-04-25 NOTE — TELEPHONE ENCOUNTER
MARVINI: Spoke to pt, got a cortisone shot this AM, hoping that it will help decrease the pain and she'll be able to sleep, does not want to change the medication at this time  Will call back if the shot doesn't help and will look into changing medication

## 2023-04-25 NOTE — PROGRESS NOTES
Assessment:     1  Primary osteoarthritis of left shoulder    2  Left shoulder tendonitis        Plan:     Problem List Items Addressed This Visit        Musculoskeletal and Integument    Left shoulder tendonitis    Relevant Medications    betamethasone acetate-betamethasone sodium phosphate (CELESTONE) injection 6 mg (Completed)    bupivacaine (MARCAINE) 0 25 % injection 2 mL (Completed)    Other Relevant Orders    Large joint arthrocentesis: L subacromial bursa (Completed)    Primary osteoarthritis of left shoulder - Primary    Relevant Medications    betamethasone acetate-betamethasone sodium phosphate (CELESTONE) injection 6 mg (Completed)    bupivacaine (MARCAINE) 0 25 % injection 2 mL (Completed)    Other Relevant Orders    Large joint arthrocentesis: L glenohumeral (Completed)       Findings consistent with left shoulder tendinitis and aggravated glenohumeral osteoarthritis  Findings and treatment options were discussed with the patient  She did well with the cortisone injection 2 years ago  Recommend cortisone injections to the glenohumeral joint and subacromial space today  She tolerated the procedures well  Advised to apply cold compress today  Continue home exercises and stretches  Continue Tylenol as needed for pain  Follow-up as needed if symptoms return  Advised patient to soon as she can have another cortisone injection is in 3 to 4 months  All patient's questions were answered to her satisfaction  This note is created using dictation transcription  It may contain typographical errors, grammatical errors, improperly dictated words, background noise and other errors  Subjective:     Patient ID: Yoseph Goss is a 81 y o  female  Chief Complaint: This is an 23-year-old white female here for follow-up of left shoulder glenohumeral osteoarthritis  Last seen in 2021 and treated with glenohumeral cortisone injection that provided relief    She denies any recent injury or change in activities  She states that she has been having difficulty sleeping recently and tossing and turning in her bed   2 days ago she woke up with increased pain in the left shoulder  She is now having difficulty lifting her arm due to pain  Her PCP prescribed her hydrocodone which provides some relief      Allergy:  Allergies   Allergen Reactions   • Pneumovax [Pneumococcal Polysaccharide Vaccine] Hives   • Medical Tape    • Nuts - Food Allergy    • Omnipaque [Iohexol] Hives   • Other Hives     Pine nuts   • Penicillins Hives   • Sulfa Antibiotics Hives   • Iodine - Food Allergy Rash     Medications:  all current active meds have been reviewed  Past Medical History:  Past Medical History:   Diagnosis Date   • Anxiety    • Cholecystitis    • Chronic calculous cholecystitis     last assessed 4/25/16   • History of cardioversion    • History of radiofrequency ablation procedure for cardiac arrhythmia    • Hyperlipidemia    • Hypertension    • Sleep apnea     no trouble since Afib corrected     Past Surgical History:  Past Surgical History:   Procedure Laterality Date   • APPENDECTOMY     • BACK SURGERY  11/15/2018    L4-5 Screws and rods   • BLADDER SUSPENSION     • CARDIOVERSION      atrial - onset 2015 - x5 in 2014 and 2015   • CHOLECYSTECTOMY      April 2016   • HYSTERECTOMY     • KNEE ARTHROSCOPY W/ MENISCAL REPAIR Bilateral     therapeutic - last assessed 4/14/16   • KNEE SURGERY     • [place] LAPAROSCOPY SURG CHOLECYSTECTOMY N/A 4/26/2016    Procedure: CHOLECYSTECTOMY LAPAROSCOPIC;  Surgeon: Min Mercado MD;  Location:  MAIN OR;  Service: General   • TONSILLECTOMY     • WISDOM TOOTH EXTRACTION       Family History:  Family History   Problem Relation Age of Onset   • Cancer Mother         ovarian   • Heart disease Father         cardiac disorder   • Cancer Father    • Heart disease Brother    • Heart disease Paternal Aunt    • Heart disease Paternal Uncle      Social History:  Social History     Substance and "Sexual Activity   Alcohol Use Yes    Comment: social; occasional     Social History     Substance and Sexual Activity   Drug Use No     Social History     Tobacco Use   Smoking Status Never   Smokeless Tobacco Never     Review of Systems   Constitutional: Negative  HENT: Negative  Eyes: Negative  Respiratory: Negative  Cardiovascular: Negative  Gastrointestinal: Negative  Endocrine: Negative  Genitourinary: Negative  Musculoskeletal: Positive for arthralgias (Left shoulder)  Skin: Negative  Allergic/Immunologic: Negative  Neurological: Negative  Hematological: Negative  Psychiatric/Behavioral: Negative  Objective:  BP Readings from Last 1 Encounters:   04/25/23 137/71      Wt Readings from Last 1 Encounters:   04/25/23 96 2 kg (212 lb)      BMI:   Estimated body mass index is 36 39 kg/m² as calculated from the following:    Height as of this encounter: 5' 4\" (1 626 m)  Weight as of this encounter: 96 2 kg (212 lb)  BSA:   Estimated body surface area is 2 01 meters squared as calculated from the following:    Height as of this encounter: 5' 4\" (1 626 m)  Weight as of this encounter: 96 2 kg (212 lb)  Physical Exam  Vitals and nursing note reviewed  Constitutional:       General: She is not in acute distress  Appearance: Normal appearance  She is well-developed  HENT:      Head: Normocephalic and atraumatic  Right Ear: External ear normal       Left Ear: External ear normal    Eyes:      Extraocular Movements: Extraocular movements intact  Conjunctiva/sclera: Conjunctivae normal    Pulmonary:      Effort: Pulmonary effort is normal  No respiratory distress  Musculoskeletal:      Cervical back: Neck supple  Skin:     General: Skin is warm and dry  Neurological:      Mental Status: She is alert and oriented to person, place, and time  Deep Tendon Reflexes: Reflexes are normal and symmetric     Psychiatric:         Mood and Affect: Mood " normal          Behavior: Behavior normal        Left Shoulder Exam     Tenderness   The patient is experiencing tenderness in the biceps tendon  Range of Motion   Active abduction: 60 (Pain)   Forward flexion: 90 (Pain)   Internal rotation 0 degrees: Lumbar     Muscle Strength   Abduction: 5/5   Internal rotation: 5/5   External rotation: 5/5   Biceps: 5/5     Tests   Drop arm: negative    Other   Erythema: absent  Scars: absent  Sensation: normal  Pulse: present     Comments:  Pain with resisted abduction and internal rotation            No new imaging  Large joint arthrocentesis: L glenohumeral  Universal Protocol:  Consent: Verbal consent obtained  Risks and benefits: risks, benefits and alternatives were discussed  Consent given by: patient  Patient understanding: patient states understanding of the procedure being performed    Supporting Documentation  Indications: pain   Procedure Details  Location: shoulder - L glenohumeral  Preparation: Patient was prepped and draped in the usual sterile fashion  Needle size: 22 G  Ultrasound guidance: no  Approach: posterior  Medications administered: 6 mg betamethasone acetate-betamethasone sodium phosphate 6 (3-3) mg/mL; 2 mL bupivacaine 0 25 %    Patient tolerance: patient tolerated the procedure well with no immediate complications  Dressing:  Sterile dressing applied    Large joint arthrocentesis: L subacromial bursa  Universal Protocol:  Consent: Verbal consent obtained    Risks and benefits: risks, benefits and alternatives were discussed  Consent given by: patient  Patient understanding: patient states understanding of the procedure being performed    Supporting Documentation  Indications: pain   Procedure Details  Location: shoulder - L subacromial bursa  Preparation: Patient was prepped and draped in the usual sterile fashion  Needle size: 22 G  Approach: posterior  Medications administered: 6 mg betamethasone acetate-betamethasone sodium phosphate 6 (3-3) mg/mL; 2 mL bupivacaine 0 25 %    Patient tolerance: patient tolerated the procedure well with no immediate complications  Dressing:  Sterile dressing applied        Scribe Attestation    I,:  Ricky Nam PA-C am acting as a scribe while in the presence of the attending physician :       I,:  Luis Alcaraz MD personally performed the services described in this documentation    as scribed in my presence :

## 2023-05-02 ENCOUNTER — APPOINTMENT (OUTPATIENT)
Dept: RADIOLOGY | Facility: CLINIC | Age: 83
End: 2023-05-02

## 2023-05-02 ENCOUNTER — OFFICE VISIT (OUTPATIENT)
Dept: OBGYN CLINIC | Facility: CLINIC | Age: 83
End: 2023-05-02

## 2023-05-02 VITALS — DIASTOLIC BLOOD PRESSURE: 82 MMHG | SYSTOLIC BLOOD PRESSURE: 124 MMHG

## 2023-05-02 DIAGNOSIS — M54.2 NECK PAIN: ICD-10-CM

## 2023-05-02 DIAGNOSIS — M25.511 ACUTE PAIN OF RIGHT SHOULDER: ICD-10-CM

## 2023-05-02 DIAGNOSIS — M47.22 RADICULOPATHY DUE TO CERVICAL SPONDYLOSIS AT MULTIPLE LEVELS: Primary | ICD-10-CM

## 2023-05-02 DIAGNOSIS — M77.8 TENDINITIS OF RIGHT SHOULDER: ICD-10-CM

## 2023-05-02 RX ORDER — PREDNISONE 20 MG/1
60 TABLET ORAL DAILY
COMMUNITY
Start: 2023-04-29

## 2023-05-02 NOTE — PROGRESS NOTES
Assessment:     1  Radiculopathy due to cervical spondylosis at multiple levels    2  Tendinitis of right shoulder    3  Acute pain of right shoulder        Plan:     Problem List Items Addressed This Visit        Nervous and Auditory    Radiculopathy due to cervical spondylosis at multiple levels - Primary    Relevant Orders    XR spine cervical complete 4 or 5 vw non injury       Musculoskeletal and Integument    Tendinitis of right shoulder   Other Visit Diagnoses     Acute pain of right shoulder        Relevant Orders    XR shoulder 2+ vw right        Findings today are consistent with cervical osteoarthritis  Imaging and prognosis was reviewed with the patient today  Patient's symptoms most likely related to her cervical spine arthritis and disc degeneration  She does have tendinitis in her right shoulder, but she declined injection  She was encouraged to continue working on her motion  She may use Aspercreme or Voltaren gel to control her right shoulder pain  Patient cannot take NSAID  Cortisone injection to right shoulder offered, patient declined as her pain has been improving  Follow up as needed  All patient's questions were answered to her satisfaction  This note is created using dictation transcription  It may contain typographical errors, grammatical errors, improperly dictated words, background noise and other errors  Subjective:     Patient ID: Paula Stapleton is a 81 y o  female  Chief Complaint:  81 y o  female presents to the office for follow up of left shoulder osteoarthritis  At last visit she received a cortisone injection to both the left subacromial bursa and left glenohumeral joint  Roughly 3 days following the injections she started experiencing radiating pain down her left arm to her hand  She was then seen at [facility]  At that time she noted left wrist swelling, numbness into her left hand, and clawing of her left hand   She then started to experience these same symptoms on the right side to a lesser degree  She was placed on oxycodone and prednisone to control her pain  Today she is experiencing right lateral shoulder pain and right wrist weakness  She denies any neck pain       Allergy:  Allergies   Allergen Reactions    Pneumovax [Pneumococcal Polysaccharide Vaccine] Hives    Medical Tape     Nuts - Food Allergy     Omnipaque [Iohexol] Hives    Other Hives     Pine nuts    Penicillins Hives    Sulfa Antibiotics Hives    Iodine - Food Allergy Rash     Medications:  all current active meds have been reviewed  Past Medical History:  Past Medical History:   Diagnosis Date    Anxiety     Cholecystitis     Chronic calculous cholecystitis     last assessed 4/25/16    History of cardioversion     History of radiofrequency ablation procedure for cardiac arrhythmia     Hyperlipidemia     Hypertension     Sleep apnea     no trouble since Afib corrected     Past Surgical History:  Past Surgical History:   Procedure Laterality Date    APPENDECTOMY      BACK SURGERY  11/15/2018    L4-5 Screws and rods    BLADDER SUSPENSION      CARDIOVERSION      atrial - onset 2015 - x5 in 2014 and 2015    CHOLECYSTECTOMY      April 2016    HYSTERECTOMY      KNEE ARTHROSCOPY W/ MENISCAL REPAIR Bilateral     therapeutic - last assessed 4/14/16    KNEE SURGERY      [place] LAPAROSCOPY SURG CHOLECYSTECTOMY N/A 4/26/2016    Procedure: CHOLECYSTECTOMY LAPAROSCOPIC;  Surgeon: Katherine Lucero MD;  Location: [facility] OR;  Service: General    TONSILLECTOMY      WISDOM TOOTH EXTRACTION       Family History:  Family History   Problem Relation Age of Onset    Cancer Mother         ovarian    Heart disease Father         cardiac disorder    Cancer Father     Heart disease Brother     Heart disease Paternal Aunt     Heart disease Paternal Uncle      Social History:  Social History     Substance and Sexual Activity   Alcohol Use Yes    Comment: social; occasional     Social History "    Substance and Sexual Activity   Drug Use No     Social History     Tobacco Use   Smoking Status Never   Smokeless Tobacco Never     Review of Systems   Constitutional: Negative for chills and fever  HENT: Negative for drooling and sneezing  Eyes: Negative for redness  Respiratory: Negative for cough and wheezing  Gastrointestinal: Negative for nausea and vomiting  Endocrine: Negative  Genitourinary: Negative  Musculoskeletal: Positive for arthralgias (Right shoulder), joint swelling and myalgias  Neurological: Positive for weakness (Right wrist)  Psychiatric/Behavioral: Negative for behavioral problems  The patient is not nervous/anxious  Objective:  BP Readings from Last 1 Encounters:   05/02/23 124/82      Wt Readings from Last 1 Encounters:   04/25/23 96 2 kg (212 lb)      BMI:   Estimated body mass index is 36 39 kg/m² as calculated from the following:    Height as of 4/25/23: 5' 4\" (1 626 m)  Weight as of 4/25/23: 96 2 kg (212 lb)  BSA:   Estimated body surface area is 2 01 meters squared as calculated from the following:    Height as of 4/25/23: 5' 4\" (1 626 m)  Weight as of 4/25/23: 96 2 kg (212 lb)  Physical Exam  Vitals and nursing note reviewed  Constitutional:       Appearance: Normal appearance  She is well-developed  HENT:      Head: Normocephalic and atraumatic  Right Ear: External ear normal       Left Ear: External ear normal    Eyes:      Extraocular Movements: Extraocular movements intact  Conjunctiva/sclera: Conjunctivae normal    Neck:      Trachea: No tracheal deviation  Pulmonary:      Effort: Pulmonary effort is normal    Musculoskeletal:      Cervical back: Neck supple  Skin:     General: Skin is warm and dry  Neurological:      Mental Status: She is alert and oriented to person, place, and time  Deep Tendon Reflexes: Reflexes are normal and symmetric     Psychiatric:         Mood and Affect: Mood normal          Behavior: " Behavior normal        Right Hand Exam     Tenderness   The patient is experiencing no tenderness  Muscle Strength   : 4/5     Tests   Phalens Sign: negative  Tinel's sign (median nerve): negative    Other   Erythema: absent  Sensation: normal  Pulse: present      Right Shoulder Exam     Tenderness   The patient is experiencing tenderness in the acromion  Range of Motion   Right shoulder forward flexion: 90 degrees active and 120 passive  Muscle Strength   Abduction: 5/5   Internal rotation: 5/5   External rotation: 5/5     Tests   Drop arm: positive    Other   Sensation: normal  Pulse: present    Comments:  No pain with cervical motion  - Spurlings  + painful arc      Left Shoulder Exam     Tenderness   The patient is experiencing no tenderness  Range of Motion   The patient has normal left shoulder ROM  Muscle Strength   The patient has normal left shoulder strength  Other   Sensation: normal  Pulse: present     Comments:  No pain with cervical motion  - Spurlings            I have personally reviewed pertinent films in PACS and my interpretation is cervical degenerative changes noted and right shoulder type 2 acromion  No glenohumeral joint osteoarthritis and moderate AC joint osteoarthritis       Scribe Attestation    I,:  Odalis Horne am acting as a scribe while in the presence of the attending physician :       I,:  Jeanine Gutierres MD personally performed the services described in this documentation    as scribed in my presence :

## 2023-05-03 ENCOUNTER — TELEPHONE (OUTPATIENT)
Dept: FAMILY MEDICINE CLINIC | Facility: HOSPITAL | Age: 83
End: 2023-05-03

## 2023-05-03 NOTE — TELEPHONE ENCOUNTER
Patient seen in ED at [facility] for arm pain  Patient was concerned for cardiac origins  [facility] said it was carpal tunnel related  Patient saw Dr Ninoska Dempsey yesterday who said it is cervical osteoarthritis  He offered the patient a cortisone shot but patient declined  Family has suggested Tylenol Arthritis and/or Osteo Bi-flex  Patient currently takes regular tylenol  Patient asking if ok to use the tylenol arthritis and/or osteo bi-flex? She also has a call into her cardiologist with same question      pcb

## 2023-05-09 ENCOUNTER — TELEPHONE (OUTPATIENT)
Dept: OBGYN CLINIC | Facility: MEDICAL CENTER | Age: 83
End: 2023-05-09

## 2023-05-09 ENCOUNTER — OFFICE VISIT (OUTPATIENT)
Dept: OBGYN CLINIC | Facility: CLINIC | Age: 83
End: 2023-05-09

## 2023-05-09 VITALS
HEIGHT: 64 IN | DIASTOLIC BLOOD PRESSURE: 80 MMHG | BODY MASS INDEX: 36.7 KG/M2 | SYSTOLIC BLOOD PRESSURE: 124 MMHG | WEIGHT: 215 LBS

## 2023-05-09 DIAGNOSIS — M77.8 TENDINITIS OF RIGHT SHOULDER: Primary | ICD-10-CM

## 2023-05-09 DIAGNOSIS — M47.22 RADICULOPATHY DUE TO CERVICAL SPONDYLOSIS AT MULTIPLE LEVELS: ICD-10-CM

## 2023-05-09 RX ORDER — BUPIVACAINE HYDROCHLORIDE 2.5 MG/ML
4 INJECTION, SOLUTION INFILTRATION; PERINEURAL
Status: COMPLETED | OUTPATIENT
Start: 2023-05-09 | End: 2023-05-09

## 2023-05-09 RX ORDER — METHYLPREDNISOLONE 4 MG/1
TABLET ORAL
Qty: 1 EACH | Refills: 0 | Status: SHIPPED | OUTPATIENT
Start: 2023-05-09

## 2023-05-09 RX ORDER — BETAMETHASONE SODIUM PHOSPHATE AND BETAMETHASONE ACETATE 3; 3 MG/ML; MG/ML
6 INJECTION, SUSPENSION INTRA-ARTICULAR; INTRALESIONAL; INTRAMUSCULAR; SOFT TISSUE
Status: COMPLETED | OUTPATIENT
Start: 2023-05-09 | End: 2023-05-09

## 2023-05-09 RX ADMIN — BETAMETHASONE SODIUM PHOSPHATE AND BETAMETHASONE ACETATE 6 MG: 3; 3 INJECTION, SUSPENSION INTRA-ARTICULAR; INTRALESIONAL; INTRAMUSCULAR; SOFT TISSUE at 12:07

## 2023-05-09 RX ADMIN — BUPIVACAINE HYDROCHLORIDE 4 ML: 2.5 INJECTION, SOLUTION INFILTRATION; PERINEURAL at 10:30

## 2023-05-09 NOTE — TELEPHONE ENCOUNTER
I spoke with Tiago Sutton and advised her to talk to her pharmacist about medication directions that she couldn't see to read them  Also gave her the number for OT to have a custom splint made for her  Patient was satisfied with outcome

## 2023-05-09 NOTE — PROGRESS NOTES
Assessment:     1  Tendinitis of right shoulder    2  Radiculopathy due to cervical spondylosis at multiple levels        Plan:     Problem List Items Addressed This Visit        Nervous and Auditory    Radiculopathy due to cervical spondylosis at multiple levels    Relevant Medications    methylPREDNISolone 4 MG tablet therapy pack    Other Relevant Orders    EMG 2 Limb Upper Extremity    Ambulatory Referral to PT/OT Hand Therapy       Musculoskeletal and Integument    Tendinitis of right shoulder - Primary    Relevant Medications    bupivacaine (MARCAINE) 0 25 % injection 4 mL (Completed)    betamethasone acetate-betamethasone sodium phosphate (CELESTONE) injection 6 mg (Completed)    Other Relevant Orders    Large joint arthrocentesis: R subacromial bursa (Completed)       Findings today are consistent with cervical osteoarthritis with radiculopathy and right shoulder tendonitis  Patient was seen last week and declined the cortisone injection to the right shoulder at that time  Pain is worsening and she was given the cortisone injection to the right subacromial space today  She tolerated the procedure well  Advised to apply cold compress today  Advised patient as soon as she can have another cortisone injection is in 3 to 4 months  We will also order an EMG of the bilateral upper extremities due to continued radicular symptoms  We will have the occupational therapist make a custom molded volar wrist splint to use on the right side at nighttime  She was also prescribed a Medrol Dosepak to help reduce inflammation and pain  She will follow-up after the EMG to go over the results  All patient's questions were answered to her satisfaction  This note is created using dictation transcription  It may contain typographical errors, grammatical errors, improperly dictated words, background noise and other errors  Subjective:     Patient ID: Beatriz Yates is a 81 y o  female    Chief Complaint:  81 y o  female following up for cervical spine spondylosis and right shoulder tendinitis  She was last seen a week ago and offered a cortisone injection to the right shoulder subacromial space, but she declined at that time  She returns to the office today due to worsening pain in the right shoulder  She would like the cortisone injection today  She also continues to have radicular symptoms down the bilateral upper extremities to her fingers       Allergy:  Allergies   Allergen Reactions   • Pneumovax [Pneumococcal Polysaccharide Vaccine] Hives   • Medical Tape    • Nuts - Food Allergy    • Omnipaque [Iohexol] Hives   • Other Hives     Pine nuts   • Penicillins Hives   • Sulfa Antibiotics Hives   • Iodine - Food Allergy Rash     Medications:  all current active meds have been reviewed  Past Medical History:  Past Medical History:   Diagnosis Date   • Anxiety    • Cholecystitis    • Chronic calculous cholecystitis     last assessed 4/25/16   • History of cardioversion    • History of radiofrequency ablation procedure for cardiac arrhythmia    • Hyperlipidemia    • Hypertension    • Sleep apnea     no trouble since Afib corrected     Past Surgical History:  Past Surgical History:   Procedure Laterality Date   • APPENDECTOMY     • BACK SURGERY  11/15/2018    L4-5 Screws and rods   • BLADDER SUSPENSION     • CARDIOVERSION      atrial - onset 2015 - x5 in 2014 and 2015   • CHOLECYSTECTOMY      April 2016   • HYSTERECTOMY     • KNEE ARTHROSCOPY W/ MENISCAL REPAIR Bilateral     therapeutic - last assessed 4/14/16   • KNEE SURGERY     • [place] LAPAROSCOPY SURG CHOLECYSTECTOMY N/A 4/26/2016    Procedure: CHOLECYSTECTOMY LAPAROSCOPIC;  Surgeon: Ray Gotti MD;  Location: [facility] OR;  Service: General   • TONSILLECTOMY     • WISDOM TOOTH EXTRACTION       Family History:  Family History   Problem Relation Age of Onset   • Cancer Mother         ovarian   • Heart disease Father         cardiac disorder   • Cancer Father    • Heart disease "Brother    • Heart disease Paternal Aunt    • Heart disease Paternal Uncle      Social History:  Social History     Substance and Sexual Activity   Alcohol Use Yes    Comment: social; occasional     Social History     Substance and Sexual Activity   Drug Use No     Social History     Tobacco Use   Smoking Status Never   Smokeless Tobacco Never     Review of Systems   Constitutional: Negative for chills and fever  HENT: Negative for drooling and sneezing  Eyes: Negative for redness  Respiratory: Negative for cough and wheezing  Gastrointestinal: Negative for nausea and vomiting  Endocrine: Negative  Genitourinary: Negative  Musculoskeletal: Positive for arthralgias (Right shoulder, cervical spine)  Neurological: Positive for weakness (Right wrist)  Psychiatric/Behavioral: Negative for behavioral problems  The patient is not nervous/anxious  Objective:  BP Readings from Last 1 Encounters:   05/09/23 124/80      Wt Readings from Last 1 Encounters:   05/09/23 97 5 kg (215 lb)      BMI:   Estimated body mass index is 36 9 kg/m² as calculated from the following:    Height as of this encounter: 5' 4\" (1 626 m)  Weight as of this encounter: 97 5 kg (215 lb)  BSA:   Estimated body surface area is 2 02 meters squared as calculated from the following:    Height as of this encounter: 5' 4\" (1 626 m)  Weight as of this encounter: 97 5 kg (215 lb)  Physical Exam  Vitals and nursing note reviewed  Constitutional:       Appearance: Normal appearance  She is well-developed  HENT:      Head: Normocephalic and atraumatic  Right Ear: External ear normal       Left Ear: External ear normal    Eyes:      Extraocular Movements: Extraocular movements intact  Conjunctiva/sclera: Conjunctivae normal    Neck:      Trachea: No tracheal deviation  Pulmonary:      Effort: Pulmonary effort is normal    Musculoskeletal:      Cervical back: Neck supple     Skin:     General: Skin is warm and " dry    Neurological:      Mental Status: She is alert and oriented to person, place, and time  Deep Tendon Reflexes: Reflexes are normal and symmetric  Psychiatric:         Mood and Affect: Mood normal          Behavior: Behavior normal        Right Hand Exam     Tenderness   The patient is experiencing no tenderness  Muscle Strength   : 4/5     Tests   Phalen’s Sign: negative  Tinel's sign (median nerve): negative    Other   Erythema: absent  Sensation: normal  Pulse: present      Right Shoulder Exam     Tenderness   The patient is experiencing tenderness in the acromion  Range of Motion   Right shoulder forward flexion: 90 degrees active and 120 passive  Muscle Strength   Abduction: 5/5   Internal rotation: 5/5   External rotation: 5/5     Tests   Drop arm: positive    Other   Sensation: normal  Pulse: present    Comments:  No pain with cervical motion  - Spurlings  + painful arc            No new imaging  Large joint arthrocentesis: R subacromial bursa  Universal Protocol:  Consent: Verbal consent obtained    Risks and benefits: risks, benefits and alternatives were discussed  Consent given by: patient  Patient understanding: patient states understanding of the procedure being performed  Site marked: the operative site was marked  Supporting Documentation  Indications: pain   Procedure Details  Location: shoulder - R subacromial bursa  Preparation: Patient was prepped and draped in the usual sterile fashion  Needle size: 22 G  Ultrasound guidance: no  Approach: posterolateral  Medications administered: 4 mL bupivacaine 0 25 %; 6 mg betamethasone acetate-betamethasone sodium phosphate 6 (3-3) mg/mL    Patient tolerance: patient tolerated the procedure well with no immediate complications  Dressing:  Sterile dressing applied        Scribe Attestation    I,:  Delma Townsend PA-C am acting as a scribe while in the presence of the attending physician :       I,:  Jorge Zamora MD personally performed the services described in this documentation    as scribed in my presence :

## 2023-05-09 NOTE — TELEPHONE ENCOUNTER
Caller: Patient    Doctor: Akira Guerra    Reason for call:     Patient is calling on the direction for the methylPREDNISolone 4 MG tablet therapy pack, she is confused the directions in the packet  She is asking for a call back to go over the directions of this medication      Call back#: [phone]

## 2023-05-11 ENCOUNTER — TELEPHONE (OUTPATIENT)
Dept: OBGYN CLINIC | Facility: HOSPITAL | Age: 83
End: 2023-05-11

## 2023-05-11 NOTE — TELEPHONE ENCOUNTER
I spoke to Felicity Wick and she is concerned about her EMG being scheduled for Feb 2024  I will forward her info for this to [place] () to see if she can contact someone to move appt up  Dr Joyce Rojas did not order an MRI and I explained this to Felicity Wick  I explained to Felicity Wick that Dr Joyce Rojas did not order Physical therapy nor is that something he wants to order at this time  I explained that the script was for occupational therapy to make a custom splint and on 5/9 I had given her this info with the number to call to schedule an appt for the splint to be made  I explained this again today  Felicity Wick was satisfied with outcome of this phone conversation

## 2023-05-11 NOTE — TELEPHONE ENCOUNTER
Caller: Self    Doctor: Umair Garcia    Reason for call: Patient states she would be getting a call to schedule EMG but has not heard anything   Warm transferred to central scheduling    Call back#: [phone]

## 2023-05-11 NOTE — TELEPHONE ENCOUNTER
Caller: ELKIN LUBIN  [facility] Central Scheduling    Doctor: Helen Arce    Reason for call: Patient called to schedule EMG and also mentioned a MRI but there is no order in the system  Central scheduling calling to  Confirm if patient needs MRI also      Call back#: [phone] patient

## 2023-05-12 ENCOUNTER — OFFICE VISIT (OUTPATIENT)
Dept: OCCUPATIONAL THERAPY | Facility: CLINIC | Age: 83
End: 2023-05-12

## 2023-05-12 DIAGNOSIS — M47.22 RADICULOPATHY DUE TO CERVICAL SPONDYLOSIS AT MULTIPLE LEVELS: ICD-10-CM

## 2023-05-12 NOTE — PROGRESS NOTES
Splint    Diagnosis:   1  Radiculopathy due to cervical spondylosis at multiple levels  Ambulatory Referral to PT/OT Hand Therapy         Indication: night time N/T management    Location: Right  wrist  Supplies: Orthotics  Thermoplastic material    Splint type: Volar wrist  Wearing Schedule: Wear at night time  Describe Position: Forearm based wrist in neutral    Precautions: Standard    Patient or Caregiver expresses understanding of wearing Schedule and Precautions? Yes  Patient or Caregiver able to don/doff orthotic independently? Yes    Written orders provided to patient?  Yes  Orders Obtained: Written  Orders Obtained from: Dr Adri Newman

## 2023-05-25 ENCOUNTER — TELEPHONE (OUTPATIENT)
Dept: FAMILY MEDICINE CLINIC | Facility: HOSPITAL | Age: 83
End: 2023-05-25

## 2023-05-25 ENCOUNTER — TELEPHONE (OUTPATIENT)
Dept: OBGYN CLINIC | Facility: CLINIC | Age: 83
End: 2023-05-25

## 2023-05-25 NOTE — TELEPHONE ENCOUNTER
Dr Erwin Broussard gave Trevor Quezada a script for an EMG but [facility] can't schedule until February and that would be in [facility], she is not going that far    Asking if there is somewhere else or someone else that Dr Tammy Nieves would recommend for her to see - she doesn't just want to go to anyone

## 2023-05-25 NOTE — TELEPHONE ENCOUNTER
Called patient and explained that it has only been 24 hrs since I sent the last message to the EMG scheduling advocate  I also let her know that I called the advocate and left a voicemail  Patient was appreciative of the information  Patient also requested to  the EMG script to see if she can get the procedure done somewhere else sooner  Printed script and gave to the  as patient states she will be in today to

## 2023-05-31 ENCOUNTER — TELEPHONE (OUTPATIENT)
Dept: OBGYN CLINIC | Facility: HOSPITAL | Age: 83
End: 2023-05-31

## 2023-05-31 NOTE — TELEPHONE ENCOUNTER
Caller: Patient     Doctor: Lula Faith    Reason for call: Patient is calling to verify that her EMG is for her arm   Advised yes    Call back#: n/a

## 2023-06-23 ENCOUNTER — TELEPHONE (OUTPATIENT)
Dept: OBGYN CLINIC | Facility: HOSPITAL | Age: 83
End: 2023-06-23

## 2023-06-23 NOTE — TELEPHONE ENCOUNTER
Caller: Neel Mail    Doctor: Madisyn Mello    Reason for call: Requesting script for EMG be faxed, patient at facility now      Fax#: [phone]

## 2023-06-27 ENCOUNTER — OFFICE VISIT (OUTPATIENT)
Dept: OBGYN CLINIC | Facility: CLINIC | Age: 83
End: 2023-06-27
Payer: MEDICARE

## 2023-06-27 VITALS
DIASTOLIC BLOOD PRESSURE: 80 MMHG | HEIGHT: 64 IN | SYSTOLIC BLOOD PRESSURE: 124 MMHG | WEIGHT: 217 LBS | BODY MASS INDEX: 37.05 KG/M2

## 2023-06-27 DIAGNOSIS — G56.03 BILATERAL CARPAL TUNNEL SYNDROME: ICD-10-CM

## 2023-06-27 DIAGNOSIS — M47.22 RADICULOPATHY DUE TO CERVICAL SPONDYLOSIS AT MULTIPLE LEVELS: Primary | ICD-10-CM

## 2023-06-27 PROCEDURE — 99214 OFFICE O/P EST MOD 30 MIN: CPT | Performed by: ORTHOPAEDIC SURGERY

## 2023-06-27 NOTE — PROGRESS NOTES
Assessment:     1  Radiculopathy due to cervical spondylosis at multiple levels    2  Bilateral carpal tunnel syndrome        Plan:     Problem List Items Addressed This Visit        Nervous and Auditory    Radiculopathy due to cervical spondylosis at multiple levels - Primary    Bilateral carpal tunnel syndrome       Findings today are consistent with cervical osteoarthritis with radiculopathy and bilateral carpal tunnel syndrome, improved  Reviewed patient's bilateral upper extremity EMG with her  I discussed prognosis of her condition  Patient's neck, shoulder, and hands symptoms seem to have settled down  Patient can continue to use the cock-up wrist brace at nighttime  I advised patient to contact me if her symptoms returns  All patient's questions were answered to her satisfaction  This note is created using dictation transcription  It may contain typographical errors, grammatical errors, improperly dictated words, background noise and other errors  Subjective:     Patient ID: Mian Christine is a 81 y o  female  Chief Complaint:  81 y o  female following up for cervical spine spondylosis and right shoulder pain with radicular symptoms  Patient is here to review her EMG  Her neck and shoulder pain seem to have settled down  The wrist brace also seem to be helping with her pain numbness and pain  Patient is feeling much better with her neck and shoulder  She has no pain today       Allergy:  Allergies   Allergen Reactions   • Pneumovax [Pneumococcal Polysaccharide Vaccine] Hives   • Medical Tape    • Nuts - Food Allergy    • Omnipaque [Iohexol] Hives   • Other Hives     Pine nuts   • Penicillins Hives   • Sulfa Antibiotics Hives   • Iodine - Food Allergy Rash     Medications:  all current active meds have been reviewed  Past Medical History:  Past Medical History:   Diagnosis Date   • Anxiety    • Cholecystitis    • Chronic calculous cholecystitis     last assessed 4/25/16   • History of cardioversion    • History of radiofrequency ablation procedure for cardiac arrhythmia    • Hyperlipidemia    • Hypertension    • Sleep apnea     no trouble since Afib corrected     Past Surgical History:  Past Surgical History:   Procedure Laterality Date   • APPENDECTOMY     • BACK SURGERY  11/15/2018    L4-5 Screws and rods   • BLADDER SUSPENSION     • CARDIOVERSION      atrial - onset 2015 - x5 in 2014 and 2015   • CHOLECYSTECTOMY      April 2016   • HYSTERECTOMY     • KNEE ARTHROSCOPY W/ MENISCAL REPAIR Bilateral     therapeutic - last assessed 4/14/16   • KNEE SURGERY     • [place] LAPAROSCOPY SURG CHOLECYSTECTOMY N/A 4/26/2016    Procedure: CHOLECYSTECTOMY LAPAROSCOPIC;  Surgeon: Kelli Moreno MD;  Location:  MAIN OR;  Service: General   • TONSILLECTOMY     • WISDOM TOOTH EXTRACTION       Family History:  Family History   Problem Relation Age of Onset   • Cancer Mother         ovarian   • Heart disease Father         cardiac disorder   • Cancer Father    • Heart disease Brother    • Heart disease Paternal Aunt    • Heart disease Paternal Uncle      Social History:  Social History     Substance and Sexual Activity   Alcohol Use Yes    Comment: social; occasional     Social History     Substance and Sexual Activity   Drug Use No     Social History     Tobacco Use   Smoking Status Never   Smokeless Tobacco Never     Review of Systems   Constitutional: Negative for chills and fever  HENT: Negative for drooling and sneezing  Eyes: Negative for redness  Respiratory: Negative for cough and wheezing  Gastrointestinal: Negative for nausea and vomiting  Endocrine: Negative  Genitourinary: Negative  Musculoskeletal: Positive for arthralgias (Right shoulder, cervical spine)  Neurological: Positive for weakness (Right wrist)  Psychiatric/Behavioral: Negative for behavioral problems  The patient is not nervous/anxious            Objective:  BP Readings from Last 1 Encounters:   06/27/23 124/80      Wt "Readings from Last 1 Encounters:   06/27/23 98 4 kg (217 lb)      BMI:   Estimated body mass index is 37 25 kg/m² as calculated from the following:    Height as of this encounter: 5' 4\" (1 626 m)  Weight as of this encounter: 98 4 kg (217 lb)  BSA:   Estimated body surface area is 2 02 meters squared as calculated from the following:    Height as of this encounter: 5' 4\" (1 626 m)  Weight as of this encounter: 98 4 kg (217 lb)  Physical Exam  Vitals and nursing note reviewed  Constitutional:       Appearance: Normal appearance  She is well-developed  HENT:      Head: Normocephalic and atraumatic  Right Ear: External ear normal       Left Ear: External ear normal    Eyes:      Extraocular Movements: Extraocular movements intact  Conjunctiva/sclera: Conjunctivae normal    Neck:      Trachea: No tracheal deviation  Pulmonary:      Effort: Pulmonary effort is normal    Musculoskeletal:      Cervical back: Neck supple  Skin:     General: Skin is warm and dry  Neurological:      Mental Status: She is alert and oriented to person, place, and time  Deep Tendon Reflexes: Reflexes are normal and symmetric  Psychiatric:         Mood and Affect: Mood normal          Behavior: Behavior normal        Back Exam     Tenderness   The patient is experiencing no tenderness  Range of Motion   Extension: 30   Flexion: normal   Lateral bend right: normal   Lateral bend left: normal   Rotation right: normal   Rotation left: normal     Other   Sensation: normal      Right Hand Exam     Tenderness   The patient is experiencing no tenderness  Range of Motion   The patient has normal right wrist ROM  Muscle Strength   : 4/5     Tests   Phalen’s Sign: negative  Tinel's sign (median nerve): negative    Other   Erythema: absent  Sensation: normal  Pulse: present      Left Hand Exam     Tenderness   The patient is experiencing no tenderness       Range of Motion   The patient has normal left " wrist ROM  Muscle Strength   :  4/5     Tests   Phalen’s Sign: negative  Tinel's sign (median nerve): negative    Other   Erythema: absent  Sensation: normal  Pulse: present      Right Shoulder Exam     Tenderness   The patient is experiencing tenderness in the acromion  Range of Motion   Right shoulder forward flexion: 90 degrees active and 120 passive  Muscle Strength   Abduction: 5/5   Internal rotation: 5/5   External rotation: 5/5     Tests   Drop arm: positive    Other   Sensation: normal  Pulse: present    Comments:  No pain with cervical motion  - Spurlings  + painful arc            I have personally reviewed pertinent films in PACS and my interpretation is Bilateral upper extremity EMG show moderate carpal tunnel syndrome  No cervical spine radiculopathy       Procedures  Scribe Attestation    I,:   am acting as a scribe while in the presence of the attending physician :       I,:   personally performed the services described in this documentation    as scribed in my presence :

## 2023-07-10 DIAGNOSIS — E03.9 ACQUIRED HYPOTHYROIDISM: ICD-10-CM

## 2023-07-11 ENCOUNTER — TELEPHONE (OUTPATIENT)
Dept: FAMILY MEDICINE CLINIC | Facility: HOSPITAL | Age: 83
End: 2023-07-11

## 2023-07-11 DIAGNOSIS — E03.9 ACQUIRED HYPOTHYROIDISM: ICD-10-CM

## 2023-07-11 RX ORDER — DILTIAZEM HYDROCHLORIDE 360 MG/1
360 CAPSULE, EXTENDED RELEASE ORAL DAILY
Qty: 10 CAPSULE | Refills: 0 | Status: ON HOLD | OUTPATIENT
Start: 2023-07-11

## 2023-07-11 RX ORDER — LEVOTHYROXINE SODIUM 175 UG/1
TABLET ORAL
Qty: 90 TABLET | Refills: 3 | Status: SHIPPED | OUTPATIENT
Start: 2023-07-11 | End: 2023-07-11 | Stop reason: SDUPTHER

## 2023-07-11 RX ORDER — LEVOTHYROXINE SODIUM 175 UG/1
175 TABLET ORAL DAILY
Qty: 90 TABLET | Refills: 3 | Status: SHIPPED | OUTPATIENT
Start: 2023-07-11 | End: 2023-07-17 | Stop reason: SDUPTHER

## 2023-07-11 NOTE — TELEPHONE ENCOUNTER
diltiazem (CARDIZEM CD) 360 MG 24 hr capsule    [facility]  Needs 10 tablets. The mail order mix up and she is out. They said it will be there in ten days.     Needs new script to go to mail order for     levothyroxine 175 mcg tablet    And    diltiazem (CARDIZEM CD) 360 MG 24 hr capsule

## 2023-07-17 ENCOUNTER — TELEPHONE (OUTPATIENT)
Dept: FAMILY MEDICINE CLINIC | Facility: HOSPITAL | Age: 83
End: 2023-07-17

## 2023-07-17 DIAGNOSIS — E03.9 ACQUIRED HYPOTHYROIDISM: ICD-10-CM

## 2023-07-17 RX ORDER — LEVOTHYROXINE SODIUM 175 UG/1
175 TABLET ORAL DAILY
Qty: 90 TABLET | Refills: 3 | Status: SHIPPED | OUTPATIENT
Start: 2023-07-17

## 2023-07-17 NOTE — TELEPHONE ENCOUNTER
Yassine Stoddard from [facility] called to clarify if they should fill     levothyroxine 175 mcg tablet    She stated they got the script last week but they also got a message saying Not to Fill/    Reference # for her call was     [id number]    She asked for a response before the end of the day

## 2023-07-18 DIAGNOSIS — E03.9 ACQUIRED HYPOTHYROIDISM: ICD-10-CM

## 2023-07-18 RX ORDER — LEVOTHYROXINE SODIUM 175 UG/1
175 TABLET ORAL DAILY
Qty: 90 TABLET | Refills: 3 | OUTPATIENT
Start: 2023-07-18

## 2023-07-19 DIAGNOSIS — G56.03 BILATERAL CARPAL TUNNEL SYNDROME: ICD-10-CM

## 2023-07-19 PROBLEM — N21.0 CALCULUS IN BLADDER: Status: ACTIVE | Noted: 2023-02-27

## 2023-07-19 PROBLEM — Z98.890 STATUS POST SPINAL SURGERY: Status: ACTIVE | Noted: 2018-11-15

## 2023-07-19 PROBLEM — N63.0 BREAST MASS: Status: ACTIVE | Noted: 2023-02-28

## 2023-07-19 RX ORDER — HYDROCODONE BITARTRATE AND ACETAMINOPHEN 5; 325 MG/1; MG/1
1 TABLET ORAL
Qty: 20 TABLET | Refills: 0 | Status: SHIPPED | OUTPATIENT
Start: 2023-07-19

## 2023-07-19 NOTE — PROGRESS NOTES
Name: Nathalie Lan      : 1940      MRN: [id number]  Encounter Provider: Galileo Gan MD  Encounter Date: 2023   Encounter department: [facility] PRIMARY CARE [address]     Assessment & Plan     1. Essential hypertension    2. Bilateral carpal tunnel syndrome  -     gabapentin (NEURONTIN) 100 mg capsule; Take 3 capsules (300 mg total) by mouth 3 (three) times a day  -     HYDROcodone-acetaminophen (Norco) 5-325 mg per tablet; Take 1 tablet by mouth daily at bedtime as needed for pain Max Daily Amount: 1 tablet    3. Paroxysmal atrial fibrillation (HCC)    4. Acquired hypothyroidism    5. Obstructive sleep apnea    6. Gait abnormality    7. Mixed hyperlipidemia    8. Mood disorder (HCC)           Subjective     6 month follow up.    Having severe pain in both wrists/arms since April  Had EMG ordered by Dr Gabriel and noted moderate bilateral CTS  Using wrist splint L wrist        Review of Systems    Past Medical History:   Diagnosis Date   • Anxiety    • Carpal tunnel syndrome, bilateral    • Cholecystitis    • Chronic calculous cholecystitis     last assessed 16   • History of cardioversion    • History of radiofrequency ablation procedure for cardiac arrhythmia    • Hyperlipidemia    • Hypertension    • Sleep apnea     no trouble since Afib corrected     Past Surgical History:   Procedure Laterality Date   • APPENDECTOMY     • BACK SURGERY  11/15/2018    L4-5 Screws and rods   • BLADDER SUSPENSION     • CARDIOVERSION      atrial - onset 2015 - x5 in  and    • CHOLECYSTECTOMY      2016   • HYSTERECTOMY     • KNEE ARTHROSCOPY W/ MENISCAL REPAIR Bilateral     therapeutic - last assessed 16   • KNEE SURGERY     • MO LAPAROSCOPY SURG CHOLECYSTECTOMY N/A 2016    Procedure: CHOLECYSTECTOMY LAPAROSCOPIC;  Surgeon: Hudson Nguyen MD;  Location: [facility] OR;  Service: General   • TONSILLECTOMY     • WISDOM TOOTH EXTRACTION       Family History   Problem Relation  Age of Onset   • Cancer Mother         ovarian   • Heart disease Father         cardiac disorder   • Cancer Father    • Heart disease Brother    • Heart disease Paternal Aunt    • Heart disease Paternal Uncle      Social History     Socioeconomic History   • Marital status:      Spouse name: None   • Number of children: None   • Years of education: None   • Highest education level: None   Occupational History   • Occupation: retired   Tobacco Use   • Smoking status: Never   • Smokeless tobacco: Never   Vaping Use   • Vaping Use: Never used   Substance and Sexual Activity   • Alcohol use: Yes     Comment: social; occasional   • Drug use: No   • Sexual activity: None   Other Topics Concern   • None   Social History Narrative    [facility]     Social Determinants of Health     Financial Resource Strain: Medium Risk (1/24/2023)    Overall Financial Resource Strain (CARDIA)    • Difficulty of Paying Living Expenses: Somewhat hard   Food Insecurity: Not on file   Transportation Needs: No Transportation Needs (1/24/2023)    PRAPARE - Transportation    • Lack of Transportation (Medical): No    • Lack of Transportation (Non-Medical): No   Physical Activity: Not on file   Stress: Not on file   Social Connections: Not on file   Intimate Partner Violence: Not on file   Housing Stability: Not on file     Current Outpatient Medications on File Prior to Visit   Medication Sig   • atorvastatin (LIPITOR) 20 mg tablet Take 20 mg by mouth daily.   • cyclobenzaprine (FLEXERIL) 10 mg tablet take 1 tablet by mouth at bedtime if needed for MUSCLE RELAXATION   • diltiazem (CARDIZEM CD) 360 MG 24 hr capsule Take 1 capsule (360 mg total) by mouth daily   • Diovan 320 MG tablet Take 1 tablet (320 mg total) by mouth daily   • escitalopram (LEXAPRO) 10 mg tablet TAKE 1 TABLET DAILY   • hydrochlorothiazide (HYDRODIURIL) 25 mg tablet Take 25 mg by mouth daily Indications: High Blood Pressure.   • hydrOXYzine HCL (ATARAX) 10  "mg tablet Take 1 tablet (10 mg total) by mouth every 6 (six) hours as needed for itching   • levothyroxine 175 mcg tablet Take 1 tablet (175 mcg total) by mouth daily   • liotrix (THYROLAR-1) 60 (12.5-50) MG (MCG) TABS Take 1 tablet by mouth daily.   • LORazepam (ATIVAN) 0.5 mg tablet Take 1 tablet (0.5 mg total) by mouth every 8 (eight) hours as needed for anxiety   • predniSONE 20 mg tablet Take 60 mg by mouth daily   • rivaroxaban (XARELTO) 20 mg tablet Take 20 mg by mouth daily with dinner Indications: LD 4/22.     • sotalol (BETAPACE) 80 mg tablet Take 80 mg by mouth 2 (two) times a day.   • zolpidem (AMBIEN) 10 mg tablet Take 1 tablet (10 mg total) by mouth daily at bedtime as needed for sleep   • [DISCONTINUED] gabapentin (NEURONTIN) 300 mg capsule Take 300 mg by mouth 3 (three) times a day   • [DISCONTINUED] HYDROcodone-acetaminophen (NORCO)  mg per tablet Take 1 tablet by mouth every 6 (six) hours as needed   • methylPREDNISolone 4 MG tablet therapy pack Use as directed on package (Patient not taking: Reported on 7/19/2023)     Allergies   Allergen Reactions   • Pneumovax [Pneumococcal Polysaccharide Vaccine] Hives   • Medical Tape    • Nuts - Food Allergy    • Omnipaque [Iohexol] Hives   • Other Hives     Pine nuts   • Penicillins Hives   • Sulfa Antibiotics Hives   • Sulfur Other (See Comments)   • Iodine - Food Allergy Rash   • Latex Rash and Other (See Comments)     Immunization History   Administered Date(s) Administered   • COVID-19 PFIZER VACCINE 0.3 ML IM 01/28/2021, 02/18/2021, 10/28/2021   • INFLUENZA 09/11/2014   • Influenza, high dose seasonal 0.7 mL 11/03/2020   • Pneumococcal Conjugate Vaccine 20-valent (Pcv20), Polysace 07/19/2022   • Pneumococcal Polysaccharide PPV23 10/18/2014       Objective     /68   Pulse 55   Temp 97.5 °F (36.4 °C)   Ht 5' 4\" (1.626 m)   Wt 98.6 kg (217 lb 6.4 oz)   SpO2 98%   BMI 37.32 kg/m²     Physical Exam  Galileo Gan MD  "

## 2023-07-20 ENCOUNTER — APPOINTMENT (EMERGENCY)
Dept: RADIOLOGY | Facility: HOSPITAL | Age: 83
DRG: 562 | End: 2023-07-20
Payer: MEDICARE

## 2023-07-20 ENCOUNTER — APPOINTMENT (EMERGENCY)
Dept: CT IMAGING | Facility: HOSPITAL | Age: 83
DRG: 562 | End: 2023-07-20
Payer: MEDICARE

## 2023-07-20 ENCOUNTER — APPOINTMENT (EMERGENCY)
Dept: ULTRASOUND IMAGING | Facility: HOSPITAL | Age: 83
DRG: 562 | End: 2023-07-20
Payer: MEDICARE

## 2023-07-20 ENCOUNTER — HOSPITAL ENCOUNTER (INPATIENT)
Facility: HOSPITAL | Age: 83
LOS: 4 days | Discharge: NURSING, REHAB OR LONG-TERM CARE | DRG: 562 | End: 2023-07-24
Attending: EMERGENCY MEDICINE | Admitting: HOSPITALIST
Payer: MEDICARE

## 2023-07-20 DIAGNOSIS — E87.1 HYPONATREMIA: ICD-10-CM

## 2023-07-20 DIAGNOSIS — E87.6 HYPOKALEMIA: Primary | ICD-10-CM

## 2023-07-20 DIAGNOSIS — S82.891A CLOSED RIGHT ANKLE FRACTURE: ICD-10-CM

## 2023-07-20 DIAGNOSIS — R74.01 TRANSAMINITIS: ICD-10-CM

## 2023-07-20 LAB
ALBUMIN SERPL BCP-MCNC: 3.1 G/DL (ref 3.5–5)
ALP SERPL-CCNC: 171 U/L (ref 34–104)
ALT SERPL W P-5'-P-CCNC: 184 U/L (ref 7–52)
ANION GAP SERPL CALCULATED.3IONS-SCNC: 4 MMOL/L
ANION GAP SERPL CALCULATED.3IONS-SCNC: 8 MMOL/L
APAP SERPL-MCNC: <10 UG/ML (ref 10–20)
APTT PPP: 43 SECONDS (ref 23–37)
AST SERPL W P-5'-P-CCNC: 208 U/L (ref 13–39)
BASOPHILS # BLD AUTO: 0.06 THOUSANDS/ÂΜL (ref 0–0.1)
BASOPHILS NFR BLD AUTO: 0 % (ref 0–1)
BILIRUB SERPL-MCNC: 3.58 MG/DL (ref 0.2–1)
BUN SERPL-MCNC: 17 MG/DL (ref 5–25)
BUN SERPL-MCNC: 22 MG/DL (ref 5–25)
CALCIUM ALBUM COR SERPL-MCNC: 10.1 MG/DL (ref 8.3–10.1)
CALCIUM SERPL-MCNC: 9 MG/DL (ref 8.4–10.2)
CALCIUM SERPL-MCNC: 9.4 MG/DL (ref 8.4–10.2)
CHLORIDE SERPL-SCNC: 87 MMOL/L (ref 96–108)
CHLORIDE SERPL-SCNC: 92 MMOL/L (ref 96–108)
CO2 SERPL-SCNC: 30 MMOL/L (ref 21–32)
CO2 SERPL-SCNC: 31 MMOL/L (ref 21–32)
CREAT SERPL-MCNC: 0.61 MG/DL (ref 0.6–1.3)
CREAT SERPL-MCNC: 0.63 MG/DL (ref 0.6–1.3)
EOSINOPHIL # BLD AUTO: 0.03 THOUSAND/ÂΜL (ref 0–0.61)
EOSINOPHIL NFR BLD AUTO: 0 % (ref 0–6)
ERYTHROCYTE [DISTWIDTH] IN BLOOD BY AUTOMATED COUNT: 15 % (ref 11.6–15.1)
EST. AVERAGE GLUCOSE BLD GHB EST-MCNC: 154 MG/DL
GFR SERPL CREATININE-BSD FRML MDRD: 83 ML/MIN/1.73SQ M
GFR SERPL CREATININE-BSD FRML MDRD: 84 ML/MIN/1.73SQ M
GLUCOSE SERPL-MCNC: 170 MG/DL (ref 65–140)
GLUCOSE SERPL-MCNC: 185 MG/DL (ref 65–140)
HBA1C MFR BLD: 7 %
HCT VFR BLD AUTO: 38.7 % (ref 34.8–46.1)
HGB BLD-MCNC: 13.2 G/DL (ref 11.5–15.4)
IMM GRANULOCYTES # BLD AUTO: 0.09 THOUSAND/UL (ref 0–0.2)
IMM GRANULOCYTES NFR BLD AUTO: 1 % (ref 0–2)
LYMPHOCYTES # BLD AUTO: 1.6 THOUSANDS/ÂΜL (ref 0.6–4.47)
LYMPHOCYTES NFR BLD AUTO: 11 % (ref 14–44)
MCH RBC QN AUTO: 29.5 PG (ref 26.8–34.3)
MCHC RBC AUTO-ENTMCNC: 34.1 G/DL (ref 31.4–37.4)
MCV RBC AUTO: 87 FL (ref 82–98)
MONOCYTES # BLD AUTO: 1.65 THOUSAND/ÂΜL (ref 0.17–1.22)
MONOCYTES NFR BLD AUTO: 11 % (ref 4–12)
NEUTROPHILS # BLD AUTO: 11.39 THOUSANDS/ÂΜL (ref 1.85–7.62)
NEUTS SEG NFR BLD AUTO: 77 % (ref 43–75)
NRBC BLD AUTO-RTO: 0 /100 WBCS
PLATELET # BLD AUTO: 229 THOUSANDS/UL (ref 149–390)
PMV BLD AUTO: 9.3 FL (ref 8.9–12.7)
POTASSIUM SERPL-SCNC: 2.7 MMOL/L (ref 3.5–5.3)
POTASSIUM SERPL-SCNC: 3.2 MMOL/L (ref 3.5–5.3)
PROT SERPL-MCNC: 8 G/DL (ref 6.4–8.4)
RBC # BLD AUTO: 4.47 MILLION/UL (ref 3.81–5.12)
SODIUM SERPL-SCNC: 125 MMOL/L (ref 135–147)
SODIUM SERPL-SCNC: 127 MMOL/L (ref 135–147)
WBC # BLD AUTO: 14.82 THOUSAND/UL (ref 4.31–10.16)

## 2023-07-20 PROCEDURE — 72170 X-RAY EXAM OF PELVIS: CPT

## 2023-07-20 PROCEDURE — 93005 ELECTROCARDIOGRAM TRACING: CPT

## 2023-07-20 PROCEDURE — 2W3QX1Z IMMOBILIZATION OF RIGHT LOWER LEG USING SPLINT: ICD-10-PCS | Performed by: EMERGENCY MEDICINE

## 2023-07-20 PROCEDURE — 70450 CT HEAD/BRAIN W/O DYE: CPT

## 2023-07-20 PROCEDURE — 71045 X-RAY EXAM CHEST 1 VIEW: CPT

## 2023-07-20 PROCEDURE — 0QSJXZZ REPOSITION RIGHT FIBULA, EXTERNAL APPROACH: ICD-10-PCS | Performed by: EMERGENCY MEDICINE

## 2023-07-20 PROCEDURE — 99285 EMERGENCY DEPT VISIT HI MDM: CPT

## 2023-07-20 PROCEDURE — 29515 APPLICATION SHORT LEG SPLINT: CPT | Performed by: EMERGENCY MEDICINE

## 2023-07-20 PROCEDURE — 80048 BASIC METABOLIC PNL TOTAL CA: CPT | Performed by: PHYSICIAN ASSISTANT

## 2023-07-20 PROCEDURE — 80053 COMPREHEN METABOLIC PANEL: CPT | Performed by: EMERGENCY MEDICINE

## 2023-07-20 PROCEDURE — 72125 CT NECK SPINE W/O DYE: CPT

## 2023-07-20 PROCEDURE — 36415 COLL VENOUS BLD VENIPUNCTURE: CPT | Performed by: EMERGENCY MEDICINE

## 2023-07-20 PROCEDURE — 99223 1ST HOSP IP/OBS HIGH 75: CPT | Performed by: HOSPITALIST

## 2023-07-20 PROCEDURE — 73610 X-RAY EXAM OF ANKLE: CPT

## 2023-07-20 PROCEDURE — 80143 DRUG ASSAY ACETAMINOPHEN: CPT | Performed by: EMERGENCY MEDICINE

## 2023-07-20 PROCEDURE — 83036 HEMOGLOBIN GLYCOSYLATED A1C: CPT | Performed by: PHYSICIAN ASSISTANT

## 2023-07-20 PROCEDURE — 96365 THER/PROPH/DIAG IV INF INIT: CPT

## 2023-07-20 PROCEDURE — 85025 COMPLETE CBC W/AUTO DIFF WBC: CPT | Performed by: EMERGENCY MEDICINE

## 2023-07-20 PROCEDURE — 76705 ECHO EXAM OF ABDOMEN: CPT

## 2023-07-20 PROCEDURE — 85730 THROMBOPLASTIN TIME PARTIAL: CPT | Performed by: EMERGENCY MEDICINE

## 2023-07-20 PROCEDURE — 99285 EMERGENCY DEPT VISIT HI MDM: CPT | Performed by: EMERGENCY MEDICINE

## 2023-07-20 PROCEDURE — 80074 ACUTE HEPATITIS PANEL: CPT | Performed by: EMERGENCY MEDICINE

## 2023-07-20 RX ORDER — OXYCODONE HYDROCHLORIDE 5 MG/1
5 TABLET ORAL EVERY 6 HOURS PRN
Status: DISCONTINUED | OUTPATIENT
Start: 2023-07-20 | End: 2023-07-24 | Stop reason: HOSPADM

## 2023-07-20 RX ORDER — POTASSIUM CHLORIDE 20 MEQ/1
40 TABLET, EXTENDED RELEASE ORAL ONCE
Status: COMPLETED | OUTPATIENT
Start: 2023-07-20 | End: 2023-07-20

## 2023-07-20 RX ORDER — SOTALOL HYDROCHLORIDE 80 MG/1
80 TABLET ORAL 2 TIMES DAILY
Status: DISCONTINUED | OUTPATIENT
Start: 2023-07-20 | End: 2023-07-24 | Stop reason: HOSPADM

## 2023-07-20 RX ORDER — LOSARTAN POTASSIUM 50 MG/1
100 TABLET ORAL DAILY
Status: DISCONTINUED | OUTPATIENT
Start: 2023-07-21 | End: 2023-07-21

## 2023-07-20 RX ORDER — ACETAMINOPHEN 325 MG/1
650 TABLET ORAL EVERY 6 HOURS PRN
Status: DISCONTINUED | OUTPATIENT
Start: 2023-07-20 | End: 2023-07-24 | Stop reason: HOSPADM

## 2023-07-20 RX ORDER — POTASSIUM CHLORIDE 14.9 MG/ML
20 INJECTION INTRAVENOUS ONCE
Status: COMPLETED | OUTPATIENT
Start: 2023-07-20 | End: 2023-07-21

## 2023-07-20 RX ORDER — ONDANSETRON 2 MG/ML
4 INJECTION INTRAMUSCULAR; INTRAVENOUS EVERY 6 HOURS PRN
Status: DISCONTINUED | OUTPATIENT
Start: 2023-07-20 | End: 2023-07-24 | Stop reason: HOSPADM

## 2023-07-20 RX ORDER — ESCITALOPRAM OXALATE 10 MG/1
10 TABLET ORAL DAILY
Status: DISCONTINUED | OUTPATIENT
Start: 2023-07-21 | End: 2023-07-23

## 2023-07-20 RX ORDER — MAGNESIUM HYDROXIDE/ALUMINUM HYDROXICE/SIMETHICONE 120; 1200; 1200 MG/30ML; MG/30ML; MG/30ML
30 SUSPENSION ORAL EVERY 6 HOURS PRN
Status: DISCONTINUED | OUTPATIENT
Start: 2023-07-20 | End: 2023-07-24 | Stop reason: HOSPADM

## 2023-07-20 RX ORDER — DILTIAZEM HYDROCHLORIDE 180 MG/1
360 CAPSULE, COATED, EXTENDED RELEASE ORAL DAILY
Status: DISCONTINUED | OUTPATIENT
Start: 2023-07-21 | End: 2023-07-24 | Stop reason: HOSPADM

## 2023-07-20 RX ORDER — POTASSIUM CHLORIDE 14.9 MG/ML
20 INJECTION INTRAVENOUS ONCE
Status: COMPLETED | OUTPATIENT
Start: 2023-07-20 | End: 2023-07-20

## 2023-07-20 RX ORDER — GABAPENTIN 300 MG/1
300 CAPSULE ORAL 3 TIMES DAILY
Status: DISCONTINUED | OUTPATIENT
Start: 2023-07-20 | End: 2023-07-24 | Stop reason: HOSPADM

## 2023-07-20 RX ORDER — ENOXAPARIN SODIUM 100 MG/ML
40 INJECTION SUBCUTANEOUS DAILY
Status: DISCONTINUED | OUTPATIENT
Start: 2023-07-21 | End: 2023-07-20 | Stop reason: SDUPTHER

## 2023-07-20 RX ORDER — SODIUM CHLORIDE 9 MG/ML
75 INJECTION, SOLUTION INTRAVENOUS CONTINUOUS
Status: DISCONTINUED | OUTPATIENT
Start: 2023-07-20 | End: 2023-07-21

## 2023-07-20 RX ADMIN — POTASSIUM CHLORIDE 20 MEQ: 14.9 INJECTION, SOLUTION INTRAVENOUS at 20:32

## 2023-07-20 RX ADMIN — POTASSIUM CHLORIDE 20 MEQ: 14.9 INJECTION, SOLUTION INTRAVENOUS at 14:18

## 2023-07-20 RX ADMIN — POTASSIUM CHLORIDE 40 MEQ: 1500 TABLET, EXTENDED RELEASE ORAL at 18:38

## 2023-07-20 RX ADMIN — POTASSIUM CHLORIDE 40 MEQ: 1500 TABLET, EXTENDED RELEASE ORAL at 22:13

## 2023-07-20 RX ADMIN — MORPHINE SULFATE 2 MG: 2 INJECTION, SOLUTION INTRAMUSCULAR; INTRAVENOUS at 16:34

## 2023-07-20 RX ADMIN — RIVAROXABAN 20 MG: 10 TABLET, FILM COATED ORAL at 18:32

## 2023-07-20 RX ADMIN — GABAPENTIN 300 MG: 300 CAPSULE ORAL at 21:09

## 2023-07-20 RX ADMIN — OXYCODONE HYDROCHLORIDE 5 MG: 5 TABLET ORAL at 19:46

## 2023-07-20 NOTE — H&P
[facility]  H&P  Name: Angel Ventura 81 y.o. female I MRN: [id number]  Unit/Bed#: [facility] I Date of Admission: 7/20/2023   Date of Service: 7/20/2023 I Hospital Day: 0      Assessment/Plan   * Closed right ankle fracture  Assessment & Plan  Secondary to mechanical fall Rosie Natacha  X-ray with right lateral malleolus fracture  Splinted  Consult Ortho  Pain control    Hyponatremia  Assessment & Plan  Na+ 125, 126 after glucose correction. Likely contributed to her fall  Unclear etiology, patient is on HCTZ and reports drinking 6-7 16oz water bottles per day. Adequate solute intake.   Check serum osm, urine osm, urine sodium, TSH, morning cortisol  Give IVF overnight  Trend BMP every 8 hours, goal over next 24 hours: 130-132  Nephrology consult    Hypokalemia  Assessment & Plan  K 2.7  ECG -normal sinus rhythm with diffuse flattened T waves, QTc 513 ms  Replete potassium  Recheck BMP at 2100, if K is still low -hold sotalol as it is contraindicated    Bilateral carpal tunnel syndrome  Assessment & Plan  With significant pain, was prescribed gabapentin and Norco by PCP  Continue gabapentin  Oxycodone PRN (do not give within 1 hour of gabapentin)    Transaminitis  Assessment & Plan  , , alk phos 171, T. bili 3.58  Patient was scleral icterus  RUQ ultrasound with normal liver, surgically absent gallbladder  Hepatitis panel pending  Trend CMP  GI consult    Prediabetes  Assessment & Plan  History of prediabetes  Glucose 170  Check A1c  Diabetic diet    Acquired hypothyroidism  Assessment & Plan  Continue levothyroxine 175 mcg daily    Essential hypertension  Assessment & Plan  On Diovan, sotalol, HCTZ, Cardizem  Hold HCTZ, continue rest of antihypertensives (Diovan switched to formulary losartan)    Mixed hyperlipidemia  Assessment & Plan  Hold atorvastatin in setting of elevated LFTs    Paroxysmal atrial fibrillation (HCC)  Assessment & Plan  Rate/rhythm control: Sotalol 80mg, cardizem 360mg  Anticoagulation: Xarelto         VTE Pharmacologic Prophylaxis:   Moderate Risk (Score 3-4) - Pharmacological DVT Prophylaxis Ordered: heparin. Code Status: Level 1 - Full Code   Discussion with family: Patient declined call to . Anticipated Length of Stay: Patient will be admitted on an inpatient basis with an anticipated length of stay of greater than 2 midnights secondary to Electrolyte abnormalities, right ankle fracture. Total Time Spent on Date of Encounter in care of patient: 65 minutes This time was spent on one or more of the following: performing physical exam; counseling and coordination of care; obtaining or reviewing history; documenting in the medical record; reviewing/ordering tests, medications or procedures; communicating with other healthcare professionals and discussing with patient's family/caregivers. Chief Complaint: Fall    History of Present Illness:  Moon Jaramillo is a 81 y.o. female with PMH of paroxysmal A-fib, hypertension, hyperlipidemia, acquired hypothyroidism, carpal tunnel/radiculopathy, insomnia, who presents to the ER from EMS after falling this morning. Patient states her walker wheel got caught on her rug, she lost balance, and her "legs went out from under her." During this fall she hit her head on a chair. Denies current nausea, vomiting, headache, dizziness. Has reported feeling particularly weak the last day or 2.     Patient had an appointment with her family doctor yesterday where she was given Gabapentin 300mg TID and Norco 5-325 mg PRN for chronic pain 2/2 carpal tunnel and cervical radiculopathy. She took both last night, and when she woke up she only intended to take the Gabapentin as prescribed.  Patient's daughter was at bedside and noted that she counted 2 were missing, and the patient admitted she "must have taken one by mistake this morning." She reported after taking her morning meds she felt lightheaded, dizzy, and had double vision.      Patient had findings of hyponatremia and hypokalemia on ER bloodwork. Patient denies chest pain, palpitations, shortness of breath, leg swelling, muscle weakness or spasms, . Admits she has eaten less food over the past couple of days. Admits to drinking 6-7 16 ounce water bottles daily.     Patient had elevated liver enzymes on ER bloodwork. Previously had her gallbladder removed in 2016. She admits to taking tylenol 650 mg 6 tablets daily for her pain as instructed by her cardiologist because she cannot take NSAIDS. Patient denies itching, abdominal pain, jaundice, abdominal distention, fever, chills. .    Review of Systems:  Review of Systems   Constitutional: Positive for appetite change and fatigue. Negative for chills and fever. HENT: Negative for ear pain, sore throat and trouble swallowing. Eyes: Positive for visual disturbance ( double vision (resolved)). Respiratory: Negative for cough, chest tightness, shortness of breath and wheezing. Cardiovascular: Negative for chest pain, palpitations and leg swelling. Gastrointestinal: Negative for abdominal distention, abdominal pain, diarrhea, nausea and vomiting. Endocrine: Negative. Genitourinary: Negative for dysuria, flank pain and hematuria. Musculoskeletal: Negative for arthralgias, gait problem and myalgias. Skin: Negative for pallor. Allergic/Immunologic: Negative for immunocompromised state. Neurological: Positive for weakness and light-headedness. Negative for dizziness, syncope, numbness and headaches.        Past Medical and Surgical History:   Past Medical History:   Diagnosis Date   • Anxiety    • Carpal tunnel syndrome, bilateral    • Cholecystitis    • Chronic calculous cholecystitis     last assessed 4/25/16   • History of cardioversion    • History of radiofrequency ablation procedure for cardiac arrhythmia    • Hyperlipidemia    • Hypertension    • Sleep apnea     no trouble since Afib corrected Past Surgical History:   Procedure Laterality Date   • APPENDECTOMY     • BACK SURGERY  11/15/2018    L4-5 Screws and rods   • BLADDER SUSPENSION     • CARDIOVERSION      atrial - onset 2015 - x5 in 2014 and 2015   • CHOLECYSTECTOMY      April 2016   • HYSTERECTOMY     • KNEE ARTHROSCOPY W/ MENISCAL REPAIR Bilateral     therapeutic - last assessed 4/14/16   • KNEE SURGERY     • [place] LAPAROSCOPY SURG CHOLECYSTECTOMY N/A 4/26/2016    Procedure: CHOLECYSTECTOMY LAPAROSCOPIC;  Surgeon: Rene Kent MD;  Location: [facility] OR;  Service: General   • TONSILLECTOMY     • WISDOM TOOTH EXTRACTION         Meds/Allergies:  Prior to Admission medications    Medication Sig Start Date End Date Taking? Authorizing Provider   atorvastatin (LIPITOR) 20 mg tablet Take 20 mg by mouth daily. Yes Historical Provider, MD   diltiazem (CARDIZEM CD) 360 MG 24 hr capsule Take 1 capsule (360 mg total) by mouth daily 7/11/23  Yes Scott Baez MD   Diovan 320 MG tablet Take 1 tablet (320 mg total) by mouth daily 12/12/22  Yes Scott Baez MD   escitalopram (LEXAPRO) 10 mg tablet TAKE 1 TABLET DAILY 11/28/22  Yes Scott Baez MD   gabapentin (NEURONTIN) 100 mg capsule Take 3 capsules (300 mg total) by mouth 3 (three) times a day 7/19/23  Yes Scott Baez MD   hydrochlorothiazide (HYDRODIURIL) 25 mg tablet Take 25 mg by mouth daily Indications: High Blood Pressure. Yes Historical Provider, MD   HYDROcodone-acetaminophen (Norco) 5-325 mg per tablet Take 1 tablet by mouth daily at bedtime as needed for pain Max Daily Amount: 1 tablet 7/19/23  Yes Scott Baez MD   levothyroxine 175 mcg tablet Take 1 tablet (175 mcg total) by mouth daily 7/17/23  Yes Scott Baez MD   rivaroxaban (XARELTO) 20 mg tablet Take 20 mg by mouth daily with dinner Indications: LD 4/22. Yes Historical Provider, MD   sotalol (BETAPACE) 80 mg tablet Take 80 mg by mouth 2 (two) times a day.    Yes Historical Provider, MD   zolpidem Krystamelyssa Noel) 10 mg tablet Take 1 tablet (10 mg total) by mouth daily at bedtime as needed for sleep 3/28/23  Yes Belkys Hernández MD   cyclobenzaprine (FLEXERIL) 10 mg tablet take 1 tablet by mouth at bedtime if needed for MUSCLE RELAXATION 11/18/22   Historical Provider, MD   hydrOXYzine HCL (ATARAX) 10 mg tablet Take 1 tablet (10 mg total) by mouth every 6 (six) hours as needed for itching 7/25/22   Belkys Hernández MD   liotrix [facility]) 60 (12.5-50) MG (MCG) TABS Take 1 tablet by mouth daily. Historical Provider, MD   LORazepam (ATIVAN) 0.5 mg tablet Take 1 tablet (0.5 mg total) by mouth every 8 (eight) hours as needed for anxiety 11/7/18   Belkys Hernández MD   methylPREDNISolone 4 MG tablet therapy pack Use as directed on package  Patient not taking: Reported on 7/19/2023 5/9/23   Joseph Brown PA-C   predniSONE 20 mg tablet Take 60 mg by mouth daily 4/29/23   Historical Provider, MD GUERRA have reviewed home medications with patient personally. Allergies: Allergies   Allergen Reactions   • Pneumovax [Pneumococcal Polysaccharide Vaccine] Hives   • Medical Tape    • Nuts - Food Allergy    • Omnipaque [Iohexol] Hives   • Other Hives     Pine nuts   • Penicillins Hives   • Sulfa Antibiotics Hives   • Sulfur Other (See Comments)   • Iodine - Food Allergy Rash   • Latex Rash and Other (See Comments)       Social History:  Marital Status:     Occupation: Noncontributory  Patient Pre-hospital Living Situation: Home  Patient Pre-hospital Level of Mobility: walks  Patient Pre-hospital Diet Restrictions: None  Substance Use History:   Social History     Substance and Sexual Activity   Alcohol Use Yes    Comment: social; occasional     Social History     Tobacco Use   Smoking Status Never   Smokeless Tobacco Never     Social History     Substance and Sexual Activity   Drug Use No       Family History:  Family History   Problem Relation Age of Onset   • Cancer Mother         ovarian   • Heart disease Father cardiac disorder   • Cancer Father    • Heart disease Brother    • Heart disease Paternal Aunt    • Heart disease Paternal Uncle        Physical Exam:     Vitals:   Blood Pressure: 147/63 (07/20/23 1704)  Pulse: 76 (07/20/23 1704)  Temperature: 98.2 °F (36.8 °C) (07/20/23 1704)  Temp Source: Temporal (07/20/23 1152)  Respirations: 18 (07/20/23 1704)  Height: 5' 4" (162.6 cm) (07/20/23 1147)  Weight - Scale: 93 kg (205 lb) (07/20/23 1147)  SpO2: 96 % (07/20/23 1704)    Physical Exam  Vitals and nursing note reviewed. Constitutional:       Appearance: Normal appearance. HENT:      Head: Normocephalic and atraumatic. Mouth/Throat:      Mouth: Mucous membranes are moist.      Pharynx: Oropharynx is clear. No oropharyngeal exudate. Eyes:      General: Scleral icterus present. Extraocular Movements: Extraocular movements intact. Cardiovascular:      Rate and Rhythm: Normal rate and regular rhythm. Pulses: Normal pulses. Heart sounds: Normal heart sounds. No murmur heard. No friction rub. No gallop. Pulmonary:      Effort: Pulmonary effort is normal. No respiratory distress. Breath sounds: Normal breath sounds. No stridor. No wheezing or rales. Abdominal:      General: Abdomen is flat. Bowel sounds are normal. There is no distension. Palpations: Abdomen is soft. Tenderness: There is no abdominal tenderness. Musculoskeletal:      Right lower leg: No edema. Left lower leg: No edema. Comments: R lower extremity splinted   Skin:     General: Skin is warm and dry. Neurological:      General: No focal deficit present. Mental Status: She is alert and oriented to person, place, and time.          Additional Data:     Lab Results:  Results from last 7 days   Lab Units 07/20/23  1213   WBC Thousand/uL 14.82*   HEMOGLOBIN g/dL 13.2   HEMATOCRIT % 38.7   PLATELETS Thousands/uL 229   NEUTROS PCT % 77*   LYMPHS PCT % 11*   MONOS PCT % 11   EOS PCT % 0     Results from last 7 days   Lab Units 07/20/23  1213   SODIUM mmol/L 125*   POTASSIUM mmol/L 2.7*   CHLORIDE mmol/L 87*   CO2 mmol/L 30   BUN mg/dL 22   CREATININE mg/dL 0.61   ANION GAP mmol/L 8   CALCIUM mg/dL 9.4   ALBUMIN g/dL 3.1*   TOTAL BILIRUBIN mg/dL 3.58*   ALK PHOS U/L 171*   ALT U/L 184*   AST U/L 208*   GLUCOSE RANDOM mg/dL 170*                       Lines/Drains:  Invasive Devices     Peripheral Intravenous Line  Duration           Peripheral IV 07/20/23 Proximal;Right;Ventral (anterior) Forearm <1 day                    Imaging: Reviewed radiology reports from this admission including: chest xray, xray(s) and ultrasound(s)  US right upper quadrant   Final Result by Erum Alcaraz MD (07/20 7914)      Within normal limits postcholecystectomy. Workstation performed: [id number]         XR ankle 3+ views RIGHT   ED Interpretation by Kristian Lundberg DO (07/20 1341)   Right lateral malleolus fracture      Final Result by Bozena Nieves MD (07/20 1657)      Acute, nondisplaced distal right fibular fracture. No dislocation. Workstation performed: [id number]         XR Trauma chest portable   Final Result by Jerrod Cole MD (07/20 1248)      No acute cardiopulmonary disease. Workstation performed: [id number]         XR Trauma pelvis ap only 1 or 2 vw   Final Result by Jerrod Cole MD (07/20 1253)      No acute osseous abnormality. Workstation performed: [id number]         TRAUMA - CT head wo contrast   Final Result by Erum Alcaraz MD (07/20 1307)      No acute intracranial abnormality. Workstation performed: [id number]         TRAUMA - CT spine cervical wo contrast   Final Result by Erum Alcaraz MD (07/20 1312)      No cervical spine fracture or traumatic malalignment. Small amount of debris in the visualized esophagus, suggesting gastroesophageal reflux.             Workstation performed: [id number]             EKG and Other Studies Reviewed on Admission: · EKG: NSR. HR 77. Flattened T waves    ** Please Note: This note has been constructed using a voice recognition system.  **

## 2023-07-20 NOTE — ASSESSMENT & PLAN NOTE
Secondary to mechanical fall Sophie Mock  X-ray with right lateral malleolus fracture  Splinted  Consult Ortho  Pain control

## 2023-07-20 NOTE — ASSESSMENT & PLAN NOTE
With significant pain, was prescribed gabapentin and Norco by PCP  Continue gabapentin  Oxycodone PRN (do not give within 1 hour of gabapentin)

## 2023-07-20 NOTE — ASSESSMENT & PLAN NOTE
, , alk phos 171, T. bili 3.58  Patient with mild scleral icterus. No abdominal pain/tenderness.   RUQ ultrasound with normal liver, surgically absent gallbladder  Hepatitis panel pending  Trend CMP  GI consult

## 2023-07-20 NOTE — ASSESSMENT & PLAN NOTE
K 2.7  ECG -normal sinus rhythm with diffuse flattened T waves, QTc 513 ms  Replete potassium  Recheck BMP at 2100, if K is still low -hold sotalol as it is contraindicated

## 2023-07-20 NOTE — ED PROVIDER NOTES
Emergency Department Trauma Note  Lela Albert 81 y.o. female MRN: [id number]  Unit/Bed#: ED 08/ED 08 Encounter: [id number]      Trauma Alert: Trauma Acuity: Trauma Evaluation  Model of Arrival: Mode of Arrival: BLS via    Trauma Team: Current Providers  Attending Provider: Brielle Kidd DO  Attending Provider: Daniel Garcia MD  Registered Nurse: Fiorella Coronel RN  Medical Student: Jimbo Beverly  Consultants:     None      History of Present Illness     Chief Complaint:   Chief Complaint   Patient presents with   • Fall     Patient presents to the ED via EMS, states mechanical fall at home today, states walker got caught on the rug and she fell, c/o right ankle pain, +head strike on thinners      HPI:  Lela Albert is a 81 y.o. female who presents with head injury and right ankle swelling after trip and fall trauma evaluation:. Mechanism:Details of Incident: patient tripped and fell Injury Date: 07/20/23        81year-old female on thinners with trip and fall head injury and right ankle pain trauma evaluation called. Patient also complains of chronic bilateral shoulder pain      History provided by:  Patient and EMS personnel  Medical Problem  Location:  Head injury and right ankle swelling  Quality:  Achy  Severity:  Mild  Onset quality:  Sudden  Timing:  Constant  Progression:  Unchanged  Chronicity:  New  Context:  Trip and fall with head injury and right ankle injury  Associated symptoms: myalgias      Review of Systems   Musculoskeletal: Positive for arthralgias and myalgias. All other systems reviewed and are negative.       Historical Information     Immunizations:   Immunization History   Administered Date(s) Administered   • COVID-19 PFIZER VACCINE 0.3 ML IM 01/28/2021, 02/18/2021, 10/28/2021   • INFLUENZA 09/11/2014   • Influenza, high dose seasonal 0.7 mL 11/03/2020   • Pneumococcal Conjugate Vaccine 20-valent (Pcv20), Polysace 07/19/2022   • Pneumococcal Polysaccharide PPV23 10/18/2014       Past Medical History:   Diagnosis Date   • Anxiety    • Carpal tunnel syndrome, bilateral    • Cholecystitis    • Chronic calculous cholecystitis     last assessed 4/25/16   • History of cardioversion    • History of radiofrequency ablation procedure for cardiac arrhythmia    • Hyperlipidemia    • Hypertension    • Sleep apnea     no trouble since Afib corrected       Family History   Problem Relation Age of Onset   • Cancer Mother         ovarian   • Heart disease Father         cardiac disorder   • Cancer Father    • Heart disease Brother    • Heart disease Paternal Aunt    • Heart disease Paternal Uncle      Past Surgical History:   Procedure Laterality Date   • APPENDECTOMY     • BACK SURGERY  11/15/2018    L4-5 Screws and rods   • BLADDER SUSPENSION     • CARDIOVERSION      atrial - onset 2015 - x5 in 2014 and 2015   • CHOLECYSTECTOMY      April 2016   • HYSTERECTOMY     • KNEE ARTHROSCOPY W/ MENISCAL REPAIR Bilateral     therapeutic - last assessed 4/14/16   • KNEE SURGERY     • MI LAPAROSCOPY SURG CHOLECYSTECTOMY N/A 4/26/2016    Procedure: CHOLECYSTECTOMY LAPAROSCOPIC;  Surgeon: Emanuel Castellanos MD;  Location:  MAIN OR;  Service: General   • TONSILLECTOMY     • WISDOM TOOTH EXTRACTION       Social History     Tobacco Use   • Smoking status: Never   • Smokeless tobacco: Never   Vaping Use   • Vaping Use: Never used   Substance Use Topics   • Alcohol use: Yes     Comment: social; occasional   • Drug use: No     E-Cigarette/Vaping   • E-Cigarette Use Never User      E-Cigarette/Vaping Substances       Family History: non-contributory    Meds/Allergies   Prior to Admission Medications   Prescriptions Last Dose Informant Patient Reported? Taking?    Diovan 320 MG tablet   No No   Sig: Take 1 tablet (320 mg total) by mouth daily   HYDROcodone-acetaminophen (Norco) 5-325 mg per tablet   No No   Sig: Take 1 tablet by mouth daily at bedtime as needed for pain Max Daily Amount: 1 tablet   LORazepam (ATIVAN) 0.5 mg tablet  Self No No   Sig: Take 1 tablet (0.5 mg total) by mouth every 8 (eight) hours as needed for anxiety   atorvastatin (LIPITOR) 20 mg tablet  Self Yes No   Sig: Take 20 mg by mouth daily. cyclobenzaprine (FLEXERIL) 10 mg tablet   Yes No   Sig: take 1 tablet by mouth at bedtime if needed for MUSCLE RELAXATION   diltiazem (CARDIZEM CD) 360 MG 24 hr capsule   No No   Sig: Take 1 capsule (360 mg total) by mouth daily   escitalopram (LEXAPRO) 10 mg tablet   No No   Sig: TAKE 1 TABLET DAILY   gabapentin (NEURONTIN) 100 mg capsule   No No   Sig: Take 3 capsules (300 mg total) by mouth 3 (three) times a day   hydrOXYzine HCL (ATARAX) 10 mg tablet   No No   Sig: Take 1 tablet (10 mg total) by mouth every 6 (six) hours as needed for itching   hydrochlorothiazide (HYDRODIURIL) 25 mg tablet  Self Yes No   Sig: Take 25 mg by mouth daily Indications: High Blood Pressure. levothyroxine 175 mcg tablet   No No   Sig: Take 1 tablet (175 mcg total) by mouth daily   liotrix (THYROLAR-1) 60 (12.5-50) MG (MCG) TABS  Self Yes No   Sig: Take 1 tablet by mouth daily. methylPREDNISolone 4 MG tablet therapy pack   No No   Sig: Use as directed on package   Patient not taking: Reported on 7/19/2023   predniSONE 20 mg tablet   Yes No   Sig: Take 60 mg by mouth daily   rivaroxaban (XARELTO) 20 mg tablet  Self Yes No   Sig: Take 20 mg by mouth daily with dinner Indications: LD 4/22.     sotalol (BETAPACE) 80 mg tablet  Self Yes No   Sig: Take 80 mg by mouth 2 (two) times a day.    zolpidem (AMBIEN) 10 mg tablet   No No   Sig: Take 1 tablet (10 mg total) by mouth daily at bedtime as needed for sleep      Facility-Administered Medications: None       Allergies   Allergen Reactions   • Pneumovax [Pneumococcal Polysaccharide Vaccine] Hives   • Medical Tape    • Nuts - Food Allergy    • Omnipaque [Iohexol] Hives   • Other Hives     Pine nuts   • Penicillins Hives   • Sulfa Antibiotics Hives   • Sulfur Other (See Comments)   • Iodine - Food Allergy Rash   • Latex Rash and Other (See Comments)       PHYSICAL EXAM    PE limited by: Nothing    Objective   Vitals:   First set: Temperature: 98.2 °F (36.8 °C) (07/20/23 1147)  Pulse: 74 (07/20/23 1147)  Respirations: 18 (07/20/23 1147)  Blood Pressure: (!) 173/80 (07/20/23 1147)  SpO2: 95 % (07/20/23 1147)    Primary Survey:   (A) Airway: Patent  (B) Breathing: Clear bilateral  (C) Circulation: Pulses:   normal  (D) Disabliity:  GCS Total:  15  (E) Expose:  Completed    Secondary Survey: (Click on Physical Exam tab above)  Physical Exam  Vitals and nursing note reviewed. Constitutional:       General: She is not in acute distress. Appearance: She is not ill-appearing, toxic-appearing or diaphoretic. HENT:      Head: Normocephalic. Comments: Occipital contusion     Right Ear: Tympanic membrane, ear canal and external ear normal.      Left Ear: Tympanic membrane, ear canal and external ear normal.      Nose: Nose normal.   Eyes:      General: No scleral icterus. Right eye: No discharge. Left eye: No discharge. Extraocular Movements: Extraocular movements intact. Pupils: Pupils are equal, round, and reactive to light. Cardiovascular:      Rate and Rhythm: Normal rate and regular rhythm. Pulses: Normal pulses. Heart sounds: No murmur heard. No friction rub. No gallop. Pulmonary:      Effort: Pulmonary effort is normal. No respiratory distress. Breath sounds: No stridor. No wheezing, rhonchi or rales. Abdominal:      General: There is no distension. Palpations: Abdomen is soft. Tenderness: There is no abdominal tenderness. There is no guarding or rebound. Musculoskeletal:         General: Tenderness and signs of injury present. Cervical back: Neck supple. No rigidity or tenderness. Comments: Swelling tenderness over the right lateral malleolus.   Left wrist splinted chronic bilateral shoulder tenderness   Skin: General: Skin is warm and dry. Coloration: Skin is not jaundiced. Findings: No erythema or rash. Neurological:      General: No focal deficit present. Mental Status: She is alert and oriented to person, place, and time. Cranial Nerves: No cranial nerve deficit. Coordination: Coordination normal.   Psychiatric:         Mood and Affect: Mood normal.         Behavior: Behavior normal.         Thought Content: Thought content normal.         Cervical spine cleared by clinical criteria? No (imaging required)      Invasive Devices     Peripheral Intravenous Line  Duration           Peripheral IV 07/20/23 Proximal;Right;Ventral (anterior) Forearm <1 day                Lab Results:   Results Reviewed     Procedure Component Value Units Date/Time    Acetaminophen level-"If concentration is detectable, please discuss with medical  on call." [[id number]] Collected: 07/20/23 1213    Lab Status: In process Specimen: Blood from Arm, Right Updated: 07/20/23 1446    Hepatitis panel, acute [[id number]] Collected: 07/20/23 1359    Lab Status:  In process Specimen: Blood from Arm, Right Updated: 07/20/23 1410    Comprehensive metabolic panel [[id number]]  (Abnormal) Collected: 07/20/23 1213    Lab Status: Final result Specimen: Blood from Arm, Right Updated: 07/20/23 1250     Sodium 125 mmol/L      Potassium 2.7 mmol/L      Chloride 87 mmol/L      CO2 30 mmol/L      ANION GAP 8 mmol/L      BUN 22 mg/dL      Creatinine 0.61 mg/dL      Glucose 170 mg/dL      Calcium 9.4 mg/dL      Corrected Calcium 10.1 mg/dL       U/L       U/L      Alkaline Phosphatase 171 U/L      Total Protein 8.0 g/dL      Albumin 3.1 g/dL      Total Bilirubin 3.58 mg/dL      eGFR 84 ml/min/1.73sq m     Narrative:      [place] guidelines for Chronic Kidney Disease (CKD):   •  Stage 1 with normal or high GFR (GFR > 90 mL/min/1.73 square meters)  •  Stage 2 Mild CKD (GFR = 60-89 mL/min/1.73 square meters)  •  Stage 3A Moderate CKD (GFR = 45-59 mL/min/1.73 square meters)  •  Stage 3B Moderate CKD (GFR = 30-44 mL/min/1.73 square meters)  •  Stage 4 Severe CKD (GFR = 15-29 mL/min/1.73 square meters)  •  Stage 5 End Stage CKD (GFR <15 mL/min/1.73 square meters)  Note: GFR calculation is accurate only with a steady state creatinine    APTT [[id number]]  (Abnormal) Collected: 07/20/23 1213    Lab Status: Final result Specimen: Blood from Arm, Right Updated: 07/20/23 1238     PTT 43 seconds     CBC and differential [[id number]]  (Abnormal) Collected: 07/20/23 1213    Lab Status: Final result Specimen: Blood from Arm, Right Updated: 07/20/23 1221     WBC 14.82 Thousand/uL      RBC 4.47 Million/uL      Hemoglobin 13.2 g/dL      Hematocrit 38.7 %      MCV 87 fL      MCH 29.5 pg      MCHC 34.1 g/dL      RDW 15.0 %      MPV 9.3 fL      Platelets 072 Thousands/uL      nRBC 0 /100 WBCs      Neutrophils Relative 77 %      Immat GRANS % 1 %      Lymphocytes Relative 11 %      Monocytes Relative 11 %      Eosinophils Relative 0 %      Basophils Relative 0 %      Neutrophils Absolute 11.39 Thousands/µL      Immature Grans Absolute 0.09 Thousand/uL      Lymphocytes Absolute 1.60 Thousands/µL      Monocytes Absolute 1.65 Thousand/µL      Eosinophils Absolute 0.03 Thousand/µL      Basophils Absolute 0.06 Thousands/µL                  Imaging Studies:   Direct to CT: Yes  XR ankle 3+ views RIGHT   ED Interpretation by Sotero Fairbanks DO (07/20 1341)   Right lateral malleolus fracture      XR Trauma chest portable   Final Result by Kavya Menendez MD (07/20 1249)      No acute cardiopulmonary disease. Workstation performed: [id number]         XR Trauma pelvis ap only 1 or 2 vw   Final Result by Kavya Menendez MD (07/20 0955)      No acute osseous abnormality.       Workstation performed: [id number]         TRAUMA - CT head wo contrast   Final Result by Earline Wilkes MD (07/20 2395)      No acute intracranial abnormality. Workstation performed: [id number]         TRAUMA - CT spine cervical wo contrast   Final Result by Lida Strange MD (07/20 1312)      No cervical spine fracture or traumatic malalignment. Small amount of debris in the visualized esophagus, suggesting gastroesophageal reflux.             Workstation performed: [id number]         [address] right upper quadrant    (Results Pending)         Procedures  ECG 12 Lead Documentation Only    Date/Time: 7/20/2023 12:00 PM    Performed by: Joseph Dean DO  Authorized by: Joseph Dean DO    ECG reviewed by me, the ED Provider: yes    Patient location:  ED  Rate:     ECG rate:  73  Rhythm:     Rhythm: sinus rhythm    Conduction:     Conduction: normal    T waves:     T waves: non-specific      Orthopedic injury treatment    Date/Time: 7/20/2023 1:50 PM    Performed by: Joseph Dean DO  Authorized by: Joseph Dean DO    Patient Location:  ED    Injury location:  Ankle  Location details:  Right ankle  Injury type:  Fracture  Fracture type: lateral malleolus    Fracture type: lateral malleolus    Neurovascular status: Neurovascularly intact    Distal perfusion: normal    Neurological function: normal    Range of motion: reduced    Manipulation performed?: No    Immobilization:  Splint  Splint type:  Short leg  Supplies used:  Ortho-Glass  Neurovascular status: Neurovascularly intact    Distal perfusion: normal    Neurological function: normal    Range of motion: unchanged    Patient tolerance:  Patient tolerated the procedure well with no immediate complications             ED Course  ED Course as of 07/20/23 1452   Thu Jul 20, 2023   1357 Patient has elevated liver enzymes on examination repeat she has no tenderness to the right upper quadrant exam she states that she does use Tylenol but not excessively less than 3 g a day we will check ultrasound of the right upper quadrant and hepatitis panel           Medical Decision Making  Fall with head injury on [facility] trauma evaluation called    Amount and/or Complexity of Data Reviewed  Labs: ordered. Radiology: ordered. Disposition  [facility] Transfer: No  Final diagnoses:   Hypokalemia   Hyponatremia   Closed right ankle fracture     Time reflects when diagnosis was documented in both MDM as applicable and the Disposition within this note     Time User Action Codes Description Comment    7/20/2023  1:06 PM Linette Marcosmarva [E87.6] Hypokalemia     7/20/2023  1:06 PM Linette Mosley [E87.1] Hyponatremia     7/20/2023  1:51 PM Allielinda Skiff Add [P25.332M] Closed right ankle fracture       ED Disposition     ED Disposition   Admit    Condition   Stable    Date/Time   u Jul 20, 2023  1:07 PM    Comment   Case was discussed with Karl Duncan** and the patient's admission status was agreed to be Admission Status: inpatient status to the service of Dr. Cyndy Lutz . Follow-up Information    None       Patient's Medications   Discharge Prescriptions    No medications on file     No discharge procedures on file.     PDMP Review       Value Time User    PDMP Reviewed  Yes 7/19/2023 10:52 AM Elisha Johnston MD          ED Provider  Electronically Signed by         Alisa Altman DO  07/20/23 3692

## 2023-07-20 NOTE — ASSESSMENT & PLAN NOTE
On Diovan, sotalol, HCTZ, Cardizem  Hold HCTZ, continue rest of antihypertensives (Diovan switched to formulary losartan)

## 2023-07-20 NOTE — ASSESSMENT & PLAN NOTE
Na+ 125, 126 after glucose correction. Likely contributed to her fall  Unclear etiology, patient is on HCTZ and reports drinking 6-7 16oz water bottles per day. Adequate solute intake.   Check serum osm, urine osm, urine sodium, TSH, morning cortisol  Give IVF overnight  Trend BMP every 8 hours, goal over next 24 hours: 130-132  Nephrology consult

## 2023-07-21 LAB
ABO GROUP BLD: NORMAL
ALBUMIN SERPL BCP-MCNC: 2.7 G/DL (ref 3.5–5)
ALP SERPL-CCNC: 148 U/L (ref 34–104)
ALT SERPL W P-5'-P-CCNC: 145 U/L (ref 7–52)
ANION GAP SERPL CALCULATED.3IONS-SCNC: 5 MMOL/L
AST SERPL W P-5'-P-CCNC: 177 U/L (ref 13–39)
ATRIAL RATE: 73 BPM
BILIRUB DIRECT SERPL-MCNC: 1.61 MG/DL (ref 0–0.2)
BILIRUB SERPL-MCNC: 3.46 MG/DL (ref 0.2–1)
BUN SERPL-MCNC: 20 MG/DL (ref 5–25)
BUN SERPL-MCNC: 20 MG/DL (ref 5–25)
BUN SERPL-MCNC: 21 MG/DL (ref 5–25)
BUN SERPL-MCNC: 22 MG/DL (ref 5–25)
CALCIUM ALBUM COR SERPL-MCNC: 10 MG/DL (ref 8.3–10.1)
CALCIUM SERPL-MCNC: 8.7 MG/DL (ref 8.4–10.2)
CALCIUM SERPL-MCNC: 8.9 MG/DL (ref 8.4–10.2)
CALCIUM SERPL-MCNC: 8.9 MG/DL (ref 8.4–10.2)
CALCIUM SERPL-MCNC: 9 MG/DL (ref 8.4–10.2)
CHLORIDE SERPL-SCNC: 94 MMOL/L (ref 96–108)
CHLORIDE SERPL-SCNC: 94 MMOL/L (ref 96–108)
CHLORIDE SERPL-SCNC: 95 MMOL/L (ref 96–108)
CHLORIDE SERPL-SCNC: 96 MMOL/L (ref 96–108)
CK SERPL-CCNC: 50 U/L (ref 26–192)
CO2 SERPL-SCNC: 27 MMOL/L (ref 21–32)
CO2 SERPL-SCNC: 28 MMOL/L (ref 21–32)
CO2 SERPL-SCNC: 29 MMOL/L (ref 21–32)
CO2 SERPL-SCNC: 30 MMOL/L (ref 21–32)
CREAT SERPL-MCNC: 0.59 MG/DL (ref 0.6–1.3)
CREAT SERPL-MCNC: 0.61 MG/DL (ref 0.6–1.3)
CREAT SERPL-MCNC: 0.62 MG/DL (ref 0.6–1.3)
CREAT SERPL-MCNC: 0.68 MG/DL (ref 0.6–1.3)
ERYTHROCYTE [DISTWIDTH] IN BLOOD BY AUTOMATED COUNT: 15.5 % (ref 11.6–15.1)
GFR SERPL CREATININE-BSD FRML MDRD: 81 ML/MIN/1.73SQ M
GFR SERPL CREATININE-BSD FRML MDRD: 84 ML/MIN/1.73SQ M
GFR SERPL CREATININE-BSD FRML MDRD: 84 ML/MIN/1.73SQ M
GFR SERPL CREATININE-BSD FRML MDRD: 85 ML/MIN/1.73SQ M
GLUCOSE SERPL-MCNC: 120 MG/DL (ref 65–140)
GLUCOSE SERPL-MCNC: 121 MG/DL (ref 65–140)
GLUCOSE SERPL-MCNC: 197 MG/DL (ref 65–140)
GLUCOSE SERPL-MCNC: 249 MG/DL (ref 65–140)
HAV IGM SER QL: NORMAL
HBV CORE IGM SER QL: NORMAL
HBV SURFACE AG SER QL: NORMAL
HCT VFR BLD AUTO: 35.4 % (ref 34.8–46.1)
HCV AB SER QL: NORMAL
HGB BLD-MCNC: 12.1 G/DL (ref 11.5–15.4)
INR PPP: 3.69 (ref 0.84–1.19)
MCH RBC QN AUTO: 29.4 PG (ref 26.8–34.3)
MCHC RBC AUTO-ENTMCNC: 34.2 G/DL (ref 31.4–37.4)
MCV RBC AUTO: 86 FL (ref 82–98)
OSMOLALITY UR/SERPL-RTO: 286 MMOL/KG (ref 282–298)
P AXIS: 67 DEGREES
PLATELET # BLD AUTO: 254 THOUSANDS/UL (ref 149–390)
PMV BLD AUTO: 9.2 FL (ref 8.9–12.7)
POTASSIUM SERPL-SCNC: 3.4 MMOL/L (ref 3.5–5.3)
POTASSIUM SERPL-SCNC: 3.5 MMOL/L (ref 3.5–5.3)
POTASSIUM SERPL-SCNC: 3.6 MMOL/L (ref 3.5–5.3)
POTASSIUM SERPL-SCNC: 3.9 MMOL/L (ref 3.5–5.3)
PR INTERVAL: 176 MS
PROT SERPL-MCNC: 7.4 G/DL (ref 6.4–8.4)
PROTHROMBIN TIME: 38.3 SECONDS (ref 11.6–14.5)
QRS AXIS: 19 DEGREES
QRSD INTERVAL: 94 MS
QT INTERVAL: 466 MS
QTC INTERVAL: 513 MS
RBC # BLD AUTO: 4.12 MILLION/UL (ref 3.81–5.12)
RH BLD: POSITIVE
SODIUM 24H UR-SCNC: 39 MOL/L
SODIUM SERPL-SCNC: 127 MMOL/L (ref 135–147)
SODIUM SERPL-SCNC: 128 MMOL/L (ref 135–147)
SODIUM SERPL-SCNC: 129 MMOL/L (ref 135–147)
SODIUM SERPL-SCNC: 129 MMOL/L (ref 135–147)
T WAVE AXIS: -12 DEGREES
TSH SERPL DL<=0.05 MIU/L-ACNC: 0.6 UIU/ML (ref 0.45–4.5)
VENTRICULAR RATE: 73 BPM
WBC # BLD AUTO: 13.56 THOUSAND/UL (ref 4.31–10.16)

## 2023-07-21 PROCEDURE — 86645 CMV ANTIBODY IGM: CPT | Performed by: PHYSICIAN ASSISTANT

## 2023-07-21 PROCEDURE — 85610 PROTHROMBIN TIME: CPT | Performed by: PHYSICIAN ASSISTANT

## 2023-07-21 PROCEDURE — 99232 SBSQ HOSP IP/OBS MODERATE 35: CPT | Performed by: PHYSICIAN ASSISTANT

## 2023-07-21 PROCEDURE — 83935 ASSAY OF URINE OSMOLALITY: CPT | Performed by: NURSE PRACTITIONER

## 2023-07-21 PROCEDURE — 99223 1ST HOSP IP/OBS HIGH 75: CPT | Performed by: INTERNAL MEDICINE

## 2023-07-21 PROCEDURE — 93010 ELECTROCARDIOGRAM REPORT: CPT | Performed by: INTERNAL MEDICINE

## 2023-07-21 PROCEDURE — 97163 PT EVAL HIGH COMPLEX 45 MIN: CPT

## 2023-07-21 PROCEDURE — 84550 ASSAY OF BLOOD/URIC ACID: CPT | Performed by: NURSE PRACTITIONER

## 2023-07-21 PROCEDURE — 97530 THERAPEUTIC ACTIVITIES: CPT

## 2023-07-21 PROCEDURE — 27786 TREATMENT OF ANKLE FRACTURE: CPT | Performed by: ORTHOPAEDIC SURGERY

## 2023-07-21 PROCEDURE — 86665 EPSTEIN-BARR CAPSID VCA: CPT | Performed by: PHYSICIAN ASSISTANT

## 2023-07-21 PROCEDURE — 83930 ASSAY OF BLOOD OSMOLALITY: CPT | Performed by: PHYSICIAN ASSISTANT

## 2023-07-21 PROCEDURE — 80048 BASIC METABOLIC PNL TOTAL CA: CPT | Performed by: INTERNAL MEDICINE

## 2023-07-21 PROCEDURE — 86664 EPSTEIN-BARR NUCLEAR ANTIGEN: CPT | Performed by: PHYSICIAN ASSISTANT

## 2023-07-21 PROCEDURE — 82550 ASSAY OF CK (CPK): CPT | Performed by: PHYSICIAN ASSISTANT

## 2023-07-21 PROCEDURE — 86381 MITOCHONDRIAL ANTIBODY EACH: CPT | Performed by: PHYSICIAN ASSISTANT

## 2023-07-21 PROCEDURE — 99222 1ST HOSP IP/OBS MODERATE 55: CPT | Performed by: INTERNAL MEDICINE

## 2023-07-21 PROCEDURE — 86644 CMV ANTIBODY: CPT | Performed by: PHYSICIAN ASSISTANT

## 2023-07-21 PROCEDURE — 86663 EPSTEIN-BARR ANTIBODY: CPT | Performed by: PHYSICIAN ASSISTANT

## 2023-07-21 PROCEDURE — 84443 ASSAY THYROID STIM HORMONE: CPT | Performed by: PHYSICIAN ASSISTANT

## 2023-07-21 PROCEDURE — 80048 BASIC METABOLIC PNL TOTAL CA: CPT | Performed by: NURSE PRACTITIONER

## 2023-07-21 PROCEDURE — 97167 OT EVAL HIGH COMPLEX 60 MIN: CPT

## 2023-07-21 PROCEDURE — 82248 BILIRUBIN DIRECT: CPT | Performed by: PHYSICIAN ASSISTANT

## 2023-07-21 PROCEDURE — 84300 ASSAY OF URINE SODIUM: CPT | Performed by: NURSE PRACTITIONER

## 2023-07-21 PROCEDURE — 80048 BASIC METABOLIC PNL TOTAL CA: CPT | Performed by: HOSPITALIST

## 2023-07-21 PROCEDURE — 99222 1ST HOSP IP/OBS MODERATE 55: CPT | Performed by: ORTHOPAEDIC SURGERY

## 2023-07-21 PROCEDURE — 80053 COMPREHEN METABOLIC PANEL: CPT | Performed by: PHYSICIAN ASSISTANT

## 2023-07-21 PROCEDURE — 85027 COMPLETE CBC AUTOMATED: CPT | Performed by: PHYSICIAN ASSISTANT

## 2023-07-21 RX ORDER — LOSARTAN POTASSIUM 50 MG/1
100 TABLET ORAL DAILY
Status: DISCONTINUED | OUTPATIENT
Start: 2023-07-22 | End: 2023-07-24 | Stop reason: HOSPADM

## 2023-07-21 RX ORDER — SODIUM CHLORIDE 9 MG/ML
50 INJECTION, SOLUTION INTRAVENOUS CONTINUOUS
Status: DISCONTINUED | OUTPATIENT
Start: 2023-07-21 | End: 2023-07-22

## 2023-07-21 RX ADMIN — OXYCODONE HYDROCHLORIDE 5 MG: 5 TABLET ORAL at 21:09

## 2023-07-21 RX ADMIN — SODIUM CHLORIDE 50 ML/HR: 0.9 INJECTION, SOLUTION INTRAVENOUS at 17:14

## 2023-07-21 RX ADMIN — RIVAROXABAN 20 MG: 10 TABLET, FILM COATED ORAL at 17:13

## 2023-07-21 RX ADMIN — LOSARTAN POTASSIUM 100 MG: 50 TABLET, FILM COATED ORAL at 09:26

## 2023-07-21 RX ADMIN — MORPHINE SULFATE 2 MG: 2 INJECTION, SOLUTION INTRAMUSCULAR; INTRAVENOUS at 08:07

## 2023-07-21 RX ADMIN — GABAPENTIN 300 MG: 300 CAPSULE ORAL at 09:26

## 2023-07-21 RX ADMIN — GABAPENTIN 300 MG: 300 CAPSULE ORAL at 17:13

## 2023-07-21 RX ADMIN — SOTALOL HYDROCHLORIDE 80 MG: 80 TABLET ORAL at 17:13

## 2023-07-21 RX ADMIN — SOTALOL HYDROCHLORIDE 80 MG: 80 TABLET ORAL at 09:26

## 2023-07-21 RX ADMIN — ESCITALOPRAM OXALATE 10 MG: 10 TABLET ORAL at 09:26

## 2023-07-21 RX ADMIN — LEVOTHYROXINE SODIUM 175 MCG: 100 TABLET ORAL at 05:33

## 2023-07-21 RX ADMIN — GABAPENTIN 300 MG: 300 CAPSULE ORAL at 21:09

## 2023-07-21 NOTE — PROGRESS NOTES
[facility]  Progress Note  Name: Sisi Howard  MRN: [id number]  Unit/Bed#: -01 I Date of Admission: 7/20/2023   Date of Service: 7/21/2023 I Hospital Day: 1    Assessment/Plan   * Closed right ankle fracture  Assessment & Plan  Secondary to mechanical fall Jaylon Hoover  X-ray with right lateral malleolus fracture  Splinted  Consult Ortho. Nonsurgical.  Pain control  By PT/OT, recommending rehab    Hyponatremia  Assessment & Plan  Na+ 125, 126 after glucose correction. Likely contributed to her fall  Unclear etiology, patient is on HCTZ and reports drinking 6-7 16oz water bottles per day. Adequate solute intake. Check serum osm, urine osm, urine sodium, TSH, morning cortisol  Give IVF overnight  Trend BMP every 8 hours, goal over next 24 hours: 130-132  Nephrology consult  7/21-improved to 129 with IVF, continue to hold HCTZ    Hypokalemia  Assessment & Plan  K 2.7  ECG -normal sinus rhythm with diffuse flattened T waves, QTc 513 ms  Resolved    Bilateral carpal tunnel syndrome  Assessment & Plan  With significant pain, was prescribed gabapentin and Norco by PCP  Continue gabapentin  Oxycodone PRN (do not give within 1 hour of gabapentin)    Transaminitis  Assessment & Plan  , , alk phos 171, T. bili 3.58  Patient with mild scleral icterus. No abdominal pain/tenderness. RUQ ultrasound with normal liver, surgically absent gallbladder  Hepatitis panel negative. LFT trending down  GI consult.   Added EBV, CMV    Prediabetes  Assessment & Plan  History of prediabetes  Glucose 170  Check A1c  Diabetic diet    Acquired hypothyroidism  Assessment & Plan  Continue levothyroxine 175 mcg daily    Essential hypertension  Assessment & Plan  On Diovan, sotalol, HCTZ, Cardizem  Hold HCTZ, continue rest of antihypertensives (Diovan switched to formulary losartan)    Mixed hyperlipidemia  Assessment & Plan  Hold atorvastatin in setting of elevated LFTs    Paroxysmal atrial fibrillation (HCC)  Assessment & Plan  Rate/rhythm control: Sotalol 80mg, cardizem 360mg  Anticoagulation: Xarelto               VTE Pharmacologic Prophylaxis:   Moderate Risk (Score 3-4) - Pharmacological DVT Prophylaxis Ordered: rivaroxaban (Xarelto). Patient Centered Rounds: I performed bedside rounds with nursing staff today. Discussions with Specialists or Other Care Team Provider: Nephrology    Education and Discussions with Family / Patient: Updated  (daughter in law) via phone. Total Time Spent on Date of Encounter in care of patient: 55 minutes This time was spent on one or more of the following: performing physical exam; counseling and coordination of care; obtaining or reviewing history; documenting in the medical record; reviewing/ordering tests, medications or procedures; communicating with other healthcare professionals and discussing with patient's family/caregivers. Current Length of Stay: 1 day(s)  Current Patient Status: Inpatient   Certification Statement: The patient will continue to require additional inpatient hospital stay due to Hyponatremia, elevated LFTs  Discharge Plan: Anticipate discharge in 48-72 hrs to rehab facility. Code Status: Level 1 - Full Code    Subjective:   No acute events overnight. Patient reports significant pain in her right wrist from carpal tunnel. Objective:     Vitals:   Temp (24hrs), Av.4 °F (36.9 °C), Min:98.2 °F (36.8 °C), Max:98.6 °F (37 °C)    Temp:  [98.2 °F (36.8 °C)-98.6 °F (37 °C)] 98.6 °F (37 °C)  HR:  [70-87] 86  Resp:  [17-20] 17  BP: (114-147)/(63-75) 122/64  SpO2:  [94 %-96 %] 96 %  Body mass index is 35.19 kg/m². Input and Output Summary (last 24 hours): Intake/Output Summary (Last 24 hours) at 2023 1646  Last data filed at 2023  Gross per 24 hour   Intake --   Output 400 ml   Net -400 ml       Physical Exam:   Physical Exam  Vitals and nursing note reviewed.    Constitutional:       Appearance: Normal appearance. HENT:      Head: Normocephalic and atraumatic. Mouth/Throat:      Mouth: Mucous membranes are moist.      Pharynx: Oropharynx is clear. No oropharyngeal exudate. Eyes:      Extraocular Movements: Extraocular movements intact. Cardiovascular:      Rate and Rhythm: Normal rate and regular rhythm. Pulses: Normal pulses. Heart sounds: Normal heart sounds. No murmur heard. No friction rub. No gallop. Pulmonary:      Effort: Pulmonary effort is normal. No respiratory distress. Breath sounds: Normal breath sounds. No stridor. No wheezing or rales. Abdominal:      General: Abdomen is flat. Bowel sounds are normal. There is no distension. Palpations: Abdomen is soft. Tenderness: There is no abdominal tenderness. Musculoskeletal:      Right lower leg: No edema. Left lower leg: No edema. Comments: Right leg splinted, brace on the left wrist   Skin:     General: Skin is warm and dry. Neurological:      General: No focal deficit present. Mental Status: She is alert and oriented to person, place, and time.         Additional Data:     Labs:  Results from last 7 days   Lab Units 07/21/23  0518 07/20/23  1213   WBC Thousand/uL 13.56* 14.82*   HEMOGLOBIN g/dL 12.1 13.2   HEMATOCRIT % 35.4 38.7   PLATELETS Thousands/uL 254 229   NEUTROS PCT %  --  77*   LYMPHS PCT %  --  11*   MONOS PCT %  --  11   EOS PCT %  --  0     Results from last 7 days   Lab Units 07/21/23  1415 07/21/23  0518   SODIUM mmol/L 127* 128*  129*   POTASSIUM mmol/L 3.9 3.5  3.6   CHLORIDE mmol/L 95* 94*  94*   CO2 mmol/L 27 29  30   BUN mg/dL 22 20  20   CREATININE mg/dL 0.68 0.59*  0.61   ANION GAP mmol/L 5 5  5   CALCIUM mg/dL 8.9 9.0  8.9   ALBUMIN g/dL  --  2.7*   TOTAL BILIRUBIN mg/dL  --  3.46*   ALK PHOS U/L  --  148*   ALT U/L  --  145*   AST U/L  --  177*   GLUCOSE RANDOM mg/dL 197* 120  121     Results from last 7 days   Lab Units 07/21/23  0518   INR  3.69* Results from last 7 days   Lab Units 07/20/23  1213   HEMOGLOBIN A1C % 7.0*           Lines/Drains:  Invasive Devices     Peripheral Intravenous Line  Duration           Peripheral IV 07/20/23 Proximal;Right;Ventral (anterior) Forearm 1 day          Drain  Duration           External Urinary Catheter <1 day                  Telemetry:  Telemetry Orders (From admission, onward)             24 Hour Telemetry Monitoring  Continuous x 24 Hours (Telem)        Expiring   Question:  Reason for 24 Hour Telemetry  Answer:  Metabolic/electrolyte disturbance with high probability of dysrhythmia. K level <3 or >6 OR KCL infusion >10mEq/hr                            Imaging: No pertinent imaging reviewed.     Recent Cultures (last 7 days):         Last 24 Hours Medication List:   Current Facility-Administered Medications   Medication Dose Route Frequency Provider Last Rate   • acetaminophen  650 mg Oral Q6H PRN Jennifer Lucero PA-C     • aluminum-magnesium hydroxide-simethicone  30 mL Oral Q6H PRN Jennifer Lucero PA-C     • diltiazem  360 mg Oral Daily Nisha Daily PA-C     • escitalopram  10 mg Oral Daily Nisha Daily PA-C     • gabapentin  300 mg Oral TID Jennifer Lucero PA-C     • levothyroxine  175 mcg Oral Early Morning Vianney Colindres     • [START ON 7/22/2023] losartan  100 mg Oral Daily RAJ Brito     • morphine injection  2 mg Intravenous Q4H PRN Rocky Jarrell MD     • ondansetron  4 mg Intravenous Q6H PRN Jennifer Lucero PA-C     • oxyCODONE  2.5 mg Oral Q6H PRN Jennifer Lucero PA-C      Or   • oxyCODONE  5 mg Oral Q6H PRN Jennifer Lucero PA-C     • rivaroxaban  20 mg Oral Daily With [facility]     • sodium chloride  50 mL/hr Intravenous Continuous Nigel Becker MD     • sotalol  80 mg Oral BID Jennifer Lucero PA-C          Today, Patient Was Seen By: Jennifer Lucero PA-C    **Please Note: This note may have been constructed using a voice recognition system. **

## 2023-07-21 NOTE — PLAN OF CARE
Problem: PHYSICAL THERAPY ADULT  Goal: Performs mobility at highest level of function for planned discharge setting. See evaluation for individualized goals. Description: Treatment/Interventions: Functional transfer training, LE strengthening/ROM, Therapeutic exercise, Endurance training, Equipment eval/education, Patient/family training, Bed mobility  Equipment Recommended: Wheelchair       See flowsheet documentation for full assessment, interventions and recommendations. 7/21/2023 1130 by Leighton Cee, PT  Note:    Problem List: Decreased strength, Decreased endurance, Impaired balance, Decreased mobility, Impaired judgement, Decreased safety awareness, Impaired vision, Obesity, Decreased skin integrity, Orthopedic restrictions, Pain  Assessment: Moon Jaramillo is a 81 y.o. Female who presents to [address] [place] [address] on 7/20/2023 from home w/ c/o mechanical fall and diagnosis of closed R ankle fracture. Orders for PT eval and treat received, w/ activity orders of NWB R LE and fall precautions. Pt presents w/ comorbidities of Afib, HLD, HTN, bilateral carpal tunnel, mood disorder. At baseline, pt mobilizes mod I w/ RW, and reports + falls in the last 6 months. Upon evaluation, pt presents w/ the following deficits: weakness, impaired balance, decreased endurance and pain limiting functional mobility. Upon eval, pt requires max A x  for bed mobility, mod A x 2   For STS transfers. Based on this PT evaluation today, patient's discharge recommendation is for Level I. During this admission, pt would benefit from continued skilled inpatient PT in the acute care setting in order to address the abovementioned deficits to maximize function and mobility before DC from acute care. Barriers to Discharge: Inaccessible home environment, Decreased caregiver support (unable to maintain NWB, requires A x 2 for basic mobility)          See flowsheet documentation for full assessment.

## 2023-07-21 NOTE — ASSESSMENT & PLAN NOTE
Secondary to mechanical fall Shruthi Hatfield  X-ray with right lateral malleolus fracture  Splinted  Consult Ortho.   Nonsurgical.  Pain control  By PT/OT, recommending rehab

## 2023-07-21 NOTE — PLAN OF CARE
Problem: PAIN - ADULT  Goal: Verbalizes/displays adequate comfort level or baseline comfort level  Description: Interventions:  - Encourage patient to monitor pain and request assistance  - Assess pain using appropriate pain scale  - Administer analgesics based on type and severity of pain and evaluate response  - Implement non-pharmacological measures as appropriate and evaluate response  - Consider cultural and social influences on pain and pain management  - Notify physician/advanced practitioner if interventions unsuccessful or patient reports new pain  Outcome: Progressing     Problem: INFECTION - ADULT  Goal: Absence or prevention of progression during hospitalization  Description: INTERVENTIONS:  - Assess and monitor for signs and symptoms of infection  - Monitor lab/diagnostic results  - Monitor all insertion sites, i.e. indwelling lines, tubes, and drains  - Monitor endotracheal if appropriate and nasal secretions for changes in amount and color  - [place] appropriate cooling/warming therapies per order  - Administer medications as ordered  - Instruct and encourage patient and family to use good hand hygiene technique  - Identify and instruct in appropriate isolation precautions for identified infection/condition  Outcome: Progressing  Goal: Absence of fever/infection during neutropenic period  Description: INTERVENTIONS:  - Monitor WBC    Outcome: Progressing

## 2023-07-21 NOTE — PHYSICAL THERAPY NOTE
PHYSICAL THERAPY EVALUATION NOTE      Patient Name: Lela Albert  BHWRJ'A Date: 2023    AGE:   81 y.o. Mrn:   [id number]  ADMIT DX:  Hypokalemia [E87.6]  Hyponatremia [E87.1]  Closed right ankle fracture [S82.891A]  Head injury due to trauma [S09.90XA]    Past Medical History:   Diagnosis Date    Anxiety     Carpal tunnel syndrome, bilateral     Cholecystitis     Chronic calculous cholecystitis     last assessed 16    History of cardioversion     History of radiofrequency ablation procedure for cardiac arrhythmia     Hyperlipidemia     Hypertension     Sleep apnea     no trouble since Afib corrected     Length Of Stay: 1  PHYSICAL THERAPY EVALUATION :   Patient's identity confirmed via 2 patient identifiers (full name and ) at start of session       23 0940   PT Last Visit   PT Visit Date 23   Note Type   Note type Evaluation   Pain Assessment   Pain Assessment Tool 0-10   Pain Score 5   Pain Location/Orientation Orientation: Right  (ankle)   Pain Onset/Description Onset: Ongoing   Hospital Pain Intervention(s) Medication (See MAR); Repositioned; Ambulation/increased activity; Emotional support   Restrictions/Precautions   Weight Bearing Precautions Per Order Yes   RLE Weight Bearing Per Order (S)  NWB   Braces or Orthoses Splint  (R foot splint, pt has LUE wrist thumb lacer she purchased off the shelf)   Other Precautions Chair Alarm; Bed Alarm;WBS;Fall Risk;Pain   Home Living   Type of [address] [place] [address] One level  (1 KARLA)   Home Equipment Walker   Additional Comments Pt reports she's been using RW to get around lately due to previously breaking her R foot. Prior Function   Level of [place] Independent with ADLs; Independent with functional mobility   Lives With (S)  Alone   Receives Help From Family   IADLs Independent with driving; Independent with meal prep; Independent with medication management   Falls in the last 6 months 1 to 4  (1, reason for admission)   Vocational Part time employment  ( at [employer])   General   Family/Caregiver Present No   Cognition   Overall Cognitive Status   (questionable insight to deficits)   Arousal/Participation Alert   Orientation Level Oriented X4   Memory Within functional limits   Following Commands Follows all commands and directions without difficulty   Comments Pt w/ flat affect, reports was recently NWB w/ her R foot   Subjective   Subjective "I would prefer to work with [facility] physical therapy"; educated pt on our role here in the hospital   RLE Assessment   RLE Assessment X   Strength RLE   RLE Overall Strength 2+/5   LLE Assessment   LLE Assessment WFL   Strength LLE   LLE Overall Strength 4-/5   Vision-Basic Assessment   Current Vision Wears glasses all the time   Bed Mobility   Supine to Sit 2  Maximal assistance   Additional items Assist x 2; Increased time required;LE management;Verbal cues   Sit to Supine Unable to assess   Additional Comments Pt sitting EOB w/ OT and Ortho AP at end of eval   Transfers   Sit to Stand 3  Moderate assistance   Additional items Assist x 2; Increased time required;Verbal cues   Stand to Sit 3  Moderate assistance   Additional items Assist x 2; Increased time required;Verbal cues   Additional Comments Pt stood x 45s w/ B HHA   Ambulation/Elevation   Gait pattern Not appropriate   Balance   Static Sitting Fair +   Static Standing Zero  (Ax2)   Activity Tolerance   Activity Tolerance Patient limited by pain   Medical Staff Made Aware OT Rekha Sawyer  W [facility] to RN [facility]   Assessment   Problem List Decreased strength;Decreased endurance; Impaired balance;Decreased mobility; Impaired judgement;Decreased safety awareness; Impaired vision;Obesity; Decreased skin integrity;Orthopedic restrictions;Pain   Assessment Vivian Mobley is a 81 y.o.  Female who presents to [address] [place] [address] on 7/20/2023 from home w/ c/o mechanical fall and diagnosis of closed R ankle fracture. Orders for PT eval and treat received, w/ activity orders of NWB R LE and fall precautions. Pt presents w/ comorbidities of Afib, HLD, HTN, bilateral carpal tunnel, mood disorder. At baseline, pt mobilizes mod I w/ RW, and reports + falls in the last 6 months. Upon evaluation, pt presents w/ the following deficits: weakness, impaired balance, decreased endurance and pain limiting functional mobility. Upon eval, pt requires max A x  for bed mobility, mod A x 2   For STS transfers. Based on this PT evaluation today, patient's discharge recommendation is for Level I. During this admission, pt would benefit from continued skilled inpatient PT in the acute care setting in order to address the abovementioned deficits to maximize function and mobility before DC from acute care. Barriers to Discharge Inaccessible home environment;Decreased caregiver support  (unable to maintain NWB, requires A x 2 for basic mobility)   Goals   Patient Goals to go home   STG Expiration Date 07/31/23   Short Term Goal #1 Patient will: Perform all bed mobility tasks w/ maxx1 to improve pt's independence w/ repositioning for decrease risk of skin breakdown, Perform all transfers w/ max A x1 in order to be able to transfer OOB, Increase all balance 1/2 grade to decrease risk for falls, Complete exercise program independently, Tolerate 3 hr OOB to faciliate upright tolerance and Propel in wheelchair for at least at least 100 ft w/ w/ supervision over tile surface in order to be able to use WC as alternate means of mobility. Pt may benefit from trial of slide board vs use of RW for transfers due to acute on chronic B wrist pain   PT Treatment Day 0   Plan   Treatment/Interventions Functional transfer training;LE strengthening/ROM; Therapeutic exercise; Endurance training;Equipment eval/education;Patient/family training;Bed mobility   PT Frequency 3-5x/wk   Recommendation   [facility] Rehab Discharge Recommendation (PT/OT) Level 1   Equipment Recommended Wheelchair   Additional Comments Dr. Kosta Casas entered room during session and suggested to pt she uses a knee scooter. Pt is NOT appropriate to use a knee scooter, as she will be unable to  anything due to carpal tunnel, and incredibly poor SLS balance. Pt is a high fall risk and will continue to be a high fall risk off a knee scooter at this time   AM-PAC Basic Mobility Inpatient   Turning in Flat Bed Without Bedrails 2   Lying on Back to Sitting on Edge of Flat Bed Without Bedrails 1   Moving Bed to Chair 1   Standing Up From Chair Using Arms 1   Walk in Room 1   Climb 3-5 Stairs With Railing 1   Basic Mobility Inpatient Raw Score 7   Turning Head Towards Sound 3   Follow Simple Instructions 3   Low Function Basic Mobility Raw Score  13   Low Function Basic Mobility Standardized Score  20.14   Highest Level Of Mobility   -HLM Goal 2: Bed activities/Dependent transfer   JH-HLM Achieved 4: Move to chair/commode   Additional Treatment Session   Start Time 1000   End Time 1012   Treatment Assessment Pt sitting EOB, agreeable to get OOB to recliner chair. Pt again requires mod A x 2 to perform STS from EOB. She benefits from [facility] support under RLE to maintain and reinforce NWB RLE. Pt requires max A x 2-3 to perform SPT to recliner chair w/ the recliner on her LEFT side. Initiated pivot, pt reports fear of falling and requires encouragement to maintain standing, benefits from third person behind to shift her hips to safely get her over to the recliner chair and max A x 2 to sit. Equipment Use B HHA   Additional Treatment Day 1   End of Consult   Patient Position at End of Consult Bedside chair;Bed/Chair alarm activated; All needs within reach       The patient's AM-PAC Basic Mobility Inpatient Short Form Raw Score is 7, Standardized Score is  .  A standardized score less than 38.32 (raw score of 16) suggests the patient may benefit from discharge to post-acute rehabilitation services which may not coincide with above PT recommendations. However please refer to therapist recommendation for discharge planning given other factors that may influence destination.     Pt would benefit from skilled inpatient PT during this admission in order to facilitate progress towards goals to maximize functional independence    Monique Walker, PT, DPT

## 2023-07-21 NOTE — PROGRESS NOTES
Patient missed dinner from kitchen. Pt says she had someone come from kitchen to place an order though her dinner did not come. Gave patient sandwich from the fridge.

## 2023-07-21 NOTE — PLAN OF CARE
Problem: OCCUPATIONAL THERAPY ADULT  Goal: Performs self-care activities at highest level of function for planned discharge setting. See evaluation for individualized goals. Description: Treatment Interventions: ADL retraining, Functional transfer training, Patient/family training, Compensatory technique education, UE strengthening/ROM, Fine motor coordination activities          See flowsheet documentation for full assessment, interventions and recommendations. Note: Limitation: Decreased ADL status, Decreased self-care trans, Decreased high-level ADLs  Prognosis: Good  Assessment: Pt is a 81 y.o. female seen for OT evaluation at [facility], admitted 7/20/2023 w/ Closed right ankle fracture. Per ortho, no surgical intervention recommended at this time. Pt placed in a short leg splint. Made NWB. Comorbidities affecting pt's functional performance at time of assessment include: bilateral carpal tunnel syndrome, prediabetes, hypokalemia, essential HTN, etc (see chart). Personal factors affecting pt at time of IE include:steps to enter environment, limited home support, difficulty performing ADLS, difficulty performing IADLS  and decreased functional mobility. Prior to admission, pt was living alone in an apt with 1 KARLA. Pt was I w/  ADLS and IADLS, (+) drove, & required RW PTA. Upon evaluation: Pt requires mod Ax  for bed mobility, mod-max A x 2 for functional mobility/transfers, sup-mod A for UB ADLs and total A for LB ADLS 2* the following deficits impacting occupational performance: weakness, decreased ROM, decreased strength, decreased balance, decreased tolerance, impaired FMC, increased pain and orthopedic restrictions. Full objective findings from OT assessment regarding body systems outlined above. These impairments, as well as pt's decreased caregiver support and risk for falls  limit pt's ability to safely engage in all baseline areas of occupation and mobility.  Pt to benefit from continued skilled OT tx while in the hospital to address deficits as defined above and maximize level of functional independence w ADL's and functional mobility. Occupational Performance areas to address include: eating, grooming, bathing/shower, toilet hygiene, dressing, functional mobility and job performance/volunteering. This evaluation required an extensive review of medical and/or therapy records and additional review of physical, cognitive and psychosocial history related to functional performance. Based upon functional performance deficits and assessments, this evaluation has been identified as a high complexity evaluation. The patient's raw score on the AM-PAC Daily Activity inpatient short form is 11, standardized score is 29.04, less than 39.4. Patients at this level are likely to benefit from DC to post-acute rehabilitation services. However please refer to therapist recommendation for discharge planning given other factors that may influence destination. At this time, OT recommendations at time of discharge are level 1 resources.

## 2023-07-21 NOTE — ASSESSMENT & PLAN NOTE
, , alk phos 171, T. bili 3.58  Patient with mild scleral icterus. No abdominal pain/tenderness. RUQ ultrasound with normal liver, surgically absent gallbladder  Hepatitis panel negative. LFT trending down  GI consult.   Added EBV, CMV

## 2023-07-21 NOTE — CONSULTS
Consultation - Orthopedics   China Kirby 81 y.o. female MRN: [id number]  Unit/Bed#: -01 Encounter: [id number]      Assessment/Plan     Assessment:  Right nondisplaced sanchez B distal fibula fracture    Plan:  No surgical intervention recommended at this time. Fracture is nondisplaced. Continue current short leg splint. Nonweightbearing to right lower extremity with assistance. Frequent icing and elevation to the right lower extremity  Follow-up as outpatient with Dr. Glenn Araujo in 2 weeks with repeat x-rays and conversion to short leg cast.  Orthopedics signing off. Please call or Tiger text with any questions or concerns. History of Present Illness   Physician Requesting Consult: Robyn Mcqueen MD  Reason for Consult / Principal Problem: Right ankle pain  HPI: China Kirby is a 81y.o. year old female who presents with right ankle pain after experiencing a mechanical fall in her home yesterday. She states she was walking from the kitchen to the living room with her rolling walker. The wheel of her walker got caught on the carpet which caused her to fall to the ground. She twisted her right ankle when falling. She felt immediate pain and was unable to weight-bear. She was brought to the emergency room and x-rays revealed a nondisplaced distal fibula fracture. She was placed in a short leg splint and orthopedics was consulted. No prior injury to the right ankle. She states she fractured bones in her foot in October 2022 that were treated nonoperatively. Inpatient consult to Orthopedic Surgery  Consult performed by: Es Dupont PA-C  Consult ordered by: Iram Resendiz PA-C          Review of Systems   Constitutional: Positive for appetite change and fatigue. HENT: Negative. Eyes: Negative. Respiratory: Negative. Cardiovascular: Negative. Gastrointestinal: Negative. Endocrine: Negative. Genitourinary: Negative.     Musculoskeletal: Positive for arthralgias, gait problem and joint swelling. Skin: Negative. Allergic/Immunologic: Negative. Neurological: Positive for weakness and light-headedness. Hematological: Negative. Psychiatric/Behavioral: Negative.         Historical Information   Past Medical History:   Diagnosis Date   • Anxiety    • Carpal tunnel syndrome, bilateral    • Cholecystitis    • Chronic calculous cholecystitis     last assessed 4/25/16   • History of cardioversion    • History of radiofrequency ablation procedure for cardiac arrhythmia    • Hyperlipidemia    • Hypertension    • Sleep apnea     no trouble since Afib corrected     Past Surgical History:   Procedure Laterality Date   • APPENDECTOMY     • BACK SURGERY  11/15/2018    L4-5 Screws and rods   • BLADDER SUSPENSION     • CARDIOVERSION      atrial - onset 2015 - x5 in 2014 and 2015   • CHOLECYSTECTOMY      April 2016   • HYSTERECTOMY     • KNEE ARTHROSCOPY W/ MENISCAL REPAIR Bilateral     therapeutic - last assessed 4/14/16   • KNEE SURGERY     • CA LAPAROSCOPY SURG CHOLECYSTECTOMY N/A 4/26/2016    Procedure: CHOLECYSTECTOMY LAPAROSCOPIC;  Surgeon: Faviola Del Rio MD;  Location:  MAIN OR;  Service: General   • TONSILLECTOMY     • WISDOM TOOTH EXTRACTION       Social History   Social History     Substance and Sexual Activity   Alcohol Use Yes    Comment: social; occasional     Social History     Substance and Sexual Activity   Drug Use No     E-Cigarette/Vaping   • E-Cigarette Use Never User      E-Cigarette/Vaping Substances     Social History     Tobacco Use   Smoking Status Never   Smokeless Tobacco Never     Family History: non-contributory    Meds/Allergies   all current active meds have been reviewed  Allergies   Allergen Reactions   • Pneumovax [Pneumococcal Polysaccharide Vaccine] Hives   • Medical Tape    • Nuts - Food Allergy    • Omnipaque [Iohexol] Hives   • Other Hives     Pine nuts   • Penicillins Hives   • Sulfa Antibiotics Hives   • Sulfur Other (See Comments)   • Iodine - Food Allergy Rash   • Latex Rash and Other (See Comments)       Objective   Vitals: Blood pressure 135/75, pulse 70, temperature 98.5 °F (36.9 °C), temperature source Oral, resp. rate 20, height 5' 4" (1.626 m), weight 93 kg (205 lb), SpO2 95 %. ,Body mass index is 35.19 kg/m². Intake/Output Summary (Last 24 hours) at 7/21/2023 0657  Last data filed at 7/20/2023 2033  Gross per 24 hour   Intake --   Output 400 ml   Net -400 ml     I/O last 24 hours: In: -   Out: 400 [Urine:400]    Invasive Devices     Peripheral Intravenous Line  Duration           Peripheral IV 07/20/23 Proximal;Right;Ventral (anterior) Forearm <1 day          Drain  Duration           External Urinary Catheter <1 day                Physical Exam  Vitals and nursing note reviewed. Constitutional:       General: She is not in acute distress. Appearance: She is well-developed. HENT:      Head: Normocephalic and atraumatic. Eyes:      General: Scleral icterus present. Cardiovascular:      Rate and Rhythm: Normal rate and regular rhythm. Heart sounds: No murmur heard. Pulmonary:      Effort: Pulmonary effort is normal. No respiratory distress. Breath sounds: Normal breath sounds. Abdominal:      Palpations: Abdomen is soft. Tenderness: There is no abdominal tenderness. Musculoskeletal:         General: Signs of injury present. Cervical back: Neck supple. Skin:     General: Skin is warm and dry. Capillary Refill: Capillary refill takes less than 2 seconds. Neurological:      Mental Status: She is alert and oriented to person, place, and time. Psychiatric:         Mood and Affect: Mood normal.       Right Ankle Exam     Other   Erythema: absent  Sensation: normal     Comments:  Short leg splint intact with no loosening or skin breakdown  Moving toes  Capillary refill brisk  Calf soft, nontender proximal to splint            Lab Results: I have personally reviewed pertinent lab results.   Imaging Studies: I have personally reviewed pertinent films in PACS  X-ray right ankle: Nondisplaced, oblique Arrieta B distal fibula fracture. Mortise is well aligned.

## 2023-07-21 NOTE — CONSULTS
Consultation - Nephrology   Kemper Cowden 81 y.o. female MRN: [id number]  Unit/Bed#: -01 Encounter: [id number]    ASSESSMENT/PLAN:    Hyponatremia  -Has a history of hyponatremia in the past with a sodium of 131 in 2018 with periods of normalization  -Presented with a sodium of 125, most recently 129  -Etiology: Suspect multifactorial, Lexapro use which can induce SIADH, HCTZ use given concurrent hypokalemia, possible polydipsia, acute pain in the setting of fibula fracture, prerenal azotemia/hypovolemia given response to volume resuscitation  -Work-up: TSH 6.06, urine osmolality, urine sodium, a.m. cortisol, uric acid, serum osmolality pending  -Imaging: Chest x-ray with no acute cardiopulmonary disease, CT head with no acute intracranial abnormality  -HCTZ was held on admission, patient was started on gentle IV fluid duration with improvement.   This was subsequently discontinued.  -Patient appears euvolemic, await urine studies to guide further decisions, start 1.8 L fluid restrictions, continue to trend BMPs every 8 hours  -If no improvement have to consider reduction of Lexapro or alternative agent    Hypertension/blood pressure  -OP regimen: Diovan 320 mg daily, hydrochlorothiazide 25 mg daily, sotalol 80 mg twice daily, Cardizem 24-hour 360 mg daily  -Current regimen: Losartan 100 mg substituted for Diovan, sotalol 80 mg twice daily, Cardizem 24-hour 360 mg daily  -Recent blood pressures are acceptable, hold parameters adjusted for losartan    Hypokalemia  -Potassium was 2.7 on admission, improved to 3.6 most recently after repletion  -Possibly due to HCTZ use, HCT is currently held    Bladder calculus  -Follows with urology as outpatient  -Prior renal ultrasound from February 2023 shows normal echogenicity and contour bilaterally with no hydronephrosis, 2 small left renal cysts, no shadowing calculi bilaterally, bladder with echogenic focus with questionable twinkle artifact    Right lateral malleolus fracture  -Orthopedics following  -Care per primary team    Transaminitis  -GI consulted    Additional Medical Problems: Atrial fibrillation on Xarelto, hypothyroidism on Synthroid, depression    HISTORY OF PRESENT ILLNESS:  Requesting Physician: Kandace Sampson MD  Reason for Consult: Hyponatremia    Malon Bernheim is a 81y.o. year old female who was admitted to [address] after presenting with mechanical fall when using a walker which she felt got stuck on a rug. She fell and struck her head but denies any loss of consciousness. Noted to be hyponatremic on admission. She denies any history of hyponatremia. She denies any recent NSAID use. She reports her appetite has been stable and at baseline recently, reports he typically drinks 6 to 7, 16 ounce water bottles daily. A renal consultation is requested today for assistance in the management of hyponatremia.     PAST MEDICAL HISTORY:  Past Medical History:   Diagnosis Date   • Anxiety    • Carpal tunnel syndrome, bilateral    • Cholecystitis    • Chronic calculous cholecystitis     last assessed 4/25/16   • History of cardioversion    • History of radiofrequency ablation procedure for cardiac arrhythmia    • Hyperlipidemia    • Hypertension    • Sleep apnea     no trouble since Afib corrected       PAST SURGICAL HISTORY:  Past Surgical History:   Procedure Laterality Date   • APPENDECTOMY     • BACK SURGERY  11/15/2018    L4-5 Screws and rods   • BLADDER SUSPENSION     • CARDIOVERSION      atrial - onset 2015 - x5 in 2014 and 2015   • CHOLECYSTECTOMY      April 2016   • HYSTERECTOMY     • KNEE ARTHROSCOPY W/ MENISCAL REPAIR Bilateral     therapeutic - last assessed 4/14/16   • KNEE SURGERY     • AZ LAPAROSCOPY SURG CHOLECYSTECTOMY N/A 4/26/2016    Procedure: CHOLECYSTECTOMY LAPAROSCOPIC;  Surgeon: Titus Carranza MD;  Location:  MAIN OR;  Service: General   • TONSILLECTOMY     • WISDOM TOOTH EXTRACTION         ALLERGIES:  Allergies Allergen Reactions   • Pneumovax [Pneumococcal Polysaccharide Vaccine] Hives   • Medical Tape    • Nuts - Food Allergy    • Omnipaque [Iohexol] Hives   • Other Hives     Pine nuts   • Penicillins Hives   • Sulfa Antibiotics Hives   • Sulfur Other (See Comments)   • Iodine - Food Allergy Rash   • Latex Rash and Other (See Comments)       SOCIAL HISTORY:  Social History     Substance and Sexual Activity   Alcohol Use Yes    Comment: social; occasional     Social History     Substance and Sexual Activity   Drug Use No     Social History     Tobacco Use   Smoking Status Never   Smokeless Tobacco Never       FAMILY HISTORY:  Family History   Problem Relation Age of Onset   • Cancer Mother         ovarian   • Heart disease Father         cardiac disorder   • Cancer Father    • Heart disease Brother    • Heart disease Paternal Aunt    • Heart disease Paternal Uncle        MEDICATIONS:    Current Facility-Administered Medications:   •  acetaminophen (TYLENOL) tablet 650 mg, 650 mg, Oral, Q6H PRN, Yumiko Freitas PA-C  •  aluminum-magnesium hydroxide-simethicone (MAALOX) oral suspension 30 mL, 30 mL, Oral, Q6H PRN, Violet Mark PA-C  •  diltiazem (CARDIZEM CD) 24 hr capsule 360 mg, 360 mg, Oral, Daily, Mingl Keyla Mark PA-C  •  escitalopram (LEXAPRO) tablet 10 mg, 10 mg, Oral, Daily, Macel Heriate MURALI Mark-ALYCE, 10 mg at 07/21/23 3057  •  gabapentin (NEURONTIN) capsule 300 mg, 300 mg, Oral, TID, Mingl Senate Deedee PA-C, 300 mg at 07/21/23 1896  •  levothyroxine tablet 175 mcg, 175 mcg, Oral, Early Morning, Yumiko Freitas PA-C, 175 mcg at 07/21/23 0533  •  [START ON 7/22/2023] losartan (COZAAR) tablet 100 mg, 100 mg, Oral, Daily, RAJ Olivera  •  morphine injection 2 mg, 2 mg, Intravenous, Q4H PRN, Gina Byrd MD, 2 mg at 07/21/23 0807  •  ondansetron (ZOFRAN) injection 4 mg, 4 mg, Intravenous, Q6H PRN, Yumiko Freitas PA-C  •  oxyCODONE (ROXICODONE) split tablet 2.5 mg, 2.5 mg, Oral, Q6H PRN **OR** oxyCODONE (ROXICODONE) IR tablet 5 mg, 5 mg, Oral, Q6H PRN, Alena Mark PA-C, 5 mg at 07/20/23 1946  •  rivaroxaban (XARELTO) tablet 20 mg, 20 mg, Oral, Daily With Allie Segura PA-C, 20 mg at 07/20/23 1832  •  sotalol (BETAPACE) tablet 80 mg, 80 mg, Oral, BID, Cristina ALBIN Bowling, 80 mg at 07/21/23 1888    REVIEW OF SYSTEMS:  Review of Systems   Constitutional: Negative for chills and fatigue. HENT: Negative. Respiratory: Negative. Cardiovascular: Negative. Gastrointestinal: Negative. Musculoskeletal:        Right ankle pain, bilateral wrist pain, bilateral shoulder pain   All other systems reviewed and are negative. A complete 10 point review of systems was performed and found to be negative unless otherwise noted above or in the HPI. PHYSICAL EXAM:  Current Weight: Weight - Scale: 93 kg (205 lb)  First Weight: Weight - Scale: 93 kg (205 lb)  Vitals:    07/20/23 2110 07/21/23 0741 07/21/23 0741 07/21/23 0900   BP: 135/75 114/66     BP Location: Right arm      Pulse: 70  87    Resp: 20 20     Temp: 98.5 °F (36.9 °C) 98.2 °F (36.8 °C)     TempSrc: Oral      SpO2: 95%  94% 94%   Weight:       Height:           Intake/Output Summary (Last 24 hours) at 7/21/2023 1142  Last data filed at 7/20/2023 2033  Gross per 24 hour   Intake --   Output 400 ml   Net -400 ml     Physical Exam  Vitals and nursing note reviewed. Constitutional:       General: She is not in acute distress. Appearance: Normal appearance. She is not toxic-appearing or diaphoretic. HENT:      Head: Normocephalic and atraumatic. Nose: Nose normal.      Mouth/Throat:      Mouth: Mucous membranes are moist.   Eyes:      General: No scleral icterus. Cardiovascular:      Rate and Rhythm: Normal rate and regular rhythm. Pulses: Normal pulses. Pulmonary:      Effort: Pulmonary effort is normal. No respiratory distress. Breath sounds: No wheezing or rales.    Abdominal: General: Abdomen is flat. Palpations: Abdomen is soft. Musculoskeletal:      Cervical back: Neck supple. Left lower leg: No edema. Comments: Right ankle splint   Skin:     General: Skin is warm and dry. Capillary Refill: Capillary refill takes less than 2 seconds. Neurological:      General: No focal deficit present. Mental Status: She is alert and oriented to person, place, and time. Invasive Devices:      Lab Results:   Results from last 7 days   Lab Units 07/21/23  0518 07/20/23  2117 07/20/23  1213   WBC Thousand/uL 13.56*  --  14.82*   HEMOGLOBIN g/dL 12.1  --  13.2   HEMATOCRIT % 35.4  --  38.7   PLATELETS Thousands/uL 254  --  229   POTASSIUM mmol/L 3.5  3.6 3.2* 2.7*   CHLORIDE mmol/L 94*  94* 92* 87*   CO2 mmol/L 29  30 31 30   BUN mg/dL 20  20 17 22   CREATININE mg/dL 0.59*  0.61 0.63 0.61   CALCIUM mg/dL 9.0  8.9 9.0 9.4   ALK PHOS U/L 148*  --  171*   ALT U/L 145*  --  184*   AST U/L 177*  --  208*       I have personally reviewed the blood work as stated above and in my note. I have personally reviewed chest x-ray, CT head, renal ultrasound imaging studies. I have personally reviewed internal medicine, orthopedics, GI note.

## 2023-07-21 NOTE — DISCHARGE INSTR - AVS FIRST PAGE
Discharge Instructions - Orthopedics  Ajay Daniels 81 y.o. female MRN: [id number]  Unit/Bed#: -01    Weight Bearing Status:                                           NWB to RLE with assistance    DVT prophylaxis:  Continue Xarelto as prescribed. Pain:  Continue analgesics as directed    Splint Instructions:   Please keep clean, dry and intact until follow up. DO NOT REMOVE. Appt Instructions: If you do not have your appointment, please call the clinic at [phone] to schedule appointment with Dr. Niko Peña in 10-14 days  Otherwise followup as scheduled     Contact the office sooner if you experience any increased numbness/tingling in the extremities.       Miscellaneous:  Ice and elevation to RLE

## 2023-07-21 NOTE — CONSULTS
Consultation - [address] [place] [address] Gastroenterology     Vivian Mobley 81 y.o. female MRN: [id number]  Unit/Bed#: -01 Encounter: [id number]    Inpatient consult to gastroenterology  Consult performed by: Ye Yung PA-C  Consult ordered by: Elina Caballero PA-C          ASSESSMENT and PLAN  1. Transaminitis  83F with hx/o cholecystectomy (2016), acquired hypothyroidism, paroxysmal A-fib on Xarelto, HTN, HLD who presented to the ED 7/20/23 for ankle fx after fall. On admission, she was found to have elevated LFTs. US 7/20/23 unremarkable. -Differentials include exposures to hepatotoxins, meds, viral hepatitis, ETOH, drugs, travel, food intake, autoimmune hepatitis, occupational disorders, right-sided heart failure (congestive hepatopathy), diabetes mellitus, skin pigmentation, arthritis, hypogonadism and dilated cardiomyopathy (hemochromatosis), obesity and metabolic syndrome (nonalcoholic fatty liver disease), pregnancy (gallstones), inflammatory bowel disease (primary sclerosing cholangitis, gallstones), early onset emphysema (alpha-1 antitrypsin deficiency), celiac disease, and thyroid disease.     -Labs improving, notable for  (from 184 on admission),  (208), Tbili 3.46 (3.58),  (171), INR 3.69, Albumin 2.7, Platelets 812  -RUQ US 7/20/2023 unremarkable  -Please obtain EBV, CMV, AMA, direct bilirubin, CK today  -Patient will need further workup outpatient with GI if LFTs remain elevated at the time of discharge    2. Mixed hyperlipidemia  -Atorvastatin 20 mg on HOLD currently due to elevated LFTs    3.  Paroxysmal atrial fibrillation ([address])  -Anticoagulated on Xarelto    Chief Complaint   Patient presents with   • Fall     Patient presents to the ED via EMS, states mechanical fall at home today, states walker got caught on the rug and she fell, c/o right ankle pain, +head strike on thinners        Physician Requesting Consult: Claire Rowland MD    HPI  Accompanied by self and history is obtained from self. Santi Lincoln is a 81y.o. year old female with a past medical history of cholecystectomy (2016), acquired hypothyroidism, paroxysmal A-fib on Xarelto, HTN, HLD who presented to the ED 7/20/23 for fall. On admission for ankle fx, she was found to have elevated liver enzymes. Notes that she takes Tylenol 650 mg 6 tabs (3,900 mg) daily for pain per cardiologist and is on Norco 5-235 mg prn for chronic pain (carpal tunnel). She does not take NSAIDs. To her recollection, she has never been told that she had abnormal liver tests in the past and her prior liver function testing from 6/2018 was wnl. She has never been a smoker. Drinks 1-2 glasses of wine/cocktails socially on weekends. No hx/o IVDU. Patient denies recent travel, different food intake, sick contacts, recent hospitalizations, or recent abx use. She denies hx of blood transfusions or [facility] time or tattoos. Today, she is most upset about her ankle pain and is anxiously awaiting surgical repair. She had increased pain last night from her bilateral carpal tunnel. She endorses no GI complaints and is frustrated about the elevated LFT workup. She has never had a screening colonoscopy and does not want one. Patient denies any F/C, SOB, cough, dysuria, or rashes. Denies any N/V, hematemesis, or coffee ground emesis, melena or hematochezia. Denies any jaundice or scleral icterus. On admission, , , alk phos 148, Tbili 3.58. WBC 14.82  US RUQ 7/20/23 was wnl, s/p cholecystectomy. GI History:  Previous issues: None  Blood thinners: ASA: no antiplatelet: no anticoagulation: yes - Xarelto  NSAID use: none  Caffeine use: Tobacco use: never  EtOH use: 1-2 glasses wine/cocktails socially on weekends  Marijuana use: never  Insulin use: no    Abdominal Surgical Hx: s/p cholecystectomy (4/2016)  Family Hx: Denies first degree relatives with GI malignancies.   GI procedure Hx:  EGD: never  Colonoscopy: never, declines  GI imaging Hx: RUQ US 7/20/23: "IMPRESSION:     Within normal limits postcholecystectomy."    Review of Systems   Constitutional: Negative for appetite change, fever and unexpected weight change. HENT: Negative for dental problem, mouth sores, sore throat, trouble swallowing and voice change. Respiratory: Negative for cough and choking. Cardiovascular: Negative for chest pain and leg swelling (no more than baseline). Gastrointestinal: Negative for abdominal distention, abdominal pain, anal bleeding, blood in stool, constipation, diarrhea, nausea, rectal pain and vomiting. Skin: Negative for pallor and rash. Neurological: Negative for weakness, light-headedness and headaches. Psychiatric/Behavioral: Negative for confusion.        Historical Information   Past Medical History:   Diagnosis Date   • Anxiety    • Carpal tunnel syndrome, bilateral    • Cholecystitis    • Chronic calculous cholecystitis     last assessed 4/25/16   • History of cardioversion    • History of radiofrequency ablation procedure for cardiac arrhythmia    • Hyperlipidemia    • Hypertension    • Sleep apnea     no trouble since Afib corrected     Past Surgical History:   Procedure Laterality Date   • APPENDECTOMY     • BACK SURGERY  11/15/2018    L4-5 Screws and rods   • BLADDER SUSPENSION     • CARDIOVERSION      atrial - onset 2015 - x5 in 2014 and 2015   • CHOLECYSTECTOMY      April 2016   • HYSTERECTOMY     • KNEE ARTHROSCOPY W/ MENISCAL REPAIR Bilateral     therapeutic - last assessed 4/14/16   • KNEE SURGERY     • KS LAPAROSCOPY SURG CHOLECYSTECTOMY N/A 4/26/2016    Procedure: CHOLECYSTECTOMY LAPAROSCOPIC;  Surgeon: Aury Shin MD;  Location:  MAIN OR;  Service: General   • TONSILLECTOMY     • WISDOM TOOTH EXTRACTION       Social History   Social History     Substance and Sexual Activity   Alcohol Use Yes    Comment: social; occasional     Social History     Substance and Sexual Activity   Drug Use No     Social History     Tobacco Use   Smoking Status Never   Smokeless Tobacco Never     Family History   Problem Relation Age of Onset   • Cancer Mother         ovarian   • Heart disease Father         cardiac disorder   • Cancer Father    • Heart disease Brother    • Heart disease Paternal Aunt    • Heart disease Paternal Uncle        Meds/Allergies     Current Facility-Administered Medications   Medication Dose Route Frequency   • acetaminophen (TYLENOL) tablet 650 mg  650 mg Oral Q6H PRN   • aluminum-magnesium hydroxide-simethicone (MAALOX) oral suspension 30 mL  30 mL Oral Q6H PRN   • diltiazem (CARDIZEM CD) 24 hr capsule 360 mg  360 mg Oral Daily   • escitalopram (LEXAPRO) tablet 10 mg  10 mg Oral Daily   • gabapentin (NEURONTIN) capsule 300 mg  300 mg Oral TID   • levothyroxine tablet 175 mcg  175 mcg Oral Early Morning   • [START ON 7/22/2023] losartan (COZAAR) tablet 100 mg  100 mg Oral Daily   • morphine injection 2 mg  2 mg Intravenous Q4H PRN   • ondansetron (ZOFRAN) injection 4 mg  4 mg Intravenous Q6H PRN   • oxyCODONE (ROXICODONE) split tablet 2.5 mg  2.5 mg Oral Q6H PRN    Or   • oxyCODONE (ROXICODONE) IR tablet 5 mg  5 mg Oral Q6H PRN   • rivaroxaban (XARELTO) tablet 20 mg  20 mg Oral Daily With Dinner   • sotalol (BETAPACE) tablet 80 mg  80 mg Oral BID     Medications Prior to Admission   Medication   • atorvastatin (LIPITOR) 20 mg tablet   • diltiazem (CARDIZEM CD) 360 MG 24 hr capsule   • Diovan 320 MG tablet   • escitalopram (LEXAPRO) 10 mg tablet   • gabapentin (NEURONTIN) 100 mg capsule   • hydrochlorothiazide (HYDRODIURIL) 25 mg tablet   • HYDROcodone-acetaminophen (Norco) 5-325 mg per tablet   • levothyroxine 175 mcg tablet   • rivaroxaban (XARELTO) 20 mg tablet   • sotalol (BETAPACE) 80 mg tablet   • zolpidem (AMBIEN) 10 mg tablet   • cyclobenzaprine (FLEXERIL) 10 mg tablet   • hydrOXYzine HCL (ATARAX) 10 mg tablet   • liotrix (THYROLAR-1) 60 (12.5-50) MG (MCG) TABS • LORazepam (ATIVAN) 0.5 mg tablet   • methylPREDNISolone 4 MG tablet therapy pack   • predniSONE 20 mg tablet       Allergies   Allergen Reactions   • Pneumovax [Pneumococcal Polysaccharide Vaccine] Hives   • Medical Tape    • Nuts - Food Allergy    • Omnipaque [Iohexol] Hives   • Other Hives     Pine nuts   • Penicillins Hives   • Sulfa Antibiotics Hives   • Sulfur Other (See Comments)   • Iodine - Food Allergy Rash   • Latex Rash and Other (See Comments)       PHYSICAL EXAM    Physical Exam  Vitals and nursing note reviewed. Constitutional:       General: She is not in acute distress. Appearance: She is not toxic-appearing. HENT:      Head: Normocephalic. Mouth/Throat:      Mouth: Mucous membranes are moist.      Pharynx: Oropharynx is clear. Eyes:      General: No scleral icterus. Extraocular Movements: Extraocular movements intact. Neck:      Trachea: Phonation normal.   Cardiovascular:      Rate and Rhythm: Normal rate and regular rhythm. Heart sounds: No murmur heard. No friction rub. No gallop. Pulmonary:      Effort: Pulmonary effort is normal. No respiratory distress. Breath sounds: No wheezing, rhonchi or rales. Abdominal:      General: Abdomen is protuberant. Bowel sounds are normal. There is no distension or abdominal bruit. Palpations: Abdomen is soft. There is no hepatomegaly or splenomegaly. Tenderness: There is no abdominal tenderness. There is no guarding or rebound. Musculoskeletal:      Cervical back: Neck supple. Comments: R lower leg in splint. Skin:     General: Skin is warm and dry. Capillary Refill: Capillary refill takes less than 2 seconds. Coloration: Skin is not jaundiced or pale. Neurological:      General: No focal deficit present. Mental Status: She is alert and oriented to person, place, and time. Psychiatric:         Behavior: Behavior normal. Behavior is cooperative. Thought Content:  Thought content normal.           Lab Results   Component Value Date    CALCIUM 8.9 07/21/2023    CALCIUM 9.0 07/21/2023    K 3.6 07/21/2023    K 3.5 07/21/2023    CO2 30 07/21/2023    CO2 29 07/21/2023    CL 94 (L) 07/21/2023    CL 94 (L) 07/21/2023    BUN 20 07/21/2023    BUN 20 07/21/2023    CREATININE 0.61 07/21/2023    CREATININE 0.59 (L) 07/21/2023    CREATININE 0.63 07/20/2023     Lab Results   Component Value Date    WBC 13.56 (H) 07/21/2023    WBC 14.82 (H) 07/20/2023    WBC 9.9 06/27/2018    HGB 12.1 07/21/2023    HGB 13.2 07/20/2023    HGB 14.1 06/27/2018    MCV 86 07/21/2023     07/21/2023     07/20/2023     06/27/2018     Lab Results   Component Value Date     (H) 07/21/2023     (H) 07/20/2023    ALT 20 06/27/2018     (H) 07/21/2023     (H) 07/20/2023    AST 15 06/27/2018    ALKPHOS 148 (H) 07/21/2023    ALKPHOS 171 (H) 07/20/2023    ALKPHOS 76 06/27/2018    TBILI 3.46 (H) 07/21/2023    TBILI 3.58 (H) 07/20/2023    TBILI 0.6 06/27/2018     No results found for: "AMYLASE"  Lab Results   Component Value Date    LIPASE 150 04/13/2016     No results found for: "IRON", "TIBC", "FERRITIN"  Lab Results   Component Value Date    INR 3.69 (H) 07/21/2023       XR ankle 3+ views RIGHT    Result Date: 7/20/2023  Narrative: RIGHT ANKLE INDICATION:   injury. COMPARISON:  None VIEWS:  XR ANKLE 3+ VW RIGHT FINDINGS: There is an acute, obliquely oriented, nondisplaced fracture of the right distal fibula. There is overlying soft tissue swelling. The ankle mortise is symmetric. No significant degenerative changes. No lytic or blastic osseous lesion. Impression: Acute, nondisplaced distal right fibular fracture. No dislocation. Workstation performed: [id number]     US right upper quadrant    Result Date: 7/20/2023  Narrative: RIGHT UPPER QUADRANT ULTRASOUND INDICATION:     Elevated liver enzyme.  COMPARISON:  None TECHNIQUE:   Real-time ultrasound of the right upper quadrant was performed with a curvilinear transducer with both volumetric sweeps and still imaging techniques. FINDINGS: PANCREAS:  Visualized portions of the pancreas are within normal limits. AORTA AND IVC:  Visualized portions are normal for patient age. LIVER: Size:  Within normal range. The liver measures 16.4 cm in the midclavicular line. Contour:  Surface contour is smooth. Parenchyma:  Echogenicity and echotexture are within normal limits. No liver mass identified. Limited imaging of the main portal vein shows it to be patent and hepatopetal. BILIARY: Patient has undergone cholecystectomy. No intrahepatic biliary dilatation. CBD measures 3.0 mm. No choledocholithiasis. KIDNEY: Right kidney measures 11.0 x 6.6 x 5.2 cm. Volume 198.1 mL Kidney within normal limits. ASCITES:   None. Impression: Within normal limits postcholecystectomy. Workstation performed: [id number]     TRAUMA - CT spine cervical wo contrast    Result Date: 7/20/2023  Narrative: CT CERVICAL SPINE - WITHOUT CONTRAST INDICATION:   TRAUMA. COMPARISON:  None. TECHNIQUE:  CT examination of the cervical spine was performed without intravenous contrast.  Contiguous axial images were obtained. Multiplanar 2D reformatted images were created from the source data. Radiation dose length product (DLP) for this visit:  448. 38 mGy-cm . This examination, like all CT scans performed in the [facility], was performed utilizing techniques to minimize radiation dose exposure, including the use of iterative  reconstruction and automated exposure control. IMAGE QUALITY:  Diagnostic. FINDINGS: ALIGNMENT:  Normal alignment of the cervical spine. No subluxation. VERTEBRAE:  No fracture. DEGENERATIVE CHANGES: Moderate multilevel cervical degenerative changes are noted. No critical central canal stenosis. PREVERTEBRAL AND PARASPINAL SOFT TISSUES: Unremarkable THORACIC INLET: Small amount of debris in the visualized esophagus.      Impression: No cervical spine fracture or traumatic malalignment. Small amount of debris in the visualized esophagus, suggesting gastroesophageal reflux. Workstation performed: [id number]     TRAUMA - CT head wo contrast    Result Date: 7/20/2023  Narrative: CT BRAIN - WITHOUT CONTRAST INDICATION:   TRAUMA. COMPARISON:  None. TECHNIQUE:  CT examination of the brain was performed. Multiplanar 2D reformatted images were created from the source data. Radiation dose length product (DLP) for this visit:  898.33 mGy-cm . This examination, like all CT scans performed in the [facility], was performed utilizing techniques to minimize radiation dose exposure, including the use of iterative  reconstruction and automated exposure control. IMAGE QUALITY:  Diagnostic. FINDINGS: PARENCHYMA: Decreased attenuation is noted in periventricular and subcortical white matter demonstrating an appearance that is statistically most likely to represent mild microangiopathic change. No CT signs of acute infarction. No intracranial mass, mass effect or midline shift. No acute parenchymal hemorrhage. VENTRICLES AND EXTRA-AXIAL SPACES:  Normal for the patient's age. VISUALIZED ORBITS: Normal visualized orbits. PARANASAL SINUSES: Normal visualized paranasal sinuses. CALVARIUM AND EXTRACRANIAL SOFT TISSUES:  Normal.     Impression: No acute intracranial abnormality. Workstation performed: [id number]     XR Trauma pelvis ap only 1 or 2 vw    Result Date: 7/20/2023  Narrative: PELVIS INDICATION:   TRAUMA. COMPARISON:  None VIEWS:  XR PELVIS AP ONLY 1 OR 2 VW Images: 1 FINDINGS: No acute fracture or hip dislocation. No significant degenerative changes. No lytic or blastic osseous lesion. Soft tissues are unremarkable. There are postoperative changes in the lower lumbar spine with pedicle screws and vertical rods. Hardware appears intact in this single view. Impression: No acute osseous abnormality.  Workstation performed: [id number]     XR Trauma chest portable    Result Date: 7/20/2023  Narrative: CHEST INDICATION:   TRAUMA. COMPARISON: 4/13/2016 EXAM PERFORMED/VIEWS:  XR CHEST PORTABLE FINDINGS: Heart shadow appears unremarkable. Atherosclerotic aortic calcifications are noted. The lungs are clear. No pneumothorax or pleural effusion. Osseous structures appear within normal limits for patient age. Impression: No acute cardiopulmonary disease. Workstation performed: [id number]       Imaging Studies: I have personally reviewed pertinent reports. Pathology, and Other Studies: I have personally reviewed pertinent reports. Patient expressed understanding and had all questions and concerns addressed. Aruna Maher PA-C  07/21/23   9:57 AM     This chart was completed in part utilizing Magnetic speech voice recognition software. Random word insertions, pronoun errors, and incomplete sentences are an occasional consequence of this system due to software limitations, and ambient noise. Any questions or concerns about the content, text, or information contained within the body of this dictation should be directly addressed to the provider for clarification.

## 2023-07-21 NOTE — OCCUPATIONAL THERAPY NOTE
Occupational Therapy Evaluation & Treat     Patient Name: China CORREA Date: 7/21/2023  Problem List  Principal Problem:    Closed right ankle fracture  Active Problems:    Paroxysmal atrial fibrillation (HCC)    Mixed hyperlipidemia    Essential hypertension    Acquired hypothyroidism    Prediabetes    Bilateral carpal tunnel syndrome    Hyponatremia    Hypokalemia    Transaminitis    Past Medical History  Past Medical History:   Diagnosis Date    Anxiety     Carpal tunnel syndrome, bilateral     Cholecystitis     Chronic calculous cholecystitis     last assessed 4/25/16    History of cardioversion     History of radiofrequency ablation procedure for cardiac arrhythmia     Hyperlipidemia     Hypertension     Sleep apnea     no trouble since Afib corrected     Past Surgical History  Past Surgical History:   Procedure Laterality Date    APPENDECTOMY      BACK SURGERY  11/15/2018    L4-5 Screws and rods    BLADDER SUSPENSION      CARDIOVERSION      atrial - onset 2015 - x5 in 2014 and 2015    CHOLECYSTECTOMY      April 2016    HYSTERECTOMY      KNEE ARTHROSCOPY W/ MENISCAL REPAIR Bilateral     therapeutic - last assessed 4/14/16    KNEE SURGERY      MN LAPAROSCOPY SURG CHOLECYSTECTOMY N/A 4/26/2016    Procedure: CHOLECYSTECTOMY LAPAROSCOPIC;  Surgeon: May Fernandes MD;  Location: [facility];  Service: General    TONSILLECTOMY      WISDOM TOOTH EXTRACTION             07/21/23 1014   OT Last Visit   OT Visit Date 07/21/23   Note Type   Note type Evaluation  (& Treat)   Pain Assessment   Pain Assessment Tool 0-10   Pain Score 9   Pain Location/Orientation   (B/l wrists (pt pending carpal tunnel surgery). R ankle)   Restrictions/Precautions   Weight Bearing Precautions Per Order Yes   RLE Weight Bearing Per Order NWB   Braces or Orthoses Thumb lacer  (RUE)   Other Precautions Chair Alarm; Bed Alarm; Fall Risk;Pain   Home Living   Type of [address] One level  (1 KARLA)   Bathroom Shower/Tub Tub/shower unit   Bathroom Toilet Raised   Bathroom Equipment Grab bars in shower; Shower chair   [address]   Prior Function   Level of [place] Independent with ADLs   Lives With (S)  Alone   Receives Help From Family   IADLs Independent with driving; Independent with meal prep; Independent with medication management   Falls in the last 6 months 1 to 4   Vocational Part time employment  ( at [employer])   Subjective   Subjective Pt received in supine position. Pt agreeable to session. ADL   Eating Assistance 5  Supervision/Setup   Grooming Assistance 4  Minimal Assistance   UB Bathing Assistance 3  Moderate Assistance   LB Bathing Assistance 1  Total Assistance   UB Dressing Assistance 3  Moderate Assistance   LB Dressing Assistance 1  Total Assistance   Toileting Assistance  1  Total Assistance   Bed Mobility   Supine to Sit 3  Moderate assistance   Additional items Assist x 2;Verbal cues; Increased time required   Additional Comments Pt remained seated in recliner by end of session   Transfers   Sit to Stand 3  Moderate assistance   Additional items Assist x 2; Increased time required;Verbal cues   Stand to Sit 3  Moderate assistance   Additional items Assist x 2;Verbal cues   Additional Comments Pt unable to ulitilze UE in transfer 2/2 UE pain. Provided support at axillary to assist pt. Balance   Static Sitting Good   Dynamic Sitting Fair +   Static Standing Poor   Dynamic Standing Poor -   Activity Tolerance   Activity Tolerance Patient limited by pain   Medical Staff Made Aware PT Carol   Nurse Made Aware JENSEN Barajas   RUE Assessment   RUE Assessment   (shoulder/elbow WFL. Unable to assess hand/wrist 2/2 c/o significant pain. Pt reports pending Carpal Tunnel sx.)   LUE Assessment   LUE Assessment   (shoulder/elbow WFL. Unable to assess hand/wrist 2/2 c/o significant pain/currently in wrist brace.  Pt reports pending Carpal Tunnel sx.) Hand Function   Fine Motor Coordination Impaired  (Pt with difficulty manipulating phone.)   Cognition   Overall Cognitive Status WFL   Arousal/Participation Alert   Attention Within functional limits   Orientation Level Oriented X4   Memory Within functional limits   Following Commands Follows all commands and directions without difficulty   Assessment   Limitation Decreased ADL status; Decreased self-care trans;Decreased high-level ADLs   Prognosis Good   Assessment Pt is a 81 y.o. female seen for OT evaluation at [facility], admitted 7/20/2023 w/ Closed right ankle fracture. Per ortho, no surgical intervention recommended at this time. Pt placed in a short leg splint. Made NWB. Comorbidities affecting pt's functional performance at time of assessment include: bilateral carpal tunnel syndrome, prediabetes, hypokalemia, essential HTN, etc (see chart). Personal factors affecting pt at time of IE include:steps to enter environment, limited home support, difficulty performing ADLS, difficulty performing IADLS  and decreased functional mobility. Prior to admission, pt was living alone in an apt with 1 KARLA. Pt was I w/  ADLS and IADLS, (+) drove, & required RW PTA. Upon evaluation: Pt requires mod Ax  for bed mobility, mod-max A x 2 for functional mobility/transfers, sup-mod A for UB ADLs and total A for LB ADLS 2* the following deficits impacting occupational performance: weakness, decreased ROM, decreased strength, decreased balance, decreased tolerance, impaired FMC, increased pain and orthopedic restrictions. Full objective findings from OT assessment regarding body systems outlined above. These impairments, as well as pt's decreased caregiver support and risk for falls  limit pt's ability to safely engage in all baseline areas of occupation and mobility.  Pt to benefit from continued skilled OT tx while in the hospital to address deficits as defined above and maximize level of functional independence w ADL's and functional mobility. Occupational Performance areas to address include: eating, grooming, bathing/shower, toilet hygiene, dressing, functional mobility and job performance/volunteering. This evaluation required an extensive review of medical and/or therapy records and additional review of physical, cognitive and psychosocial history related to functional performance. Based upon functional performance deficits and assessments, this evaluation has been identified as a high complexity evaluation. The patient's raw score on the AM-PAC Daily Activity inpatient short form is 11, standardized score is 29.04, less than 39.4. Patients at this level are likely to benefit from DC to post-acute rehabilitation services. However please refer to therapist recommendation for discharge planning given other factors that may influence destination. At this time, OT recommendations at time of discharge are level 1 resources. Goals   Patient Goals Pt wishes to get home   Plan   Treatment Interventions ADL retraining;Functional transfer training;Patient/family training; Compensatory technique education;UE strengthening/ROM; Fine motor coordination activities   Goal Expiration Date 07/31/23   OT Treatment Day 0   OT Frequency 3-5x/wk   Recommendation   UB Rehab Discharge Recommendation (PT/OT) Level 1   AM-PAC Daily Activity Inpatient   Lower Body Dressing 1   Bathing 1   Toileting 1   Upper Body Dressing 2   Grooming 3   Eating 3   Daily Activity Raw Score 11   Daily Activity Standardized Score (Calc for Raw Score >=11) 29.04   AM-[facility] Applied Cognition Inpatient   Following a Speech/Presentation 3   Understanding Ordinary Conversation 4   Taking Medications 4   Remembering Where Things Are Placed or Put Away 4   Remembering List of 4-5 Errands 4   Taking Care of Complicated Tasks 3   Applied Cognition Raw Score 22   Applied Cognition Standardized Score 47.83   Additional Treatment Session   Start Time 1004   End Time 1014 Treatment Assessment Pt seated EOB. Performed additional sit>stand transfer with mod Ax 2. Required assist to maintain RLE NWB. Support provided at axilla. Pt very anxious t/o. Required max Ax 2 to perform stand pivot to recliner. Pt reclined and repositioned for comfort. Pt remained seated in chair. NAD. All needs met. + chair alarm. Call bell in reach. RN aware. End of Consult   Education Provided Yes   Patient Position at End of Consult Bed/Chair alarm activated; All needs within reach   Nurse Communication Nurse aware of consult       Pt will achieve the following goals within 10 days. *Pt will complete grooming with mod I.    *Pt will complete UB bathing and dressing with mod I.    *Pt will complete LB bathing and dressing with min A .    * Pt will complete toileting w/ min A w/ G hygiene/thoroughness using DME PRN    *Pt will complete bed mobility with min A x 1, with bed flat and no side rail to prep for purposeful tasks    *Pt will perform functional transfers with on/off all surfaces with min A x 1 using DME as needed w/ G balance/safety. *Pt will improve functional mobility during ADL/IADL/leisure tasks to min Ax 1 using DME as needed w/ G balance/safety.      *Assess DME needs       Angelique Strong OTR/L

## 2023-07-21 NOTE — CASE MANAGEMENT
Case Management Assessment & Discharge Planning Note    Patient name Ashkan Nairll  Location /-40 MRN [id number]  : 1940 Date 2023       Current Admission Date: 2023  Current Admission Diagnosis:Closed right ankle fracture   Patient Active Problem List    Diagnosis Date Noted   • Closed right ankle fracture 2023   • Hyponatremia 2023   • Hypokalemia 2023   • Transaminitis 2023   • Bilateral carpal tunnel syndrome 2023   • Radiculopathy due to cervical spondylosis at multiple levels 2023   • Tendinitis of right shoulder 2023   • Breast mass 2023   • Calculus in bladder 2023   • Herniated nucleus pulposus, L5-S1 2023   • Displaced fracture of fifth metatarsal bone, right foot, initial encounter for closed fracture 2023   • Arthritis of carpometacarpal [place]) joint of left thumb 2023   • Hematuria, undiagnosed cause 2023   • Obstructive sleep apnea 2022   • Osteoarthritis of knee 2022   • Diffuse interstitial pulmonary fibrosis ([address]) 2022   • Long term (current) use of anticoagulants 2022   • Adjustment insomnia 2022   • Spondylolisthesis, grade 2 2022   • Lumbar spinal stenosis 2022   • Localized, primary osteoarthritis of hand 2022   • Incontinence of feces with fecal urgency 2022   • Strain of left shoulder 2021   • Left shoulder tendonitis 2021   • Primary osteoarthritis of left shoulder 2021   • Mood disorder ([address]) 2020   • Gait abnormality 2020   • Failed back surgical syndrome 2019   • Status post spinal surgery 11/15/2018   • Prediabetes 2018   • Medicare annual wellness visit, subsequent 2018   • Lumbosacral radiculopathy due to intervertebral disc disorder 2018   • Multiple actinic keratoses 2018   • Class 2 severe obesity due to excess calories with serious comorbidity and body mass index (BMI) of 35.0 to 35.9 in adult [facility]) 04/25/2016   • Paroxysmal atrial fibrillation ([address]) 04/14/2016   • Mixed hyperlipidemia 04/14/2016   • Essential hypertension 04/14/2016   • Acquired hypothyroidism 04/14/2016      LOS (days): 1  Geometric Mean LOS (GMLOS) (days): 2.70  Days to GMLOS:1.6     OBJECTIVE:    Risk of Unplanned Readmission Score: 12.12      Current admission status: Inpatient    Preferred Pharmacy:   [address]  [address] [facility] [address]  Phone: [phone] Fax: [phone] for [address] [facility], [address], [address] [facility] [address] [address]  Phone: [phone] Fax: [phone] [address] [place] [address],[address], [address] [facility] [address]  [facility] [address]  Phone: [phone] Fax: [phone]    [facility] [address] [place], [place] - [address] [facility]  [address] [place] [address]  Phone: [phone] Fax: [phone]    Primary Care Provider: Stacy Rodriges MD    Primary Insurance: MEDICARE  Secondary Insurance:  FOR LIFE    ASSESSMENT:  Brownrt Proxies    There are no active Health Care Proxies on file. Advance Directives  Does patient have a [address]?: Yes  Does patient have Advance Directives?: Yes  Advance Directives: Living will, Power of  for health care  Primary Contact: Cayden Mc but pt's dtr in law Kenyatta Felix is main contact    Readmission Root Cause  30 Day Readmission: No    Patient Information  Admitted from[de-identified] Home  Mental Status: Alert  During Assessment patient was accompanied by: Not accompanied during assessment  Assessment information provided by[de-identified] Patient  Primary Caregiver: Self  Support Systems: Self, Family members  [place] of Residence: [address] do you live in?: [address] [facility] entry access options.  Select all that apply.: Stairs  Number of steps to enter home.: 2  Type of Current Residence: Apartment  Floor Level: 1  Upon entering residence, is there a bedroom on the main floor (no further steps)?: Yes  Upon entering residence, is there a bathroom on the main floor (no further steps)?: Yes  In the last 12 months, was there a time when you were not able to pay the mortgage or rent on time?: No  In the last 12 months, how many places have you lived?: 1  In the last 12 months, was there a time when you did not have a steady place to sleep or slept in a shelter (including now)?: No  Homeless/housing insecurity resource given?: N/A  Living Arrangements: Lives Alone  Is patient a ?: No    Activities of Daily Living Prior to Admission  Functional Status: Independent  Completes ADLs independently?: Yes  Ambulates independently?: Yes  Does patient use assisted devices?: Yes  Assisted Devices (DME) used: Walker, Rollator, Shower Chair  Does patient currently own DME?: Yes  What DME does the patient currently own?: Lucas Baxter Shower Chair  Does patient have a history of Outpatient Therapy (PT/OT)?: Yes  Does the patient have a history of Short-Term Rehab?: No  Does patient have a history of HHC?: No  Does patient currently have [facility]?: No    Patient Information Continued  Does patient have prescription coverage?: Yes  Within the past 12 months, you worried that your food would run out before you got the money to buy more.: Never true  Within the past 12 months, the food you bought just didn't last and you didn't have money to get more.: Never true  Food insecurity resource given?: N/A  Does patient receive dialysis treatments?: No  Does patient have a history of substance abuse?: No  Does patient have a history of Mental Health Diagnosis?: No    Means of Transportation  Means of Transport to Appts[de-identified] Drives Self  In the past 12 months, has lack of transportation kept you from medical appointments or from getting medications?: No  In the past 12 months, has lack of transportation kept you from meetings, work, or from getting things needed for daily living?: No  Was application for public transport provided?: N/A    DISCHARGE DETAILS:    Discharge planning discussed with[de-identified] pt  Freedom of Choice: Yes     CM contacted family/caregiver?: No- see comments (pt is alert and oriented)  Were Treatment Team discharge recommendations reviewed with patient/caregiver?: Yes  Did patient/caregiver verbalize understanding of patient care needs?: Yes  Were patient/caregiver advised of the risks associated with not following Treatment Team discharge recommendations?: Yes    [address]         Is the patient interested in [address] at discharge?: No    DME Referral Provided  Referral made for DME?: No    Other Referral/Resources/Interventions Provided:  Interventions: Acute Rehab, Short Term Rehab  Referral Comments: Referrals sent to both SNF for STR and acute rehabs. Pt is deciding which she wants to pursue. Therapy recommends acute rehab. Treatment Team Recommendation: Acute Rehab  Discharge Destination Plan[de-identified] Acute Rehab, Short Term Rehab     IMM Given (Date):: 07/21/23  IMM Given to[de-identified] Patient  Family notified[de-identified] Reviewed with pt. Additional Comments: Cm met with pt and reviewed cm role. Pt lives alone in a 1st floor apt with 2STE. She has a walker, rollator, and shower chair. She drives and works. She is ind at baseline. She has had outpt PT. No hx of HHC, STR, MH or D/A tx. She uses [facility] in [place]. Referrals sent to both SNF for STR and acute rehabs. Pt is deciding which she wants to pursue. Therapy recommends acute rehab.

## 2023-07-21 NOTE — ASSESSMENT & PLAN NOTE
Na+ 125, 126 after glucose correction. Likely contributed to her fall  Unclear etiology, patient is on HCTZ and reports drinking 6-7 16oz water bottles per day. Adequate solute intake.   Check serum osm, urine osm, urine sodium, TSH, morning cortisol  Give IVF overnight  Trend BMP every 8 hours, goal over next 24 hours: 130-132  Nephrology consult  7/21-improved to 129 with IVF, continue to hold HCTZ

## 2023-07-22 PROBLEM — R82.71 ASYMPTOMATIC BACTERIURIA: Status: ACTIVE | Noted: 2023-07-22

## 2023-07-22 LAB
ALBUMIN SERPL BCP-MCNC: 2.5 G/DL (ref 3.5–5)
ALP SERPL-CCNC: 131 U/L (ref 34–104)
ALT SERPL W P-5'-P-CCNC: 108 U/L (ref 7–52)
ANION GAP SERPL CALCULATED.3IONS-SCNC: 3 MMOL/L
AST SERPL W P-5'-P-CCNC: 112 U/L (ref 13–39)
BACTERIA UR QL AUTO: ABNORMAL /HPF
BILIRUB DIRECT SERPL-MCNC: 1.98 MG/DL (ref 0–0.2)
BILIRUB SERPL-MCNC: 3.72 MG/DL (ref 0.2–1)
BILIRUB UR QL STRIP: NEGATIVE
BUN SERPL-MCNC: 23 MG/DL (ref 5–25)
CALCIUM SERPL-MCNC: 8.4 MG/DL (ref 8.4–10.2)
CHLORIDE SERPL-SCNC: 98 MMOL/L (ref 96–108)
CLARITY UR: ABNORMAL
CMV IGG SERPL IA-ACNC: >10 U/ML (ref 0–0.59)
CMV IGM SERPL IA-ACNC: <30 AU/ML (ref 0–29.9)
CO2 SERPL-SCNC: 30 MMOL/L (ref 21–32)
COLOR UR: ABNORMAL
CORTIS AM PEAK SERPL-MCNC: 19.3 UG/DL (ref 6.7–22.6)
CREAT SERPL-MCNC: 0.57 MG/DL (ref 0.6–1.3)
EBV NA IGG SER IA-ACNC: >600 U/ML (ref 0–17.9)
EBV VCA IGG SER IA-ACNC: 474 U/ML (ref 0–17.9)
EBV VCA IGM SER IA-ACNC: <36 U/ML (ref 0–35.9)
ERYTHROCYTE [DISTWIDTH] IN BLOOD BY AUTOMATED COUNT: 15.9 % (ref 11.6–15.1)
GFR SERPL CREATININE-BSD FRML MDRD: 86 ML/MIN/1.73SQ M
GLUCOSE SERPL-MCNC: 123 MG/DL (ref 65–140)
GLUCOSE UR STRIP-MCNC: NEGATIVE MG/DL
HCT VFR BLD AUTO: 34.2 % (ref 34.8–46.1)
HGB BLD-MCNC: 11.5 G/DL (ref 11.5–15.4)
HGB UR QL STRIP.AUTO: ABNORMAL
INTERPRETATION: ABNORMAL
KETONES UR STRIP-MCNC: NEGATIVE MG/DL
LEUKOCYTE ESTERASE UR QL STRIP: ABNORMAL
MCH RBC QN AUTO: 29.7 PG (ref 26.8–34.3)
MCHC RBC AUTO-ENTMCNC: 33.6 G/DL (ref 31.4–37.4)
MCV RBC AUTO: 88 FL (ref 82–98)
MITOCHONDRIA M2 IGG SER-ACNC: <20 UNITS (ref 0–20)
MUCOUS THREADS UR QL AUTO: ABNORMAL
NITRITE UR QL STRIP: NEGATIVE
NON-SQ EPI CELLS URNS QL MICRO: ABNORMAL /HPF
OSMOLALITY UR: 556 MMOL/KG
PH UR STRIP.AUTO: 8 [PH]
PLATELET # BLD AUTO: 241 THOUSANDS/UL (ref 149–390)
PMV BLD AUTO: 9.4 FL (ref 8.9–12.7)
POTASSIUM SERPL-SCNC: 3.2 MMOL/L (ref 3.5–5.3)
PROT SERPL-MCNC: 7.1 G/DL (ref 6.4–8.4)
PROT UR STRIP-MCNC: ABNORMAL MG/DL
RBC # BLD AUTO: 3.87 MILLION/UL (ref 3.81–5.12)
RBC #/AREA URNS AUTO: ABNORMAL /HPF
SODIUM SERPL-SCNC: 131 MMOL/L (ref 135–147)
SP GR UR STRIP.AUTO: 1.02 (ref 1–1.03)
TRI-PHOS CRY URNS QL MICRO: ABNORMAL /HPF
URATE SERPL-MCNC: 2.4 MG/DL (ref 2–7.5)
UROBILINOGEN UR STRIP-ACNC: 4 MG/DL
WBC # BLD AUTO: 13.15 THOUSAND/UL (ref 4.31–10.16)
WBC #/AREA URNS AUTO: ABNORMAL /HPF

## 2023-07-22 PROCEDURE — 80076 HEPATIC FUNCTION PANEL: CPT | Performed by: INTERNAL MEDICINE

## 2023-07-22 PROCEDURE — 99232 SBSQ HOSP IP/OBS MODERATE 35: CPT | Performed by: INTERNAL MEDICINE

## 2023-07-22 PROCEDURE — 82533 TOTAL CORTISOL: CPT | Performed by: HOSPITALIST

## 2023-07-22 PROCEDURE — 99232 SBSQ HOSP IP/OBS MODERATE 35: CPT | Performed by: PHYSICIAN ASSISTANT

## 2023-07-22 PROCEDURE — 85027 COMPLETE CBC AUTOMATED: CPT | Performed by: PHYSICIAN ASSISTANT

## 2023-07-22 PROCEDURE — 81001 URINALYSIS AUTO W/SCOPE: CPT | Performed by: NURSE PRACTITIONER

## 2023-07-22 PROCEDURE — 80048 BASIC METABOLIC PNL TOTAL CA: CPT | Performed by: INTERNAL MEDICINE

## 2023-07-22 RX ORDER — SODIUM CHLORIDE 1 G/1
2 TABLET ORAL 2 TIMES DAILY WITH MEALS
Status: DISCONTINUED | OUTPATIENT
Start: 2023-07-22 | End: 2023-07-24 | Stop reason: HOSPADM

## 2023-07-22 RX ORDER — POTASSIUM CHLORIDE 20 MEQ/1
40 TABLET, EXTENDED RELEASE ORAL EVERY 6 HOURS
Status: COMPLETED | OUTPATIENT
Start: 2023-07-22 | End: 2023-07-22

## 2023-07-22 RX ADMIN — DILTIAZEM HYDROCHLORIDE 360 MG: 180 CAPSULE, EXTENDED RELEASE ORAL at 08:24

## 2023-07-22 RX ADMIN — ESCITALOPRAM OXALATE 10 MG: 10 TABLET ORAL at 08:24

## 2023-07-22 RX ADMIN — RIVAROXABAN 20 MG: 10 TABLET, FILM COATED ORAL at 17:25

## 2023-07-22 RX ADMIN — SODIUM CHLORIDE 2 G: 1 TABLET ORAL at 10:00

## 2023-07-22 RX ADMIN — POTASSIUM CHLORIDE 40 MEQ: 1500 TABLET, EXTENDED RELEASE ORAL at 17:24

## 2023-07-22 RX ADMIN — POTASSIUM CHLORIDE 40 MEQ: 1500 TABLET, EXTENDED RELEASE ORAL at 09:32

## 2023-07-22 RX ADMIN — GABAPENTIN 300 MG: 300 CAPSULE ORAL at 20:04

## 2023-07-22 RX ADMIN — SODIUM CHLORIDE 2 G: 1 TABLET ORAL at 17:27

## 2023-07-22 RX ADMIN — SOTALOL HYDROCHLORIDE 80 MG: 80 TABLET ORAL at 17:25

## 2023-07-22 RX ADMIN — LEVOTHYROXINE SODIUM 175 MCG: 100 TABLET ORAL at 05:02

## 2023-07-22 RX ADMIN — GABAPENTIN 300 MG: 300 CAPSULE ORAL at 08:24

## 2023-07-22 RX ADMIN — SOTALOL HYDROCHLORIDE 80 MG: 80 TABLET ORAL at 08:24

## 2023-07-22 RX ADMIN — GABAPENTIN 300 MG: 300 CAPSULE ORAL at 17:25

## 2023-07-22 NOTE — PROGRESS NOTES
[facility]  Progress Note  Name: Elijio Habermann  MRN: [id number]  Unit/Bed#: -01 I Date of Admission: 7/20/2023   Date of Service: 7/22/2023 I Hospital Day: 2    Assessment/Plan   * Closed right ankle fracture  Assessment & Plan  Secondary to mechanical fall Domenica Law  X-ray with right lateral malleolus fracture  Splinted  Consult Ortho. Nonsurgical.  Pain control  By PT/OT, recommending rehab    Hyponatremia  Assessment & Plan  Na+ 125, 126 after glucose correction. Likely contributed to her fall  Unclear etiology, patient is on HCTZ and reports drinking 6-7 16oz water bottles per day. Adequate solute intake. Give IVF overnight  Trend BMP   Nephrology consult  improved to 131 with IVF and holding diuretics  Lipid panel and SPEP/UPEP ordered given normal serum osm    Hypokalemia  Assessment & Plan  K 2.7  ECG -normal sinus rhythm with diffuse flattened T waves, QTc 513 ms  Resolved    Bilateral carpal tunnel syndrome  Assessment & Plan  With significant pain, was prescribed gabapentin and Norco by PCP  Continue gabapentin  Oxycodone PRN (do not give within 1 hour of gabapentin)    Transaminitis  Assessment & Plan  , , alk phos 171, T. bili 3.58  Patient with mild scleral icterus. No abdominal pain/tenderness. RUQ ultrasound with normal liver, surgically absent gallbladder  Hepatitis panel negative. LFT trending down, suspect shock liver  GI consult.   CMV IGG positive but no IGM    Asymptomatic bacteriuria  Assessment & Plan  UA with 10-20 WBCs, innumerable bacteria  Denies any pain, burning, frequency, urgency  Will hold off on treatment    Prediabetes  Assessment & Plan  History of prediabetes  Glucose 170  A1c 7.0  Diabetic diet - has not required insulin while inpatient    Acquired hypothyroidism  Assessment & Plan  Continue levothyroxine 175 mcg daily    Essential hypertension  Assessment & Plan  On Diovan, sotalol, HCTZ, Cardizem  Hold HCTZ, continue rest of antihypertensives (Diovan switched to formulary losartan)    Mixed hyperlipidemia  Assessment & Plan  Hold atorvastatin in setting of elevated LFTs    Paroxysmal atrial fibrillation (HCC)  Assessment & Plan  Rate/rhythm control: Sotalol 80mg, cardizem 360mg  Anticoagulation: Xarelto               VTE Pharmacologic Prophylaxis:   Moderate Risk (Score 3-4) - Pharmacological DVT Prophylaxis Ordered: rivaroxaban (Xarelto). Patient Centered Rounds: I performed bedside rounds with nursing staff today. Discussions with Specialists or Other Care Team Provider: Nephrology    Education and Discussions with Family / Patient: Updated  (daughter in law) via phone. Total Time Spent on Date of Encounter in care of patient: 55 minutes This time was spent on one or more of the following: performing physical exam; counseling and coordination of care; obtaining or reviewing history; documenting in the medical record; reviewing/ordering tests, medications or procedures; communicating with other healthcare professionals and discussing with patient's family/caregivers. Current Length of Stay: 2 day(s)  Current Patient Status: Inpatient   Certification Statement: The patient will continue to require additional inpatient hospital stay due to Hyponatremia  Discharge Plan: Anticipate discharge in 24-48 hrs to rehab facility. Code Status: Level 1 - Full Code    Subjective:   No acute events overnight. Objective:     Vitals:   Temp (24hrs), Av.5 °F (36.9 °C), Min:97.9 °F (36.6 °C), Max:99.1 °F (37.3 °C)    Temp:  [97.9 °F (36.6 °C)-99.1 °F (37.3 °C)] 97.9 °F (36.6 °C)  HR:  [73-86] 83  Resp:  [16-18] 18  BP: (122-140)/(64-76) 128/73  SpO2:  [94 %-96 %] 94 %  Body mass index is 35.19 kg/m². Input and Output Summary (last 24 hours):      Intake/Output Summary (Last 24 hours) at 2023 1453  Last data filed at 2023 1001  Gross per 24 hour   Intake 120 ml   Output 430 ml   Net -310 ml Physical Exam:   Physical Exam  Vitals and nursing note reviewed. Constitutional:       Appearance: Normal appearance. HENT:      Head: Normocephalic and atraumatic. Mouth/Throat:      Mouth: Mucous membranes are moist.      Pharynx: Oropharynx is clear. No oropharyngeal exudate. Eyes:      Extraocular Movements: Extraocular movements intact. Cardiovascular:      Rate and Rhythm: Normal rate and regular rhythm. Pulses: Normal pulses. Heart sounds: Normal heart sounds. No murmur heard. No friction rub. No gallop. Pulmonary:      Effort: Pulmonary effort is normal. No respiratory distress. Breath sounds: Normal breath sounds. No stridor. No wheezing or rales. Abdominal:      General: Abdomen is flat. Bowel sounds are normal. There is no distension. Palpations: Abdomen is soft. Tenderness: There is no abdominal tenderness. Musculoskeletal:      Right lower leg: No edema. Left lower leg: No edema. Skin:     General: Skin is warm and dry. Neurological:      General: No focal deficit present. Mental Status: She is alert and oriented to person, place, and time. Additional Data:     Labs:  Results from last 7 days   Lab Units 07/22/23  0451 07/21/23  0518 07/20/23  1213   WBC Thousand/uL 13.15*   < > 14.82*   HEMOGLOBIN g/dL 11.5   < > 13.2   HEMATOCRIT % 34.2*   < > 38.7   PLATELETS Thousands/uL 241   < > 229   NEUTROS PCT %  --   --  77*   LYMPHS PCT %  --   --  11*   MONOS PCT %  --   --  11   EOS PCT %  --   --  0    < > = values in this interval not displayed.      Results from last 7 days   Lab Units 07/22/23  1008 07/22/23  0451   SODIUM mmol/L  --  131*   POTASSIUM mmol/L  --  3.2*   CHLORIDE mmol/L  --  98   CO2 mmol/L  --  30   BUN mg/dL  --  23   CREATININE mg/dL  --  0.57*   ANION GAP mmol/L  --  3   CALCIUM mg/dL  --  8.4   ALBUMIN g/dL 2.5*  --    TOTAL BILIRUBIN mg/dL 3.72*  --    ALK PHOS U/L 131*  --    ALT U/L 108*  --    AST U/L 112*  --    GLUCOSE RANDOM mg/dL  --  123     Results from last 7 days   Lab Units 07/21/23  0518   INR  3.69*         Results from last 7 days   Lab Units 07/20/23  1213   HEMOGLOBIN A1C % 7.0*           Lines/Drains:  Invasive Devices     Peripheral Intravenous Line  Duration           Peripheral IV 07/20/23 Proximal;Right;Ventral (anterior) Forearm 2 days          Drain  Duration           External Urinary Catheter 1 day                      Imaging: No pertinent imaging reviewed. Recent Cultures (last 7 days):         Last 24 Hours Medication List:   Current Facility-Administered Medications   Medication Dose Route Frequency Provider Last Rate   • acetaminophen  650 mg Oral Q6H PRN Daniel Spear PA-C     • aluminum-magnesium hydroxide-simethicone  30 mL Oral Q6H PRN Daniel Spear PA-C     • diltiazem  360 mg Oral Daily [place] Drummer Abimbola PA-C     • escitalopram  10 mg Oral Daily [place] Drummer Abimbola PA-C     • gabapentin  300 mg Oral TID Daniel Spear PA-C     • levothyroxine  175 mcg Oral Early Morning [place] Daily PA-C     • losartan  100 mg Oral Daily RAJ Gupta     • morphine injection  2 mg Intravenous Q4H PRN Anthony Brewer MD     • ondansetron  4 mg Intravenous Q6H PRN Daniel Spear PA-C     • oxyCODONE  2.5 mg Oral Q6H PRN Daniel Spear PA-C      Or   • oxyCODONE  5 mg Oral Q6H PRN Daniel Spear PA-C     • potassium chloride  40 mEq Oral Q6H RAJ Gupta     • rivaroxaban  20 mg Oral Daily With SonopiaALBIN     • sodium chloride  2 g Oral BID With Meals RAJ Gupta     • sotalol  80 mg Oral BID Daniel Spear PA-C          Today, Patient Was Seen By: Daniel Spear PA-C    **Please Note: This note may have been constructed using a voice recognition system. **

## 2023-07-22 NOTE — PROGRESS NOTES
Michele Cee  [id number]    58 y.o.  female      ASSESSMENT and PLAN    Elevated liver function tests - 83-yoF no known history of liver disease but with history of atrial fibrillation and hypertension and hyperlipidemia presents after falling at home and injuring ankle. At the time of the fall had started gabapentin the previous night and felt woozy upon awakening. Denies LOC chest pain or dyspnea. On admission found to have elevated liver transaminases and elevated bilirubin. Ultrasound unremarkable. Abdominal exam unremarkable. Elevated LFTs secondary to transient shock liver/hypotension at the time of her fall or adverse reaction to medication are most likely. Previous LFTs were negative. Viral serologies negative for hepatitis AB or C and CMV serology consistent with past exposure but not acute infection. .  No sign of chronic underlying fatty liver disease. Ultrasound unremarkable. Autoimmune consideration but less likely. Bilirubin remains elevated, but transaminases slightly improved today, hopefully this is a sign of recovery from transient hypotension and bilirubin is just lagging and normalization. • Continue with supportive management  • Awaiting additional serologies  • Continue to trend LFTs    Chief Complaint   Patient presents with   • Fall     Patient presents to the ED via EMS, states mechanical fall at home today, states walker got caught on the rug and she fell, c/o right ankle pain, +head strike on thinners        SUBJECTIVE/HPI denies any GI complaints. Tolerating p.o. Discussed LFTs.     /73 (BP Location: Right arm)   Pulse 83   Temp 97.9 °F (36.6 °C) (Oral)   Resp 18   Ht 5' 4" (1.626 m)   Wt 93 kg (205 lb)   SpO2 94%   BMI 35.19 kg/m²     PHYSICALEXAM  General appearance: alert, appears stated age and cooperative  Head: Normocephalic, without obvious abnormality, atraumatic  Lungs: clear to auscultation bilaterally  Heart: regular rate and rhythm, S1, S2 normal, no murmur, click, rub or gallop  Abdomen: soft, non-tender; bowel sounds normal; no masses,  no organomegaly      Lab Results   Component Value Date    CALCIUM 8.4 07/22/2023    K 3.2 (L) 07/22/2023    CO2 30 07/22/2023    CL 98 07/22/2023    BUN 23 07/22/2023    CREATININE 0.57 (L) 07/22/2023     Lab Results   Component Value Date    WBC 13.15 (H) 07/22/2023    HGB 11.5 07/22/2023    HCT 34.2 (L) 07/22/2023    MCV 88 07/22/2023     07/22/2023     Lab Results   Component Value Date     (H) 07/22/2023     (H) 07/22/2023    ALKPHOS 131 (H) 07/22/2023     No results found for: "AMYLASE"  Lab Results   Component Value Date    LIPASE 150 04/13/2016     No results found for: "IRON", "TIBC", "FERRITIN"  Lab Results   Component Value Date    INR 3.69 (H) 07/21/2023       Ultrasound unremarkable    Counseling / Coordination of Care  Total floor / unit time spent today 20 minutes.

## 2023-07-22 NOTE — ASSESSMENT & PLAN NOTE
History of prediabetes  Glucose 170  A1c 7.0  Diabetic diet - has not required insulin while inpatient

## 2023-07-22 NOTE — PROGRESS NOTES
NEPHROLOGY PROGRESS NOTE   Halie Prior 81 y.o. female MRN: [id number]  Unit/Bed#: -01 Encounter: [id number]      HPI/24hr EVENTS:    -43-year-old female past medical history of atrial fibrillation, hypothyroidism, depression. Presented with mechanical fall.   Nephrology consult for management of hyponatremia    -Improvement in sodium level with gentle IV fluid resuscitation    ASSESSMENT/PLAN:    Hyponatremia  -Has a history of hyponatremia in the past with a sodium of 131 in 2018 with periods of normalization  -Presented with a sodium of 125, most recently 131, same as last evening when glucose corrected  -Etiology: Suspect multifactorial, Lexapro use which can induce SIADH, HCTZ use given concurrent hypokalemia, possible polydipsia, acute pain in the setting of fibula fracture, prerenal azotemia/hypovolemia given response to volume resuscitation, urine studies more consistent with SIADH  -Work-up: TSH 6.06, urine osmolality 556, urine sodium 39, a.m. cortisol ordered, uric acid 2.4, serum osmolality 286  -Imaging: Chest x-ray with no acute cardiopulmonary disease, CT head with no acute intracranial abnormality  -HCTZ was held on admission  -Patient received gentle IV fluid hydration with improvement, stop further IV fluid  -Start salt tablets 2 g twice daily for now  -Check SPEP given normal serum osmolality, check triglycerides, check UA  -If no improvement have to consider reduction of Lexapro or alternative agent     Hypertension/blood pressure  -OP regimen: Diovan 320 mg daily, hydrochlorothiazide 25 mg daily, sotalol 80 mg twice daily, Cardizem 24-hour 360 mg daily  -Current regimen: Losartan 100 mg substituted for Diovan, sotalol 80 mg twice daily, Cardizem 24-hour 360 mg daily  -Recent blood pressures are acceptable, hold parameters adjusted for losartan     Hypokalemia  -Potassium was 2.7 on admission, most recently 3.2  -Possibly due to HCTZ use, is currently held  -Give KCl 40 mEq x 2, further solute will assist with correction of hyponatremia     Bladder calculus  -Follows with urology as outpatient  -Prior renal ultrasound from February 2023 shows normal echogenicity and contour bilaterally with no hydronephrosis, 2 small left renal cysts, no shadowing calculi bilaterally, bladder with echogenic focus with questionable twinkle artifact     Right lateral malleolus fracture  -Orthopedics following  -Care per primary team     Transaminitis  -GI consulted     Additional Medical Problems: Atrial fibrillation on Xarelto, hypothyroidism on Synthroid, depression    SUBJECTIVE:  Reports she feels a little bit better, pain in her right ankle is controlled, had difficulty eating due to wrist pain but was able to eat breakfast    ROS:  Review of Systems   Constitutional: Positive for fatigue. Musculoskeletal:        Right ankle pain, wrist pain      A complete 10 point review of systems was performed and found to be negative unless otherwise noted above or in the HPI. OBJECTIVE:  Current Weight: Weight - Scale: 93 kg (205 lb)  Vitals:    07/21/23 0900 07/21/23 1522 07/21/23 1923 07/22/23 0823   BP:  122/64 140/76 128/73   BP Location:    Right arm   Pulse:  86 73 83   Resp:  17 16 18   Temp:  98.6 °F (37 °C) 99.1 °F (37.3 °C) 97.9 °F (36.6 °C)   TempSrc:    Oral   SpO2: 94% 96% 94% 94%   Weight:       Height:           Intake/Output Summary (Last 24 hours) at 7/22/2023 0946  Last data filed at 7/22/2023 0849  Gross per 24 hour   Intake 120 ml   Output 400 ml   Net -280 ml     Physical Exam  Vitals and nursing note reviewed. Constitutional:       General: She is not in acute distress. Appearance: She is obese. She is not toxic-appearing or diaphoretic. Comments: Awake sitting in bed   HENT:      Head: Normocephalic and atraumatic. Nose: Nose normal.      Mouth/Throat:      Mouth: Mucous membranes are moist.   Eyes:      General: No scleral icterus.   Cardiovascular:      Rate and Rhythm: Normal rate. Rhythm irregular. Pulses: Normal pulses. Pulmonary:      Effort: Pulmonary effort is normal. No respiratory distress. Breath sounds: No wheezing or rales. Abdominal:      General: Abdomen is flat. Palpations: Abdomen is soft. Musculoskeletal:      Cervical back: Neck supple. Left lower leg: No edema. Comments: Right ankle dressing/splint   Skin:     General: Skin is warm and dry. Capillary Refill: Capillary refill takes less than 2 seconds. Neurological:      General: No focal deficit present. Mental Status: She is alert and oriented to person, place, and time.           Medications:    Current Facility-Administered Medications:   •  acetaminophen (TYLENOL) tablet 650 mg, 650 mg, Oral, Q6H PRN, Debora Granados PA-C  •  aluminum-magnesium hydroxide-simethicone (MAALOX) oral suspension 30 mL, 30 mL, Oral, Q6H PRN, Debora Granados PA-C  •  diltiazem (CARDIZEM CD) 24 hr capsule 360 mg, 360 mg, Oral, Daily, Debora Granados PA-C, 360 mg at 07/22/23 8698  •  escitalopram (LEXAPRO) tablet 10 mg, 10 mg, Oral, Daily, Edison Ormond Vila, PA-C, 10 mg at 07/22/23 0863  •  gabapentin (NEURONTIN) capsule 300 mg, 300 mg, Oral, TID, Debora Granados PA-C, 300 mg at 07/22/23 7215  •  levothyroxine tablet 175 mcg, 175 mcg, Oral, Early Morning, Debora Granados PA-C, 175 mcg at 07/22/23 0502  •  losartan (COZAAR) tablet 100 mg, 100 mg, Oral, Daily, Jose Solis, CRDEEDEE  •  morphine injection 2 mg, 2 mg, Intravenous, Q4H PRN, Savi Ruiz MD, 2 mg at 07/21/23 1275  •  ondansetron (ZOFRAN) injection 4 mg, 4 mg, Intravenous, Q6H PRN, Debora Granados PA-C  •  oxyCODONE (ROXICODONE) split tablet 2.5 mg, 2.5 mg, Oral, Q6H PRN **OR** oxyCODONE (ROXICODONE) IR tablet 5 mg, 5 mg, Oral, Q6H PRN, Edison Ormond Vila, PA-C, 5 mg at 07/21/23 2109  •  potassium chloride (K-DUR,KLOR-CON) CR tablet 40 mEq, 40 mEq, Oral, Q6H, RAJ Lew, 40 mEq at 07/22/23 0304  •  rivaroxaban (XARELTO) tablet 20 mg, 20 mg, Oral, Daily With Karla Martin PA-C, 20 mg at 07/21/23 1713  •  sodium chloride 0.9 % infusion, 50 mL/hr, Intravenous, Continuous, Nigel Caputo MD, Last Rate: 50 mL/hr at 07/21/23 1714, 50 mL/hr at 07/21/23 1714  •  sotalol (BETAPACE) tablet 80 mg, 80 mg, Oral, BID, Peter Daily PA-C, 80 mg at 07/22/23 1775    Laboratory Results:  Results from last 7 days   Lab Units 07/22/23  0451 07/21/23  2059 07/21/23  1415 07/21/23  0518 07/20/23  2117 07/20/23  1213   WBC Thousand/uL 13.15*  --   --  13.56*  --  14.82*   HEMOGLOBIN g/dL 11.5  --   --  12.1  --  13.2   HEMATOCRIT % 34.2*  --   --  35.4  --  38.7   PLATELETS Thousands/uL 241  --   --  254  --  229   POTASSIUM mmol/L 3.2* 3.4* 3.9 3.5  3.6 3.2* 2.7*   CHLORIDE mmol/L 98 96 95* 94*  94* 92* 87*   CO2 mmol/L 30 28 27 29  30 31 30   BUN mg/dL 23 21 22 20  20 17 22   CREATININE mg/dL 0.57* 0.62 0.68 0.59*  0.61 0.63 0.61   CALCIUM mg/dL 8.4 8.7 8.9 9.0  8.9 9.0 9.4       I have personally reviewed the blood work as stated above and in my note. I have personally reviewed internal medicine, GI, orthopedics note.

## 2023-07-22 NOTE — ASSESSMENT & PLAN NOTE
Na+ 125, 126 after glucose correction. Likely contributed to her fall  Unclear etiology, patient is on HCTZ and reports drinking 6-7 16oz water bottles per day. Adequate solute intake.   Give IVF overnight  Trend BMP   Nephrology consult  improved to 131 with IVF and holding diuretics  Lipid panel and SPEP/UPEP ordered given normal serum osm

## 2023-07-22 NOTE — ASSESSMENT & PLAN NOTE
, , alk phos 171, T. bili 3.58  Patient with mild scleral icterus. No abdominal pain/tenderness. RUQ ultrasound with normal liver, surgically absent gallbladder  Hepatitis panel negative. LFT trending down, suspect shock liver  GI consult.   CMV IGG positive but no IGM

## 2023-07-22 NOTE — PLAN OF CARE
Problem: Prexisting or High Potential for Compromised Skin Integrity  Goal: Skin integrity is maintained or improved  Description: INTERVENTIONS:  - Identify patients at risk for skin breakdown  - Assess and monitor skin integrity  - Assess and monitor nutrition and hydration status  - Monitor labs   - Assess for incontinence   - Turn and reposition patient  - Assist with mobility/ambulation  - Relieve pressure over bony prominences  - Avoid friction and shearing  - Provide appropriate hygiene as needed including keeping skin clean and dry  - Evaluate need for skin moisturizer/barrier cream  - Collaborate with interdisciplinary team   - Patient/family teaching  - Consider wound care consult   Outcome: Progressing     Problem: CARDIOVASCULAR - ADULT  Goal: Maintains optimal cardiac output and hemodynamic stability  Description: INTERVENTIONS:  - Monitor I/O, vital signs and rhythm  - Monitor for S/S and trends of decreased cardiac output  - Administer and titrate ordered vasoactive medications to optimize hemodynamic stability  - Assess quality of pulses, skin color and temperature  - Assess for signs of decreased coronary artery perfusion  - Instruct patient to report change in severity of symptoms  Outcome: Progressing     Problem: GASTROINTESTINAL - ADULT  Goal: Minimal or absence of nausea and/or vomiting  Description: INTERVENTIONS:  - Administer IV fluids if ordered to ensure adequate hydration  - Maintain NPO status until nausea and vomiting are resolved  - Nasogastric tube if ordered  - Administer ordered antiemetic medications as needed  - Provide nonpharmacologic comfort measures as appropriate  - Advance diet as tolerated, if ordered  - Consider nutrition services referral to assist patient with adequate nutrition and appropriate food choices  Outcome: Progressing  Goal: Maintains or returns to baseline bowel function  Description: INTERVENTIONS:  - Assess bowel function  - Encourage oral fluids to ensure adequate hydration  - Administer IV fluids if ordered to ensure adequate hydration  - Administer ordered medications as needed  - Encourage mobilization and activity  - Consider nutritional services referral to assist patient with adequate nutrition and appropriate food choices  Outcome: Progressing  Goal: Maintains adequate nutritional intake  Description: INTERVENTIONS:  - Monitor percentage of each meal consumed  - Identify factors contributing to decreased intake, treat as appropriate  - Assist with meals as needed  - Monitor I&O, weight, and lab values if indicated  - Obtain nutrition services referral as needed  Outcome: Progressing     Problem: GENITOURINARY - ADULT  Goal: Maintains or returns to baseline urinary function  Description: INTERVENTIONS:  - Assess urinary function  - Encourage oral fluids to ensure adequate hydration if ordered  - Administer IV fluids as ordered to ensure adequate hydration  - Administer ordered medications as needed  - Offer frequent toileting  - Follow urinary retention protocol if ordered  Outcome: Progressing     Problem: METABOLIC, FLUID AND ELECTROLYTES - ADULT  Goal: Electrolytes maintained within normal limits  Description: INTERVENTIONS:  - Monitor labs and assess patient for signs and symptoms of electrolyte imbalances  - Administer electrolyte replacement as ordered  - Monitor response to electrolyte replacements, including repeat lab results as appropriate  - Instruct patient on fluid and nutrition as appropriate  Outcome: Progressing  Goal: Fluid balance maintained  Description: INTERVENTIONS:  - Monitor labs   - Monitor I/O and WT  - Instruct patient on fluid and nutrition as appropriate  - Assess for signs & symptoms of volume excess or deficit  Outcome: Progressing     Problem: SKIN/TISSUE INTEGRITY - ADULT  Goal: Skin Integrity remains intact(Skin Breakdown Prevention)  Description: Assess:  -Perform [place] assessment every x  -Clean and moisturize skin every x  -Inspect skin when repositioning, toileting, and assisting with ADLS  -Assess under medical devices such as x every x  -Assess extremities for adequate circulation and sensation     Bed Management:  -Have minimal linens on bed & keep smooth, unwrinkled  -Change linens as needed when moist or perspiring  -Avoid sitting or lying in one position for more than x hours while in bed  -Keep HOB at [facility]     Toileting:  -Offer bedside commode  -Assess for incontinence every x  -Use incontinent care products after each incontinent episode such as x    Activity:  -Mobilize patient x times a day  -Encourage activity and walks on unit  -Encourage or provide ROM exercises   -Turn and reposition patient every x Hours  -Use appropriate equipment to lift or move patient in bed  -Instruct/ Assist with weight shifting every x when out of bed in chair  -Consider limitation of chair time x hour intervals    Skin Care:  -Avoid use of baby powder, tape, friction and shearing, hot water or constrictive clothing  -Relieve pressure over bony prominences using x  -Do not massage red bony areas    Next Steps:  -Teach patient strategies to minimize risks such as x   -Consider consults to  interdisciplinary teams such as x  Outcome: Progressing     Problem: HEMATOLOGIC - ADULT  Goal: Maintains hematologic stability  Description: INTERVENTIONS  - Assess for signs and symptoms of bleeding or hemorrhage  - Monitor labs  - Administer supportive blood products/factors as ordered and appropriate  Outcome: Progressing     Problem: MUSCULOSKELETAL - ADULT  Goal: Maintain or return mobility to safest level of function  Description: INTERVENTIONS:  - Assess patient's ability to carry out ADLs; assess patient's baseline for ADL function and identify physical deficits which impact ability to perform ADLs (bathing, care of mouth/teeth, toileting, grooming, dressing, etc.)  - Assess/evaluate cause of self-care deficits   - Assess range of motion  - Assess patient's mobility  - Assess patient's need for assistive devices and provide as appropriate  - Encourage maximum independence but intervene and supervise when necessary  - Involve family in performance of ADLs  - Assess for home care needs following discharge   - Consider OT consult to assist with ADL evaluation and planning for discharge  - Provide patient education as appropriate  Outcome: Progressing     Problem: Nutrition/Hydration-ADULT  Goal: Nutrient/Hydration intake appropriate for improving, restoring or maintaining nutritional needs  Description: Monitor and assess patient's nutrition/hydration status for malnutrition. Collaborate with interdisciplinary team and initiate plan and interventions as ordered. Monitor patient's weight and dietary intake as ordered or per policy. Utilize nutrition screening tool and intervene as necessary. Determine patient's food preferences and provide high-protein, high-caloric foods as appropriate.      INTERVENTIONS:  - Monitor oral intake, urinary output, labs, and treatment plans  - Assess nutrition and hydration status and recommend course of action  - Evaluate amount of meals eaten  - Assist patient with eating if necessary   - Allow adequate time for meals  - Recommend/ encourage appropriate diets, oral nutritional supplements, and vitamin/mineral supplements  - Order, calculate, and assess calorie counts as needed  - Recommend, monitor, and adjust tube feedings and TPN/PPN based on assessed needs  - Assess need for intravenous fluids  - Provide specific nutrition/hydration education as appropriate  - Include patient/family/caregiver in decisions related to nutrition  Outcome: Progressing     Problem: PAIN - ADULT  Goal: Verbalizes/displays adequate comfort level or baseline comfort level  Description: Interventions:  - Encourage patient to monitor pain and request assistance  - Assess pain using appropriate pain scale  - Administer analgesics based on type and severity of pain and evaluate response  - Implement non-pharmacological measures as appropriate and evaluate response  - Consider cultural and social influences on pain and pain management  - Notify physician/advanced practitioner if interventions unsuccessful or patient reports new pain  Outcome: Progressing     Problem: INFECTION - ADULT  Goal: Absence or prevention of progression during hospitalization  Description: INTERVENTIONS:  - Assess and monitor for signs and symptoms of infection  - Monitor lab/diagnostic results  - Monitor all insertion sites, i.e. indwelling lines, tubes, and drains  - Monitor endotracheal if appropriate and nasal secretions for changes in amount and color  - [place] appropriate cooling/warming therapies per order  - Administer medications as ordered  - Instruct and encourage patient and family to use good hand hygiene technique  - Identify and instruct in appropriate isolation precautions for identified infection/condition  Outcome: Progressing     Problem: SAFETY ADULT  Goal: Patient will remain free of falls  Description: INTERVENTIONS:  - Educate patient/family on patient safety including physical limitations  - Instruct patient to call for assistance with activity   - Consult OT/PT to assist with strengthening/mobility   - Keep Call bell within reach  - Keep bed low and locked with side rails adjusted as appropriate  - Keep care items and personal belongings within reach  - Initiate and maintain comfort rounds  - Make Fall Risk Sign visible to staff  - Offer Toileting every x Hours, in advance of need  - Initiate/Maintain xalarm  - Obtain necessary fall risk management equipment: x  - Apply yellow socks and bracelet for high fall risk patients  - Consider moving patient to room near nurses station  Outcome: Progressing  Goal: Maintain or return to baseline ADL function  Description: INTERVENTIONS:  -  Assess patient's ability to carry out ADLs; assess patient's baseline for ADL function and identify physical deficits which impact ability to perform ADLs (bathing, care of mouth/teeth, toileting, grooming, dressing, etc.)  - Assess/evaluate cause of self-care deficits   - Assess range of motion  - Assess patient's mobility; develop plan if impaired  - Assess patient's need for assistive devices and provide as appropriate  - Encourage maximum independence but intervene and supervise when necessary  - Involve family in performance of ADLs  - Assess for home care needs following discharge   - Consider OT consult to assist with ADL evaluation and planning for discharge  - Provide patient education as appropriate  Outcome: Progressing  Goal: Maintains/Returns to pre admission functional level  Description: INTERVENTIONS:  - Perform BMAT or MOVE assessment daily.   - Set and communicate daily mobility goal to care team and patient/family/caregiver. - Collaborate with rehabilitation services on mobility goals if consulted  - Perform Range of Motion x times a day. - Reposition patient every x hours.   - Dangle patient x times a day  - Stand patient x times a day  - Ambulate patient x times a day  - Out of bed to chair x times a day   - Out of bed for meals x times a day  - Out of bed for toileting  - Record patient progress and toleration of activity level   Outcome: Progressing     Problem: DISCHARGE PLANNING  Goal: Discharge to home or other facility with appropriate resources  Description: INTERVENTIONS:  - Identify barriers to discharge w/patient and caregiver  - Arrange for needed discharge resources and transportation as appropriate  - Identify discharge learning needs (meds, wound care, etc.)  - Arrange for interpretive services to assist at discharge as needed  - Refer to Case Management Department for coordinating discharge planning if the patient needs post-hospital services based on physician/advanced practitioner order or complex needs related to functional status, cognitive ability, or social support system  Outcome: Progressing     Problem: Knowledge Deficit  Goal: Patient/family/caregiver demonstrates understanding of disease process, treatment plan, medications, and discharge instructions  Description: Complete learning assessment and assess knowledge base.   Interventions:  - Provide teaching at level of understanding  - Provide teaching via preferred learning methods  Outcome: Progressing     Problem: Potential for Falls  Goal: Patient will remain free of falls  Description: INTERVENTIONS:  - Educate patient/family on patient safety including physical limitations  - Instruct patient to call for assistance with activity   - Consult OT/PT to assist with strengthening/mobility   - Keep Call bell within reach  - Keep bed low and locked with side rails adjusted as appropriate  - Keep care items and personal belongings within reach  - Initiate and maintain comfort rounds  - Make Fall Risk Sign visible to staff  - Offer Toileting every x Hours, in advance of need  - Initiate/Maintain xalarm  - Obtain necessary fall risk management equipment: x  - Apply yellow socks and bracelet for high fall risk patients  - Consider moving patient to room near nurses station  Outcome: Progressing     Problem: MOBILITY - ADULT  Goal: Maintain or return to baseline ADL function  Description: INTERVENTIONS:  -  Assess patient's ability to carry out ADLs; assess patient's baseline for ADL function and identify physical deficits which impact ability to perform ADLs (bathing, care of mouth/teeth, toileting, grooming, dressing, etc.)  - Assess/evaluate cause of self-care deficits   - Assess range of motion  - Assess patient's mobility; develop plan if impaired  - Assess patient's need for assistive devices and provide as appropriate  - Encourage maximum independence but intervene and supervise when necessary  - Involve family in performance of ADLs  - Assess for home care needs following discharge   - Consider OT consult to assist with ADL evaluation and planning for discharge  - Provide patient education as appropriate  Outcome: Progressing  Goal: Maintains/Returns to pre admission functional level  Description: INTERVENTIONS:  - Perform BMAT or MOVE assessment daily.   - Set and communicate daily mobility goal to care team and patient/family/caregiver. - Collaborate with rehabilitation services on mobility goals if consulted  - Perform Range of Motion x times a day. - Reposition patient every x hours.   - Dangle patient x times a day  - Stand patient xxxx times a day  - Ambulate patient x times a day  - Out of bed to chair x times a day   - Out of bed for meals x times a day  - Out of bed for toileting  - Record patient progress and toleration of activity level   Outcome: Progressing

## 2023-07-22 NOTE — ASSESSMENT & PLAN NOTE
Secondary to mechanical fall Liz Roers  X-ray with right lateral malleolus fracture  Splinted  Consult Ortho.   Nonsurgical.  Pain control  By PT/OT, recommending rehab

## 2023-07-22 NOTE — ASSESSMENT & PLAN NOTE
UA with 10-20 WBCs, innumerable bacteria  Denies any pain, burning, frequency, urgency  Will hold off on treatment

## 2023-07-23 LAB
ALBUMIN SERPL BCP-MCNC: 2.5 G/DL (ref 3.5–5)
ALP SERPL-CCNC: 115 U/L (ref 34–104)
ALT SERPL W P-5'-P-CCNC: 87 U/L (ref 7–52)
ANION GAP SERPL CALCULATED.3IONS-SCNC: 5 MMOL/L
AST SERPL W P-5'-P-CCNC: 94 U/L (ref 13–39)
BILIRUB DIRECT SERPL-MCNC: 1.15 MG/DL (ref 0–0.2)
BILIRUB SERPL-MCNC: 3.23 MG/DL (ref 0.2–1)
BUN SERPL-MCNC: 24 MG/DL (ref 5–25)
CALCIUM SERPL-MCNC: 8.6 MG/DL (ref 8.4–10.2)
CHLORIDE SERPL-SCNC: 102 MMOL/L (ref 96–108)
CO2 SERPL-SCNC: 24 MMOL/L (ref 21–32)
CREAT SERPL-MCNC: 0.55 MG/DL (ref 0.6–1.3)
GFR SERPL CREATININE-BSD FRML MDRD: 87 ML/MIN/1.73SQ M
GLUCOSE SERPL-MCNC: 117 MG/DL (ref 65–140)
POTASSIUM SERPL-SCNC: 4.3 MMOL/L (ref 3.5–5.3)
PROT SERPL-MCNC: 7 G/DL (ref 6.4–8.4)
SODIUM SERPL-SCNC: 131 MMOL/L (ref 135–147)
TRIGL SERPL-MCNC: 98 MG/DL

## 2023-07-23 PROCEDURE — 80048 BASIC METABOLIC PNL TOTAL CA: CPT | Performed by: HOSPITALIST

## 2023-07-23 PROCEDURE — 86334 IMMUNOFIX E-PHORESIS SERUM: CPT | Performed by: HOSPITALIST

## 2023-07-23 PROCEDURE — 99232 SBSQ HOSP IP/OBS MODERATE 35: CPT | Performed by: INTERNAL MEDICINE

## 2023-07-23 PROCEDURE — 80076 HEPATIC FUNCTION PANEL: CPT | Performed by: HOSPITALIST

## 2023-07-23 PROCEDURE — 99232 SBSQ HOSP IP/OBS MODERATE 35: CPT | Performed by: PHYSICIAN ASSISTANT

## 2023-07-23 PROCEDURE — 84165 PROTEIN E-PHORESIS SERUM: CPT | Performed by: HOSPITALIST

## 2023-07-23 PROCEDURE — 84478 ASSAY OF TRIGLYCERIDES: CPT | Performed by: HOSPITALIST

## 2023-07-23 RX ORDER — TORSEMIDE 10 MG/1
5 TABLET ORAL DAILY
Status: DISCONTINUED | OUTPATIENT
Start: 2023-07-23 | End: 2023-07-24 | Stop reason: HOSPADM

## 2023-07-23 RX ORDER — ESCITALOPRAM OXALATE 5 MG/1
5 TABLET ORAL DAILY
Status: DISCONTINUED | OUTPATIENT
Start: 2023-07-24 | End: 2023-07-24 | Stop reason: HOSPADM

## 2023-07-23 RX ADMIN — ACETAMINOPHEN 650 MG: 325 TABLET ORAL at 08:18

## 2023-07-23 RX ADMIN — GABAPENTIN 300 MG: 300 CAPSULE ORAL at 20:04

## 2023-07-23 RX ADMIN — ESCITALOPRAM OXALATE 10 MG: 10 TABLET ORAL at 08:16

## 2023-07-23 RX ADMIN — SOTALOL HYDROCHLORIDE 80 MG: 80 TABLET ORAL at 08:16

## 2023-07-23 RX ADMIN — LOSARTAN POTASSIUM 100 MG: 50 TABLET, FILM COATED ORAL at 08:17

## 2023-07-23 RX ADMIN — SOTALOL HYDROCHLORIDE 80 MG: 80 TABLET ORAL at 17:15

## 2023-07-23 RX ADMIN — GABAPENTIN 300 MG: 300 CAPSULE ORAL at 17:15

## 2023-07-23 RX ADMIN — SODIUM CHLORIDE 2 G: 1 TABLET ORAL at 08:17

## 2023-07-23 RX ADMIN — LEVOTHYROXINE SODIUM 175 MCG: 100 TABLET ORAL at 06:01

## 2023-07-23 RX ADMIN — SODIUM CHLORIDE 2 G: 1 TABLET ORAL at 17:15

## 2023-07-23 RX ADMIN — DILTIAZEM HYDROCHLORIDE 360 MG: 180 CAPSULE, EXTENDED RELEASE ORAL at 08:17

## 2023-07-23 RX ADMIN — RIVAROXABAN 20 MG: 10 TABLET, FILM COATED ORAL at 17:15

## 2023-07-23 RX ADMIN — GABAPENTIN 300 MG: 300 CAPSULE ORAL at 08:16

## 2023-07-23 RX ADMIN — ACETAMINOPHEN 650 MG: 325 TABLET ORAL at 20:57

## 2023-07-23 RX ADMIN — TORSEMIDE 5 MG: 10 TABLET ORAL at 10:10

## 2023-07-23 NOTE — PLAN OF CARE
Problem: PAIN - ADULT  Goal: Verbalizes/displays adequate comfort level or baseline comfort level  Description: Interventions:  - Encourage patient to monitor pain and request assistance  - Assess pain using appropriate pain scale  - Administer analgesics based on type and severity of pain and evaluate response  - Implement non-pharmacological measures as appropriate and evaluate response  - Consider cultural and social influences on pain and pain management  - Notify physician/advanced practitioner if interventions unsuccessful or patient reports new pain  Outcome: Progressing     Problem: INFECTION - ADULT  Goal: Absence or prevention of progression during hospitalization  Description: INTERVENTIONS:  - Assess and monitor for signs and symptoms of infection  - Monitor lab/diagnostic results  - Monitor all insertion sites, i.e. indwelling lines, tubes, and drains  - Monitor endotracheal if appropriate and nasal secretions for changes in amount and color  - [place] appropriate cooling/warming therapies per order  - Administer medications as ordered  - Instruct and encourage patient and family to use good hand hygiene technique  - Identify and instruct in appropriate isolation precautions for identified infection/condition  Outcome: Progressing

## 2023-07-23 NOTE — PLAN OF CARE
Problem: Prexisting or High Potential for Compromised Skin Integrity  Goal: Skin integrity is maintained or improved  Description: INTERVENTIONS:  - Identify patients at risk for skin breakdown  - Assess and monitor skin integrity  - Assess and monitor nutrition and hydration status  - Monitor labs   - Assess for incontinence   - Turn and reposition patient  - Assist with mobility/ambulation  - Relieve pressure over bony prominences  - Avoid friction and shearing  - Provide appropriate hygiene as needed including keeping skin clean and dry  - Evaluate need for skin moisturizer/barrier cream  - Collaborate with interdisciplinary team   - Patient/family teaching  - Consider wound care consult   Outcome: Progressing     Problem: CARDIOVASCULAR - ADULT  Goal: Maintains optimal cardiac output and hemodynamic stability  Description: INTERVENTIONS:  - Monitor I/O, vital signs and rhythm  - Monitor for S/S and trends of decreased cardiac output  - Administer and titrate ordered vasoactive medications to optimize hemodynamic stability  - Assess quality of pulses, skin color and temperature  - Assess for signs of decreased coronary artery perfusion  - Instruct patient to report change in severity of symptoms  Outcome: Progressing     Problem: GASTROINTESTINAL - ADULT  Goal: Minimal or absence of nausea and/or vomiting  Description: INTERVENTIONS:  - Administer IV fluids if ordered to ensure adequate hydration  - Maintain NPO status until nausea and vomiting are resolved  - Nasogastric tube if ordered  - Administer ordered antiemetic medications as needed  - Provide nonpharmacologic comfort measures as appropriate  - Advance diet as tolerated, if ordered  - Consider nutrition services referral to assist patient with adequate nutrition and appropriate food choices  Outcome: Progressing  Goal: Maintains or returns to baseline bowel function  Description: INTERVENTIONS:  - Assess bowel function  - Encourage oral fluids to ensure adequate hydration  - Administer IV fluids if ordered to ensure adequate hydration  - Administer ordered medications as needed  - Encourage mobilization and activity  - Consider nutritional services referral to assist patient with adequate nutrition and appropriate food choices  Outcome: Progressing  Goal: Maintains adequate nutritional intake  Description: INTERVENTIONS:  - Monitor percentage of each meal consumed  - Identify factors contributing to decreased intake, treat as appropriate  - Assist with meals as needed  - Monitor I&O, weight, and lab values if indicated  - Obtain nutrition services referral as needed  Outcome: Progressing     Problem: GENITOURINARY - ADULT  Goal: Maintains or returns to baseline urinary function  Description: INTERVENTIONS:  - Assess urinary function  - Encourage oral fluids to ensure adequate hydration if ordered  - Administer IV fluids as ordered to ensure adequate hydration  - Administer ordered medications as needed  - Offer frequent toileting  - Follow urinary retention protocol if ordered  Outcome: Progressing     Problem: METABOLIC, FLUID AND ELECTROLYTES - ADULT  Goal: Electrolytes maintained within normal limits  Description: INTERVENTIONS:  - Monitor labs and assess patient for signs and symptoms of electrolyte imbalances  - Administer electrolyte replacement as ordered  - Monitor response to electrolyte replacements, including repeat lab results as appropriate  - Instruct patient on fluid and nutrition as appropriate  Outcome: Progressing  Goal: Fluid balance maintained  Description: INTERVENTIONS:  - Monitor labs   - Monitor I/O and WT  - Instruct patient on fluid and nutrition as appropriate  - Assess for signs & symptoms of volume excess or deficit  Outcome: Progressing     Problem: SKIN/TISSUE INTEGRITY - ADULT  Goal: Skin Integrity remains intact(Skin Breakdown Prevention)  Description: Assess:  -Perform [place] assessment every x  -Clean and moisturize skin every x  -Inspect skin when repositioning, toileting, and assisting with ADLS  -Assess under medical devices such as x every x  -Assess extremities for adequate circulation and sensation     Bed Management:  -Have minimal linens on bed & keep smooth, unwrinkled  -Change linens as needed when moist or perspiring  -Avoid sitting or lying in one position for more than x hours while in bed  -Keep HOB at [facility]     Toileting:  -Offer bedside commode  -Assess for incontinence every x  -Use incontinent care products after each incontinent episode such as x    Activity:  -Mobilize patient x times a day  -Encourage activity and walks on unit  -Encourage or provide ROM exercises   -Turn and reposition patient every x Hours  -Use appropriate equipment to lift or move patient in bed  -Instruct/ Assist with weight shifting every x when out of bed in chair  -Consider limitation of chair time x hour intervals    Skin Care:  -Avoid use of baby powder, tape, friction and shearing, hot water or constrictive clothing  -Relieve pressure over bony prominences using x  -Do not massage red bony areas    Next Steps:  -Teach patient strategies to minimize risks such as x   -Consider consults to  interdisciplinary teams such as x  Outcome: Progressing     Problem: HEMATOLOGIC - ADULT  Goal: Maintains hematologic stability  Description: INTERVENTIONS  - Assess for signs and symptoms of bleeding or hemorrhage  - Monitor labs  - Administer supportive blood products/factors as ordered and appropriate  Outcome: Progressing     Problem: MUSCULOSKELETAL - ADULT  Goal: Maintain or return mobility to safest level of function  Description: INTERVENTIONS:  - Assess patient's ability to carry out ADLs; assess patient's baseline for ADL function and identify physical deficits which impact ability to perform ADLs (bathing, care of mouth/teeth, toileting, grooming, dressing, etc.)  - Assess/evaluate cause of self-care deficits   - Assess range of motion  - Assess patient's mobility  - Assess patient's need for assistive devices and provide as appropriate  - Encourage maximum independence but intervene and supervise when necessary  - Involve family in performance of ADLs  - Assess for home care needs following discharge   - Consider OT consult to assist with ADL evaluation and planning for discharge  - Provide patient education as appropriate  Outcome: Progressing     Problem: Nutrition/Hydration-ADULT  Goal: Nutrient/Hydration intake appropriate for improving, restoring or maintaining nutritional needs  Description: Monitor and assess patient's nutrition/hydration status for malnutrition. Collaborate with interdisciplinary team and initiate plan and interventions as ordered. Monitor patient's weight and dietary intake as ordered or per policy. Utilize nutrition screening tool and intervene as necessary. Determine patient's food preferences and provide high-protein, high-caloric foods as appropriate.      INTERVENTIONS:  - Monitor oral intake, urinary output, labs, and treatment plans  - Assess nutrition and hydration status and recommend course of action  - Evaluate amount of meals eaten  - Assist patient with eating if necessary   - Allow adequate time for meals  - Recommend/ encourage appropriate diets, oral nutritional supplements, and vitamin/mineral supplements  - Order, calculate, and assess calorie counts as needed  - Recommend, monitor, and adjust tube feedings and TPN/PPN based on assessed needs  - Assess need for intravenous fluids  - Provide specific nutrition/hydration education as appropriate  - Include patient/family/caregiver in decisions related to nutrition  Outcome: Progressing     Problem: PAIN - ADULT  Goal: Verbalizes/displays adequate comfort level or baseline comfort level  Description: Interventions:  - Encourage patient to monitor pain and request assistance  - Assess pain using appropriate pain scale  - Administer analgesics based on type and severity of pain and evaluate response  - Implement non-pharmacological measures as appropriate and evaluate response  - Consider cultural and social influences on pain and pain management  - Notify physician/advanced practitioner if interventions unsuccessful or patient reports new pain  Outcome: Progressing     Problem: INFECTION - ADULT  Goal: Absence or prevention of progression during hospitalization  Description: INTERVENTIONS:  - Assess and monitor for signs and symptoms of infection  - Monitor lab/diagnostic results  - Monitor all insertion sites, i.e. indwelling lines, tubes, and drains  - Monitor endotracheal if appropriate and nasal secretions for changes in amount and color  - [place] appropriate cooling/warming therapies per order  - Administer medications as ordered  - Instruct and encourage patient and family to use good hand hygiene technique  - Identify and instruct in appropriate isolation precautions for identified infection/condition  Outcome: Progressing     Problem: SAFETY ADULT  Goal: Patient will remain free of falls  Description: INTERVENTIONS:  - Educate patient/family on patient safety including physical limitations  - Instruct patient to call for assistance with activity   - Consult OT/PT to assist with strengthening/mobility   - Keep Call bell within reach  - Keep bed low and locked with side rails adjusted as appropriate  - Keep care items and personal belongings within reach  - Initiate and maintain comfort rounds  - Make Fall Risk Sign visible to staff  - Offer Toileting every x Hours, in advance of need  - Initiate/Maintain xalarm  - Obtain necessary fall risk management equipment: x  - Apply yellow socks and bracelet for high fall risk patients  - Consider moving patient to room near nurses station  Outcome: Progressing  Goal: Maintain or return to baseline ADL function  Description: INTERVENTIONS:  -  Assess patient's ability to carry out ADLs; assess patient's baseline for ADL function and identify physical deficits which impact ability to perform ADLs (bathing, care of mouth/teeth, toileting, grooming, dressing, etc.)  - Assess/evaluate cause of self-care deficits   - Assess range of motion  - Assess patient's mobility; develop plan if impaired  - Assess patient's need for assistive devices and provide as appropriate  - Encourage maximum independence but intervene and supervise when necessary  - Involve family in performance of ADLs  - Assess for home care needs following discharge   - Consider OT consult to assist with ADL evaluation and planning for discharge  - Provide patient education as appropriate  Outcome: Progressing  Goal: Maintains/Returns to pre admission functional level  Description: INTERVENTIONS:  - Perform BMAT or MOVE assessment daily.   - Set and communicate daily mobility goal to care team and patient/family/caregiver. - Collaborate with rehabilitation services on mobility goals if consulted  - Perform Range of Motion x times a day. - Reposition patient every x hours.   - Dangle patient x times a day  - Stand patient x times a day  - Ambulate patient x times a day  - Out of bed to chair x times a day   - Out of bed for meals x times a day  - Out of bed for toileting  - Record patient progress and toleration of activity level   Outcome: Progressing     Problem: DISCHARGE PLANNING  Goal: Discharge to home or other facility with appropriate resources  Description: INTERVENTIONS:  - Identify barriers to discharge w/patient and caregiver  - Arrange for needed discharge resources and transportation as appropriate  - Identify discharge learning needs (meds, wound care, etc.)  - Arrange for interpretive services to assist at discharge as needed  - Refer to Case Management Department for coordinating discharge planning if the patient needs post-hospital services based on physician/advanced practitioner order or complex needs related to functional status, cognitive ability, or social support system  Outcome: Progressing     Problem: Knowledge Deficit  Goal: Patient/family/caregiver demonstrates understanding of disease process, treatment plan, medications, and discharge instructions  Description: Complete learning assessment and assess knowledge base.   Interventions:  - Provide teaching at level of understanding  - Provide teaching via preferred learning methods  Outcome: Progressing     Problem: Potential for Falls  Goal: Patient will remain free of falls  Description: INTERVENTIONS:  - Educate patient/family on patient safety including physical limitations  - Instruct patient to call for assistance with activity   - Consult OT/PT to assist with strengthening/mobility   - Keep Call bell within reach  - Keep bed low and locked with side rails adjusted as appropriate  - Keep care items and personal belongings within reach  - Initiate and maintain comfort rounds  - Make Fall Risk Sign visible to staff  - Offer Toileting every x Hours, in advance of need  - Initiate/Maintain xalarm  - Obtain necessary fall risk management equipment: x  - Apply yellow socks and bracelet for high fall risk patients  - Consider moving patient to room near nurses station  Outcome: Progressing     Problem: MOBILITY - ADULT  Goal: Maintain or return to baseline ADL function  Description: INTERVENTIONS:  -  Assess patient's ability to carry out ADLs; assess patient's baseline for ADL function and identify physical deficits which impact ability to perform ADLs (bathing, care of mouth/teeth, toileting, grooming, dressing, etc.)  - Assess/evaluate cause of self-care deficits   - Assess range of motion  - Assess patient's mobility; develop plan if impaired  - Assess patient's need for assistive devices and provide as appropriate  - Encourage maximum independence but intervene and supervise when necessary  - Involve family in performance of ADLs  - Assess for home care needs following discharge   - Consider OT consult to assist with ADL evaluation and planning for discharge  - Provide patient education as appropriate  Outcome: Progressing  Goal: Maintains/Returns to pre admission functional level  Description: INTERVENTIONS:  - Perform BMAT or MOVE assessment daily.   - Set and communicate daily mobility goal to care team and patient/family/caregiver. - Collaborate with rehabilitation services on mobility goals if consulted  - Perform Range of Motion x times a day. - Reposition patient every x hours.   - Dangle patient x times a day  - Stand patient xxxxx times a day  - Ambulate patient x times a day  - Out of bed to chair x times a day   - Out of bed for meals x times a day  - Out of bed for toileting  - Record patient progress and toleration of activity level   Outcome: Progressing

## 2023-07-23 NOTE — PROGRESS NOTES
Clifford Person  [id number]    81 y.o.  female      ASSESSMENT and PLAN    81-yoF no known history of liver disease but with history of atrial fibrillation and hypertension and hyperlipidemia presents after falling at home and injuring ankle.  At the time of the fall had started gabapentin the previous night and felt woozy upon awakening.  Denies LOC chest pain or dyspnea.  On admission found to have elevated liver transaminases and elevated bilirubin.  Ultrasound unremarkable.  Abdominal exam unremarkable. Elevated LFTs secondary to transient shock liver/hypotension at the time of her fall or adverse reaction to medication are most likely.  Previous LFTs were negative. Viral serologies negative for hepatitis AB or C and CMV and EBV serology consistent with past exposure but not acute infection. .  No sign of chronic underlying fatty liver disease. Ultrasound unremarkable.   Autoimmune consideration but less likely. Bilirubin and transaminases finally starting to normalize with bilirubin lagging. • Continue with supportive management  • Continue to trend LFTs    Chief Complaint   Patient presents with   • Fall     Patient presents to the ED via EMS, states mechanical fall at home today, states walker got caught on the rug and she fell, c/o right ankle pain, +head strike on thinners        SUBJECTIVE/HPI no GI complaints. No nausea or abdominal discomfort. Tolerating p.o. and having bowel movements.     /75   Pulse 82   Temp 97.8 °F (36.6 °C)   Resp (!) 24   Ht 5' 4" (1.626 m)   Wt 93 kg (205 lb)   SpO2 95%   BMI 35.19 kg/m²     PHYSICALEXAM  General appearance: alert, appears stated age and cooperative  Head: Normocephalic, without obvious abnormality, atraumatic  Abdomen: soft, non-tender; obese but not distended bowel sounds normal; no masses,  no organomegaly      Lab Results   Component Value Date    CALCIUM 8.6 07/23/2023    K 4.3 07/23/2023    CO2 24 07/23/2023     07/23/2023    BUN 24 07/23/2023    CREATININE 0.55 (L) 07/23/2023     Lab Results   Component Value Date    WBC 13.15 (H) 07/22/2023    HGB 11.5 07/22/2023    HCT 34.2 (L) 07/22/2023    MCV 88 07/22/2023     07/22/2023     Lab Results   Component Value Date    ALT 87 (H) 07/23/2023    AST 94 (H) 07/23/2023    ALKPHOS 115 (H) 07/23/2023     No results found for: "AMYLASE"  Lab Results   Component Value Date    LIPASE 150 04/13/2016     No results found for: "IRON", "TIBC", "FERRITIN"  Lab Results   Component Value Date    INR 3.69 (H) 07/21/2023       Counseling / Coordination of Care  Total floor / unit time spent today 20 minutes.

## 2023-07-23 NOTE — PROGRESS NOTES
[facility]  Progress Note  Name: Hari Alcaraz  MRN: [id number]  Unit/Bed#: -01 I Date of Admission: 7/20/2023   Date of Service: 7/23/2023 I Hospital Day: 3    Assessment/Plan   * Closed right ankle fracture  Assessment & Plan  Secondary to mechanical fall Aracely Ballard  X-ray with right lateral malleolus fracture  Splinted  Consult Ortho. Nonsurgical.  Pain control  By PT/OT, recommending rehab    Hyponatremia  Assessment & Plan  Na+ 125, 126 after glucose correction. Likely contributed to her fall  Unclear etiology, patient is on HCTZ and reports drinking 6-7 16oz water bottles per day. Adequate solute intake. Give IVF overnight  Nephrology consult  Initially improved to 131 with IVF and holding diuretics-stabilized at 131. Started on torsemide 5 mg and salt tablets by nephrology on 7/23  Continue to trend BMP daily  Lipid panel and SPEP ordered given normal serum osm    Hypokalemia  Assessment & Plan  K 2.7  ECG -normal sinus rhythm with diffuse flattened T waves, QTc 513 ms  Resolved    Bilateral carpal tunnel syndrome  Assessment & Plan  With significant pain, was prescribed gabapentin and Norco by PCP  Continue gabapentin  Oxycodone PRN (do not give within 1 hour of gabapentin)    Transaminitis  Assessment & Plan  , , alk phos 171, T. bili 3.58  Patient with mild scleral icterus. No abdominal pain/tenderness. RUQ ultrasound with normal liver, surgically absent gallbladder  Hepatitis panel negative. LFT trending down, suspect shock liver  GI consult.   CMV and EBV IGG positive but no IGM    Asymptomatic bacteriuria  Assessment & Plan  UA with 10-20 WBCs, innumerable bacteria  Denies any pain, burning, frequency, urgency  Will hold off on treatment    Prediabetes  Assessment & Plan  History of prediabetes  Glucose 170  A1c 7.0  Diabetic diet - has not required insulin while inpatient    Acquired hypothyroidism  Assessment & Plan  Continue levothyroxine 175 mcg daily    Essential hypertension  Assessment & Plan  On Diovan, sotalol, HCTZ, Cardizem  Hold HCTZ, continue rest of antihypertensives (Diovan switched to formulary losartan)    Mixed hyperlipidemia  Assessment & Plan  Hold atorvastatin in setting of elevated LFTs    Paroxysmal atrial fibrillation (HCC)  Assessment & Plan  Rate/rhythm control: Sotalol 80mg, cardizem 360mg  Anticoagulation: Xarelto               VTE Pharmacologic Prophylaxis:   Moderate Risk (Score 3-4) - Pharmacological DVT Prophylaxis Ordered: rivaroxaban (Xarelto). Patient Centered Rounds: I performed bedside rounds with nursing staff today. Discussions with Specialists or Other Care Team Provider: Nephrology    Education and Discussions with Family / Patient: Updated  (son) at bedside. Total Time Spent on Date of Encounter in care of patient: 45 minutes This time was spent on one or more of the following: performing physical exam; counseling and coordination of care; obtaining or reviewing history; documenting in the medical record; reviewing/ordering tests, medications or procedures; communicating with other healthcare professionals and discussing with patient's family/caregivers. Current Length of Stay: 3 day(s)  Current Patient Status: Inpatient   Certification Statement: The patient will continue to require additional inpatient hospital stay due to Pending improvement in sodium as well as rehab placement  Discharge Plan: Anticipate discharge in 24-48 hrs to rehab facility. Code Status: Level 1 - Full Code    Subjective:   No acute events overnight. Patient reports she did not sleep well due to alarms going off and feels fatigued.     Objective:     Vitals:   Temp (24hrs), Av.5 °F (36.9 °C), Min:97.8 °F (36.6 °C), Max:99.1 °F (37.3 °C)    Temp:  [97.8 °F (36.6 °C)-99.1 °F (37.3 °C)] 97.8 °F (36.6 °C)  HR:  [72-82] 82  Resp:  [16-24] 24  BP: (130-154)/(72-77) 154/75  SpO2:  [94 %-95 %] 95 %  Body mass index is 35.19 kg/m². Input and Output Summary (last 24 hours): Intake/Output Summary (Last 24 hours) at 7/23/2023 1505  Last data filed at 7/23/2023 0858  Gross per 24 hour   Intake 480 ml   Output 275 ml   Net 205 ml       Physical Exam:   Physical Exam  Vitals and nursing note reviewed. Constitutional:       Appearance: Normal appearance. HENT:      Head: Normocephalic and atraumatic. Mouth/Throat:      Mouth: Mucous membranes are moist.      Pharynx: Oropharynx is clear. No oropharyngeal exudate. Eyes:      Extraocular Movements: Extraocular movements intact. Cardiovascular:      Rate and Rhythm: Normal rate and regular rhythm. Pulses: Normal pulses. Heart sounds: Normal heart sounds. No murmur heard. No friction rub. No gallop. Pulmonary:      Effort: Pulmonary effort is normal. No respiratory distress. Breath sounds: Normal breath sounds. No stridor. No wheezing or rales. Abdominal:      General: Abdomen is flat. Bowel sounds are normal. There is no distension. Palpations: Abdomen is soft. Tenderness: There is no abdominal tenderness. Genitourinary:     Comments: Dark estiven-colored urine  Musculoskeletal:      Right lower leg: No edema. Left lower leg: No edema. Skin:     General: Skin is warm and dry. Neurological:      General: No focal deficit present. Mental Status: She is alert and oriented to person, place, and time. Additional Data:     Labs:  Results from last 7 days   Lab Units 07/22/23  0451 07/21/23  0518 07/20/23  1213   WBC Thousand/uL 13.15*   < > 14.82*   HEMOGLOBIN g/dL 11.5   < > 13.2   HEMATOCRIT % 34.2*   < > 38.7   PLATELETS Thousands/uL 241   < > 229   NEUTROS PCT %  --   --  77*   LYMPHS PCT %  --   --  11*   MONOS PCT %  --   --  11   EOS PCT %  --   --  0    < > = values in this interval not displayed.      Results from last 7 days   Lab Units 07/23/23  0412   SODIUM mmol/L 131*   POTASSIUM mmol/L 4.3   CHLORIDE mmol/L 102   CO2 mmol/L 24   BUN mg/dL 24   CREATININE mg/dL 0.55*   ANION GAP mmol/L 5   CALCIUM mg/dL 8.6   ALBUMIN g/dL 2.5*   TOTAL BILIRUBIN mg/dL 3.23*   ALK PHOS U/L 115*   ALT U/L 87*   AST U/L 94*   GLUCOSE RANDOM mg/dL 117     Results from last 7 days   Lab Units 07/21/23  0518   INR  3.69*         Results from last 7 days   Lab Units 07/20/23  1213   HEMOGLOBIN A1C % 7.0*           Lines/Drains:  Invasive Devices     Peripheral Intravenous Line  Duration           Peripheral IV 07/20/23 Proximal;Right;Ventral (anterior) Forearm 3 days          Drain  Duration           External Urinary Catheter 2 days                      Imaging: No pertinent imaging reviewed.     Recent Cultures (last 7 days):         Last 24 Hours Medication List:   Current Facility-Administered Medications   Medication Dose Route Frequency Provider Last Rate   • acetaminophen  650 mg Oral Q6H PRN Vivienne Mckinnon PA-C     • aluminum-magnesium hydroxide-simethicone  30 mL Oral Q6H PRN Vivienne Mckinnon PA-C     • diltiazem  360 mg Oral Daily Vianney Bernabe     • [START ON 7/24/2023] escitalopram  5 mg Oral Daily Vivienne Mckinnon PA-C     • gabapentin  300 mg Oral TID Vivienne Mckinnon PA-C     • levothyroxine  175 mcg Oral Early Morning Tato Daily PA-C     • losartan  100 mg Oral Daily RAJ Recinos     • morphine injection  2 mg Intravenous Q4H PRN Sylvain Dye MD     • ondansetron  4 mg Intravenous Q6H PRN Vivienne Mckinnon PA-C     • oxyCODONE  2.5 mg Oral Q6H PRN Vivienne Mckinnon PA-C      Or   • oxyCODONE  5 mg Oral Q6H PRN Vivienne Mckinnon PA-C     • rivaroxaban  20 mg Oral Daily With [email or web address]     • sodium chloride  2 g Oral BID With Meals RAJ Recinos     • sotalol  80 mg Oral BID Vivienne Mckinnon PA-C     • torsemide  5 mg Oral Daily RAJ Recinos          Today, Patient Was Seen By: Vivienne Mckinnon PA-C    **Please Note: This note may have been constructed using a voice recognition system. **

## 2023-07-23 NOTE — ASSESSMENT & PLAN NOTE
Secondary to mechanical fall Gisele Manzo  X-ray with right lateral malleolus fracture  Splinted  Consult Ortho.   Nonsurgical.  Pain control  By PT/OT, recommending rehab

## 2023-07-23 NOTE — ASSESSMENT & PLAN NOTE
, , alk phos 171, T. bili 3.58  Patient with mild scleral icterus. No abdominal pain/tenderness. RUQ ultrasound with normal liver, surgically absent gallbladder  Hepatitis panel negative. LFT trending down, suspect shock liver  GI consult.   CMV and EBV IGG positive but no IGM

## 2023-07-23 NOTE — PROGRESS NOTES
NEPHROLOGY PROGRESS NOTE   Hima Barkley 81 y.o. female MRN: [id number]  Unit/Bed#: -01 Encounter: [id number]      HPI/24hr EVENTS:    -81-year-old female past medical history of atrial fibrillation, hypothyroidism, depression. Presented with mechanical fall.   Nephrology consult for management of hyponatremia    -Sodium level unchanged    ASSESSMENT/PLAN:    Hyponatremia  -Has a history of hyponatremia in the past with a sodium of 131 in 2018 with periods of normalization  -Presented with a sodium of 125, most recently 131, unchanged from prior  -Etiology: Suspect multifactorial, Lexapro use which can induce SIADH, HCTZ use given concurrent hypokalemia, possible polydipsia, acute pain in the setting of fibula fracture, prerenal azotemia/hypovolemia given response to volume resuscitation, urine studies more consistent with SIADH  -Work-up: TSH 6.06, urine osmolality 556, urine sodium 39, a.m. cortisol  19.3, uric acid 2.4, serum osmolality 286  -Imaging: Chest x-ray with no acute cardiopulmonary disease, CT head with no acute intracranial abnormality  -HCTZ was held on admission  -Patient received gentle IV fluid hydration with improvement, stop further IV fluid  -Start salt tablets 2 g twice daily for now, start torsemide 5 mg daily, continue 1.2 L fluid restrictions  -Check SPEP given normal serum osmolality, triglycerides normal, UA with trace protein  -If no improvement have to consider reduction of Lexapro or alternative agent     Hypertension/blood pressure  -OP regimen: Diovan 320 mg daily, hydrochlorothiazide 25 mg daily, sotalol 80 mg twice daily, Cardizem 24-hour 360 mg daily  -Current regimen: Losartan 100 mg substituted for Diovan, sotalol 80 mg twice daily, Cardizem 24-hour 360 mg daily  -Recent blood pressures are acceptable, hold parameters adjusted for losartan     Hypokalemia  -Potassium was 2.7 on admission, most recently 4.3  -Possibly due to HCTZ use, is currently held  -Monitor with low-dose loop diuretic     Bladder calculus  -Follows with urology as outpatient  -Prior renal ultrasound from February 2023 shows normal echogenicity and contour bilaterally with no hydronephrosis, 2 small left renal cysts, no shadowing calculi bilaterally, bladder with echogenic focus with questionable twinkle artifact     Right lateral malleolus fracture  -Orthopedics following  -Care per primary team     Transaminitis  -GI consulted     Additional Medical Problems: Atrial fibrillation on Xarelto, hypothyroidism on Synthroid, depression    SUBJECTIVE:  Reports pain in her ankle is well controlled, is eating and drinking denies any nausea/vomiting    ROS:  Review of Systems   Constitutional: Positive for fatigue. Respiratory: Negative. Cardiovascular: Negative. Gastrointestinal: Negative. Genitourinary: Negative. Musculoskeletal:        Left ankle pain      A complete 10 point review of systems was performed and found to be negative unless otherwise noted above or in the HPI. OBJECTIVE:  Current Weight: Weight - Scale: 93 kg (205 lb)  Vitals:    07/22/23 0823 07/22/23 1533 07/22/23 2107 07/23/23 0729   BP: 128/73 130/72 150/77 154/75   BP Location: Right arm      Pulse: 83 72 78 82   Resp: 18 16 16 (!) 24   Temp: 97.9 °F (36.6 °C) 99.1 °F (37.3 °C) 98.5 °F (36.9 °C) 97.8 °F (36.6 °C)   TempSrc: Oral      SpO2: 94% 94% 95% 95%   Weight:       Height:           Intake/Output Summary (Last 24 hours) at 7/23/2023 1023  Last data filed at 7/23/2023 0858  Gross per 24 hour   Intake 480 ml   Output 275 ml   Net 205 ml     Physical Exam  Vitals and nursing note reviewed. Constitutional:       General: She is not in acute distress. Appearance: She is obese. She is not toxic-appearing or diaphoretic. Comments: Awake sitting in bed   HENT:      Head: Normocephalic and atraumatic.       Nose: Nose normal.      Mouth/Throat:      Mouth: Mucous membranes are moist.   Cardiovascular:      Rate and Rhythm: Normal rate. Pulses: Normal pulses. Pulmonary:      Effort: Pulmonary effort is normal. No respiratory distress. Breath sounds: Normal breath sounds. No wheezing or rales. Abdominal:      General: Abdomen is flat. Musculoskeletal:      Cervical back: Neck supple. Right lower leg: No edema. Comments: Left lower extremity splint   Skin:     General: Skin is warm and dry. Capillary Refill: Capillary refill takes less than 2 seconds. Neurological:      General: No focal deficit present. Mental Status: She is alert and oriented to person, place, and time.           Medications:    Current Facility-Administered Medications:   •  acetaminophen (TYLENOL) tablet 650 mg, 650 mg, Oral, Q6H PRN, Tony Mark PA-C, 650 mg at 07/23/23 0818  •  aluminum-magnesium hydroxide-simethicone (MAALOX) oral suspension 30 mL, 30 mL, Oral, Q6H PRN, Isha Melchor PA-C  •  diltiazem (CARDIZEM CD) 24 hr capsule 360 mg, 360 mg, Oral, Daily, Isha Melchor PA-C, 360 mg at 07/23/23 2185  •  escitalopram (LEXAPRO) tablet 10 mg, 10 mg, Oral, Daily, Isha Melchor PA-C, 10 mg at 07/23/23 1812  •  gabapentin (NEURONTIN) capsule 300 mg, 300 mg, Oral, TID, Isha Melchor PA-C, 300 mg at 07/23/23 7849  •  levothyroxine tablet 175 mcg, 175 mcg, Oral, Early Morning, Isha Melchor PA-C, 175 mcg at 07/23/23 0601  •  losartan (COZAAR) tablet 100 mg, 100 mg, Oral, Daily, RAJ Brenner, 100 mg at 07/23/23 1507  •  morphine injection 2 mg, 2 mg, Intravenous, Q4H PRN, Eric Dyson MD, 2 mg at 07/21/23 6358  •  ondansetron (ZOFRAN) injection 4 mg, 4 mg, Intravenous, Q6H PRN, Isha Melchor PA-C  •  oxyCODONE (ROXICODONE) split tablet 2.5 mg, 2.5 mg, Oral, Q6H PRN **OR** oxyCODONE (ROXICODONE) IR tablet 5 mg, 5 mg, Oral, Q6H PRN, Isha Melchor PA-C, 5 mg at 07/21/23 2109  •  rivaroxaban (XARELTO) tablet 20 mg, 20 mg, Oral, Daily With Dana Leyva ALBIN Mark, 20 mg at 07/22/23 1725  •  sodium chloride tablet 2 g, 2 g, Oral, BID With Meals, RAJ Stewart, 2 g at 07/23/23 0346  •  sotalol (BETAPACE) tablet 80 mg, 80 mg, Oral, BID, Angeljim Mark PA-C, 80 mg at 07/23/23 4163  •  torsemide [facility]) tablet 5 mg, 5 mg, Oral, Daily, RAJ Stewart, 5 mg at 07/23/23 1010    Laboratory Results:  Results from last 7 days   Lab Units 07/23/23  0412 07/22/23  0451 07/21/23  2059 07/21/23  1415 07/21/23  0518 07/20/23  2117 07/20/23  1213   WBC Thousand/uL  --  13.15*  --   --  13.56*  --  14.82*   HEMOGLOBIN g/dL  --  11.5  --   --  12.1  --  13.2   HEMATOCRIT %  --  34.2*  --   --  35.4  --  38.7   PLATELETS Thousands/uL  --  241  --   --  254  --  229   POTASSIUM mmol/L 4.3 3.2* 3.4* 3.9 3.5  3.6 3.2* 2.7*   CHLORIDE mmol/L 102 98 96 95* 94*  94* 92* 87*   CO2 mmol/L 24 30 28 27 29  30 31 30   BUN mg/dL 24 23 21 22 20  20 17 22   CREATININE mg/dL 0.55* 0.57* 0.62 0.68 0.59*  0.61 0.63 0.61   CALCIUM mg/dL 8.6 8.4 8.7 8.9 9.0  8.9 9.0 9.4       I have personally reviewed the blood work as stated above and in my note. I have personally reviewed internal medicine, GI note.

## 2023-07-23 NOTE — ASSESSMENT & PLAN NOTE
Na+ 125, 126 after glucose correction. Likely contributed to her fall  Unclear etiology, patient is on HCTZ and reports drinking 6-7 16oz water bottles per day. Adequate solute intake. Give IVF overnight  Nephrology consult  Initially improved to 131 with IVF and holding diuretics-stabilized at 131.   Started on torsemide 5 mg and salt tablets by nephrology on 7/23  Continue to trend BMP daily  Lipid panel and SPEP ordered given normal serum osm

## 2023-07-24 ENCOUNTER — TELEPHONE (OUTPATIENT)
Dept: NEPHROLOGY | Facility: CLINIC | Age: 83
End: 2023-07-24

## 2023-07-24 PROBLEM — E87.6 HYPOKALEMIA: Status: RESOLVED | Noted: 2023-07-20 | Resolved: 2023-07-24

## 2023-07-24 LAB
ALBUMIN SERPL BCP-MCNC: 2.5 G/DL (ref 3.5–5)
ALP SERPL-CCNC: 102 U/L (ref 34–104)
ALT SERPL W P-5'-P-CCNC: 70 U/L (ref 7–52)
ANION GAP SERPL CALCULATED.3IONS-SCNC: 4 MMOL/L
AST SERPL W P-5'-P-CCNC: 68 U/L (ref 13–39)
BILIRUB DIRECT SERPL-MCNC: 1.27 MG/DL (ref 0–0.2)
BILIRUB SERPL-MCNC: 3.03 MG/DL (ref 0.2–1)
BUN SERPL-MCNC: 22 MG/DL (ref 5–25)
CALCIUM SERPL-MCNC: 8.5 MG/DL (ref 8.4–10.2)
CHLORIDE SERPL-SCNC: 101 MMOL/L (ref 96–108)
CO2 SERPL-SCNC: 28 MMOL/L (ref 21–32)
CREAT SERPL-MCNC: 0.45 MG/DL (ref 0.6–1.3)
ERYTHROCYTE [DISTWIDTH] IN BLOOD BY AUTOMATED COUNT: 15.8 % (ref 11.6–15.1)
GFR SERPL CREATININE-BSD FRML MDRD: 93 ML/MIN/1.73SQ M
GLUCOSE SERPL-MCNC: 110 MG/DL (ref 65–140)
HCT VFR BLD AUTO: 34.4 % (ref 34.8–46.1)
HGB BLD-MCNC: 11.3 G/DL (ref 11.5–15.4)
MCH RBC QN AUTO: 29.7 PG (ref 26.8–34.3)
MCHC RBC AUTO-ENTMCNC: 32.8 G/DL (ref 31.4–37.4)
MCV RBC AUTO: 90 FL (ref 82–98)
PLATELET # BLD AUTO: 233 THOUSANDS/UL (ref 149–390)
PMV BLD AUTO: 8.8 FL (ref 8.9–12.7)
POTASSIUM SERPL-SCNC: 3.7 MMOL/L (ref 3.5–5.3)
PROT SERPL-MCNC: 7.1 G/DL (ref 6.4–8.4)
RBC # BLD AUTO: 3.81 MILLION/UL (ref 3.81–5.12)
SODIUM SERPL-SCNC: 133 MMOL/L (ref 135–147)
WBC # BLD AUTO: 12.37 THOUSAND/UL (ref 4.31–10.16)

## 2023-07-24 PROCEDURE — 99232 SBSQ HOSP IP/OBS MODERATE 35: CPT | Performed by: INTERNAL MEDICINE

## 2023-07-24 PROCEDURE — 80076 HEPATIC FUNCTION PANEL: CPT | Performed by: PHYSICIAN ASSISTANT

## 2023-07-24 PROCEDURE — 80048 BASIC METABOLIC PNL TOTAL CA: CPT | Performed by: PHYSICIAN ASSISTANT

## 2023-07-24 PROCEDURE — 99239 HOSP IP/OBS DSCHRG MGMT >30: CPT | Performed by: PHYSICIAN ASSISTANT

## 2023-07-24 PROCEDURE — 85027 COMPLETE CBC AUTOMATED: CPT | Performed by: HOSPITALIST

## 2023-07-24 RX ORDER — SODIUM CHLORIDE 1 G/1
2 TABLET ORAL 2 TIMES DAILY WITH MEALS
Qty: 120 TABLET | Refills: 0 | Status: SHIPPED | OUTPATIENT
Start: 2023-07-24

## 2023-07-24 RX ORDER — TORSEMIDE 5 MG/1
5 TABLET ORAL DAILY
Qty: 30 TABLET | Refills: 0 | Status: SHIPPED | OUTPATIENT
Start: 2023-07-25

## 2023-07-24 RX ADMIN — LOSARTAN POTASSIUM 100 MG: 50 TABLET, FILM COATED ORAL at 08:53

## 2023-07-24 RX ADMIN — GABAPENTIN 300 MG: 300 CAPSULE ORAL at 16:20

## 2023-07-24 RX ADMIN — OXYCODONE HYDROCHLORIDE 5 MG: 5 TABLET ORAL at 13:53

## 2023-07-24 RX ADMIN — TORSEMIDE 5 MG: 10 TABLET ORAL at 08:53

## 2023-07-24 RX ADMIN — DILTIAZEM HYDROCHLORIDE 360 MG: 180 CAPSULE, EXTENDED RELEASE ORAL at 08:53

## 2023-07-24 RX ADMIN — LEVOTHYROXINE SODIUM 175 MCG: 100 TABLET ORAL at 05:52

## 2023-07-24 RX ADMIN — SODIUM CHLORIDE 2 G: 1 TABLET ORAL at 16:20

## 2023-07-24 RX ADMIN — SODIUM CHLORIDE 2 G: 1 TABLET ORAL at 05:52

## 2023-07-24 RX ADMIN — RIVAROXABAN 20 MG: 10 TABLET, FILM COATED ORAL at 16:20

## 2023-07-24 RX ADMIN — ACETAMINOPHEN 650 MG: 325 TABLET ORAL at 05:52

## 2023-07-24 RX ADMIN — ESCITALOPRAM 5 MG: 5 TABLET, FILM COATED ORAL at 08:53

## 2023-07-24 RX ADMIN — SOTALOL HYDROCHLORIDE 80 MG: 80 TABLET ORAL at 08:54

## 2023-07-24 RX ADMIN — GABAPENTIN 300 MG: 300 CAPSULE ORAL at 08:54

## 2023-07-24 NOTE — NURSING NOTE
Patient resting comfortably during hourly rounds, no signs of distress. Braces remain on B/L wrists, RLE wrapped in ACE bandage, clean, dry, intact. Neurovascular check RLE WNL. Bed in lowest position, call bell within reach.

## 2023-07-24 NOTE — ASSESSMENT & PLAN NOTE
· On Diovan, sotalol, HCTZ, Cardizem  · Hold HCTZ, continue rest of antihypertensives (Diovan switched to formulary losartan while admitted)

## 2023-07-24 NOTE — PLAN OF CARE
Problem: Prexisting or High Potential for Compromised Skin Integrity  Goal: Skin integrity is maintained or improved  Description: INTERVENTIONS:  - Identify patients at risk for skin breakdown  - Assess and monitor skin integrity  - Assess and monitor nutrition and hydration status  - Monitor labs   - Assess for incontinence   - Turn and reposition patient  - Assist with mobility/ambulation  - Relieve pressure over bony prominences  - Avoid friction and shearing  - Provide appropriate hygiene as needed including keeping skin clean and dry  - Evaluate need for skin moisturizer/barrier cream  - Collaborate with interdisciplinary team   - Patient/family teaching  - Consider wound care consult   Outcome: Progressing     Problem: CARDIOVASCULAR - ADULT  Goal: Maintains optimal cardiac output and hemodynamic stability  Description: INTERVENTIONS:  - Monitor I/O, vital signs and rhythm  - Monitor for S/S and trends of decreased cardiac output  - Administer and titrate ordered vasoactive medications to optimize hemodynamic stability  - Assess quality of pulses, skin color and temperature  - Assess for signs of decreased coronary artery perfusion  - Instruct patient to report change in severity of symptoms  Outcome: Progressing     Problem: GASTROINTESTINAL - ADULT  Goal: Minimal or absence of nausea and/or vomiting  Description: INTERVENTIONS:  - Administer IV fluids if ordered to ensure adequate hydration  - Maintain NPO status until nausea and vomiting are resolved  - Nasogastric tube if ordered  - Administer ordered antiemetic medications as needed  - Provide nonpharmacologic comfort measures as appropriate  - Advance diet as tolerated, if ordered  - Consider nutrition services referral to assist patient with adequate nutrition and appropriate food choices  Outcome: Progressing  Goal: Maintains or returns to baseline bowel function  Description: INTERVENTIONS:  - Assess bowel function  - Encourage oral fluids to ensure adequate hydration  - Administer IV fluids if ordered to ensure adequate hydration  - Administer ordered medications as needed  - Encourage mobilization and activity  - Consider nutritional services referral to assist patient with adequate nutrition and appropriate food choices  Outcome: Progressing  Goal: Maintains adequate nutritional intake  Description: INTERVENTIONS:  - Monitor percentage of each meal consumed  - Identify factors contributing to decreased intake, treat as appropriate  - Assist with meals as needed  - Monitor I&O, weight, and lab values if indicated  - Obtain nutrition services referral as needed  Outcome: Progressing     Problem: GENITOURINARY - ADULT  Goal: Maintains or returns to baseline urinary function  Description: INTERVENTIONS:  - Assess urinary function  - Encourage oral fluids to ensure adequate hydration if ordered  - Administer IV fluids as ordered to ensure adequate hydration  - Administer ordered medications as needed  - Offer frequent toileting  - Follow urinary retention protocol if ordered  Outcome: Progressing     Problem: METABOLIC, FLUID AND ELECTROLYTES - ADULT  Goal: Electrolytes maintained within normal limits  Description: INTERVENTIONS:  - Monitor labs and assess patient for signs and symptoms of electrolyte imbalances  - Administer electrolyte replacement as ordered  - Monitor response to electrolyte replacements, including repeat lab results as appropriate  - Instruct patient on fluid and nutrition as appropriate  Outcome: Progressing  Goal: Fluid balance maintained  Description: INTERVENTIONS:  - Monitor labs   - Monitor I/O and WT  - Instruct patient on fluid and nutrition as appropriate  - Assess for signs & symptoms of volume excess or deficit  Outcome: Progressing     Problem: SKIN/TISSUE INTEGRITY - ADULT  Goal: Skin Integrity remains intact(Skin Breakdown Prevention)  Description: Assess:  -Perform Douglas assessment every x  -Clean and moisturize skin every x  -Inspect skin when repositioning, toileting, and assisting with ADLS  -Assess under medical devices such as x every x  -Assess extremities for adequate circulation and sensation     Bed Management:  -Have minimal linens on bed & keep smooth, unwrinkled  -Change linens as needed when moist or perspiring  -Avoid sitting or lying in one position for more than x hours while in bed  -Keep HOB at [facility]     Toileting:  -Offer bedside commode  -Assess for incontinence every x  -Use incontinent care products after each incontinent episode such as x    Activity:  -Mobilize patient x times a day  -Encourage activity and walks on unit  -Encourage or provide ROM exercises   -Turn and reposition patient every x Hours  -Use appropriate equipment to lift or move patient in bed  -Instruct/ Assist with weight shifting every x when out of bed in chair  -Consider limitation of chair time x hour intervals    Skin Care:  -Avoid use of baby powder, tape, friction and shearing, hot water or constrictive clothing  -Relieve pressure over bony prominences using x  -Do not massage red bony areas    Next Steps:  -Teach patient strategies to minimize risks such as x   -Consider consults to  interdisciplinary teams such as x  Outcome: Progressing     Problem: HEMATOLOGIC - ADULT  Goal: Maintains hematologic stability  Description: INTERVENTIONS  - Assess for signs and symptoms of bleeding or hemorrhage  - Monitor labs  - Administer supportive blood products/factors as ordered and appropriate  Outcome: Progressing     Problem: MUSCULOSKELETAL - ADULT  Goal: Maintain or return mobility to safest level of function  Description: INTERVENTIONS:  - Assess patient's ability to carry out ADLs; assess patient's baseline for ADL function and identify physical deficits which impact ability to perform ADLs (bathing, care of mouth/teeth, toileting, grooming, dressing, etc.)  - Assess/evaluate cause of self-care deficits   - Assess range of motion  - Assess patient's mobility  - Assess patient's need for assistive devices and provide as appropriate  - Encourage maximum independence but intervene and supervise when necessary  - Involve family in performance of ADLs  - Assess for home care needs following discharge   - Consider OT consult to assist with ADL evaluation and planning for discharge  - Provide patient education as appropriate  Outcome: Progressing     Problem: Nutrition/Hydration-ADULT  Goal: Nutrient/Hydration intake appropriate for improving, restoring or maintaining nutritional needs  Description: Monitor and assess patient's nutrition/hydration status for malnutrition. Collaborate with interdisciplinary team and initiate plan and interventions as ordered. Monitor patient's weight and dietary intake as ordered or per policy. Utilize nutrition screening tool and intervene as necessary. Determine patient's food preferences and provide high-protein, high-caloric foods as appropriate.      INTERVENTIONS:  - Monitor oral intake, urinary output, labs, and treatment plans  - Assess nutrition and hydration status and recommend course of action  - Evaluate amount of meals eaten  - Assist patient with eating if necessary   - Allow adequate time for meals  - Recommend/ encourage appropriate diets, oral nutritional supplements, and vitamin/mineral supplements  - Order, calculate, and assess calorie counts as needed  - Recommend, monitor, and adjust tube feedings and TPN/PPN based on assessed needs  - Assess need for intravenous fluids  - Provide specific nutrition/hydration education as appropriate  - Include patient/family/caregiver in decisions related to nutrition  Outcome: Progressing     Problem: PAIN - ADULT  Goal: Verbalizes/displays adequate comfort level or baseline comfort level  Description: Interventions:  - Encourage patient to monitor pain and request assistance  - Assess pain using appropriate pain scale  - Administer analgesics based on type and severity of pain and evaluate response  - Implement non-pharmacological measures as appropriate and evaluate response  - Consider cultural and social influences on pain and pain management  - Notify physician/advanced practitioner if interventions unsuccessful or patient reports new pain  Outcome: Progressing     Problem: INFECTION - ADULT  Goal: Absence or prevention of progression during hospitalization  Description: INTERVENTIONS:  - Assess and monitor for signs and symptoms of infection  - Monitor lab/diagnostic results  - Monitor all insertion sites, i.e. indwelling lines, tubes, and drains  - Monitor endotracheal if appropriate and nasal secretions for changes in amount and color  - Cashion appropriate cooling/warming therapies per order  - Administer medications as ordered  - Instruct and encourage patient and family to use good hand hygiene technique  - Identify and instruct in appropriate isolation precautions for identified infection/condition  Outcome: Progressing     Problem: SAFETY ADULT  Goal: Patient will remain free of falls  Description: INTERVENTIONS:  - Educate patient/family on patient safety including physical limitations  - Instruct patient to call for assistance with activity   - Consult OT/PT to assist with strengthening/mobility   - Keep Call bell within reach  - Keep bed low and locked with side rails adjusted as appropriate  - Keep care items and personal belongings within reach  - Initiate and maintain comfort rounds  - Make Fall Risk Sign visible to staff  - Offer Toileting every x Hours, in advance of need  - Initiate/Maintain xalarm  - Obtain necessary fall risk management equipment: x  - Apply yellow socks and bracelet for high fall risk patients  - Consider moving patient to room near nurses station  Outcome: Progressing  Goal: Maintain or return to baseline ADL function  Description: INTERVENTIONS:  -  Assess patient's ability to carry out ADLs; assess patient's baseline for ADL function and identify physical deficits which impact ability to perform ADLs (bathing, care of mouth/teeth, toileting, grooming, dressing, etc.)  - Assess/evaluate cause of self-care deficits   - Assess range of motion  - Assess patient's mobility; develop plan if impaired  - Assess patient's need for assistive devices and provide as appropriate  - Encourage maximum independence but intervene and supervise when necessary  - Involve family in performance of ADLs  - Assess for home care needs following discharge   - Consider OT consult to assist with ADL evaluation and planning for discharge  - Provide patient education as appropriate  Outcome: Progressing  Goal: Maintains/Returns to pre admission functional level  Description: INTERVENTIONS:  - Perform BMAT or MOVE assessment daily.   - Set and communicate daily mobility goal to care team and patient/family/caregiver. - Collaborate with rehabilitation services on mobility goals if consulted  - Perform Range of Motion xx times a day. - Reposition patient every x hours.   - Dangle patient x times a day  - Stand patient x times a day  - Ambulate patient x times a day  - Out of bed to chair x times a day   - Out of bed for meals x times a day  - Out of bed for toileting  - Record patient progress and toleration of activity level   Outcome: Progressing     Problem: DISCHARGE PLANNING  Goal: Discharge to home or other facility with appropriate resources  Description: INTERVENTIONS:  - Identify barriers to discharge w/patient and caregiver  - Arrange for needed discharge resources and transportation as appropriate  - Identify discharge learning needs (meds, wound care, etc.)  - Arrange for interpretive services to assist at discharge as needed  - Refer to Case Management Department for coordinating discharge planning if the patient needs post-hospital services based on physician/advanced practitioner order or complex needs related to functional status, cognitive ability, or social support system  Outcome: Progressing     Problem: Knowledge Deficit  Goal: Patient/family/caregiver demonstrates understanding of disease process, treatment plan, medications, and discharge instructions  Description: Complete learning assessment and assess knowledge base.   Interventions:  - Provide teaching at level of understanding  - Provide teaching via preferred learning methods  Outcome: Progressing     Problem: Potential for Falls  Goal: Patient will remain free of falls  Description: INTERVENTIONS:  - Educate patient/family on patient safety including physical limitations  - Instruct patient to call for assistance with activity   - Consult OT/PT to assist with strengthening/mobility   - Keep Call bell within reach  - Keep bed low and locked with side rails adjusted as appropriate  - Keep care items and personal belongings within reach  - Initiate and maintain comfort rounds  - Make Fall Risk Sign visible to staff  - Offer Toileting every x Hours, in advance of need  - Initiate/Maintain xalarm  - Obtain necessary fall risk management equipment: x  - Apply yellow socks and bracelet for high fall risk patients  - Consider moving patient to room near nurses station  Outcome: Progressing     Problem: MOBILITY - ADULT  Goal: Maintain or return to baseline ADL function  Description: INTERVENTIONS:  -  Assess patient's ability to carry out ADLs; assess patient's baseline for ADL function and identify physical deficits which impact ability to perform ADLs (bathing, care of mouth/teeth, toileting, grooming, dressing, etc.)  - Assess/evaluate cause of self-care deficits   - Assess range of motion  - Assess patient's mobility; develop plan if impaired  - Assess patient's need for assistive devices and provide as appropriate  - Encourage maximum independence but intervene and supervise when necessary  - Involve family in performance of ADLs  - Assess for home care needs following discharge   - Consider OT consult to assist with ADL evaluation and planning for discharge  - Provide patient education as appropriate  Outcome: Progressing  Goal: Maintains/Returns to pre admission functional level  Description: INTERVENTIONS:  - Perform BMAT or MOVE assessment daily.   - Set and communicate daily mobility goal to care team and patient/family/caregiver. - Collaborate with rehabilitation services on mobility goals if consulted  - Perform Range of Motion x times a day. - Reposition patient every x hours.   - Dangle patient x times a day  - Stand patient x times a day  - Ambulate patient x times a day  - Out of bed to chair x times a day   - Out of bed for meals x times a day  - Out of bed for toileting  - Record patient progress and toleration of activity level   Outcome: Progressing

## 2023-07-24 NOTE — ASSESSMENT & PLAN NOTE
· UA with 10-20 WBCs, innumerable bacteria  · Denies any pain, burning, frequency, urgency  · Will hold off on treatment

## 2023-07-24 NOTE — PROGRESS NOTES
NEPHROLOGY PROGRESS NOTE   Bettie Haney 81 y.o. female MRN: [id number]  Unit/Bed#: -01 Encounter: [id number]  Reason for Consult: Hyponatremia    ASSESSMENT/PLAN:  Hyponatremia: Etiology suspected multifactorial with possible SIADH, HCTZ, possible polydipsia, acute pain, and on SSRI. -Presented with sodium of 125.  -Repeat a.m. sodium level improved to 133. -Work-up: TSH 6.06, urine osmolality 556, urine sodium 39, a.m. cortisol 19.3, uric acid 2.4, serum osmolality 286. SPEP is pending.  -Imaging negative for acute cardiopulmonary disease, CT of head negative for acute intracranial abnormality.  -Currently holding HCTZ. -Current regimen: Sodium chloride tablets 2 g twice daily with meals, torsemide 5 mg daily + 1.5 L fluid restriction.  -Of note, continues on Lexapro 5 mg daily (reduced this admission). May need to continue to wean off if sodium level remains low.  -Renal function remains stable. Hypokalemia: Suspected due to thiazide.  -Would hold thiazide for now. -If potassium decreases again, would check magnesium level. -Repeat a.m. potassium 3.7. Blood pressure/hypertension: Remains stable and acceptable.  -Outpatient regimen: Diovan 320 mg daily, hydrochlorothiazide 25 mg daily, sotalol 80 mg twice daily, and Cardizem 360 mg daily.  -Current medication: Cardizem 360 mg p.o. daily, losartan 100 mg p.o. daily.  -Would recommend avoiding hypotension or high fluctuations with blood pressure. Evaded LFTs: Suspected due to hypotension versus medication related. -GI team following. Ultrasound unremarkable. Trending LFTs and continues with supportive management. Closed right ankle fracture: Secondary to mechanical fall. Continues with nonsurgical management and pain control. Other: Hypothyroidism on Synthroid, atrial fibrillation, hyperlipidemia.     Disposition: Okay to discharge from renal.  Continue salt tabs 2 g twice daily, torsemide 5 mg daily, 1.5 L fluid restriction at discharge. Will send message to office to arrange follow-up with weekly BMP x4. SUBJECTIVE:  The patient denies any acute events overnight. She denies chest pain or shortness of breath. She denies nausea, vomiting, diarrhea. She is eating and drinking. OBJECTIVE:  Current Weight: Weight - Scale: 93 kg (205 lb)  Vitals:    07/22/23 2107 07/23/23 0729 07/23/23 1524 07/24/23 0734   BP: 150/77 154/75 138/67 131/61   BP Location:    Right arm   Pulse: 78 82 67 72   Resp: 16 (!) 24 17 16   Temp: 98.5 °F (36.9 °C) 97.8 °F (36.6 °C) 98.2 °F (36.8 °C) 98.7 °F (37.1 °C)   TempSrc:    Oral   SpO2: 95% 95% 97% 96%   Weight:       Height:           Intake/Output Summary (Last 24 hours) at 7/24/2023 1004  Last data filed at 7/23/2023 2001  Gross per 24 hour   Intake 360 ml   Output 650 ml   Net -290 ml     General: NAD  Skin: warm, dry, intact, no rash  HEENT: Moist mucous membranes, sclera anicteric, normocephalic, atraumatic  Neck: No apparent JVD appreciated  Chest: lung sounds clear B/L, on RA   CVS:Regular rate and rhythm, no murmer   Abdomen: Soft, round, non-tender, +BS, obese  Extremities: No B/L LE edema present.  Right foot wrapped  Neuro: alert and oriented  Psych: appropriate mood and affect     Medications:    Current Facility-Administered Medications:   •  acetaminophen (TYLENOL) tablet 650 mg, 650 mg, Oral, Q6H PRN, Sotero Mark PA-C, 650 mg at 07/24/23 1403  •  aluminum-magnesium hydroxide-simethicone (MAALOX) oral suspension 30 mL, 30 mL, Oral, Q6H PRN, Lesa Rogers PA-C  •  diltiazem (CARDIZEM CD) 24 hr capsule 360 mg, 360 mg, Oral, Daily, Lesa Rogers PA-C, 360 mg at 07/24/23 6052  •  escitalopram (LEXAPRO) tablet 5 mg, 5 mg, Oral, Daily, Sotero Mark PA-C, 5 mg at 07/24/23 4140  •  gabapentin (NEURONTIN) capsule 300 mg, 300 mg, Oral, TID, Sotero Mark PA-C, 300 mg at 07/24/23 1999  •  levothyroxine tablet 175 mcg, 175 mcg, Oral, Early Morning, Lesa Rogers, ALBIN, 175 mcg at 07/24/23 5124  •  losartan (COZAAR) tablet 100 mg, 100 mg, Oral, Daily, Isaiah Adelina, CRNP, 100 mg at 07/24/23 6495  •  morphine injection 2 mg, 2 mg, Intravenous, Q4H PRN, Óscar Paul MD, 2 mg at 07/21/23 1705  •  ondansetron (ZOFRAN) injection 4 mg, 4 mg, Intravenous, Q6H PRN, Lesa Rogers PA-C  •  oxyCODONE (ROXICODONE) split tablet 2.5 mg, 2.5 mg, Oral, Q6H PRN **OR** oxyCODONE (ROXICODONE) IR tablet 5 mg, 5 mg, Oral, Q6H PRN, Lesa Rogers PA-C, 5 mg at 07/21/23 2109  •  rivaroxaban (XARELTO) tablet 20 mg, 20 mg, Oral, Daily With [place] ALBIN, 20 mg at 07/23/23 1715  •  sodium chloride tablet 2 g, 2 g, Oral, BID With Meals, Isaiah Adelina, RAJ, 2 g at 07/24/23 4534  •  sotalol (BETAPACE) tablet 80 mg, 80 mg, Oral, BID, Lesa Rogers PA-C, 80 mg at 07/24/23 1296  •  torsemide [facility]) tablet 5 mg, 5 mg, Oral, Daily, Isaiah [place], CRNP, 5 mg at 07/24/23 1647    Laboratory Results:  Results from last 7 days   Lab Units 07/24/23  0533 07/23/23  0412 07/22/23  1008 07/22/23  0451 07/21/23  1415 07/21/23  0518   WBC Thousand/uL 12.37*  --   --  13.15*  --  13.56*   HEMOGLOBIN g/dL 11.3*  --   --  11.5  --  12.1   HEMATOCRIT % 34.4*  --   --  34.2*  --  35.4   PLATELETS Thousands/uL 233  --   --  241  --  254   SODIUM mmol/L 133* 131*  --  131*   < > 128*  129*   POTASSIUM mmol/L 3.7 4.3  --  3.2*   < > 3.5  3.6   CHLORIDE mmol/L 101 102  --  98   < > 94*  94*   CO2 mmol/L 28 24  --  30   < > 29  30   BUN mg/dL 22 24  --  23   < > 20  20   CREATININE mg/dL 0.45* 0.55*  --  0.57*   < > 0.59*  0.61   CALCIUM mg/dL 8.5 8.6  --  8.4   < > 9.0  8.9   ALK PHOS U/L  --  115* 131*  --   --  148*   ALT U/L  --  87* 108*  --   --  145*   AST U/L  --  94* 112*  --   --  177*    < > = values in this interval not displayed.

## 2023-07-24 NOTE — TELEPHONE ENCOUNTER
----- Message from [facility] sent at 7/24/2023 12:17 PM EDT -----  Patient needs hyponatremia hospital follow-up with repeat BMP weekly x4. Thank you.

## 2023-07-24 NOTE — ASSESSMENT & PLAN NOTE
· Na+ 125, 126 after glucose correction. Likely contributed to her fall  · Unclear etiology, patient is on HCTZ and reports drinking 6-7 16oz water bottles per day. Adequate solute intake. · Give IVF overnight  · Nephrology consult  · Initially improved to 131 with IVF and holding diuretics-stabilized at 131.   Started on torsemide 5 mg and salt tablets by nephrology on 7/23  · Continue to trend BMP daily  · Lipid panel and SPEP ordered given normal serum osm

## 2023-07-24 NOTE — ASSESSMENT & PLAN NOTE
· , , alk phos 171, T. bili 3.58  · Patient with mild scleral icterus. No abdominal pain/tenderness. · RUQ ultrasound with normal liver, surgically absent gallbladder  · Hepatitis panel negative. · LFT trending down, suspect shock liver  · GI consult.   CMV and EBV IGG positive but no IGM

## 2023-07-24 NOTE — ASSESSMENT & PLAN NOTE
· With significant pain, was prescribed gabapentin and Norco by PCP  · Continue gabapentin  · Oxycodone PRN (do not give within 1 hour of gabapentin)

## 2023-07-24 NOTE — CASE MANAGEMENT
Case Management Discharge Planning Note    Patient name Min Castellanos /-36 MRN [id number]  : 1940 Date 2023       Current Admission Date: 2023  Current Admission Diagnosis:Closed right ankle fracture   Patient Active Problem List    Diagnosis Date Noted   • Asymptomatic bacteriuria 2023   • Closed right ankle fracture 2023   • Hyponatremia 2023   • Hypokalemia 2023   • Transaminitis 2023   • Bilateral carpal tunnel syndrome 2023   • Radiculopathy due to cervical spondylosis at multiple levels 2023   • Tendinitis of right shoulder 2023   • Breast mass 2023   • Calculus in bladder 2023   • Herniated nucleus pulposus, L5-S1 2023   • Displaced fracture of fifth metatarsal bone, right foot, initial encounter for closed fracture 2023   • Arthritis of carpometacarpal [place]) joint of left thumb 2023   • Hematuria, undiagnosed cause 2023   • Obstructive sleep apnea 2022   • Osteoarthritis of knee 2022   • Diffuse interstitial pulmonary fibrosis ([address]) 2022   • Long term (current) use of anticoagulants 2022   • Adjustment insomnia 2022   • Spondylolisthesis, grade 2 2022   • Lumbar spinal stenosis 2022   • Localized, primary osteoarthritis of hand 2022   • Incontinence of feces with fecal urgency 2022   • Strain of left shoulder 2021   • Left shoulder tendonitis 2021   • Primary osteoarthritis of left shoulder 2021   • Mood disorder ([address]) 2020   • Gait abnormality 2020   • Failed back surgical syndrome 2019   • Status post spinal surgery 11/15/2018   • Prediabetes 2018   • Medicare annual wellness visit, subsequent 2018   • Lumbosacral radiculopathy due to intervertebral disc disorder 2018   • Multiple actinic keratoses 2018   • Class 2 severe obesity due to excess calories with serious comorbidity and body mass index (BMI) of 35.0 to 35.9 in adult [facility]) 04/25/2016   • Paroxysmal atrial fibrillation ([address]) 04/14/2016   • Mixed hyperlipidemia 04/14/2016   • Essential hypertension 04/14/2016   • Acquired hypothyroidism 04/14/2016      LOS (days): 4  Geometric Mean LOS (GMLOS) (days): 2.70  Days to GMLOS:-1.3     OBJECTIVE:  Risk of Unplanned Readmission Score: 12      Current admission status: Inpatient   Preferred Pharmacy:   [address] [facility]  [address] [facility] [address]  Phone: [phone] Fax: [phone] for [address] [facility], [address], [address] [facility] [address] [place] [address] [facility] [address]  Phone: [phone] Fax: [phone] [address], [address] [facility] [address] [place] [address],[address]  [place] [address] [facility] [address]  Phone: [phone] Fax: [phone]    [facility] [address] [place], [place] - [address] [facility]  [address] [facility] [address]  Phone: [phone] Fax: [phone]    Primary Care Provider: Naomi Ortiz MD    Primary Insurance: MEDICARE  Secondary Insurance:  FOR LIFE    DISCHARGE DETAILS:    Discharge planning discussed with[de-identified] pt  Freedom of Choice: Yes     CM contacted family/caregiver?: Yes  Were Treatment Team discharge recommendations reviewed with patient/caregiver?: Yes  Did patient/caregiver verbalize understanding of patient care needs?: Yes  Were patient/caregiver advised of the risks associated with not following Treatment Team discharge recommendations?: Yes    [address]         Is the patient interested in [address] at discharge?: No    DME Referral Provided  Referral made for DME?: No    Treatment Team Recommendation: Short Term Rehab  Discharge Destination Plan[de-identified] Short Term Rehab  Transport at Discharge : Wheelchair van  Dispatcher Contacted: Yes  Number/Name of Dispatcher: Jihan wang (Company and Unit #): [email or web address]  ETA of Transport (Date): 07/24/23  ETA of Transport (Time): 1700     Transfer Mode: Stretcher  Accompanied by: EMS personnel     IMM Given (Date):: 07/24/23  IMM Given to[de-identified] Patient  Family notified[de-identified] Reviewed with pt who would like to stay anot her day but understands and is in agreement with d/c. Additional Comments: CM spoke with pt and her family. THey chose to pursue SNF STR and pt chose [facility]. Pt will be transported by Niurka Pal with [employer] at 5:00 pm to [facility]. Report can be called to Phone: [phone]  Fax: [phone].     Accepting Facility Name, [address] [place] [address] : [facility]  Receiving Facility/Agency Phone Number: Phone: [phone]  Facility/Agency Fax Number: Fax: [phone]

## 2023-07-24 NOTE — ASSESSMENT & PLAN NOTE
· Secondary to mechanical fall Roseann Caruso  · X-ray with right lateral malleolus fracture  · Splinted  · Consult Ortho.   Nonsurgical.  · Pain control  · By PT/OT, recommending rehab

## 2023-07-24 NOTE — PROGRESS NOTES
Progress note - [facility]   Anushka Reyes 81 y.o. female MRN: [id number]  Unit/Bed#: -01 Encounter: [id number]    ASSESSMENT and PLAN  81-yoF no known history of liver disease but with history of atrial fibrillation and hypertension and hyperlipidemia presents after falling at home and injuring ankle.  At the time of the fall had started gabapentin the previous night and felt woozy upon awakening.  Denies LOC chest pain or dyspnea.  On admission found to have elevated liver transaminases and elevated bilirubin, primarily indiredt.  Ultrasound unremarkable.  Abdominal exam unremarkable. Elevated LFTs secondary to transient shock liver/hypotension at the time of her fall or adverse reaction to medication are most likely.  Previous LFTs were negative. Viral serologies negative for hepatitis AB or C and CMV and EBV serology consistent with past exposure but not acute infection. .  No sign of chronic underlying fatty liver disease.  Ultrasound unremarkable.   Autoimmune consideration but less likely (AMA neg).     Bilirubin and transaminases finally starting to normalize with bilirubin lagging. • Continue with supportive management  • Continue to trend LFTs  • If LFT's continue to remain elevated can follow up with GI as outpatient    Chief Complaint   Patient presents with   • Fall     Patient presents to the ED via EMS, states mechanical fall at home today, states walker got caught on the rug and she fell, c/o right ankle pain, +head strike on thinners        SUBJECTIVE    Patient feeling okay. Tolerating regular diet. No nausea vomiting or abdominal pain. Had 2 soft BM's  yesterday without bleeding. ROS:   Constitutional: denies fatigue, fever. HEENT: denies visual disturbance, postnasal drip, sore throat. Respiratory: denies cough, shortness of breath. Cardiovascular: denies chest pain, leg swelling. Gastrointestinal: as noted above in HPI.   : denies difficulty urinating, dysuria. Musculoskeletal: denies arthralgias, back pain. + R ankle pain  Neurological: denies dizziness, syncope. Psychiatric: denies confusion, anxiety. Temp:  [98.2 °F (36.8 °C)-98.7 °F (37.1 °C)] 98.7 °F (37.1 °C)  HR:  [67-72] 72  Resp:  [16-17] 16  BP: (131-138)/(61-67) 131/61     PHYSICAL EXAM:    Constitutional: Well-developed, no acute distress  HEENT: normocephalic, mucous membranes moist.  Neck: Supple  Skin: warm and dry  Respiratory: Lungs are clear to auscultation B/L. Cardiovascular: Heart is regular rate and rhythm. Gastrointestinal: obese, Soft, nontender, nondistended with normal active bowel sounds. No masses, guarding, rebound. Rectal Exam: Deferred. Integumentary: Warm and dry  Extremities: RLE splinted, B/L wrist gards  Neurologic: Nonfocal. A & O ×3. Psychiatric: Normal affect.       LABS & STUDIES  Lab Results   Component Value Date    CALCIUM 8.5 07/24/2023    K 3.7 07/24/2023    CO2 28 07/24/2023     07/24/2023    BUN 22 07/24/2023    CREATININE 0.45 (L) 07/24/2023    CREATININE 0.55 (L) 07/23/2023    CREATININE 0.57 (L) 07/22/2023     Lab Results   Component Value Date    WBC 12.37 (H) 07/24/2023    WBC 13.15 (H) 07/22/2023    WBC 13.56 (H) 07/21/2023    HGB 11.3 (L) 07/24/2023    HGB 11.5 07/22/2023    HGB 12.1 07/21/2023    MCV 90 07/24/2023     07/24/2023     07/22/2023     07/21/2023     Lab Results   Component Value Date    ALT 87 (H) 07/23/2023     (H) 07/22/2023     (H) 07/21/2023    AST 94 (H) 07/23/2023     (H) 07/22/2023     (H) 07/21/2023    ALKPHOS 115 (H) 07/23/2023    ALKPHOS 131 (H) 07/22/2023    ALKPHOS 148 (H) 07/21/2023    TBILI 3.23 (H) 07/23/2023    TBILI 3.72 (H) 07/22/2023    TBILI 3.46 (H) 07/21/2023     No results found for: "AMYLASE"  Lab Results   Component Value Date    LIPASE 150 04/13/2016     No results found for: "IRON", "TIBC", "FERRITIN"  Lab Results   Component Value Date    INR 3.69 (H) 07/21/2023       XR ankle 3+ views RIGHT    Result Date: 7/20/2023  Narrative: RIGHT ANKLE INDICATION:   injury. COMPARISON:  None VIEWS:  XR ANKLE 3+ VW RIGHT FINDINGS: There is an acute, obliquely oriented, nondisplaced fracture of the right distal fibula. There is overlying soft tissue swelling. The ankle mortise is symmetric. No significant degenerative changes. No lytic or blastic osseous lesion. Impression: Acute, nondisplaced distal right fibular fracture. No dislocation. Workstation performed: [id number]     US right upper quadrant    Result Date: 7/20/2023  Narrative: RIGHT UPPER QUADRANT ULTRASOUND INDICATION:     Elevated liver enzyme. COMPARISON:  None TECHNIQUE:   Real-time ultrasound of the right upper quadrant was performed with a curvilinear transducer with both volumetric sweeps and still imaging techniques. FINDINGS: PANCREAS:  Visualized portions of the pancreas are within normal limits. AORTA AND IVC:  Visualized portions are normal for patient age. LIVER: Size:  Within normal range. The liver measures 16.4 cm in the midclavicular line. Contour:  Surface contour is smooth. Parenchyma:  Echogenicity and echotexture are within normal limits. No liver mass identified. Limited imaging of the main portal vein shows it to be patent and hepatopetal. BILIARY: Patient has undergone cholecystectomy. No intrahepatic biliary dilatation. CBD measures 3.0 mm. No choledocholithiasis. KIDNEY: Right kidney measures 11.0 x 6.6 x 5.2 cm. Volume 198.1 mL Kidney within normal limits. ASCITES:   None. Impression: Within normal limits postcholecystectomy. Workstation performed: [id number]     TRAUMA - CT spine cervical wo contrast    Result Date: 7/20/2023  Narrative: CT CERVICAL SPINE - WITHOUT CONTRAST INDICATION:   TRAUMA. COMPARISON:  None. TECHNIQUE:  CT examination of the cervical spine was performed without intravenous contrast.  Contiguous axial images were obtained.  Multiplanar 2D reformatted images were created from the source data. Radiation dose length product (DLP) for this visit:  448. 38 mGy-cm . This examination, like all CT scans performed in the [facility], was performed utilizing techniques to minimize radiation dose exposure, including the use of iterative  reconstruction and automated exposure control. IMAGE QUALITY:  Diagnostic. FINDINGS: ALIGNMENT:  Normal alignment of the cervical spine. No subluxation. VERTEBRAE:  No fracture. DEGENERATIVE CHANGES: Moderate multilevel cervical degenerative changes are noted. No critical central canal stenosis. PREVERTEBRAL AND PARASPINAL SOFT TISSUES: Unremarkable THORACIC INLET: Small amount of debris in the visualized esophagus. Impression: No cervical spine fracture or traumatic malalignment. Small amount of debris in the visualized esophagus, suggesting gastroesophageal reflux. Workstation performed: [id number]     TRAUMA - CT head wo contrast    Result Date: 7/20/2023  Narrative: CT BRAIN - WITHOUT CONTRAST INDICATION:   TRAUMA. COMPARISON:  None. TECHNIQUE:  CT examination of the brain was performed. Multiplanar 2D reformatted images were created from the source data. Radiation dose length product (DLP) for this visit:  898.33 mGy-cm . This examination, like all CT scans performed in the [facility], was performed utilizing techniques to minimize radiation dose exposure, including the use of iterative  reconstruction and automated exposure control. IMAGE QUALITY:  Diagnostic. FINDINGS: PARENCHYMA: Decreased attenuation is noted in periventricular and subcortical white matter demonstrating an appearance that is statistically most likely to represent mild microangiopathic change. No CT signs of acute infarction. No intracranial mass, mass effect or midline shift. No acute parenchymal hemorrhage. VENTRICLES AND EXTRA-AXIAL SPACES:  Normal for the patient's age. VISUALIZED ORBITS: Normal visualized orbits. PARANASAL SINUSES: Normal visualized paranasal sinuses. CALVARIUM AND EXTRACRANIAL SOFT TISSUES:  Normal.     Impression: No acute intracranial abnormality. Workstation performed: [id number]     XR Trauma pelvis ap only 1 or 2 vw    Result Date: 7/20/2023  Narrative: PELVIS INDICATION:   TRAUMA. COMPARISON:  None VIEWS:  XR PELVIS AP ONLY 1 OR 2 VW Images: 1 FINDINGS: No acute fracture or hip dislocation. No significant degenerative changes. No lytic or blastic osseous lesion. Soft tissues are unremarkable. There are postoperative changes in the lower lumbar spine with pedicle screws and vertical rods. Hardware appears intact in this single view. Impression: No acute osseous abnormality. Workstation performed: [id number]     XR Trauma chest portable    Result Date: 7/20/2023  Narrative: CHEST INDICATION:   TRAUMA. COMPARISON: 4/13/2016 EXAM PERFORMED/VIEWS:  XR CHEST PORTABLE FINDINGS: Heart shadow appears unremarkable. Atherosclerotic aortic calcifications are noted. The lungs are clear. No pneumothorax or pleural effusion. Osseous structures appear within normal limits for patient age. Impression: No acute cardiopulmonary disease. Workstation performed: [id number]       Patient expressed understanding and had all questions and concerns addressed. Luis Miguel Sifuentes PA-C   07/24/23  11:13 AM    This chart was completed in part utilizing [employer] speech voice recognition software. Random word insertions, pronoun errors, and incomplete sentences are an occasional consequence of this system due to software limitations, and ambient noise. Any questions or concerns about the content, text, or information contained within the body of this dictation should be directly addressed to the provider for clarification.

## 2023-07-24 NOTE — DISCHARGE SUMMARY
[address] [facility]  Discharge- Florian Medinar 1940, 81 y.o. female MRN: [id number]  Unit/Bed#: -Amy Encounter: [id number]  Primary Care Provider: Maureen Hays MD   Date and time admitted to hospital: 7/20/2023 11:47 AM    * Closed right ankle fracture  Assessment & Plan  · Secondary to mechanical fall Keesha Wellington  · X-ray with right lateral malleolus fracture  · Splinted  · Consult Ortho. Nonsurgical.  · Pain control  · By PT/OT, recommending rehab    Asymptomatic bacteriuria  Assessment & Plan  · UA with 10-20 WBCs, innumerable bacteria  · Denies any pain, burning, frequency, urgency  · Will hold off on treatment    Transaminitis  Assessment & Plan  · , , alk phos 171, T. bili 3.58  · Patient with mild scleral icterus. No abdominal pain/tenderness. · RUQ ultrasound with normal liver, surgically absent gallbladder  · Hepatitis panel negative. · LFT trending down, suspect shock liver  · GI consult. CMV and EBV IGG positive but no IGM    Hyponatremia  Assessment & Plan  · Na+ 125, 126 after glucose correction. Likely contributed to her fall  · Unclear etiology, patient is on HCTZ and reports drinking 6-7 16oz water bottles per day. Adequate solute intake. · Give IVF overnight  · Nephrology consult  · Initially improved to 131 with IVF and holding diuretics-stabilized at 131.   Started on torsemide 5 mg and salt tablets by nephrology on 7/23  · Continue to trend BMP daily  · Lipid panel and SPEP ordered given normal serum osm    Bilateral carpal tunnel syndrome  Assessment & Plan  · With significant pain, was prescribed gabapentin and Norco by PCP  · Continue gabapentin  · Oxycodone PRN (do not give within 1 hour of gabapentin)    Prediabetes  Assessment & Plan  · History of prediabetes  · Glucose 170  · A1c 7.0  · Diabetic diet - has not required insulin while inpatient    Acquired hypothyroidism  Assessment & Plan  · Continue levothyroxine 175 mcg daily    Essential hypertension  Assessment & Plan  · On Diovan, sotalol, HCTZ, Cardizem  · Hold HCTZ, continue rest of antihypertensives (Diovan switched to formulary losartan while admitted)    Mixed hyperlipidemia  Assessment & Plan  · Hold atorvastatin in setting of elevated LFTs    Paroxysmal atrial fibrillation (HCC)  Assessment & Plan  · Rate/rhythm control: Sotalol 80mg, cardizem 360mg  · Anticoagulation: Xarelto    Hypokalemia-resolved as of 7/24/2023  Assessment & Plan  · K 2.7  · ECG -normal sinus rhythm with diffuse flattened T waves, QTc 513 ms  · Resolved    Medical Problems     Resolved Problems  Date Reviewed: 7/24/2023          Resolved    Hypokalemia 7/24/2023     Resolved by  Eber Skiff, PA-C        Discharging Physician / Practitioner: Eber Skiff, PA-C  PCP: Bishop Chung MD  Admission Date:   Admission Orders (From admission, onward)     Ordered        07/20/23 [address]  Once                      Discharge Date: 07/24/23    Consultations During Hospital Stay:  · Gastroenterology  · Nephrology    Procedures Performed:   · None    Significant Findings / Test Results:   · CT head 7/20: No acute intracranial abnormality  · CT cervical spine 7/20: No cervical spine fracture or traumatic malalignment. Small amount of debris in the visualized esophagus, suggesting gastroesophageal reflux. · CXR 7/20: No actue cardiopulmonary disease  · Xray pelvis 7/20: No acute osseous abnormality. · Xray ankle 7/20: Acute, nondisplaced distal right fibular fracture. No dislocation. · RUQ US 7/20: Within normal limits postcholecystectomy.     Incidental Findings:   · None     Test Results Pending at Discharge (will require follow up):   · SPEP  · Serum light chain ratio     Outpatient Tests Requested:  · Repeat LFTs    Complications:  None    Reason for Admission: Right ankle fracture    Hospital Course:   Cresencio Singh is a 81 y.o. female patient with a past medical history of paroxysmal A-fib, hypertension, hyperlipidemia, acquired hypothyroidism, carpal tunnel/radiculopathy, insomnia who originally presented to the hospital on 7/20/2023 due to fall. Patient reported that her walker wheel got caught on a rug, lost her balance. May have taken additional gabapentin on morning of presentation. Patient is also been taking higher doses of Tylenol as she is unable to take NSAIDs. She did have elevated LFTs on presentation. Gastroenterology consulted. Tolerating diet per GI, with improvement in LFTs. Suspected the elevated LFTs secondary to transient shock liver/hypotension at time of fall or adverse reaction to medication. Natremia on admission suspected multifactorial with possible SIADH in the setting of SSRI use, HCTZ use, pain. Sodium improved to 133 by time of discharge. Continue fluid restriction and salt tabs on discharge. Follow-up outpatient. Hemodynamically stable at time of discharge and appropriate for discharge to rehab facility. Please see above list of diagnoses and related plan for additional information. Condition at Discharge: stable    Discharge Day Visit / Exam:   Subjective: Patient overall doing relatively well, 5 out of 10 pain. Tolerating diet without significant abdominal discomfort. Vitals: Blood Pressure: 131/62 (07/24/23 1411)  Pulse: 63 (07/24/23 1411)  Temperature: 98.4 °F (36.9 °C) (07/24/23 1411)  Temp Source: Oral (07/24/23 0734)  Respirations: 17 (07/24/23 1411)  Height: 5' 4" (162.6 cm) (07/20/23 1147)  Weight - Scale: 93 kg (205 lb) (07/20/23 1147)  SpO2: 93 % (07/24/23 1411)  Exam:   Physical Exam  Vitals and nursing note reviewed. Constitutional:       General: She is not in acute distress. Appearance: Normal appearance. She is well-developed. HENT:      Head: Normocephalic and atraumatic. Eyes:      General: No scleral icterus.      Conjunctiva/sclera: Conjunctivae normal.   Cardiovascular:      Rate and Rhythm: Normal rate and regular rhythm. Heart sounds: No murmur heard. Pulmonary:      Effort: Pulmonary effort is normal.      Breath sounds: No wheezing, rhonchi or rales. Abdominal:      General: There is no distension. Palpations: Abdomen is soft. Feet:      Comments: R foot immobilized, clean, dry, intact  Skin:     General: Skin is warm and dry. Neurological:      General: No focal deficit present. Mental Status: She is alert. Psychiatric:         Mood and Affect: Mood normal.        Discussion with Family: Patient declined call to . Discharge instructions/Information to patient and family:   See after visit summary for information provided to patient and family. Provisions for Follow-Up Care:  See after visit summary for information related to follow-up care and any pertinent home health orders. Disposition:   Other [address] Readmission: None     Discharge Statement:  I spent 65 minutes discharging the patient. This time was spent on the day of discharge. I had direct contact with the patient on the day of discharge. Greater than 50% of the total time was spent examining patient, answering all patient questions, arranging and discussing plan of care with patient as well as directly providing post-discharge instructions. Additional time then spent on discharge activities. Discharge Medications:  See after visit summary for reconciled discharge medications provided to patient and/or family.       **Please Note: This note may have been constructed using a voice recognition system**

## 2023-07-24 NOTE — ASSESSMENT & PLAN NOTE
· History of prediabetes  · Glucose 170  · A1c 7.0  · Diabetic diet - has not required insulin while inpatient

## 2023-07-25 LAB
ALBUMIN SERPL ELPH-MCNC: 2.26 G/DL (ref 3.2–5.1)
ALBUMIN SERPL ELPH-MCNC: 34.7 % (ref 48–70)
ALPHA1 GLOB SERPL ELPH-MCNC: 0.4 G/DL (ref 0.15–0.47)
ALPHA1 GLOB SERPL ELPH-MCNC: 6.2 % (ref 1.8–7)
ALPHA2 GLOB SERPL ELPH-MCNC: 0.85 G/DL (ref 0.42–1.04)
ALPHA2 GLOB SERPL ELPH-MCNC: 13.1 % (ref 5.9–14.9)
BETA GLOB ABNORMAL SERPL ELPH-MCNC: 0.38 G/DL (ref 0.31–0.57)
BETA1 GLOB SERPL ELPH-MCNC: 5.8 % (ref 4.7–7.7)
BETA2 GLOB SERPL ELPH-MCNC: 6.4 % (ref 3.1–7.9)
BETA2+GAMMA GLOB SERPL ELPH-MCNC: 0.42 G/DL (ref 0.2–0.58)
GAMMA GLOB ABNORMAL SERPL ELPH-MCNC: 2.2 G/DL (ref 0.4–1.66)
GAMMA GLOB SERPL ELPH-MCNC: 33.8 % (ref 6.9–22.3)
IGG/ALB SER: 0.53 {RATIO} (ref 1.1–1.8)
INTERPRETATION UR IFE-IMP: NORMAL
M PEAK ID 2: 10 %
M PROTEIN 1 MFR SERPL ELPH: 10.9 %
M PROTEIN 1 SERPL ELPH-MCNC: 0.71 G/DL
M PROTEIN 2 SERPL ELPH-MCNC: 0.65 G/DL
PROT PATTERN SERPL ELPH-IMP: ABNORMAL
PROT SERPL-MCNC: 6.5 G/DL (ref 6.4–8.2)

## 2023-07-25 PROCEDURE — 86334 IMMUNOFIX E-PHORESIS SERUM: CPT | Performed by: PATHOLOGY

## 2023-07-25 PROCEDURE — 84165 PROTEIN E-PHORESIS SERUM: CPT | Performed by: PATHOLOGY

## 2023-07-27 ENCOUNTER — PREP FOR PROCEDURE (OUTPATIENT)
Dept: OBGYN CLINIC | Facility: CLINIC | Age: 83
End: 2023-07-27

## 2023-07-27 ENCOUNTER — OFFICE VISIT (OUTPATIENT)
Dept: OBGYN CLINIC | Facility: CLINIC | Age: 83
End: 2023-07-27
Payer: MEDICARE

## 2023-07-27 VITALS — SYSTOLIC BLOOD PRESSURE: 118 MMHG | DIASTOLIC BLOOD PRESSURE: 80 MMHG

## 2023-07-27 DIAGNOSIS — S82.831A NONDISPLACED FRACTURE OF DISTAL END OF RIGHT FIBULA: Primary | ICD-10-CM

## 2023-07-27 DIAGNOSIS — G56.03 BILATERAL CARPAL TUNNEL SYNDROME: ICD-10-CM

## 2023-07-27 PROCEDURE — 29405 APPL SHORT LEG CAST: CPT | Performed by: ORTHOPAEDIC SURGERY

## 2023-07-27 PROCEDURE — 99214 OFFICE O/P EST MOD 30 MIN: CPT | Performed by: ORTHOPAEDIC SURGERY

## 2023-07-27 RX ORDER — LIDOCAINE HYDROCHLORIDE 10 MG/ML
10 INJECTION, SOLUTION EPIDURAL; INFILTRATION; INTRACAUDAL; PERINEURAL ONCE
Status: CANCELLED | OUTPATIENT
Start: 2023-07-27

## 2023-07-27 RX ORDER — CHLORHEXIDINE GLUCONATE 4 G/100ML
SOLUTION TOPICAL DAILY PRN
Status: CANCELLED | OUTPATIENT
Start: 2023-07-27

## 2023-07-27 RX ORDER — BUPIVACAINE HYDROCHLORIDE 5 MG/ML
20 INJECTION, SOLUTION EPIDURAL; INTRACAUDAL ONCE
Status: CANCELLED | OUTPATIENT
Start: 2023-07-27 | End: 2023-07-27

## 2023-07-27 RX ORDER — LIDOCAINE HYDROCHLORIDE 10 MG/ML
10 INJECTION, SOLUTION EPIDURAL; INFILTRATION; INTRACAUDAL; PERINEURAL ONCE
Status: CANCELLED | OUTPATIENT
Start: 2023-07-27 | End: 2023-07-27

## 2023-07-27 NOTE — PROGRESS NOTES
Assessment:     1. Nondisplaced fracture of distal end of right fibula    2. Bilateral carpal tunnel syndrome        Plan:     Problem List Items Addressed This Visit        Nervous and Auditory    Bilateral carpal tunnel syndrome    Relevant Orders    Case request operating room: RELEASE CARPAL TUNNEL, BILATERAL OPEN (Completed)       Musculoskeletal and Integument    Nondisplaced fracture of distal end of right fibula - Primary    Relevant Orders    Durable Medical Equipment    Cast application       Findings consistent with right nondisplaced distal fibula fracture sustained 7/20/23. Imaging and prognosis reviewed with patient. She was placed in short leg cast today, non weight bearing. She will be in cast for 6 weeks. Fracture will take 6-12 weeks to heal. Elevate and ice to control swelling, help with pain. OTC anti inflammatories if tolerable. See patient back in 6 weeks with cast off and imaging. Patient will also be consented for bilateral carpal tunnel release to be done under local. Rehab facility will have to make arrangements for scheduling and transportation. Same day surgery. See patient post operatively. All patient's questions were answered to her satisfaction. This note is created using dictation transcription. It may contain typographical errors, grammatical errors, improperly dictated words, background noise and other errors. Discussed with patient surgical risks and complications including but not limited to infection, persistent pain, nerve and vessel injury, complications associated with anesthesia, DVT, exposure to COVID virus, etc.  Patient understands the risks and complication and consented to the surgery. Subjective:     Patient ID: Hima Barkley is a 81 y.o. female. Chief Complaint:   81 yr old female in for evaluation of right ankle fracture. Patient reported that her walker wheel got caught on a rug, lost her balance and fell on 7/20/23.  She was seen in ED day of injury and imaging showed nondisplaced distal fibula fracture. She presents on stretcher from rehab facility-[facility]. Splint intact right ankle/foot and pain is well controlled. She is able to move her toes, sensation intact. She also has bilateral carpal tunnel diagnosed at previous visits. She is wearing bilateral wrist splints. She would like to discuss scheduling carpal tunnel release in near future.      Allergy:  Allergies   Allergen Reactions   • Pneumovax [Pneumococcal Polysaccharide Vaccine] Hives   • Medical Tape    • Nuts - Food Allergy    • Omnipaque [Iohexol] Hives   • Other Hives     Pine nuts   • Penicillins Hives   • Sulfa Antibiotics Hives   • Sulfur Other (See Comments)   • Iodine - Food Allergy Rash   • Latex Rash and Other (See Comments)     Medications:  all current active meds have been reviewed  Past Medical History:  Past Medical History:   Diagnosis Date   • Anxiety    • Carpal tunnel syndrome, bilateral    • Cholecystitis    • Chronic calculous cholecystitis     last assessed 4/25/16   • History of cardioversion    • History of radiofrequency ablation procedure for cardiac arrhythmia    • Hyperlipidemia    • Hypertension    • Hypokalemia 7/20/2023   • Sleep apnea     no trouble since Afib corrected     Past Surgical History:  Past Surgical History:   Procedure Laterality Date   • APPENDECTOMY     • BACK SURGERY  11/15/2018    L4-5 Screws and rods   • BLADDER SUSPENSION     • CARDIOVERSION      atrial - onset 2015 - x5 in 2014 and 2015   • CHOLECYSTECTOMY      April 2016   • HYSTERECTOMY     • KNEE ARTHROSCOPY W/ MENISCAL REPAIR Bilateral     therapeutic - last assessed 4/14/16   • KNEE SURGERY     • RI LAPAROSCOPY SURG CHOLECYSTECTOMY N/A 4/26/2016    Procedure: CHOLECYSTECTOMY LAPAROSCOPIC;  Surgeon: Talat Walker MD;  Location: [facility];  Service: General   • TONSILLECTOMY     • WISDOM TOOTH EXTRACTION       Family History:  Family History   Problem Relation Age of Onset   • Cancer Mother         ovarian   • Heart disease Father         cardiac disorder   • Cancer Father    • Heart disease Brother    • Heart disease Paternal Aunt    • Heart disease Paternal Uncle      Social History:  Social History     Substance and Sexual Activity   Alcohol Use Yes    Comment: social; occasional     Social History     Substance and Sexual Activity   Drug Use No     Social History     Tobacco Use   Smoking Status Never   Smokeless Tobacco Never     Review of Systems   Constitutional: Negative for chills and fever. HENT: Negative for ear pain and sore throat. Eyes: Negative for pain and visual disturbance. Respiratory: Negative for cough and shortness of breath. Cardiovascular: Negative for chest pain and palpitations. Gastrointestinal: Negative for abdominal pain and vomiting. Genitourinary: Negative for dysuria and hematuria. Musculoskeletal: Positive for arthralgias (right ankle), gait problem (Not able to weight-bear on the right leg) and joint swelling (Right ankle). Negative for back pain. Skin: Negative for color change and rash. Neurological: Positive for numbness (Bilateral thumb, index, and and ring fingers). Negative for seizures and syncope. Psychiatric/Behavioral: Negative. All other systems reviewed and are negative. Objective:  BP Readings from Last 1 Encounters:   07/27/23 118/80      Wt Readings from Last 1 Encounters:   07/20/23 93 kg (205 lb)      BMI:   Estimated body mass index is 35.19 kg/m² as calculated from the following:    Height as of 7/20/23: 5' 4" (1.626 m). Weight as of 7/20/23: 93 kg (205 lb). BSA:   Estimated body surface area is 1.98 meters squared as calculated from the following:    Height as of 7/20/23: 5' 4" (1.626 m). Weight as of 7/20/23: 93 kg (205 lb). Physical Exam  Vitals and nursing note reviewed. Constitutional:       Appearance: Normal appearance. She is well-developed.    HENT:      Head: Normocephalic and atraumatic. Right Ear: External ear normal.      Left Ear: External ear normal.   Eyes:      Extraocular Movements: Extraocular movements intact. Conjunctiva/sclera: Conjunctivae normal.      Pupils: Pupils are equal, round, and reactive to light. Cardiovascular:      Rate and Rhythm: Regular rhythm. Heart sounds: Normal heart sounds. No murmur heard. No gallop. Pulmonary:      Effort: Pulmonary effort is normal.      Breath sounds: Normal breath sounds. Abdominal:      Palpations: Abdomen is soft. Musculoskeletal:         General: Tenderness (right ankle arthralgia ) and signs of injury (distal fibula fracture) present. Cervical back: Normal range of motion and neck supple. Right knee: No effusion. Skin:     General: Skin is warm and dry. Neurological:      Mental Status: She is alert and oriented to person, place, and time. Deep Tendon Reflexes: Reflexes are normal and symmetric. Psychiatric:         Mood and Affect: Mood normal.         Behavior: Behavior normal.       Right Ankle Exam     Tenderness   The patient is experiencing tenderness in the lateral malleolus. Swelling: mild (lateral ankle and foot)    Range of Motion   Dorsiflexion: abnormal   Plantar flexion: abnormal   Eversion: abnormal   Inversion: abnormal     Other   Erythema: absent  Scars: absent  Sensation: normal  Pulse: present     Comments:  Swelling and ecchymosis lateral aspect of ankle into lateral foot  Able to move all toes, sensation intact  Range of motion and strength not tested due to fracture       Right Knee Exam     Other   Effusion: no effusion present      Right Hand Exam     Tenderness   The patient is experiencing no tenderness. Range of Motion   The patient has normal right wrist ROM.      Muscle Strength   Wrist extension: 5/5   Wrist flexion: 5/5   : 4/5     Tests   Phalen’s sign: positive  Tinel's sign (median nerve): positive  Finkelstein's test: negative    Other Erythema: absent  Scars: absent  Sensation: normal  Pulse: present      Left Hand Exam     Tenderness   The patient is experiencing no tenderness. Range of Motion   The patient has normal left wrist ROM. Muscle Strength   Wrist extension: 5/5   Wrist flexion: 5/5   :  4/5     Tests   Phalen’s sign: positive  Tinel's sign (median nerve): positive  Finkelstein's test: negative    Other   Erythema: absent  Scars: absent  Sensation: normal  Pulse: present            I have personally reviewed pertinent films in PACS and my interpretation is xr right ankle demonstrates nondisplaced distal fibula fracture without mortise widening. Cast application    Date/Time: 7/27/2023 3:45 PM    Performed by: Paramjit Emerson MD  Authorized by: Paramjit Emerson MD  Universal Protocol:  Consent: Verbal consent obtained.   Risks and benefits: risks, benefits and alternatives were discussed  Consent given by: patient  Patient understanding: patient states understanding of the procedure being performed  Patient identity confirmed: verbally with patient      Pre-procedure details:     Sensation:  Normal  Procedure details:     Laterality:  Right    Location:  Ankle    Ankle:  R ankle    Strapping: no  Cast type:  Short leg      Supplies:  Fiberglass and cotton padding      Scribe Attestation    I,:  Beatriz Soliz am acting as a scribe while in the presence of the attending physician.:       I,:  Paramjit Emerson MD personally performed the services described in this documentation    as scribed in my presence.:

## 2023-07-27 NOTE — H&P (VIEW-ONLY)
Assessment:     1. Nondisplaced fracture of distal end of right fibula    2. Bilateral carpal tunnel syndrome        Plan:     Problem List Items Addressed This Visit        Nervous and Auditory    Bilateral carpal tunnel syndrome    Relevant Orders    Case request operating room: RELEASE CARPAL TUNNEL, BILATERAL OPEN (Completed)       Musculoskeletal and Integument    Nondisplaced fracture of distal end of right fibula - Primary    Relevant Orders    Durable Medical Equipment    Cast application       Findings consistent with right nondisplaced distal fibula fracture sustained 7/20/23. Imaging and prognosis reviewed with patient. She was placed in short leg cast today, non weight bearing. She will be in cast for 6 weeks. Fracture will take 6-12 weeks to heal. Elevate and ice to control swelling, help with pain. OTC anti inflammatories if tolerable. See patient back in 6 weeks with cast off and imaging. Patient will also be consented for bilateral carpal tunnel release to be done under local. Rehab facility will have to make arrangements for scheduling and transportation. Same day surgery. See patient post operatively. All patient's questions were answered to her satisfaction. This note is created using dictation transcription. It may contain typographical errors, grammatical errors, improperly dictated words, background noise and other errors. Discussed with patient surgical risks and complications including but not limited to infection, persistent pain, nerve and vessel injury, complications associated with anesthesia, DVT, exposure to COVID virus, etc.  Patient understands the risks and complication and consented to the surgery. Subjective:     Patient ID: Juan Angeles is a 81 y.o. female. Chief Complaint:   81 yr old female in for evaluation of right ankle fracture. Patient reported that her walker wheel got caught on a rug, lost her balance and fell on 7/20/23.  She was seen in ED day of injury and imaging showed nondisplaced distal fibula fracture. She presents on stretcher from rehab facility-[facility]. Splint intact right ankle/foot and pain is well controlled. She is able to move her toes, sensation intact. She also has bilateral carpal tunnel diagnosed at previous visits. She is wearing bilateral wrist splints. She would like to discuss scheduling carpal tunnel release in near future.      Allergy:  Allergies   Allergen Reactions   • Pneumovax [Pneumococcal Polysaccharide Vaccine] Hives   • Medical Tape    • Nuts - Food Allergy    • Omnipaque [Iohexol] Hives   • Other Hives     Pine nuts   • Penicillins Hives   • Sulfa Antibiotics Hives   • Sulfur Other (See Comments)   • Iodine - Food Allergy Rash   • Latex Rash and Other (See Comments)     Medications:  all current active meds have been reviewed  Past Medical History:  Past Medical History:   Diagnosis Date   • Anxiety    • Carpal tunnel syndrome, bilateral    • Cholecystitis    • Chronic calculous cholecystitis     last assessed 4/25/16   • History of cardioversion    • History of radiofrequency ablation procedure for cardiac arrhythmia    • Hyperlipidemia    • Hypertension    • Hypokalemia 7/20/2023   • Sleep apnea     no trouble since Afib corrected     Past Surgical History:  Past Surgical History:   Procedure Laterality Date   • APPENDECTOMY     • BACK SURGERY  11/15/2018    L4-5 Screws and rods   • BLADDER SUSPENSION     • CARDIOVERSION      atrial - onset 2015 - x5 in 2014 and 2015   • CHOLECYSTECTOMY      April 2016   • HYSTERECTOMY     • KNEE ARTHROSCOPY W/ MENISCAL REPAIR Bilateral     therapeutic - last assessed 4/14/16   • KNEE SURGERY     • DE LAPAROSCOPY SURG CHOLECYSTECTOMY N/A 4/26/2016    Procedure: CHOLECYSTECTOMY LAPAROSCOPIC;  Surgeon: Guerda Alva MD;  Location: [facility];  Service: General   • TONSILLECTOMY     • WISDOM TOOTH EXTRACTION       Family History:  Family History   Problem Relation Age of Onset   • Cancer Mother         ovarian   • Heart disease Father         cardiac disorder   • Cancer Father    • Heart disease Brother    • Heart disease Paternal Aunt    • Heart disease Paternal Uncle      Social History:  Social History     Substance and Sexual Activity   Alcohol Use Yes    Comment: social; occasional     Social History     Substance and Sexual Activity   Drug Use No     Social History     Tobacco Use   Smoking Status Never   Smokeless Tobacco Never     Review of Systems   Constitutional: Negative for chills and fever. HENT: Negative for ear pain and sore throat. Eyes: Negative for pain and visual disturbance. Respiratory: Negative for cough and shortness of breath. Cardiovascular: Negative for chest pain and palpitations. Gastrointestinal: Negative for abdominal pain and vomiting. Genitourinary: Negative for dysuria and hematuria. Musculoskeletal: Positive for arthralgias (right ankle), gait problem (Not able to weight-bear on the right leg) and joint swelling (Right ankle). Negative for back pain. Skin: Negative for color change and rash. Neurological: Positive for numbness (Bilateral thumb, index, and and ring fingers). Negative for seizures and syncope. Psychiatric/Behavioral: Negative. All other systems reviewed and are negative. Objective:  BP Readings from Last 1 Encounters:   07/27/23 118/80      Wt Readings from Last 1 Encounters:   07/20/23 93 kg (205 lb)      BMI:   Estimated body mass index is 35.19 kg/m² as calculated from the following:    Height as of 7/20/23: 5' 4" (1.626 m). Weight as of 7/20/23: 93 kg (205 lb). BSA:   Estimated body surface area is 1.98 meters squared as calculated from the following:    Height as of 7/20/23: 5' 4" (1.626 m). Weight as of 7/20/23: 93 kg (205 lb). Physical Exam  Vitals and nursing note reviewed. Constitutional:       Appearance: Normal appearance. She is well-developed.    HENT:      Head: Normocephalic and atraumatic. Right Ear: External ear normal.      Left Ear: External ear normal.   Eyes:      Extraocular Movements: Extraocular movements intact. Conjunctiva/sclera: Conjunctivae normal.      Pupils: Pupils are equal, round, and reactive to light. Cardiovascular:      Rate and Rhythm: Regular rhythm. Heart sounds: Normal heart sounds. No murmur heard. No gallop. Pulmonary:      Effort: Pulmonary effort is normal.      Breath sounds: Normal breath sounds. Abdominal:      Palpations: Abdomen is soft. Musculoskeletal:         General: Tenderness (right ankle arthralgia ) and signs of injury (distal fibula fracture) present. Cervical back: Normal range of motion and neck supple. Right knee: No effusion. Skin:     General: Skin is warm and dry. Neurological:      Mental Status: She is alert and oriented to person, place, and time. Deep Tendon Reflexes: Reflexes are normal and symmetric. Psychiatric:         Mood and Affect: Mood normal.         Behavior: Behavior normal.       Right Ankle Exam     Tenderness   The patient is experiencing tenderness in the lateral malleolus. Swelling: mild (lateral ankle and foot)    Range of Motion   Dorsiflexion: abnormal   Plantar flexion: abnormal   Eversion: abnormal   Inversion: abnormal     Other   Erythema: absent  Scars: absent  Sensation: normal  Pulse: present     Comments:  Swelling and ecchymosis lateral aspect of ankle into lateral foot  Able to move all toes, sensation intact  Range of motion and strength not tested due to fracture       Right Knee Exam     Other   Effusion: no effusion present      Right Hand Exam     Tenderness   The patient is experiencing no tenderness. Range of Motion   The patient has normal right wrist ROM.      Muscle Strength   Wrist extension: 5/5   Wrist flexion: 5/5   : 4/5     Tests   Phalen’s sign: positive  Tinel's sign (median nerve): positive  Finkelstein's test: negative    Other Erythema: absent  Scars: absent  Sensation: normal  Pulse: present      Left Hand Exam     Tenderness   The patient is experiencing no tenderness. Range of Motion   The patient has normal left wrist ROM. Muscle Strength   Wrist extension: 5/5   Wrist flexion: 5/5   :  4/5     Tests   Phalen’s sign: positive  Tinel's sign (median nerve): positive  Finkelstein's test: negative    Other   Erythema: absent  Scars: absent  Sensation: normal  Pulse: present            I have personally reviewed pertinent films in PACS and my interpretation is xr right ankle demonstrates nondisplaced distal fibula fracture without mortise widening. Cast application    Date/Time: 7/27/2023 3:45 PM    Performed by: Frederick Yin MD  Authorized by: Frederick Yin MD  Universal Protocol:  Consent: Verbal consent obtained.   Risks and benefits: risks, benefits and alternatives were discussed  Consent given by: patient  Patient understanding: patient states understanding of the procedure being performed  Patient identity confirmed: verbally with patient      Pre-procedure details:     Sensation:  Normal  Procedure details:     Laterality:  Right    Location:  Ankle    Ankle:  R ankle    Strapping: no  Cast type:  Short leg      Supplies:  Fiberglass and cotton padding      Scribe Attestation    I,:  Av Mcfarland am acting as a scribe while in the presence of the attending physician.:       I,:  Frederick Yin MD personally performed the services described in this documentation    as scribed in my presence.:

## 2023-07-28 ENCOUNTER — TELEPHONE (OUTPATIENT)
Dept: OBGYN CLINIC | Facility: HOSPITAL | Age: 83
End: 2023-07-28

## 2023-07-28 NOTE — TELEPHONE ENCOUNTER
Caller: Jessica Greenwood    Doctor: Darrell Valdes    Reason for call: the facility was calling for information about the pt's sx on where it's going to take place.     Call back#:

## 2023-07-30 VITALS
TEMPERATURE: 98.4 F | DIASTOLIC BLOOD PRESSURE: 62 MMHG | OXYGEN SATURATION: 93 % | SYSTOLIC BLOOD PRESSURE: 131 MMHG | WEIGHT: 205 LBS | HEART RATE: 63 BPM | RESPIRATION RATE: 17 BRPM | BODY MASS INDEX: 35 KG/M2 | HEIGHT: 64 IN

## 2023-07-31 ENCOUNTER — HOSPITAL ENCOUNTER (OUTPATIENT)
Facility: HOSPITAL | Age: 83
Setting detail: OUTPATIENT SURGERY
Discharge: HOME | End: 2023-07-31
Attending: ORTHOPAEDIC SURGERY | Admitting: ORTHOPAEDIC SURGERY
Payer: MEDICARE

## 2023-07-31 VITALS
DIASTOLIC BLOOD PRESSURE: 77 MMHG | OXYGEN SATURATION: 97 % | HEART RATE: 54 BPM | TEMPERATURE: 98.5 F | SYSTOLIC BLOOD PRESSURE: 168 MMHG | RESPIRATION RATE: 18 BRPM

## 2023-07-31 DIAGNOSIS — G56.03 BILATERAL CARPAL TUNNEL SYNDROME: Primary | ICD-10-CM

## 2023-07-31 PROCEDURE — 64721 CARPAL TUNNEL SURGERY: CPT | Performed by: PHYSICIAN ASSISTANT

## 2023-07-31 PROCEDURE — 64721 CARPAL TUNNEL SURGERY: CPT | Performed by: ORTHOPAEDIC SURGERY

## 2023-07-31 RX ORDER — MAGNESIUM HYDROXIDE 1200 MG/15ML
LIQUID ORAL AS NEEDED
Status: DISCONTINUED | OUTPATIENT
Start: 2023-07-31 | End: 2023-07-31 | Stop reason: HOSPADM

## 2023-07-31 RX ORDER — LIDOCAINE HYDROCHLORIDE 10 MG/ML
10 INJECTION, SOLUTION EPIDURAL; INFILTRATION; INTRACAUDAL; PERINEURAL ONCE
Status: COMPLETED | OUTPATIENT
Start: 2023-07-31 | End: 2023-07-31

## 2023-07-31 RX ORDER — CHLORHEXIDINE GLUCONATE 4 G/100ML
SOLUTION TOPICAL DAILY PRN
Status: DISCONTINUED | OUTPATIENT
Start: 2023-07-31 | End: 2023-07-31 | Stop reason: HOSPADM

## 2023-07-31 RX ORDER — BUPIVACAINE HYDROCHLORIDE 5 MG/ML
20 INJECTION, SOLUTION EPIDURAL; INTRACAUDAL ONCE
Status: COMPLETED | OUTPATIENT
Start: 2023-07-31 | End: 2023-07-31

## 2023-07-31 RX ORDER — ACETAMINOPHEN AND CODEINE PHOSPHATE 300; 30 MG/1; MG/1
1 TABLET ORAL EVERY 6 HOURS PRN
Qty: 10 TABLET | Refills: 0 | Status: SHIPPED | OUTPATIENT
Start: 2023-07-31 | End: 2023-08-10

## 2023-07-31 RX ORDER — LIDOCAINE HYDROCHLORIDE 10 MG/ML
INJECTION, SOLUTION EPIDURAL; INFILTRATION; INTRACAUDAL; PERINEURAL AS NEEDED
Status: DISCONTINUED | OUTPATIENT
Start: 2023-07-31 | End: 2023-07-31 | Stop reason: HOSPADM

## 2023-07-31 RX ORDER — ACETAMINOPHEN 325 MG/1
650 TABLET ORAL EVERY 6 HOURS PRN
Status: CANCELLED | OUTPATIENT
Start: 2023-07-31

## 2023-07-31 RX ADMIN — LIDOCAINE HYDROCHLORIDE 5 ML: 10 INJECTION, SOLUTION EPIDURAL; INFILTRATION; INTRACAUDAL; PERINEURAL at 10:45

## 2023-07-31 RX ADMIN — LIDOCAINE HYDROCHLORIDE 5 ML: 10 INJECTION, SOLUTION EPIDURAL; INFILTRATION; INTRACAUDAL; PERINEURAL at 10:47

## 2023-07-31 RX ADMIN — BUPIVACAINE HYDROCHLORIDE 5 ML: 5 INJECTION, SOLUTION EPIDURAL; INTRACAUDAL; PERINEURAL at 10:45

## 2023-07-31 NOTE — OP NOTE
OPERATIVE REPORT  PATIENT NAME: Yumiko Baca    :  1940  MRN: [id number]  Pt Location:  OR ROOM 02    SURGERY DATE: 2023    Surgeon(s) and Role:     * Yehuda Decker MD - Primary     * Juan Pablo Mccracken PA-C - Assisting    Preop Diagnosis:  Bilateral carpal tunnel syndrome [G56.03]    Post-Op Diagnosis Codes:     * Bilateral carpal tunnel syndrome [G56.03]    Procedure(s):  Bilateral - RELEASE CARPAL TUNNEL. BILATERAL OPEN    Specimen(s):  * No specimens in log *    Estimated Blood Loss:   Minimal    Drains:  * No LDAs found *    Anesthesia Type:   Local    Operative Indications:  Bilateral carpal tunnel syndrome [G56.03]    Indications: Yumiko Baca is a 81y.o. years old female diagnosed with bilaterally carpal tunnel syndrome. Patient failed conservative treatments and elected to proceed with surgical intervention. The risks and complications are discussed with the patient. The patient consented to the procedure. Operating findings: Local anesthesia was administrated without any difficulty in holding area. 10 cc of mixture of lidocaine 1% and Marcaine 0.5% was used. Extra 4 cc lidocaine 1% used in left wrist intra-op. Patient was brought into the OR properly identified. A time out was called and the bilaterally was identified as the operative site. The bilaterally forearm and hand was prepped and draped in a sterile fashion. An Esmarch was used as a tourniquet in the forearm. A longitudinal incision was made distal to the distal wrist crest over the palm. Dissection was carried down the carpal tunnel ligament. The carpal tunnel ligament was divided with knife. Care was make sure to prevent injury to the median nerve and flexor tendons. The carpal tunnel ligament was divide more proximal with a carpal tunnel knife. Inspection of the carpal tunnel showed that there were no soft tissue lesion. The motor branch of the median nerve was identified and appears to be intact.  Patient appears to have severe compression of the nerve left and moderate right. The wound was irrigated and closed with 4.0 nylon in a vertical mattress interrupted fashion. Sterile bulky dressing was applied. The Esmarch was removed. Patient appears to have good capillary refill in the finger. Patient was then transferred to the recovery room for post-op care. Family was contacted. There was no qualified resident available to assist.    Mrs. Zofia Zaidi was required in the OR in assist in the soft tissue retraction and incision closure.     Complications:   None    Patient Disposition:  PACU     SIGNATURE: Paramjit Emerson MD  DATE: July 31, 2023  TIME: 12:07 PM

## 2023-07-31 NOTE — DISCHARGE INSTR - AVS FIRST PAGE
-Leave dressings on and dry until follow-up  -Gentle range of motion of the fingers is encouraged. Work on making a complete fist, straightening and stretching the fingers out as far as possible every 1-2 hours. You may do this by using the muscles themselves, or even using the other hand to assist in the stretching.  -Elevate the hand and ice it for pain and swelling  -You should use over-the-counter pain medications such as Tylenol, Ibuprofen, Advil, and Aleve for pain control. This should give you good pain control. If this is not enough you may add the prescribed pain medication.  -Avoid any heavy lifting, pushing, or pulling for the 1st 3-4 weeks after surgery until the incision is completely healed.

## 2023-08-10 ENCOUNTER — OFFICE VISIT (OUTPATIENT)
Dept: OBGYN CLINIC | Facility: CLINIC | Age: 83
End: 2023-08-10

## 2023-08-10 VITALS — SYSTOLIC BLOOD PRESSURE: 120 MMHG | DIASTOLIC BLOOD PRESSURE: 80 MMHG

## 2023-08-10 DIAGNOSIS — Z47.89 AFTERCARE FOLLOWING SURGERY OF THE MUSCULOSKELETAL SYSTEM: Primary | ICD-10-CM

## 2023-08-10 NOTE — ASSESSMENT & PLAN NOTE
Andrew Santillan is doing very well status post bilateral carpal tunnel release by Dr. Tram Crawford on July 31, 2023. Sutures were removed today. Numbness and tingling has improved significantly since surgery. Advised patient to avoid soaking the hands in any standing water for the next 1 to 2 weeks until incisions are completely healed. Continue range of motion of the fingers. Avoid any heavy lifting or gripping at this time. Follow-up as scheduled with Dr. Tram Crawford in 4 weeks for reevaluation and to also follow-up on the right ankle fracture. All questions were answered to patient's satisfaction.

## 2023-08-10 NOTE — PROGRESS NOTES
Assessment:     1. Aftercare following surgery of the musculoskeletal system        Plan:     Problem List Items Addressed This Visit        Other    Aftercare following surgery of the musculoskeletal system - Primary     Desiree Sherman is doing very well status post bilateral carpal tunnel release by Dr. Darrell Valdes on July 31, 2023. Sutures were removed today. Numbness and tingling has improved significantly since surgery. Advised patient to avoid soaking the hands in any standing water for the next 1 to 2 weeks until incisions are completely healed. Continue range of motion of the fingers. Avoid any heavy lifting or gripping at this time. Follow-up as scheduled with Dr. Darrell Valdes in 4 weeks for reevaluation and to also follow-up on the right ankle fracture. All questions were answered to patient's satisfaction. Subjective:     Patient ID: Hima Barkley is a 81 y.o. female. Chief Complaint: This is an 81-year-old white female accompanied by her daughter who is status post bilateral carpal tunnel release by Dr. Darrell Valdes on July 31, 2023. She continues to remain at the rehab facility and arrives via stretcher. She is also being treated by Dr. Darrell Valdes for a right nondisplaced distal fibula fracture. No issues with the cast.  She feels significant relief in the numbness and tingling in her fingers since surgery. She denies any pain at this time. She denies any fevers or chills after surgery.     Allergy:  Allergies   Allergen Reactions   • Pneumovax [Pneumococcal Polysaccharide Vaccine] Hives   • Medical Tape    • Nuts - Food Allergy    • Omnipaque [Iohexol] Hives   • Other Hives     Pine nuts   • Penicillins Hives   • Sulfa Antibiotics Hives   • Sulfur Other (See Comments)   • Iodine - Food Allergy Rash   • Latex Rash and Other (See Comments)     Medications:  all current active meds have been reviewed  Past Medical History:  Past Medical History:   Diagnosis Date   • Anxiety    • Carpal tunnel syndrome, bilateral    • Cholecystitis    • Chronic calculous cholecystitis     last assessed 4/25/16   • History of cardioversion    • History of radiofrequency ablation procedure for cardiac arrhythmia    • Hyperlipidemia    • Hypertension    • Hypokalemia 7/20/2023   • Sleep apnea     no trouble since Afib corrected     Past Surgical History:  Past Surgical History:   Procedure Laterality Date   • APPENDECTOMY     • BACK SURGERY  11/15/2018    L4-5 Screws and rods   • BLADDER SUSPENSION     • CARDIOVERSION      atrial - onset 2015 - x5 in 2014 and 2015   • CHOLECYSTECTOMY      April 2016   • HYSTERECTOMY     • KNEE ARTHROSCOPY W/ MENISCAL REPAIR Bilateral     therapeutic - last assessed 4/14/16   • KNEE SURGERY     • ND LAPAROSCOPY SURG CHOLECYSTECTOMY N/A 4/26/2016    Procedure: CHOLECYSTECTOMY LAPAROSCOPIC;  Surgeon: Bee Mcknight MD;  Location:  MAIN OR;  Service: General   • ND NEUROPLASTY &/TRANSPOS MEDIAN NRV CARPAL Deatrice Rise Bilateral 7/31/2023    Procedure: RELEASE CARPAL TUNNEL, BILATERAL OPEN;  Surgeon: Talita Bingham MD;  Location:  MAIN OR;  Service: Orthopedics   • TONSILLECTOMY     • WISDOM TOOTH EXTRACTION       Family History:  Family History   Problem Relation Age of Onset   • Cancer Mother         ovarian   • Heart disease Father         cardiac disorder   • Cancer Father    • Heart disease Brother    • Heart disease Paternal Aunt    • Heart disease Paternal Uncle      Social History:  Social History     Substance and Sexual Activity   Alcohol Use Yes    Comment: social; occasional     Social History     Substance and Sexual Activity   Drug Use No     Social History     Tobacco Use   Smoking Status Never   Smokeless Tobacco Never     Review of Systems   Constitutional: Negative. HENT: Negative. Eyes: Negative. Respiratory: Negative. Cardiovascular: Negative. Gastrointestinal: Negative. Endocrine: Negative. Genitourinary: Negative. Musculoskeletal: Negative for arthralgias. Skin: Negative. Allergic/Immunologic: Negative. Neurological: Negative. Negative for numbness. Hematological: Negative. Psychiatric/Behavioral: Negative. Objective:  BP Readings from Last 1 Encounters:   08/10/23 120/80      Wt Readings from Last 1 Encounters:   07/20/23 93 kg (205 lb)      BMI:   Estimated body mass index is 35.19 kg/m² as calculated from the following:    Height as of 7/20/23: 5' 4" (1.626 m). Weight as of 7/20/23: 93 kg (205 lb). BSA:   Estimated body surface area is 1.98 meters squared as calculated from the following:    Height as of 7/20/23: 5' 4" (1.626 m). Weight as of 7/20/23: 93 kg (205 lb). Physical Exam  Constitutional:       General: She is not in acute distress. Appearance: She is well-developed. HENT:      Head: Normocephalic. Eyes:      Conjunctiva/sclera: Conjunctivae normal.      Pupils: Pupils are equal, round, and reactive to light. Pulmonary:      Effort: Pulmonary effort is normal. No respiratory distress. Skin:     General: Skin is warm and dry. Neurological:      Mental Status: She is alert and oriented to person, place, and time. Psychiatric:         Behavior: Behavior normal.       Right Hand Exam     Tenderness   The patient is experiencing no tenderness. Range of Motion   The patient has normal right wrist ROM. Other   Erythema: absent  Scars: present (Well-healing incision with no evidence of infection.)  Sensation: normal  Pulse: present    Comments:  Can make a composite fist      Left Hand Exam     Tenderness   The patient is experiencing no tenderness. Range of Motion   The patient has normal left wrist ROM. Other   Erythema: absent  Scars: present (Well-healing incision with no evidence of infection.)  Sensation: normal  Pulse: present    Comments:  Can make a composite fist            No new imaging. Procedure: Sutures removed from bilateral wrists.

## 2023-08-11 ENCOUNTER — TELEPHONE (OUTPATIENT)
Age: 83
End: 2023-08-11

## 2023-08-11 NOTE — TELEPHONE ENCOUNTER
Caller: Yonny Abad @ [facility]     Doctor: Isaac Verma     Reason for call: Needing clarification on OT for the patients bilat hands. Please call her back as she is seeing patient now.      Tried office no answer     Call back#: [phone]

## 2023-09-07 ENCOUNTER — APPOINTMENT (OUTPATIENT)
Dept: RADIOLOGY | Facility: CLINIC | Age: 83
End: 2023-09-07
Payer: MEDICARE

## 2023-09-07 ENCOUNTER — OFFICE VISIT (OUTPATIENT)
Dept: OBGYN CLINIC | Facility: CLINIC | Age: 83
End: 2023-09-07

## 2023-09-07 VITALS — DIASTOLIC BLOOD PRESSURE: 82 MMHG | SYSTOLIC BLOOD PRESSURE: 134 MMHG

## 2023-09-07 DIAGNOSIS — S82.831A NONDISPLACED FRACTURE OF DISTAL END OF RIGHT FIBULA: Primary | ICD-10-CM

## 2023-09-07 DIAGNOSIS — S82.831A NONDISPLACED FRACTURE OF DISTAL END OF RIGHT FIBULA: ICD-10-CM

## 2023-09-07 PROCEDURE — 99024 POSTOP FOLLOW-UP VISIT: CPT | Performed by: ORTHOPAEDIC SURGERY

## 2023-09-07 PROCEDURE — 73610 X-RAY EXAM OF ANKLE: CPT

## 2023-09-07 NOTE — PROGRESS NOTES
Assessment:     1. Nondisplaced fracture of distal end of right fibula        Plan:     Problem List Items Addressed This Visit        Musculoskeletal and Integument    Nondisplaced fracture of distal end of right fibula - Primary     Tal Garcia is s/p bilateral carpal tunnel release 7/31/23 overall doing well, nondisplaced distal fibula fracture. Imaging and prognosis reviewed with patient. Patient will transition into CAM boot WBAT and start physical therapy to rehab ankle. She can remove while resting or sleeping and work on gentle range of motion. Wrists are doing very well numbness and tingling has completely resolved in all digits except distal tip of right long finger. This may continue to improve over time. See patient back in 4-6 weeks. All patient's questions were answered to her satisfaction. This note is created using dictation transcription. It may contain typographical errors, grammatical errors, improperly dictated words, background noise and other errors. Relevant Orders    XR ankle 3+ vw right    Ambulatory Referral to Physical Therapy    Cam Boot       Subjective:     Patient ID: Anthony Brian is a 81 y.o. female. Chief Complaint: This is an 28-year-old white female accompanied by her daughter who is status post bilateral carpal tunnel release by Dr. Julian Valentine on July 31, 2023. She feels significant relief in the numbness and tingling in her fingers since surgery. She only has some residual numbness in right tip of long finger. She denies any pain at this time. She continues to remain at the rehab facility and arrives via stretcher. She is also being treated by Dr. Julian Valentine for a right nondisplaced distal fibula fracture. No issues with the cast.  She states she has no pain in ankle. She is able to move all toes. Denies any numbness or tingling in lower extremity.      Allergy:  Allergies   Allergen Reactions   • Pneumovax [Pneumococcal Polysaccharide Vaccine] Hives   • Medical Tape    • Nuts - Food Allergy    • Omnipaque [Iohexol] Hives   • Other Hives     Pine nuts   • Penicillins Hives   • Sulfa Antibiotics Hives   • Sulfur Other (See Comments)   • Iodine - Food Allergy Rash   • Latex Rash and Other (See Comments)     Medications:  all current active meds have been reviewed  Past Medical History:  Past Medical History:   Diagnosis Date   • Anxiety    • Carpal tunnel syndrome, bilateral    • Cholecystitis    • Chronic calculous cholecystitis     last assessed 4/25/16   • History of cardioversion    • History of radiofrequency ablation procedure for cardiac arrhythmia    • Hyperlipidemia    • Hypertension    • Hypokalemia 7/20/2023   • Sleep apnea     no trouble since Afib corrected     Past Surgical History:  Past Surgical History:   Procedure Laterality Date   • APPENDECTOMY     • BACK SURGERY  11/15/2018    L4-5 Screws and rods   • BLADDER SUSPENSION     • CARDIOVERSION      atrial - onset 2015 - x5 in 2014 and 2015   • CHOLECYSTECTOMY      April 2016   • HYSTERECTOMY     • KNEE ARTHROSCOPY W/ MENISCAL REPAIR Bilateral     therapeutic - last assessed 4/14/16   • KNEE SURGERY     • RI LAPAROSCOPY SURG CHOLECYSTECTOMY N/A 4/26/2016    Procedure: CHOLECYSTECTOMY LAPAROSCOPIC;  Surgeon: Jeanne Garcia MD;  Location:  MAIN OR;  Service: General   • RI NEUROPLASTY &/TRANSPOS MEDIAN NRV CARPAL Charlies Riedel Bilateral 7/31/2023    Procedure: RELEASE CARPAL TUNNEL, BILATERAL OPEN;  Surgeon: Jeyson Nolasco MD;  Location:  MAIN OR;  Service: Orthopedics   • TONSILLECTOMY     • WISDOM TOOTH EXTRACTION       Family History:  Family History   Problem Relation Age of Onset   • Cancer Mother         ovarian   • Heart disease Father         cardiac disorder   • Cancer Father    • Heart disease Brother    • Heart disease Paternal Aunt    • Heart disease Paternal Uncle      Social History:  Social History     Substance and Sexual Activity   Alcohol Use Yes    Comment: social; occasional     Social History     Substance and Sexual Activity   Drug Use No     Social History     Tobacco Use   Smoking Status Never   Smokeless Tobacco Never     Review of Systems   Constitutional: Negative for chills and fever. HENT: Negative for ear pain and sore throat. Eyes: Negative for pain and visual disturbance. Respiratory: Negative for cough and shortness of breath. Cardiovascular: Negative for chest pain and palpitations. Gastrointestinal: Negative for abdominal pain and vomiting. Genitourinary: Negative for dysuria and hematuria. Musculoskeletal: Positive for arthralgias (right ankle, bilateral hands) and gait problem (Arrived in a stretcher). Negative for back pain and joint swelling. Skin: Negative for color change and rash. Neurological: Negative for seizures and syncope. Psychiatric/Behavioral: Negative. All other systems reviewed and are negative. Objective:  BP Readings from Last 1 Encounters:   09/07/23 134/82      Wt Readings from Last 1 Encounters:   07/20/23 93 kg (205 lb)      BMI:   Estimated body mass index is 35.19 kg/m² as calculated from the following:    Height as of 7/20/23: 5' 4" (1.626 m). Weight as of 7/20/23: 93 kg (205 lb). BSA:   Estimated body surface area is 1.98 meters squared as calculated from the following:    Height as of 7/20/23: 5' 4" (1.626 m). Weight as of 7/20/23: 93 kg (205 lb). Physical Exam  Vitals and nursing note reviewed. Constitutional:       Appearance: Normal appearance. She is well-developed. HENT:      Head: Normocephalic and atraumatic. Right Ear: External ear normal.      Left Ear: External ear normal.   Eyes:      Extraocular Movements: Extraocular movements intact. Conjunctiva/sclera: Conjunctivae normal.   Pulmonary:      Effort: Pulmonary effort is normal.   Musculoskeletal:      Cervical back: Neck supple. Right knee: No effusion. Skin:     General: Skin is warm and dry.    Neurological:      Mental Status: She is alert and oriented to person, place, and time. Deep Tendon Reflexes: Reflexes are normal and symmetric. Psychiatric:         Mood and Affect: Mood normal.         Behavior: Behavior normal.       Right Ankle Exam     Tenderness   The patient is experiencing no tenderness. Swelling: none    Other   Erythema: absent  Scars: absent  Sensation: normal  Pulse: present     Comments:  Range of motion decreased in right ankle secondary to cast  She has no pain with passive ankle motion  Able to move all toes  Sensation intact ankle/foot/toes       Right Knee Exam     Other   Effusion: no effusion present      Right Hand Exam     Tenderness   The patient is experiencing no tenderness. Range of Motion   The patient has normal right wrist ROM. Tests   Phalen’s Sign: negative  Tinel's sign (median nerve): negative  Finkelstein's test: negative    Other   Erythema: absent  Scars: present (well healed incision)  Sensation: normal  Pulse: present    Comments:  Able to make composite fist        Left Hand Exam     Tenderness   The patient is experiencing no tenderness. Range of Motion   The patient has normal left wrist ROM. Tests   Phalen’s Sign: negative  Tinel's sign (median nerve): negative  Finkelstein's test: negative    Other   Erythema: absent  Scars: present (well healed incision)  Sensation: normal  Pulse: present    Comments:  Able to make composite fist             I have personally reviewed pertinent films in PACS and my interpretation is xr right ankle demonstrates distal fibula fracture in good alignment. Fracture is healing. Mortise and syndesmosis are intact.      Scribe Attestation    I,:  Yanira Snato am acting as a scribe while in the presence of the attending physician.:       I,:  Dalbert Schirmer, MD personally performed the services described in this documentation    as scribed in my presence.:

## 2023-09-07 NOTE — ASSESSMENT & PLAN NOTE
Leila Johns is s/p bilateral carpal tunnel release 7/31/23 overall doing well, nondisplaced distal fibula fracture. Imaging and prognosis reviewed with patient. Patient will transition into CAM boot WBAT and start physical therapy to rehab ankle. She can remove while resting or sleeping and work on gentle range of motion. Wrists are doing very well numbness and tingling has completely resolved in all digits except distal tip of right long finger. This may continue to improve over time. See patient back in 4-6 weeks. All patient's questions were answered to her satisfaction. This note is created using dictation transcription. It may contain typographical errors, grammatical errors, improperly dictated words, background noise and other errors.

## 2023-10-02 ENCOUNTER — TELEPHONE (OUTPATIENT)
Dept: FAMILY MEDICINE CLINIC | Facility: HOSPITAL | Age: 83
End: 2023-10-02

## 2023-10-02 PROBLEM — E11.9 TYPE 2 DIABETES MELLITUS (HCC): Status: ACTIVE | Noted: 2023-10-02

## 2023-10-02 NOTE — ED PROVIDER NOTES
History  Chief Complaint   Patient presents with    Abnormal Lab     Elevated liver enzymes from routine blood work done yesterday. This is an 81 YOF with a medical history significant for afib, HLD, HTN, hypothyroidism, transaminitis, nondisplaced fx of right fibula- currently completing subacute rehab who presents to the ED via EMS for evaluation of jaundice and elevated bilirubin. Review labs from [facility] shows patient had labs drawn yesterday which showed elevated bilirubin at 6.8, also had elevated AST at 398 and elevated alk phos at 129. Per chart review labs drawn on 7/28 showed elevated bilirubin at 3.03. Alk phos 102. There is a patient had been consistently running bilirubin levels approximately 3-4 over the last few months. Patient was given ambulatory for to  for elevated bili and liver enzymes though has yet to have the opportunity to follow-up. Patient AOx3 during initial evaluation. She is without complaints at this time, states rehab has been going well states she is scheduled to finish rehab in the next few days. She does report that she had her gallbladder removed many years ago. Any recent fevers, vision changes, headaches, fatigue, generalized weakness, chest pain, cough, SOB, abdominal pain, nausea, vomiting, diarrhea, dysuria, reports normal BMs. She reports normal appetite at facility no difficulty completing subacute rehab or daily activities at the facility. Prior to Admission Medications   Prescriptions Last Dose Informant Patient Reported? Taking? Diovan 320 MG tablet Unknown  No No   Sig: Take 1 tablet (320 mg total) by mouth daily   HYDROcodone-acetaminophen (Norco) 5-325 mg per tablet Not Taking  No No   Sig: Take 1 tablet by mouth daily at bedtime as needed for pain Max Daily Amount: 1 tablet   Patient not taking: Reported on 10/2/2023   atorvastatin (LIPITOR) 20 mg tablet Unknown Self Yes No   Sig: Take 20 mg by mouth daily.    Patient not taking: Reported on 10/2/2023   diltiazem (CARDIZEM CD) 360 MG 24 hr capsule Unknown  No No   Sig: Take 1 capsule (360 mg total) by mouth daily   Patient not taking: Reported on 10/2/2023   escitalopram (LEXAPRO) 10 mg tablet Unknown  No No   Sig: TAKE 1 TABLET DAILY   gabapentin (NEURONTIN) 100 mg capsule Not Taking  No No   Sig: Take 3 capsules (300 mg total) by mouth 3 (three) times a day   Patient not taking: Reported on 10/2/2023   hydrochlorothiazide (HYDRODIURIL) 25 mg tablet Unknown Self Yes No   Sig: Take 25 mg by mouth daily Indications: High Blood Pressure. levothyroxine 175 mcg tablet Unknown at s  No No   Sig: Take 1 tablet (175 mcg total) by mouth daily   liotrix (THYROLAR-1) 60 (12.5-50) MG (MCG) TABS Unknown Self Yes No   Sig: Take 1 tablet by mouth daily. Patient not taking: Reported on 10/2/2023   rivaroxaban (XARELTO) 20 mg tablet Unknown Self Yes No   Sig: Take 20 mg by mouth daily with dinner Indications: LD 4/22. sodium chloride 1 g tablet Unknown  No No   Sig: Take 2 tablets (2 g total) by mouth 2 (two) times a day with meals   sotalol (BETAPACE) 80 mg tablet Unknown Self Yes No   Sig: Take 80 mg by mouth 2 (two) times a day. torsemide (DEMADEX) 5 MG tablet Not Taking  No No   Sig: Take 1 tablet (5 mg total) by mouth daily Do not start before July 25, 2023.    Patient not taking: Reported on 10/2/2023   zolpidem (AMBIEN) 10 mg tablet Not Taking  No No   Sig: Take 1 tablet (10 mg total) by mouth daily at bedtime as needed for sleep   Patient not taking: Reported on 10/2/2023      Facility-Administered Medications: None       Past Medical History:   Diagnosis Date    Anxiety     Carpal tunnel syndrome, bilateral     Cholecystitis     Chronic calculous cholecystitis     last assessed 4/25/16    History of cardioversion     History of radiofrequency ablation procedure for cardiac arrhythmia     Hyperlipidemia     Hypertension     Hypokalemia 7/20/2023    Sleep apnea     no trouble since Afib corrected       Past Surgical History:   Procedure Laterality Date    APPENDECTOMY      BACK SURGERY  11/15/2018    L4-5 Screws and rods    BLADDER SUSPENSION      CARDIOVERSION      atrial - onset 2015 - x5 in 2014 and 2015    CHOLECYSTECTOMY      April 2016    HYSTERECTOMY      KNEE ARTHROSCOPY W/ MENISCAL REPAIR Bilateral     therapeutic - last assessed 4/14/16    KNEE SURGERY      AL LAPAROSCOPY SURG CHOLECYSTECTOMY N/A 4/26/2016    Procedure: CHOLECYSTECTOMY LAPAROSCOPIC;  Surgeon: Jason Ambrosio MD;  Location: [facility];  Service: General    AL NEUROPLASTY &/TRANSPOS MEDIAN NRV CARPAL TUNNE Bilateral 7/31/2023    Procedure: RELEASE CARPAL TUNNEL, BILATERAL OPEN;  Surgeon: Bianca Venegas MD;  Location: [facility];  Service: Orthopedics    TONSILLECTOMY      WISDOM TOOTH EXTRACTION         Family History   Problem Relation Age of Onset    Cancer Mother         ovarian    Heart disease Father         cardiac disorder    Cancer Father     Heart disease Brother     Heart disease Paternal Aunt     Heart disease Paternal Uncle      I have reviewed and agree with the history as documented. E-Cigarette/Vaping    E-Cigarette Use Never User      E-Cigarette/Vaping Substances     Social History     Tobacco Use    Smoking status: Never    Smokeless tobacco: Never   Vaping Use    Vaping Use: Never used   Substance Use Topics    Alcohol use: Yes     Comment: social; occasional    Drug use: No       Review of Systems   Constitutional:  Negative for chills, fatigue and fever. Eyes:  Negative for photophobia and visual disturbance. Respiratory:  Negative for cough and shortness of breath. Cardiovascular:  Negative for chest pain and palpitations. Gastrointestinal:  Negative for abdominal pain, blood in stool, constipation, diarrhea, nausea and vomiting. Genitourinary:  Negative for difficulty urinating, dysuria, flank pain and hematuria. Skin:  Positive for color change (jaundice).    Neurological:  Negative for dizziness, seizures, syncope, weakness, light-headedness and headaches. Physical Exam  Physical Exam  Vitals and nursing note reviewed. Constitutional:       General: She is not in acute distress. Appearance: She is well-developed. She is not diaphoretic. HENT:      Head: Normocephalic and atraumatic. Eyes:      General: Scleral icterus present. Conjunctiva/sclera: Conjunctivae normal.   Cardiovascular:      Rate and Rhythm: Normal rate and regular rhythm. Heart sounds: No murmur heard. Pulmonary:      Effort: Pulmonary effort is normal. No respiratory distress. Breath sounds: Normal breath sounds. Abdominal:      General: There is no distension. Palpations: Abdomen is soft. Tenderness: There is no abdominal tenderness. There is no right CVA tenderness, left CVA tenderness or guarding. Comments: Abdomen diffusely nontender on light and deep palpation. Musculoskeletal:         General: No swelling. Cervical back: Normal range of motion and neck supple. Skin:     General: Skin is warm and dry. Capillary Refill: Capillary refill takes less than 2 seconds. Coloration: Skin is jaundiced. Neurological:      General: No focal deficit present. Mental Status: She is alert and oriented to person, place, and time.    Psychiatric:         Mood and Affect: Mood normal.         Vital Signs  ED Triage Vitals [10/02/23 1449]   Temperature Pulse Respirations Blood Pressure SpO2   98.2 °F (36.8 °C) 63 20 141/65 94 %      Temp Source Heart Rate Source Patient Position - Orthostatic VS BP Location FiO2 (%)   Temporal Monitor Sitting Right arm --      Pain Score       No Pain           Vitals:    10/02/23 1655 10/02/23 1828 10/02/23 2019 10/02/23 2054   BP: 132/74 137/77 107/54 107/54   Pulse: 63 67 58 68   Patient Position - Orthostatic VS: Sitting            Visual Acuity      ED Medications  Medications   insulin lispro (HumaLOG) 100 units/mL subcutaneous injection 1-6 Units ( Subcutaneous Not Given 10/2/23 2007)   insulin lispro (HumaLOG) 100 units/mL subcutaneous injection 1-5 Units ( Subcutaneous Not Given 10/2/23 2143)   escitalopram (LEXAPRO) tablet 10 mg (has no administration in time range)   hydrochlorothiazide (HYDRODIURIL) tablet 12.5 mg (has no administration in time range)   levothyroxine tablet 175 mcg (has no administration in time range)   losartan (COZAAR) tablet 100 mg (has no administration in time range)   rivaroxaban (XARELTO) tablet 20 mg (20 mg Oral Given 10/2/23 2023)   sotalol (BETAPACE) tablet 80 mg (80 mg Oral Given 10/2/23 2021)   sodium chloride tablet 2 g (2 g Oral Given 10/2/23 2020)   sodium chloride 0.9 % bolus 500 mL (0 mL Intravenous Stopped 10/2/23 1746)   potassium chloride (K-DUR,KLOR-CON) CR tablet 40 mEq (40 mEq Oral Given 10/2/23 2020)       Diagnostic Studies  Results Reviewed       Procedure Component Value Units Date/Time    CEA [[id number]]  (Normal) Collected: 10/02/23 1857    Lab Status: Final result Specimen: Blood from Arm, Left Updated: 10/02/23 2242     CEA 1.8 ng/mL     AFP tumor marker [[id number]]  (Abnormal) Collected: 10/02/23 1857    Lab Status: Final result Specimen: Blood from Arm, Left Updated: 10/02/23 2234     AFP TUMOR MARKER 16.91 ng/mL     Narrative:      [employer] Access chemiluminescent immunoassay. Results cannot be interpreted for the presence or absence of malignant disease. Confirm baseline values for patients being serially monitored.        UA w Reflex to Microscopic w Reflex to Culture [[id number]]  (Abnormal) Collected: 10/02/23 1859    Lab Status: Final result Specimen: Urine, Clean Catch Updated: 10/02/23 1923     Color, UA Dark Yellow     Clarity, UA Clear     Specific Gravity, UA 1.020     pH, UA 6.0     Leukocytes, UA Large     Nitrite, UA Positive     Protein, UA Trace mg/dl      Glucose, UA Negative mg/dl      Ketones, UA Negative mg/dl      Urobilinogen, UA 4.0 mg/dl      Bilirubin, UA Small     Occult Blood, UA Trace    Cancer antigen 19-9 [[id number]] Collected: 10/02/23 1857    Lab Status: In process Specimen: Blood from Arm, Left Updated: 10/02/23 1901    Hepatitis panel, acute [[id number]] Collected: 10/02/23 1754    Lab Status: In process Specimen: Blood from Arm, Right Updated: 71/91/21 3254    Salicylate level [[id number]]  (Normal) Collected: 10/02/23 1550    Lab Status: Final result Specimen: Blood from Arm, Right Updated: 02/74/25 2935     Salicylate Lvl <5 mg/dL     Acetaminophen level-If concentration is detectable, please discuss with medical  on call. [[id number]]  (Abnormal) Collected: 10/02/23 1550    Lab Status: Final result Specimen: Blood from Arm, Right Updated: 10/02/23 1706     Acetaminophen Level <10 ug/mL     Ethanol [[id number]]  (Normal) Collected: 10/02/23 1550    Lab Status: Final result Specimen: Blood from Arm, Right Updated: 10/02/23 1642     Ethanol Lvl <10 mg/dL     Hepatic function panel [[id number]]  (Abnormal) Collected: 10/02/23 1521    Lab Status: Final result Specimen: Blood from Arm, Right Updated: 10/02/23 1545     Total Bilirubin 12.20 mg/dL      Bilirubin, Direct 7.78 mg/dL      Alkaline Phosphatase 118 U/L       U/L       U/L      Total Protein 7.2 g/dL      Albumin 2.0 g/dL     Narrative:      Specimen Icteric. Lipase [[id number]]  (Normal) Collected: 10/02/23 1521    Lab Status: Final result Specimen: Blood from Arm, Right Updated: 10/02/23 1545     Lipase 57 u/L     Narrative:      Specimen Icteric.     Basic metabolic panel [[id number]]  (Abnormal) Collected: 10/02/23 1521    Lab Status: Final result Specimen: Blood from Arm, Right Updated: 10/02/23 1545     Sodium 132 mmol/L      Potassium 3.4 mmol/L      Chloride 103 mmol/L      CO2 27 mmol/L      ANION GAP 2 mmol/L      BUN 15 mg/dL      Creatinine 0.80 mg/dL      Glucose 230 mg/dL      Calcium 8.7 mg/dL      eGFR 68 ml/min/1.73sq m     Narrative:      Specimen Icteric. National Kidney Disease Foundation guidelines for Chronic Kidney Disease (CKD):     Stage 1 with normal or high GFR (GFR > 90 mL/min/1.73 square meters)    Stage 2 Mild CKD (GFR = 60-89 mL/min/1.73 square meters)    Stage 3A Moderate CKD (GFR = 45-59 mL/min/1.73 square meters)    Stage 3B Moderate CKD (GFR = 30-44 mL/min/1.73 square meters)    Stage 4 Severe CKD (GFR = 15-29 mL/min/1.73 square meters)    Stage 5 End Stage CKD (GFR <15 mL/min/1.73 square meters)  Note: GFR calculation is accurate only with a steady state creatinine    CBC and differential [[id number]]  (Abnormal) Collected: 10/02/23 1521    Lab Status: Final result Specimen: Blood from Arm, Right Updated: 10/02/23 1526     WBC 6.45 Thousand/uL      RBC 4.10 Million/uL      Hemoglobin 11.8 g/dL      Hematocrit 36.6 %      MCV 89 fL      MCH 28.8 pg      MCHC 32.2 g/dL      RDW 18.9 %      MPV 9.5 fL      Platelets 272 Thousands/uL      nRBC 0 /100 WBCs      Neutrophils Relative 48 %      Immat GRANS % 1 %      Lymphocytes Relative 26 %      Monocytes Relative 16 %      Eosinophils Relative 8 %      Basophils Relative 1 %      Neutrophils Absolute 3.11 Thousands/µL      Immature Grans Absolute 0.03 Thousand/uL      Lymphocytes Absolute 1.68 Thousands/µL      Monocytes Absolute 1.04 Thousand/µL      Eosinophils Absolute 0.50 Thousand/µL      Basophils Absolute 0.09 Thousands/µL                    CT abdomen pelvis wo contrast   Final Result by Denver Mourning, MD (10/02 1644)      Nodular liver morphology and sequela of portal venous hypertension suggesting cirrhosis. Minimal ascites around the liver and layering in the pelvis. Prominent lymph nodes at the katelynn hepatis presumably reactive to primary underlying liver dysfunction. No biliary ductal dilatation to suggest obstructive cause of jaundice. 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  Recommend nonemergent outpatient evaluation with high-resolution chest CT to exclude [place] UIP pattern fibrosis. Workstation performed: [id number]                    Procedures  Procedures         ED Course  ED Course as of 10/03/23 0004   Mon Oct 02, 2023   1650 CT abdomen pelvis wo contrast     IMPRESSION:     Nodular liver morphology and sequela of portal venous hypertension suggesting cirrhosis. Minimal ascites around the liver and layering in the pelvis. Prominent lymph nodes at the katelynn hepatis presumably reactive to primary underlying liver dysfunction. No biliary ductal dilatation to suggest obstructive cause of jaundice. 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 Recommend nonemergent outpatient evaluation with high-resolution chest CT to exclude [place] UIP pattern fibrosis. [address](!): 12.20  GI paged. 65 Spoke with Dr Hari Ramirez via TT who advised admission with GI consult. Medical Decision Making  58-year-old female presents to the ED via EMS for evaluation of jaundice and increasing bilirubin. Labs that show new onset thrombocytopenia at 144,000, elevated bilirubin and LFTs, labs. Otherwise acutely unremarkable. Patient denies any other acute concerns or complaints at this time. Case was discussed with GI who advised Slim admission with GI consult. Amount and/or Complexity of Data Reviewed  Labs: ordered. Decision-making details documented in ED Course. Radiology: ordered. Decision-making details documented in ED Course. Risk  Decision regarding hospitalization.         Disposition  Final diagnoses:   Jaundice   Elevated bilirubin   Elevated transaminase level   Thrombocytopenia (HCC)     Time reflects when diagnosis was documented in both MDM as applicable and the Disposition within this note       Time User Action Codes Description Comment    10/2/2023  5:02 PM Liz Person Add [R17] Jaundice     10/2/2023  5:02 PM Liz Person Add [R17] Elevated bilirubin     10/2/2023  5:02 PM Ahmed Masker Add [R74.01] Elevated transaminase level     10/2/2023  5:02 PM Ahmed Masker Add [D69.6] Thrombocytopenia [facility])           ED Disposition       ED Disposition   Admit    Condition   Stable    Date/Time   Mon Oct 2, 2023  5:23 PM    Comment   Case was discussed with Dr Jona Reed and the patient's admission status was agreed to be Admission Status: inpatient status to the service of Dr. Jona Reed. Follow-up Information    None         Current Discharge Medication List        CONTINUE these medications which have NOT CHANGED    Details   atorvastatin (LIPITOR) 20 mg tablet Take 20 mg by mouth daily. diltiazem (CARDIZEM CD) 360 MG 24 hr capsule Take 1 capsule (360 mg total) by mouth daily  Qty: 10 capsule, Refills: 0    Associated Diagnoses: Acquired hypothyroidism      Diovan 320 MG tablet Take 1 tablet (320 mg total) by mouth daily  Qty: 90 tablet, Refills: 1    Associated Diagnoses: Essential hypertension      escitalopram (LEXAPRO) 10 mg tablet TAKE 1 TABLET DAILY  Qty: 90 tablet, Refills: 3    Associated Diagnoses: Mood disorder (HCC)      gabapentin (NEURONTIN) 100 mg capsule Take 3 capsules (300 mg total) by mouth 3 (three) times a day  Qty: 90 capsule, Refills: 1    Associated Diagnoses: Bilateral carpal tunnel syndrome      hydrochlorothiazide (HYDRODIURIL) 25 mg tablet Take 25 mg by mouth daily Indications: High Blood Pressure. HYDROcodone-acetaminophen (Norco) 5-325 mg per tablet Take 1 tablet by mouth daily at bedtime as needed for pain Max Daily Amount: 1 tablet  Qty: 20 tablet, Refills: 0    Associated Diagnoses: Bilateral carpal tunnel syndrome      levothyroxine 175 mcg tablet Take 1 tablet (175 mcg total) by mouth daily  Qty: 90 tablet, Refills: 3    Associated Diagnoses: Acquired hypothyroidism      liotrix (THYROLAR-1) 60 (12.5-50) MG (MCG) TABS Take 1 tablet by mouth daily.       rivaroxaban (XARELTO) 20 mg tablet Take 20 mg by mouth daily with dinner Indications: LD 4/22. sodium chloride 1 g tablet Take 2 tablets (2 g total) by mouth 2 (two) times a day with meals  Qty: 120 tablet, Refills: 0    Associated Diagnoses: Hyponatremia      sotalol (BETAPACE) 80 mg tablet Take 80 mg by mouth 2 (two) times a day. torsemide (DEMADEX) 5 MG tablet Take 1 tablet (5 mg total) by mouth daily Do not start before July 25, 2023. Qty: 30 tablet, Refills: 0    Associated Diagnoses: Hyponatremia      zolpidem (AMBIEN) 10 mg tablet Take 1 tablet (10 mg total) by mouth daily at bedtime as needed for sleep  Qty: 20 tablet, Refills: 0    Comments: This prescription was filled on 9/26/2022. Any refills authorized will be placed on file. Associated Diagnoses: Insomnia due to medical condition             No discharge procedures on file.     PDMP Review         Value Time User    PDMP Reviewed  Yes 7/31/2023 12:00 PM Dinah Pnio PA-C            ED Provider  Electronically Signed by             Ritika Parson PA-C  10/03/23 0004

## 2023-10-02 NOTE — ASSESSMENT & PLAN NOTE
Previously on diltiazem and sotalol however patient recently taken off diltiazem at facility  Unclear why diltiazem was stopped   Will continue to hold for now given hepatic impairment   Continue Xarelto for anticoagulation

## 2023-10-02 NOTE — H&P
[facility]  H&P  Name: Amelia Cowan 81 y.o. female I MRN: [id number]  Unit/Bed#: -01 I Date of Admission: 10/2/2023   Date of Service: 10/2/2023 I Hospital Day: 0      Assessment/Plan   * Transaminitis  Assessment & Plan  Previously evaluated by GI in July for transaminitis thought 2/2 shock liver. CMV and EBV IGG positive but no IGM. Hepatitis panel and RUQ ultrasound were negative. Labs obtained due to new jaundice at [facility] showed T bili of 6, prompting ED presentation. S/P cholecystectomy. LFTs on admission:  Direct bilirubin: 7.70  Total bilirubin: 12.2  Alk phos: 118  AST: 690  ALT: 407  GI consulted   CT A/P: Nodular liver morphology and sequela of portal venous hypertension suggesting cirrhosis. Minimal ascites around the liver and layering in the pelvis. Prominent lymph nodes at the katelynn hepatis presumably reactive to primary underlying liver dysfunction. No biliary ductal dilatation to suggest obstructive cause of jaundice. Hepatitis panel pending   BEV, ANCA, Anti-ccp, RF pending   AFP, CEA, CA 19-9 pending  Trend LFTs  Hold statin    Type 2 diabetes mellitus (HCC)  Assessment & Plan  Lab Results   Component Value Date    HGBA1C 7.0 (H) 07/20/2023     Patient has not been on any home medications   Sliding scale insulin while admitted  Diabetic diet        Hyponatremia  Assessment & Plan  Previously discharged on 2 g salt tabs BID and torsemide 5 mg daily  Torsemide was stopped by facility for unknown reason   Continue to monitor on salt tabs - Na corrects to 135 on admission    Closed right ankle fracture  Assessment & Plan  Continue ambulating with camboot and walker  PT/OT for dispo planning     Diffuse interstitial pulmonary fibrosis (HCC)  Assessment & Plan  CT:  Reticular interstitial lung abnormality (REENA) noted in both lower lobes. Recommend nonemergent outpatient evaluation with high-resolution chest CT to exclude shiloh UIP pattern fibrosis.     Mood disorder [facility])  Assessment & Plan  Continue Lexapro  Ambien nightly as needed    Acquired hypothyroidism  Assessment & Plan  Continue levothyroxine 175 mcg daily    Class 2 severe obesity due to excess calories with serious comorbidity and body mass index (BMI) of 35.0 to 35.9 in adult   Assessment & Plan  Dietary changes and lifestyle modifications  Affects all aspects of care    Essential hypertension  Assessment & Plan  Previously discharged on diovan, sotalol, torsemide  At facility patient is on HCTZ 12.5, losartan 100 mg, and sotalol 80 mg BID  Continue     Mixed hyperlipidemia  Assessment & Plan  Holding Lipitor 20 mg daily given LFTs    Paroxysmal atrial fibrillation (HCC)  Assessment & Plan  Previously on diltiazem and sotalol however patient recently taken off diltiazem at facility  Unclear why diltiazem was stopped   Will continue to hold for now given hepatic impairment   Continue Xarelto for anticoagulation         VTE Pharmacologic Prophylaxis: VTE Score: 4 Moderate Risk (Score 3-4) - Pharmacological DVT Prophylaxis Ordered: rivaroxaban (Xarelto). Code Status: Level 1 - Full Code - Full Code  Discussion with family: Updated  (son) via phone. Anticipated Length of Stay: Patient will be admitted on an inpatient basis with an anticipated length of stay of greater than 2 midnights secondary to transaminitis. Total Time Spent on Date of Encounter in care of patient: 65 mins. This time was spent on one or more of the following: performing physical exam; counseling and coordination of care; obtaining or reviewing history; documenting in the medical record; reviewing/ordering tests, medications or procedures; communicating with other healthcare professionals and discussing with patient's family/caregivers.     Chief Complaint: abnormal outpatient labs, jaundice     History of Present Illness:  Aki Villarreal is a 81 y.o. female with a PMH of A-fib, hyperlipidemia, hypertension, hypothyroidism, transaminitis, in rehab for recent nondisplaced fracture of right fibula who presents with jaundice and elevated bilirubin. Patient was participating rehab at [facility] and had labs drawn due to jaundice which revealed an elevated bilirubin of 6.8, AST of 398 and alk phos of 129. Per chart review, labs on 7/28 revealed elevated bilirubin at 3.03.  GI had been consulted during that admission and suspected shock liver at that time as ultrasound was unremarkable, patient had no gallbladder and hepatitis panel was negative. Patient herself offers no complaints, reports rehab has been progressing as planned and she hopes to finish in the next few days and return home. She denies any fevers, chills, nausea, vomiting, diarrhea, chest pain, difficulty using bathroom or eating. She denies RUQ tenderness. Review of Systems:  Review of Systems   Skin:  Positive for color change. All other systems reviewed and are negative.       Past Medical and Surgical History:   Past Medical History:   Diagnosis Date    Anxiety     Carpal tunnel syndrome, bilateral     Cholecystitis     Chronic calculous cholecystitis     last assessed 4/25/16    History of cardioversion     History of radiofrequency ablation procedure for cardiac arrhythmia     Hyperlipidemia     Hypertension     Hypokalemia 7/20/2023    Sleep apnea     no trouble since Afib corrected       Past Surgical History:   Procedure Laterality Date    APPENDECTOMY      BACK SURGERY  11/15/2018    L4-5 Screws and rods    BLADDER SUSPENSION      CARDIOVERSION      atrial - onset 2015 - x5 in 2014 and 2015    CHOLECYSTECTOMY      April 2016    HYSTERECTOMY      KNEE ARTHROSCOPY W/ MENISCAL REPAIR Bilateral     therapeutic - last assessed 4/14/16    KNEE SURGERY      MI LAPAROSCOPY SURG CHOLECYSTECTOMY N/A 4/26/2016    Procedure: CHOLECYSTECTOMY LAPAROSCOPIC;  Surgeon: Teresa Lew MD;  Location: [facility];  Service: General    MI NEUROPLASTY &/TRANSPOS MEDIAN NRV CARPAL TUNNE Bilateral 7/31/2023    Procedure: RELEASE CARPAL TUNNEL, BILATERAL OPEN;  Surgeon: Joaquín Childers MD;  Location:  MAIN OR;  Service: Orthopedics    TONSILLECTOMY      WISDOM TOOTH EXTRACTION         Meds/Allergies:  Prior to Admission medications    Medication Sig Start Date End Date Taking? Authorizing Provider   atorvastatin (LIPITOR) 20 mg tablet Take 20 mg by mouth daily. Historical Provider, MD   diltiazem (CARDIZEM CD) 360 MG 24 hr capsule Take 1 capsule (360 mg total) by mouth daily 7/11/23   Guille Buchanan MD   Diovan 320 MG tablet Take 1 tablet (320 mg total) by mouth daily 12/12/22   Guille Buchanan MD   escitalopram (LEXAPRO) 10 mg tablet TAKE 1 TABLET DAILY 11/28/22   Guille Buchanan MD   gabapentin (NEURONTIN) 100 mg capsule Take 3 capsules (300 mg total) by mouth 3 (three) times a day 7/19/23   Guille Buchanan MD   hydrochlorothiazide (HYDRODIURIL) 25 mg tablet Take 25 mg by mouth daily Indications: High Blood Pressure. Historical Provider, MD   HYDROcodone-acetaminophen (Norco) 5-325 mg per tablet Take 1 tablet by mouth daily at bedtime as needed for pain Max Daily Amount: 1 tablet 7/19/23   Guille Buchanan MD   levothyroxine 175 mcg tablet Take 1 tablet (175 mcg total) by mouth daily 7/17/23   Guille Buchanan MD   liotrix [facility]) 60 (12.5-50) MG (MCG) TABS Take 1 tablet by mouth daily. Historical Provider, MD   rivaroxaban (XARELTO) 20 mg tablet Take 20 mg by mouth daily with dinner Indications: LD 4/22. Historical Provider, MD   sodium chloride 1 g tablet Take 2 tablets (2 g total) by mouth 2 (two) times a day with meals 7/24/23   Verenice REINA PA-C   sotalol (BETAPACE) 80 mg tablet Take 80 mg by mouth 2 (two) times a day.     Historical Provider, MD   torsemide (DEMADEX) 5 MG tablet Take 1 tablet (5 mg total) by mouth daily Do not start before July 25, 2023. 7/25/23   Verenice REINA PA-C   zolpidem (AMBIEN) 10 mg tablet Take 1 tablet (10 mg total) by mouth daily at bedtime as needed for sleep 3/28/23   Mia Christianson MD     I have reveiwed home medications using records provided by SNF. And discussion with patient     Allergies: Allergies   Allergen Reactions    Pneumovax [Pneumococcal Polysaccharide Vaccine] Hives    Medical Tape     Nuts - Food Allergy     Omnipaque [Iohexol] Hives    Other Hives     Pine nuts    Penicillins Hives    Sulfa Antibiotics Hives    Sulfur Other (See Comments)    Iodine - Food Allergy Rash    Latex Rash and Other (See Comments)       Social History:  Marital Status:    Occupation: retired  Patient Pre-hospital Living Situation: [address]: [place]  Patient Pre-hospital Level of Mobility:  in rehab  Patient Pre-hospital Diet Restrictions: Diabetic  Substance Use History:   Social History     Substance and Sexual Activity   Alcohol Use Yes    Comment: social; occasional     Social History     Tobacco Use   Smoking Status Never   Smokeless Tobacco Never     Social History     Substance and Sexual Activity   Drug Use No       Family History:  Family History   Problem Relation Age of Onset    Cancer Mother         ovarian    Heart disease Father         cardiac disorder    Cancer Father     Heart disease Brother     Heart disease Paternal Aunt     Heart disease Paternal Uncle        Physical Exam:     Vitals:   Blood Pressure: 137/77 (10/02/23 1828)  Pulse: 67 (10/02/23 1828)  Temperature: 97.5 °F (36.4 °C) (10/02/23 1828)  Temp Source: Temporal (10/02/23 1828)  Respirations: 19 (10/02/23 1828)  Weight - Scale: 98 kg (216 lb 0.8 oz) (10/02/23 1449)  SpO2: 98 % (10/02/23 1828)    Physical Exam  Vitals and nursing note reviewed. Constitutional:       General: She is not in acute distress. Appearance: She is well-developed. She is obese. She is not ill-appearing. HENT:      Head: Normocephalic and atraumatic. Eyes:      General: Scleral icterus present. Right eye: No discharge. Left eye: No discharge. Conjunctiva/sclera: Conjunctivae normal.   Cardiovascular:      Rate and Rhythm: Normal rate and regular rhythm. Heart sounds: No murmur heard. Pulmonary:      Effort: Pulmonary effort is normal. No respiratory distress. Breath sounds: Normal breath sounds. No wheezing, rhonchi or rales. Abdominal:      General: Bowel sounds are normal. There is no distension. Palpations: Abdomen is soft. Tenderness: There is no abdominal tenderness. Musculoskeletal:      Cervical back: Neck supple. Right lower leg: No edema. Left lower leg: No edema. Skin:     General: Skin is warm and dry. Capillary Refill: Capillary refill takes less than 2 seconds. Coloration: Skin is jaundiced. Neurological:      General: No focal deficit present. Mental Status: She is alert and oriented to person, place, and time. Mental status is at baseline. Cranial Nerves: No cranial nerve deficit.    Psychiatric:         Mood and Affect: Mood normal.         Behavior: Behavior normal.          Additional Data:     Lab Results:  Results from last 7 days   Lab Units 10/02/23  1521   WBC Thousand/uL 6.45   HEMOGLOBIN g/dL 11.8   HEMATOCRIT % 36.6   PLATELETS Thousands/uL 144*   NEUTROS PCT % 48   LYMPHS PCT % 26   MONOS PCT % 16*   EOS PCT % 8*     Results from last 7 days   Lab Units 10/02/23  1521   SODIUM mmol/L 132*   POTASSIUM mmol/L 3.4*   CHLORIDE mmol/L 103   CO2 mmol/L 27   BUN mg/dL 15   CREATININE mg/dL 0.80   ANION GAP mmol/L 2   CALCIUM mg/dL 8.7   ALBUMIN g/dL 2.0*   TOTAL BILIRUBIN mg/dL 12.20*   ALK PHOS U/L 118*   ALT U/L 407*   AST U/L 690*   GLUCOSE RANDOM mg/dL 230*                       Lines/Drains:  Invasive Devices       Peripheral Intravenous Line  Duration             Peripheral IV 10/02/23 Proximal;Right;Ventral (anterior) Forearm <1 day                        Imaging: Reviewed radiology reports from this admission including: abdominal/pelvic CT  CT abdomen pelvis wo contrast   Final Result by Lynn Farrar MD (10/02 9214)      Nodular liver morphology and sequela of portal venous hypertension suggesting cirrhosis. Minimal ascites around the liver and layering in the pelvis. Prominent lymph nodes at the katelynn hepatis presumably reactive to primary underlying liver dysfunction. No biliary ductal dilatation to suggest obstructive cause of jaundice. 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 Recommend nonemergent outpatient evaluation with high-resolution chest CT to exclude sihloh UIP pattern fibrosis. Workstation performed: [id number]             EKG and Other Studies Reviewed on Admission:   EKG: No EKG obtained. ** Please Note: This note has been constructed using a voice recognition system.  **

## 2023-10-02 NOTE — ASSESSMENT & PLAN NOTE
Previously discharged on 2 g salt tabs BID and torsemide 5 mg daily  Torsemide was stopped by facility for unknown reason   Continue to monitor on salt tabs - Na corrects to 135 on admission

## 2023-10-02 NOTE — ASSESSMENT & PLAN NOTE
Previously discharged on diovan, sotalol, torsemide  At facility patient is on HCTZ 12.5, losartan 100 mg, and sotalol 80 mg BID  Continue

## 2023-10-02 NOTE — ASSESSMENT & PLAN NOTE
Previously evaluated by GI in July for transaminitis thought 2/2 shock liver. CMV and EBV IGG positive but no IGM. Hepatitis panel and RUQ ultrasound were negative. Labs obtained due to new jaundice at [facility] showed T bili of 6, prompting ED presentation. S/P cholecystectomy. LFTs on admission:  Direct bilirubin: 7.70  Total bilirubin: 12.2  Alk phos: 118  AST: 690  ALT: 407  GI consulted   CT A/P: Nodular liver morphology and sequela of portal venous hypertension suggesting cirrhosis. Minimal ascites around the liver and layering in the pelvis. Prominent lymph nodes at the katelynn hepatis presumably reactive to primary underlying liver dysfunction. No biliary ductal dilatation to suggest obstructive cause of jaundice.   Hepatitis panel pending   BEV, ANCA, Anti-ccp, RF pending   AFP, CEA, CA 19-9 pending  Trend LFTs  Hold statin

## 2023-10-02 NOTE — ASSESSMENT & PLAN NOTE
Lab Results   Component Value Date    HGBA1C 7.0 (H) 07/20/2023     Patient has not been on any home medications   Sliding scale insulin while admitted  Diabetic diet

## 2023-10-02 NOTE — ED NOTES
Tiger text report sent to [facility] MS3 RN.       Bronwyn Kohler, JENSEN  10/02/23 9530

## 2023-10-02 NOTE — TELEPHONE ENCOUNTER
Patient called and stated she cannot leave the rehab until she has an appointment with GI. She would like to have Dr Shyann Hale recommendation as to who she should see. Would like a referral to one. She is scheduled to go home tomorrow but needs to have an appt set up before she can do that.     Please contact patient w the contact information for GI

## 2023-10-02 NOTE — ASSESSMENT & PLAN NOTE
CT:  Reticular interstitial lung abnormality (REENA) noted in both lower lobes. Recommend nonemergent outpatient evaluation with high-resolution chest CT to exclude shiloh UIP pattern fibrosis.

## 2023-10-03 NOTE — ASSESSMENT & PLAN NOTE
Lab Results   Component Value Date    HGBA1C 7.0 (H) 07/20/2023     Patient has not been on any home medications   Sliding scale insulin while admitted  Diabetic diet  (P) 110.75

## 2023-10-03 NOTE — ASSESSMENT & PLAN NOTE
Previously evaluated by GI in July for transaminitis thought 2/2 shock liver. CMV and EBV IGG positive but no IGM. Hepatitis panel and RUQ ultrasound were negative. Labs obtained due to new jaundice at [facility] showed T bili of 6, prompting ED presentation. S/P cholecystectomy. LFTs on admission:  Direct bilirubin: 7.70  Total bilirubin: 12.2  Alk phos: 118  AST: 690  ALT: 407  GI consulted   CT A/P: Nodular liver morphology and sequela of portal venous hypertension suggesting cirrhosis. Minimal ascites around the liver and layering in the pelvis. Prominent lymph nodes at the katelynn hepatis presumably reactive to primary underlying liver dysfunction. No biliary ductal dilatation to suggest obstructive cause of jaundice.   Plan for MRI  Await labs  AFP and CA 19-9 are elevated

## 2023-10-03 NOTE — PHYSICAL THERAPY NOTE
PHYSICAL THERAPY EVAL/TX  Physical Therapy Evaluation    Performed at least 2 patient identifiers during session:  Patient Active Problem List   Diagnosis    Paroxysmal atrial fibrillation ([address])    Mixed hyperlipidemia    Essential hypertension    Class 2 severe obesity due to excess calories with serious comorbidity and body mass index (BMI) of 35.0 to 35.9 in adult     Acquired hypothyroidism    Medicare annual wellness visit, subsequent    Lumbosacral radiculopathy due to intervertebral disc disorder    Multiple actinic keratoses    Prediabetes    Failed back surgical syndrome    Gait abnormality    Mood disorder (HCC)    Left shoulder tendonitis    Primary osteoarthritis of left shoulder    Strain of left shoulder    Spondylolisthesis, grade 2    Lumbar spinal stenosis    Localized, primary osteoarthritis of hand    Incontinence of feces with fecal urgency    ADRIA (obstructive sleep apnea)    Osteoarthritis of knee    Diffuse interstitial pulmonary fibrosis ([address])    Long term (current) use of anticoagulants    Adjustment insomnia    Herniated nucleus pulposus, L5-S1    Displaced fracture of fifth metatarsal bone, right foot, initial encounter for closed fracture    Arthritis of carpometacarpal (CMC) joint of left thumb    Hematuria, undiagnosed cause    Radiculopathy due to cervical spondylosis at multiple levels    Tendinitis of right shoulder    Bilateral carpal tunnel syndrome    Status post spinal surgery    Calculus in bladder    Breast mass    Closed right ankle fracture    Hyponatremia    Transaminitis    Asymptomatic bacteriuria    Nondisplaced fracture of distal end of right fibula    Aftercare following surgery of the musculoskeletal system    Type 2 diabetes mellitus ([address])       Past Medical History:   Diagnosis Date    Anxiety     Carpal tunnel syndrome, bilateral     Cholecystitis     Chronic calculous cholecystitis last assessed 4/25/16    History of cardioversion     History of radiofrequency ablation procedure for cardiac arrhythmia     Hyperlipidemia     Hypertension     Hypokalemia 7/20/2023    Sleep apnea     no trouble since Afib corrected       Past Surgical History:   Procedure Laterality Date    APPENDECTOMY      BACK SURGERY  11/15/2018    L4-5 Screws and rods    BLADDER SUSPENSION      CARDIOVERSION      atrial - onset 2015 - x5 in 2014 and 2015    CHOLECYSTECTOMY      April 2016    HYSTERECTOMY      KNEE ARTHROSCOPY W/ MENISCAL REPAIR Bilateral     therapeutic - last assessed 4/14/16    KNEE SURGERY      WI LAPAROSCOPY SURG CHOLECYSTECTOMY N/A 4/26/2016    Procedure: CHOLECYSTECTOMY LAPAROSCOPIC;  Surgeon: Raoul Izaguirre MD;  Location: [facility];  Service: General    WI NEUROPLASTY &/TRANSPOS MEDIAN NRV CARPAL Abdoulaye Hose Bilateral 7/31/2023    Procedure: RELEASE CARPAL TUNNEL, BILATERAL OPEN;  Surgeon: Bari Soliz MD;  [facility];  Service: Orthopedics    TONSILLECTOMY      WISDOM TOOTH EXTRACTION          10/03/23 1016   PT Last Visit   PT Visit Date 10/03/23   Note Type   Note type Evaluation   Pain Assessment   Pain Assessment Tool 0-10   Pain Score No Pain   Restrictions/Precautions   Weight Bearing Precautions Per Order Yes   RLE Weight Bearing Per Order WBAT   Braces or Orthoses CAM Boot  (Shoe lift for LLE)   Home Living   Type of Home Apartment   Home Layout One level  (1STE)   Bathroom Shower/Tub Tub/shower unit   [employer] bars in shower; Shower chair   Home Equipment Walker;Cane  (rollator)   Additional Comments RW   Prior Function   Level of [place] Independent with ADLs; Independent with functional mobility; Independent with IADLS   Lives With Alone   IADLs Independent with driving; Independent with meal prep; Independent with medication management   Falls in the last 6 months 1 to 4   Vocational Part time employment   Comments Pt typically lives alone & is independent. However, since 7/2023 pt has been at rehab 2/2 R ankle fx. Pt reports she was set to DC today from [facility] & return home   General   Family/Caregiver Present No   Cognition   Overall Cognitive Status WFL   Arousal/Participation Alert   Attention Within functional limits   Orientation Level Oriented to person;Oriented to time;Oriented to place;Oriented to situation   Memory Decreased recall of precautions   Following Commands Follows all commands and directions without difficulty   Subjective   Subjective "I was supposed to be discharged from rehab today."   RLE Assessment   RLE Assessment WFL   LLE Assessment   LLE Assessment WFL   Bed Mobility   Supine to Sit 6  Modified independent   Additional items HOB elevated; Bedrails   Transfers   Sit to Stand 5  Supervision   Additional items Verbal cues  (for hand placement)   Stand to Sit 5  Supervision   Additional items Armrests; Verbal cues   Additional Comments Pt required verbal cues for safety this session for hand placement & proper RW management during sit <> stands. Pt attempted to pull up on RW initially. When going to sit pt abandons RW prematurely   Ambulation/Elevation   Gait pattern Foward flexed   Gait Assistance 6  Modified independent   Assistive Device Rolling walker   Distance 250ft   Ambulation/Elevation Additional Comments Refer to treatment session for stair trial   Balance   Static Sitting Normal   Dynamic Sitting Good   Static Standing Good   Dynamic Standing Good   Ambulatory Good   Endurance Deficit   Endurance Deficit No   Activity Tolerance   Activity Tolerance Patient tolerated treatment well   Medical Staff Made Aware Jammie LOPEZ   Nurse Made Aware Wen STYLES   Assessment   Prognosis Good   Problem List Impaired balance;Obesity;Orthopedic restrictions   Assessment Patient is an 82y/o F who presented with transaminitis, hyponatremia. Patient was at rehab for a closed R ankle fracture. She is not WBAT with a CAM boot.  She was to be discharged from rehab today. She normally resides in an apartment with 1 KARLA. She lives alone. Current medical status includes obesity, fall risk, CAM boot, flat affect, decreased balance and mobility. Patient tolerated session well and demonstrate all mobility without assistance. She ambulated a community distance and performed steps. She has no further inpatient P.T. needs. Will discharge orders. Level 3 resources. The patient's AM-PAC Basic Mobility Inpatient Short Form Raw Score is 24. A Raw score of greater than 17 suggests the patient may benefit from discharge to home. Please also refer to the recommendation of the Physical Therapist for safe discharge planning. Barriers to Discharge None   Goals   Patient Goals To go home   Plan   Treatment/Interventions   (D/C P.T.)   Recommendation   UB Rehab Discharge Recommendation (PT/OT) Level 3   AM-PAC Basic Mobility Inpatient   Turning in Flat Bed Without Bedrails 4   Lying on Back to Sitting on Edge of Flat Bed Without Bedrails 4   Moving Bed to Chair 4   Standing Up From Chair Using Arms 4   Walk in Room 4   Climb 3-5 Stairs With Railing 4   Basic Mobility Inpatient Raw Score 24   Basic Mobility Standardized Score 57.68   Highest Level Of Mobility   JH-HLM Goal 8: Walk 250 feet or more   JH-HLM Achieved 8: Walk 250 feet ot more   Additional Treatment Session   Start Time 1025   End Time 1035   Treatment Assessment Ascended/descended 2 steps with 1 railing nonreciprocal pattern supervision level. End of Consult   Patient Position at End of Consult Bedside chair;Bed/Chair alarm activated; All needs within reach     Angy Sutton, PT             Patient Name: Kimberly Goldman  AOLZP'I Date: 10/3/2023

## 2023-10-03 NOTE — PLAN OF CARE
Problem: MOBILITY - ADULT  Goal: Maintain or return to baseline ADL function  Description: INTERVENTIONS:  -  Assess patient's ability to carry out ADLs; assess patient's baseline for ADL function and identify physical deficits which impact ability to perform ADLs (bathing, care of mouth/teeth, toileting, grooming, dressing, etc.)  - Assess/evaluate cause of self-care deficits   - Assess range of motion  - Assess patient's mobility; develop plan if impaired  - Assess patient's need for assistive devices and provide as appropriate  - Encourage maximum independence but intervene and supervise when necessary  - Involve family in performance of ADLs  - Assess for home care needs following discharge   - Consider OT consult to assist with ADL evaluation and planning for discharge  - Provide patient education as appropriate  Outcome: Progressing  Goal: Maintains/Returns to pre admission functional level  Description: INTERVENTIONS:  - Perform BMAT or MOVE assessment daily.   - Set and communicate daily mobility goal to care team and patient/family/caregiver. - Collaborate with rehabilitation services on mobility goals if consulted  - Perform Range of Motion  times a day. - Reposition patient every  hours.   - Dangle patient  times a day  - Stand patient  times a day  - Ambulate patient  times a day  - Out of bed to chair  times a day   - Out of bed for meals  times a day  - Out of bed for toileting  - Record patient progress and toleration of activity level   Outcome: Progressing     Problem: PAIN - ADULT  Goal: Verbalizes/displays adequate comfort level or baseline comfort level  Description: Interventions:  - Encourage patient to monitor pain and request assistance  - Assess pain using appropriate pain scale  - Administer analgesics based on type and severity of pain and evaluate response  - Implement non-pharmacological measures as appropriate and evaluate response  - Consider cultural and social influences on pain and pain management  - Notify physician/advanced practitioner if interventions unsuccessful or patient reports new pain  Outcome: Progressing     Problem: INFECTION - ADULT  Goal: Absence or prevention of progression during hospitalization  Description: INTERVENTIONS:  - Assess and monitor for signs and symptoms of infection  - Monitor lab/diagnostic results  - Monitor all insertion sites, i.e. indwelling lines, tubes, and drains  - Monitor endotracheal if appropriate and nasal secretions for changes in amount and color  - [place] appropriate cooling/warming therapies per order  - Administer medications as ordered  - Instruct and encourage patient and family to use good hand hygiene technique  - Identify and instruct in appropriate isolation precautions for identified infection/condition  Outcome: Progressing  Goal: Absence of fever/infection during neutropenic period  Description: INTERVENTIONS:  - Monitor WBC    Outcome: Progressing     Problem: SAFETY ADULT  Goal: Maintain or return to baseline ADL function  Description: INTERVENTIONS:  -  Assess patient's ability to carry out ADLs; assess patient's baseline for ADL function and identify physical deficits which impact ability to perform ADLs (bathing, care of mouth/teeth, toileting, grooming, dressing, etc.)  - Assess/evaluate cause of self-care deficits   - Assess range of motion  - Assess patient's mobility; develop plan if impaired  - Assess patient's need for assistive devices and provide as appropriate  - Encourage maximum independence but intervene and supervise when necessary  - Involve family in performance of ADLs  - Assess for home care needs following discharge   - Consider OT consult to assist with ADL evaluation and planning for discharge  - Provide patient education as appropriate  Outcome: Progressing  Goal: Maintains/Returns to pre admission functional level  Description: INTERVENTIONS:  - Perform BMAT or MOVE assessment daily.   - Set and communicate daily mobility goal to care team and patient/family/caregiver. - Collaborate with rehabilitation services on mobility goals if consulted  - Perform Range of Motion  times a day. - Reposition patient every  hours.   - Dangle patient  times a day  - Stand patient  times a day  - Ambulate patient  times a day  - Out of bed to chair  times a day   - Out of bed for meals  times a day  - Out of bed for toileting  - Record patient progress and toleration of activity level   Outcome: Progressing  Goal: Patient will remain free of falls  Description: INTERVENTIONS:  - Educate patient/family on patient safety including physical limitations  - Instruct patient to call for assistance with activity   - Consult OT/PT to assist with strengthening/mobility   - Keep Call bell within reach  - Keep bed low and locked with side rails adjusted as appropriate  - Keep care items and personal belongings within reach  - Initiate and maintain comfort rounds  - Make Fall Risk Sign visible to staff  - Offer Toileting every  Hours, in advance of need  - Initiate/Maintain alarm  - Obtain necessary fall risk management equipment:   - Apply yellow socks and bracelet for high fall risk patients  - Consider moving patient to room near nurses station  Outcome: Progressing     Problem: DISCHARGE PLANNING  Goal: Discharge to home or other facility with appropriate resources  Description: INTERVENTIONS:  - Identify barriers to discharge w/patient and caregiver  - Arrange for needed discharge resources and transportation as appropriate  - Identify discharge learning needs (meds, wound care, etc.)  - Arrange for interpretive services to assist at discharge as needed  - Refer to Case Management Department for coordinating discharge planning if the patient needs post-hospital services based on physician/advanced practitioner order or complex needs related to functional status, cognitive ability, or social support system  Outcome: Progressing Problem: Knowledge Deficit  Goal: Patient/family/caregiver demonstrates understanding of disease process, treatment plan, medications, and discharge instructions  Description: Complete learning assessment and assess knowledge base.   Interventions:  - Provide teaching at level of understanding  - Provide teaching via preferred learning methods  Outcome: Progressing

## 2023-10-03 NOTE — PLAN OF CARE
Problem: MOBILITY - ADULT  Goal: Maintain or return to baseline ADL function  Description: INTERVENTIONS:  -  Assess patient's ability to carry out ADLs; assess patient's baseline for ADL function and identify physical deficits which impact ability to perform ADLs (bathing, care of mouth/teeth, toileting, grooming, dressing, etc.)  - Assess/evaluate cause of self-care deficits   - Assess range of motion  - Assess patient's mobility; develop plan if impaired  - Assess patient's need for assistive devices and provide as appropriate  - Encourage maximum independence but intervene and supervise when necessary  - Involve family in performance of ADLs  - Assess for home care needs following discharge   - Consider OT consult to assist with ADL evaluation and planning for discharge  - Provide patient education as appropriate  Outcome: Progressing  Goal: Maintains/Returns to pre admission functional level  Description: INTERVENTIONS:  - Perform BMAT or MOVE assessment daily.   - Set and communicate daily mobility goal to care team and patient/family/caregiver. - Collaborate with rehabilitation services on mobility goals if consulted  - Perform Range of Motion 3 times a day. - Reposition patient every 2 hours.   - Dangle patient 2 times a day  - Stand patient 3 times a day  - Ambulate patient 3 times a day  - Out of bed to chair 2 times a day   - Out of bed for meals 3 times a day  - Out of bed for toileting  - Record patient progress and toleration of activity level   Outcome: Progressing     Problem: PAIN - ADULT  Goal: Verbalizes/displays adequate comfort level or baseline comfort level  Description: Interventions:  - Encourage patient to monitor pain and request assistance  - Assess pain using appropriate pain scale  - Administer analgesics based on type and severity of pain and evaluate response  - Implement non-pharmacological measures as appropriate and evaluate response  - Consider cultural and social influences on pain and pain management  - Notify physician/advanced practitioner if interventions unsuccessful or patient reports new pain  Outcome: Progressing     Problem: INFECTION - ADULT  Goal: Absence or prevention of progression during hospitalization  Description: INTERVENTIONS:  - Assess and monitor for signs and symptoms of infection  - Monitor lab/diagnostic results  - Monitor all insertion sites, i.e. indwelling lines, tubes, and drains  - Monitor endotracheal if appropriate and nasal secretions for changes in amount and color  - [place] appropriate cooling/warming therapies per order  - Administer medications as ordered  - Instruct and encourage patient and family to use good hand hygiene technique  - Identify and instruct in appropriate isolation precautions for identified infection/condition  Outcome: Progressing  Goal: Absence of fever/infection during neutropenic period  Description: INTERVENTIONS:  - Monitor WBC    Outcome: Progressing     Problem: SAFETY ADULT  Goal: Maintain or return to baseline ADL function  Description: INTERVENTIONS:  -  Assess patient's ability to carry out ADLs; assess patient's baseline for ADL function and identify physical deficits which impact ability to perform ADLs (bathing, care of mouth/teeth, toileting, grooming, dressing, etc.)  - Assess/evaluate cause of self-care deficits   - Assess range of motion  - Assess patient's mobility; develop plan if impaired  - Assess patient's need for assistive devices and provide as appropriate  - Encourage maximum independence but intervene and supervise when necessary  - Involve family in performance of ADLs  - Assess for home care needs following discharge   - Consider OT consult to assist with ADL evaluation and planning for discharge  - Provide patient education as appropriate  Outcome: Progressing     Problem: DISCHARGE PLANNING  Goal: Discharge to home or other facility with appropriate resources  Description: INTERVENTIONS:  - Identify barriers to discharge w/patient and caregiver  - Arrange for needed discharge resources and transportation as appropriate  - Identify discharge learning needs (meds, wound care, etc.)  - Arrange for interpretive services to assist at discharge as needed  - Refer to Case Management Department for coordinating discharge planning if the patient needs post-hospital services based on physician/advanced practitioner order or complex needs related to functional status, cognitive ability, or social support system  Outcome: Progressing     Problem: Knowledge Deficit  Goal: Patient/family/caregiver demonstrates understanding of disease process, treatment plan, medications, and discharge instructions  Description: Complete learning assessment and assess knowledge base.   Interventions:  - Provide teaching at level of understanding  - Provide teaching via preferred learning methods  Outcome: Progressing

## 2023-10-03 NOTE — PROGRESS NOTES
[address] [facility]  Progress Note  Name: Shweta Santiago  MRN: [id number]  Unit/Bed#: -01 I Date of Admission: 10/2/2023   Date of Service: 10/3/2023 I Hospital Day: 1    Assessment/Plan   Type 2 diabetes mellitus [facility])  Assessment & Plan  Lab Results   Component Value Date    HGBA1C 7.0 (H) 07/20/2023     Patient has not been on any home medications   Sliding scale insulin while admitted  Diabetic diet  (P) 110.75      Hyponatremia  Assessment & Plan  Previously discharged on 2 g salt tabs BID and torsemide 5 mg daily  Torsemide was stopped by facility for unknown reason   Continue to monitor on salt tabs - Na corrects to 135 on admission    Mood disorder (HCC)  Assessment & Plan  Continue Lexapro  Ambien nightly as needed    Acquired hypothyroidism  Assessment & Plan  Continue levothyroxine 175 mcg daily    Class 2 severe obesity due to excess calories with serious comorbidity and body mass index (BMI) of 35.0 to 35.9 in adult   Assessment & Plan  Dietary changes and lifestyle modifications  Affects all aspects of care    Essential hypertension  Assessment & Plan  Previously discharged on diovan, sotalol, torsemide  At facility patient is on HCTZ 12.5, losartan 100 mg, and sotalol 80 mg BID  Continue     Mixed hyperlipidemia  Assessment & Plan  Holding Lipitor 20 mg daily given LFTs    Paroxysmal atrial fibrillation (HCC)  Assessment & Plan  Previously on diltiazem and sotalol however patient recently taken off diltiazem at facility  Unclear why diltiazem was stopped   Will continue to hold for now given hepatic impairment   Continue Xarelto for anticoagulation    * Transaminitis  Assessment & Plan  Previously evaluated by GI in July for transaminitis thought 2/2 shock liver. CMV and EBV IGG positive but no IGM. Hepatitis panel and RUQ ultrasound were negative. Labs obtained due to new jaundice at [facility] showed T bili of 6, prompting ED presentation. S/P cholecystectomy.    LFTs on admission:  Direct bilirubin: 7.70  Total bilirubin: 12.2  Alk phos: 118  AST: 690  ALT: 407  GI consulted   CT A/P: Nodular liver morphology and sequela of portal venous hypertension suggesting cirrhosis. Minimal ascites around the liver and layering in the pelvis. Prominent lymph nodes at the katelynn hepatis presumably reactive to primary underlying liver dysfunction. No biliary ductal dilatation to suggest obstructive cause of jaundice. Plan for MRI  Await labs  AFP and CA 19-9 are elevated               VTE Pharmacologic Prophylaxis: VTE Score: 4 Moderate Risk (Score 3-4) - Pharmacological DVT Prophylaxis Ordered: rivaroxaban (Xarelto). Patient Centered Rounds: I performed bedside rounds with nursing staff today. Discussions with Specialists or Other Care Team Provider: BERNARDINO    Education and Discussions with Family / Patient: Patient declined call to . Total Time Spent on Date of Encounter in care of patient: 45 mins. This time was spent on one or more of the following: performing physical exam; counseling and coordination of care; obtaining or reviewing history; documenting in the medical record; reviewing/ordering tests, medications or procedures; communicating with other healthcare professionals and discussing with patient's family/caregivers. Current Length of Stay: 1 day(s)  Current Patient Status: Inpatient   Certification Statement: The patient will continue to require additional inpatient hospital stay due to LFT elevation  Discharge Plan:  pending improvement    Code Status: Level 1 - Full Code    Subjective:   Patient states he feels fine. , denies abdominal pain, nausea and vomiting.  Tolerating PO intake    Objective:     Vitals:   Temp (24hrs), Av.5 °F (36.4 °C), Min:96.6 °F (35.9 °C), Max:98.2 °F (36.8 °C)    Temp:  [96.6 °F (35.9 °C)-98.2 °F (36.8 °C)] 96.6 °F (35.9 °C)  HR:  [58-68] 64  Resp:  [16-20] 16  BP: (107-141)/(54-77) 139/66  SpO2:  [94 %-98 %] 98 %  Body mass index is 37.09 kg/m². Input and Output Summary (last 24 hours): Intake/Output Summary (Last 24 hours) at 10/3/2023 1415  Last data filed at 10/3/2023 1300  Gross per 24 hour   Intake 1090 ml   Output --   Net 1090 ml       Physical Exam:   Physical Exam  Vitals and nursing note reviewed. Constitutional:       General: She is not in acute distress. Appearance: She is well-developed. HENT:      Head: Normocephalic and atraumatic. Mouth/Throat:      Mouth: Mucous membranes are moist.      Pharynx: Oropharynx is clear. Eyes:      General: Scleral icterus present. Conjunctiva/sclera: Conjunctivae normal.   Cardiovascular:      Rate and Rhythm: Normal rate and regular rhythm. Heart sounds: No murmur heard. Pulmonary:      Effort: Pulmonary effort is normal. No respiratory distress. Breath sounds: Normal breath sounds. Abdominal:      Palpations: Abdomen is soft. Tenderness: There is no abdominal tenderness. Musculoskeletal:         General: No swelling. Cervical back: Neck supple. Skin:     General: Skin is warm and dry. Capillary Refill: Capillary refill takes less than 2 seconds. Coloration: Skin is jaundiced. Neurological:      Mental Status: She is alert and oriented to person, place, and time.    Psychiatric:         Mood and Affect: Mood normal.          Additional Data:     Labs:  Results from last 7 days   Lab Units 10/03/23  0355   WBC Thousand/uL 6.60   HEMOGLOBIN g/dL 11.5   HEMATOCRIT % 34.6*   PLATELETS Thousands/uL 137*   NEUTROS PCT % 47   LYMPHS PCT % 27   MONOS PCT % 14*   EOS PCT % 10*     Results from last 7 days   Lab Units 10/03/23  0355   SODIUM mmol/L 134*   POTASSIUM mmol/L 3.7   CHLORIDE mmol/L 106   CO2 mmol/L 27   BUN mg/dL 14   CREATININE mg/dL 0.69   ANION GAP mmol/L 1   CALCIUM mg/dL 8.5   ALBUMIN g/dL 1.9*   TOTAL BILIRUBIN mg/dL 12.14*   ALK PHOS U/L 109*   ALT U/L 392*   AST U/L 665*   GLUCOSE RANDOM mg/dL 82     Results from last 7 days   Lab Units 10/03/23  1200   INR  >15.00*     Results from last 7 days   Lab Units 10/03/23  1047 10/03/23  0723 10/02/23  2128 10/02/23  1947   POC GLUCOSE mg/dl 111 75 124 133               Lines/Drains:  Invasive Devices       Peripheral Intravenous Line  Duration             Peripheral IV 10/02/23 Proximal;Right;Ventral (anterior) Forearm <1 day                          Imaging: Reviewed radiology reports from this admission including: abdominal/pelvic CT    Recent Cultures (last 7 days):         Last 24 Hours Medication List:   Current Facility-Administered Medications   Medication Dose Route Frequency Provider Last Rate    escitalopram  10 mg Oral Daily Erika Ward PA-C      hydrochlorothiazide  12.5 mg Oral Daily Erika Ward PA-C      insulin lispro  1-5 Units Subcutaneous HS Erika Ward PA-C      insulin lispro  1-6 Units Subcutaneous TID AC Marcela Long PA-C      levothyroxine  175 mcg Oral Early Morning Erika Briceño PA-C      losartan  100 mg Oral Daily Erika Briceño PA-C      rivaroxaban  20 mg Oral Daily With [employer] ALBIN Ward      sodium chloride  2 g Oral BID With Meals Erika Ward PA-C      sotalol  80 mg Oral BID Candelaria Gamez PA-C          Today, Patient Was Seen By: Monnie Dandy, MD    **Please Note: This note may have been constructed using a voice recognition system. **

## 2023-10-03 NOTE — CONSULTS
Consultation - [facility]     Rigo Earnest 81 y.o. female MRN: [id number]  Unit/Bed#: -01 Encounter: [id number]    Inpatient consult to gastroenterology  Consult performed by: Yasmeen Copeland PA-C  Consult ordered by: Fanny Cheek PA-C          ASSESSMENT and PLAN  Transaminitis  83F with hx/o cholecystectomy (2016), acquired hypothyroidism, paroxysmal A-fib on Xarelto, HTN, HLD who presented to  ED from acute rehab on 10/2/2023 for acute painless jaundice.     Differentials include exposures to hepatotoxins, meds, viral hepatitis, ETOH, drugs, travel, food intake, autoimmune hepatitis, occupational disorders, right-sided heart failure (congestive hepatopathy), diabetes mellitus, skin pigmentation, arthritis, hypogonadism and dilated cardiomyopathy (hemochromatosis), obesity and metabolic syndrome (nonalcoholic fatty liver disease), pregnancy (gallstones), inflammatory bowel disease (primary sclerosing cholangitis, gallstones), early onset emphysema (alpha-1 antitrypsin deficiency), celiac disease, and thyroid disease.     - Labs notable for , , Tbili 12.14, Dbili 7.66, , albumin 1.9, plt 137, lipase wnl, AFP 16.91, acetaminophen level wnl  - MRI w IV contrast + MRCP ordered  - Awaiting results of INR, CA 19-9, acute hepatitis panel, BEV, ANCA, CCP IgG, RF, Celiac panel, iron panel, A1-AT, ceruloplasmin, GGT, aSMA  - Patient will need further workup outpatient with GI    Acute painless jaundice  - R/o obstructive process - less likely without biliary ductal dilation on CT but MRI is more sensitive to identify stone  - R/o neoplasm - none seen on CT but MRI is more sensitive    Cirrhosis of liver  - New dx, not noted on RUQ US 7/2023    Ascites  - New: trace, noted on non-contrast CT A/P 10/2/2023    Esophageal varices  - New: "wispy gastroesophageal and perigastric varices are present" on CT A/P 10/2/2023    Hepatic encephalopathy  - None to date    Liver lesion  - None to date on CT A/P 10/2/2023  - MRI w IV contrast + MRCP pending    Paroxysmal atrial fibrillation  - On Xarelto    Chief Complaint   Patient presents with    Abnormal Lab     Elevated liver enzymes from routine blood work done yesterday. Physician Requesting Consult: Norma Henderson MD    HPI  Accompanied by self and history is obtained from self. Marline Stout is a 81y.o. year old female with a past medical history of cholecystectomy (2016), acquired hypothyroidism, paroxysmal A-fib on Xarelto, HTN, HLD who presented to the ED from acute rehab on 10/2/2023 for acute painless jaundice. Found to have elevated LFTs and possible cirrhosis & evidence of portal HTN on CT A/P. No biliary ductal dilation noted on CT. She denies fevers, chills, unintentional weight loss, odynophagia, dysphagia, heartburn/regurgitation, abdominal pain, nausea/vomiting, constipation/diarrhea, rectal pain, hematochezia, melena. She was seen by GI on her previous recent admission to [address] [place] [address] July 2023 and workup for transaminitis & elevated bilirubin was largely unremarkable, felt to be 2/2 shock liver. Patient denies recent travel, different food intake, sick contacts, or recent abx use. Patient denies any ETOH, IVDU, tobacco use. Hx of blood transfusions or residential time or tattoos. Patient denies any OTC medications like NSAIDS or supplements. Patient denies any family history of liver disease or GI malignancies. Admission labs notable for transaminases in 400s-600s and elevated alk phos. Total bilirubin elevated at 12.20, direct bilirubin 7.78. Lipase wnl. AFP elevated at 16.91, CEA wnl. CA 19-9 pending. Non-contrast CT A/P 10/2/23 revealed "IMPRESSION:     Nodular liver morphology and sequela of portal venous hypertension suggesting cirrhosis. Minimal ascites around the liver and layering in the pelvis.   Prominent lymph nodes at the katelynn hepatis presumably reactive to primary underlying liver dysfunction. No biliary ductal dilatation to suggest obstructive cause of jaundice. 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 Recommend nonemergent outpatient evaluation with high-resolution chest CT to exclude shiloh UIP pattern fibrosis."    GI History:  Previous issues: None  Blood thinners: ASA: no antiplatelet: no anticoagulation: yes - Xarelto  NSAID use: none  Caffeine use: Tobacco use: never  EtOH use: 1-2 glasses wine/cocktails socially on weekends  Cannabis use: never  Insulin use: no    Abdominal Surgical Hx: s/p cholecystectomy (4/2016)  Family Hx: Denies first degree relatives with GI malignancies. GI procedure Hx:  EGD: never  Colonoscopy: never, declines  GI imaging Hx: as noted above    Review of Systems   Constitutional:  Negative for appetite change, chills, fever and unexpected weight change. HENT:  Negative for dental problem, mouth sores, sore throat, trouble swallowing and voice change. Respiratory:  Negative for cough and choking. Cardiovascular:  Negative for chest pain and leg swelling. Gastrointestinal:  Negative for abdominal distention, abdominal pain, anal bleeding, blood in stool, constipation, diarrhea, nausea, rectal pain and vomiting. Skin:  Positive for color change (jaundice). Negative for pallor and rash. Neurological:  Negative for weakness, light-headedness and headaches. Psychiatric/Behavioral:  Negative for confusion and sleep disturbance. The patient is not nervous/anxious.         Historical Information   Past Medical History:   Diagnosis Date    Anxiety     Carpal tunnel syndrome, bilateral     Cholecystitis     Chronic calculous cholecystitis     last assessed 4/25/16    History of cardioversion     History of radiofrequency ablation procedure for cardiac arrhythmia     Hyperlipidemia     Hypertension     Hypokalemia 7/20/2023    Sleep apnea     no trouble since Afib corrected     Past Surgical History: Procedure Laterality Date    APPENDECTOMY      BACK SURGERY  11/15/2018    L4-5 Screws and rods    BLADDER SUSPENSION      CARDIOVERSION      atrial - onset 2015 - x5 in 2014 and 2015    CHOLECYSTECTOMY      April 2016    HYSTERECTOMY      KNEE ARTHROSCOPY W/ MENISCAL REPAIR Bilateral     therapeutic - last assessed 4/14/16    KNEE SURGERY      FL LAPAROSCOPY SURG CHOLECYSTECTOMY N/A 4/26/2016    Procedure: CHOLECYSTECTOMY LAPAROSCOPIC;  Surgeon: Vitor Barron MD;  Location: [facility];  Service: General    FL NEUROPLASTY &/TRANSPOS MEDIAN NRV CARPAL [place] P[place]o Bilateral 7/31/2023    Procedure: RELEASE CARPAL TUNNEL, BILATERAL OPEN;  Surgeon: Eliseo Langley MD;  Location: [facility];  Service: Orthopedics    TONSILLECTOMY      WISDOM TOOTH EXTRACTION       Social History   Social History     Substance and Sexual Activity   Alcohol Use Yes    Comment: social; occasional     Social History     Substance and Sexual Activity   Drug Use No     Social History     Tobacco Use   Smoking Status Never   Smokeless Tobacco Never     Family History   Problem Relation Age of Onset    Cancer Mother         ovarian    Heart disease Father         cardiac disorder    Cancer Father     Heart disease Brother     Heart disease Paternal Aunt     Heart disease Paternal Uncle        Meds/Allergies     Current Facility-Administered Medications   Medication Dose Route Frequency    escitalopram (LEXAPRO) tablet 10 mg  10 mg Oral Daily    hydrochlorothiazide (HYDRODIURIL) tablet 12.5 mg  12.5 mg Oral Daily    insulin lispro (HumaLOG) 100 units/mL subcutaneous injection 1-5 Units  1-5 Units Subcutaneous HS    insulin lispro (HumaLOG) 100 units/mL subcutaneous injection 1-6 Units  1-6 Units Subcutaneous TID AC    levothyroxine tablet 175 mcg  175 mcg Oral Early Morning    losartan (COZAAR) tablet 100 mg  100 mg Oral Daily    rivaroxaban (XARELTO) tablet 20 mg  20 mg Oral Daily With Dinner    sodium chloride tablet 2 g  2 g Oral BID With Meals sotalol (BETAPACE) tablet 80 mg  80 mg Oral BID     Medications Prior to Admission   Medication    atorvastatin (LIPITOR) 20 mg tablet    diltiazem (CARDIZEM CD) 360 MG 24 hr capsule    Diovan 320 MG tablet    escitalopram (LEXAPRO) 10 mg tablet    gabapentin (NEURONTIN) 100 mg capsule    hydrochlorothiazide (HYDRODIURIL) 25 mg tablet    HYDROcodone-acetaminophen (Norco) 5-325 mg per tablet    levothyroxine 175 mcg tablet    liotrix (THYROLAR-1) 60 (12.5-50) MG (MCG) TABS    rivaroxaban (XARELTO) 20 mg tablet    sodium chloride 1 g tablet    sotalol (BETAPACE) 80 mg tablet    torsemide (DEMADEX) 5 MG tablet    zolpidem (AMBIEN) 10 mg tablet       Allergies   Allergen Reactions    Pneumovax [Pneumococcal Polysaccharide Vaccine] Hives    Medical Tape     Nuts - Food Allergy     Omnipaque [Iohexol] Hives    Other Hives     Pine nuts    Penicillins Hives    Sulfa Antibiotics Hives    Sulfur Other (See Comments)    Iodine - Food Allergy Rash    Latex Rash and Other (See Comments)       PHYSICAL EXAM    Physical Exam  Vitals and nursing note reviewed. Constitutional:       General: She is not in acute distress. Appearance: She is overweight. She is not toxic-appearing. HENT:      Head: Normocephalic. Mouth/Throat:      Mouth: Mucous membranes are moist.      Pharynx: Oropharynx is clear. Eyes:      General: Scleral icterus present. Extraocular Movements: Extraocular movements intact. Neck:      Trachea: Phonation normal.   Cardiovascular:      Comments: Appears well perfused  Pulmonary:      Effort: Pulmonary effort is normal. No respiratory distress. Abdominal:      General: Abdomen is protuberant. Bowel sounds are normal. There is no distension or abdominal bruit. Palpations: Abdomen is soft. There is no hepatomegaly or splenomegaly. Tenderness: There is no abdominal tenderness. There is no guarding or rebound. Musculoskeletal:      Cervical back: Neck supple.       Right lower leg: No edema. Left lower leg: No edema. Comments: Moving all 4 extremities spontaneously   Skin:     General: Skin is warm and dry. Capillary Refill: Capillary refill takes less than 2 seconds. Coloration: Skin is jaundiced. Skin is not pale. Neurological:      General: No focal deficit present. Mental Status: She is alert and oriented to person, place, and time. Psychiatric:         Behavior: Behavior normal. Behavior is cooperative. Thought Content: Thought content normal.           Lab Results   Component Value Date    CALCIUM 8.5 10/03/2023    K 3.7 10/03/2023    CO2 27 10/03/2023     10/03/2023    BUN 14 10/03/2023    CREATININE 0.69 10/03/2023    CREATININE 0.80 10/02/2023    CREATININE 0.45 (L) 07/24/2023     Lab Results   Component Value Date    WBC 6.60 10/03/2023    WBC 6.45 10/02/2023    WBC 12.37 (H) 07/24/2023    HGB 11.5 10/03/2023    HGB 11.8 10/02/2023    HGB 11.3 (L) 07/24/2023    MCV 87 10/03/2023     (L) 10/03/2023     (L) 10/02/2023     07/24/2023     Lab Results   Component Value Date     (H) 10/03/2023     (H) 10/02/2023    ALT 70 (H) 07/24/2023     (H) 10/03/2023     (H) 10/02/2023    AST 68 (H) 07/24/2023    ALKPHOS 109 (H) 10/03/2023    ALKPHOS 118 (H) 10/02/2023    ALKPHOS 102 07/24/2023    TBILI 12.14 (H) 10/03/2023    TBILI 12.20 (H) 10/02/2023    TBILI 3.03 (H) 07/24/2023     Lab Results   Component Value Date    LIPASE 57 10/02/2023     Lab Results   Component Value Date    INR 3.69 (H) 07/21/2023       CT abdomen pelvis wo contrast    Result Date: 10/2/2023  Narrative: CT ABDOMEN AND PELVIS WITHOUT IV CONTRAST INDICATION:   New onset jaundice. COMPARISON: CT of the lumbar spine dated 1/23/2019. Ultrasound dated 7/20/2023. TECHNIQUE:  CT examination of the abdomen and pelvis was performed without intravenous contrast. Multiplanar 2D reformatted images were created from the source data.  This examination, like all CT scans performed in the [facility], was performed utilizing techniques to minimize radiation dose exposure, including the use of iterative reconstruction and automated exposure control. Radiation dose length product (DLP) for this visit:  792.82 mGy-cm Enteric contrast was administered. FINDINGS: ABDOMEN LOWER CHEST: Peripheral reticulation and subpleural banding in both lower lobes without shiloh bronchiolectasis or honeycombing. No acute consolidations at the lung bases. Suspect biatrial enlargement of the heart. No acute findings in the visualized portions of the lower chest. LIVER/BILIARY TREE: Liver size is within normal limits. Contour appears micronodular. Parenchyma is mildly heterogeneous, but there is no focal hepatic mass. Main portal vein is normal in caliber. Recannulization of the umbilical vein noted suggesting portal venous hypertension. Wispy gastroesophageal and perigastric varices are present. Intrahepatic and extrahepatic biliary tree appears within normal limits given prior cholecystectomy. No ductal dilatation. GALLBLADDER: Gallbladder is surgically absent. SPLEEN: Borderline splenomegaly measuring 12.5 cm in the long axis with flattening of the hilar concavity. No focal splenic masses. PANCREAS:  Unremarkable. ADRENAL GLANDS:  Unremarkable. KIDNEYS/URETERS:  Unremarkable. No hydronephrosis. STOMACH AND BOWEL: There is colonic diverticulosis without evidence of acute diverticulitis. APPENDIX: Not well demonstrated. No evidence for acute appendicitis. ABDOMINOPELVIC CAVITY: Mild perihepatic ascites. Mild free fluid layering in the pelvis, also. Minimal hazy edema in the mesenteric fat. No encapsulated collections or free air. Prominent lymph nodes at the katelynn hepatis. Periportal node measuring 19 mm x 13 mm (5/26). Portacaval node measuring 22 mm x 13 mm (5/29 appears increased in size from 2019.  VESSELS: Sequelae of portal venous hypertension, discussed above. Abdominal aorta and branch vessels show atherosclerotic calcifications with involvement of the SMA and renal ostia. Cannot assess degree of stenosis without contrast. No aneurysms. PELVIS REPRODUCTIVE ORGANS: Surgical changes of prior hysterectomy. No suspicious adnexal masses. URINARY BLADDER: Bladder base shows mild findings suggesting inferior descent though there is no shiloh cystocele. No wall thickening or perivesicular inflammation. No endoluminal stones. ABDOMINAL WALL/INGUINAL REGIONS: Small amount of fat in the left inguinal canal. Minuscule fat-containing umbilical hernia. OSSEOUS STRUCTURES: Suspect prior L5 laminectomy with L4-S1 posterior fixation. No evidence for hardware failure. No acute fracture or destructive osseous lesion. Impression: Nodular liver morphology and sequela of portal venous hypertension suggesting cirrhosis. Minimal ascites around the liver and layering in the pelvis. Prominent lymph nodes at the katelynn hepatis presumably reactive to primary underlying liver dysfunction. No biliary ductal dilatation to suggest obstructive cause of jaundice. 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 Recommend nonemergent outpatient evaluation with high-resolution chest CT to exclude shiloh UIP pattern fibrosis. Workstation performed: [id number]     XR ankle 3+ vw right    Result Date: 9/14/2023  Narrative: RIGHT ANKLE INDICATION:   Fracture follow-up status post injury in July. COMPARISON: Ankle radiograph 7/20/2023. VIEWS:  XR ANKLE 3+ VW RIGHT Images: 3 FINDINGS: Healing, nondisplaced oblique fracture of the right distal fibula. Alignment unchanged from prior radiograph. Resolution of overlying soft tissue swelling. Small plantar calcaneal spur. Calcaneocuboid degenerative changes versus os peroneum. No lytic or blastic osseous lesion. Impression: Healing, nondisplaced oblique right distal fibular fracture, alignment unchanged.  Resident: Cara Martinez BARTOLO GUERRA, the attending radiologist, have reviewed the images and agree with the final report above. Workstation performed: [id number]       Imaging Studies: I have personally reviewed pertinent reports. Pathology, and Other Studies: I have personally reviewed pertinent reports. Patient expressed understanding and had all questions and concerns addressed. Gladys Griffith PA-C  10/03/23   11:00 AM     This chart was completed in part utilizing [employer] speech voice recognition software. Random word insertions, pronoun errors, and incomplete sentences are an occasional consequence of this system due to software limitations, and ambient noise. Any questions or concerns about the content, text, or information contained within the body of this dictation should be directly addressed to the provider for clarification.

## 2023-10-03 NOTE — PLAN OF CARE
Problem: OCCUPATIONAL THERAPY ADULT  Goal: Performs self-care activities at highest level of function for planned discharge setting. See evaluation for individualized goals. Description: Treatment Interventions: ADL retraining, Functional transfer training, Endurance training, Patient/family training, Equipment evaluation/education, Energy conservation          See flowsheet documentation for full assessment, interventions and recommendations. Note: Limitation: Decreased ADL status, Decreased endurance, Decreased self-care trans, Decreased high-level ADLs  Prognosis: Good  Assessment: Pt is a 81 y.o. female seen for OT evaluation at [facility], admitted 10/2/2023 w/ Transaminitis. OT completed extensive review of pt's medical and social history. Comorbidities affecting pt's functional performance at time of assessment include: PAF, HTN, obesity, mood disorder, closed R ankle fx (WBAT with CAM boot), DM2, h/o spinal sx, osteoarthritis, ADRIA. Personal factors affecting pt at time of IE include: steps to enter environment, limited home support, behavioral pattern, difficulty performing ADLS, difficulty performing IADLS , limited insight into deficits, compliance, flat affect, and environment. Prior to admission, was STR & was reportedly set to DC today back to home. Prior to STR, pt was living alone in an apartment with 1 KARLA. Pt was I w/  ADLS and w/ IADLS, (+) drove, & required use of RW PTA. Upon evaluation: Pt requires mod I for bed mobility, S/Mod I for functional mobility/transfers, S for [facility] ADLs and Min A for LB ADLS 2* the following deficits impacting occupational performance: decreased balance, decreased tolerance, impaired problem solving, decreased safety awareness, orthopedic restrictions, and impaired interpersonal skills. Full objective findings from OT assessment regarding body systems outlined above.  Pt to benefit from continued skilled OT tx while in the hospital to address deficits as defined above and maximize level of functional independence w/ ADL's and functional mobility. Occupational Performance areas to address include: bathing/shower, toilet hygiene, dressing, functional mobility, community mobility, and clothing management. Based on findings, pt is of high complexity. The patient's raw score on the -PAC Daily Activity inpatient short form is 21, standardized score is 44.27, greater than 39.4. Patients at this level are likely to benefit from DC to home. However, please refer to therapist recommendation for discharge planning given other factors that may influence destination. At this time, OT recommendations at time of discharge are DC with Level III resources.

## 2023-10-03 NOTE — OCCUPATIONAL THERAPY NOTE
Occupational Therapy Evaluation     Patient Name: Gretta Franco  GHTFP'P Date: 10/3/2023  Problem List  Principal Problem:    Transaminitis  Active Problems:    Paroxysmal atrial fibrillation ([address])    Mixed hyperlipidemia    Essential hypertension    Class 2 severe obesity due to excess calories with serious comorbidity and body mass index (BMI) of 35.0 to 35.9 in adult     Acquired hypothyroidism    Mood disorder (HCC)    ADRIA (obstructive sleep apnea)    Diffuse interstitial pulmonary fibrosis (HCC)    Closed right ankle fracture    Hyponatremia    Type 2 diabetes mellitus ([address])    Past Medical History  Past Medical History:   Diagnosis Date    Anxiety     Carpal tunnel syndrome, bilateral     Cholecystitis     Chronic calculous cholecystitis     last assessed 4/25/16    History of cardioversion     History of radiofrequency ablation procedure for cardiac arrhythmia     Hyperlipidemia     Hypertension     Hypokalemia 7/20/2023    Sleep apnea     no trouble since Afib corrected     Past Surgical History  Past Surgical History:   Procedure Laterality Date    APPENDECTOMY      BACK SURGERY  11/15/2018    L4-5 Screws and rods    BLADDER SUSPENSION      CARDIOVERSION      atrial - onset 2015 - x5 in 2014 and 2015    CHOLECYSTECTOMY      April 2016    HYSTERECTOMY      KNEE ARTHROSCOPY W/ MENISCAL REPAIR Bilateral     therapeutic - last assessed 4/14/16    KNEE SURGERY      VA LAPAROSCOPY SURG CHOLECYSTECTOMY N/A 4/26/2016    Procedure: CHOLECYSTECTOMY LAPAROSCOPIC;  Surgeon: Jay Quintero MD;  Location:  MAIN OR;  Service: General    VA NEUROPLASTY &/TRANSPOS MEDIAN NRV CARPAL [employer] Bilateral 7/31/2023    Procedure: RELEASE CARPAL TUNNEL, BILATERAL OPEN;  Surgeon: Kiara Emerson MD;  Location:  MAIN OR;  Service: Orthopedics    TONSILLECTOMY      WISDOM TOOTH EXTRACTION             10/03/23 1028   OT Last Visit   OT Visit Date 10/03/23   Note Type   Note type Evaluation   Pain Assessment   Pain Assessment Tool 0-10   Pain Score No Pain   Restrictions/Precautions   Weight Bearing Precautions Per Order Yes   RLE Weight Bearing Per Order WBAT   Braces or Orthoses CAM Boot; Other (Comment)  (Shoe lift for LLE)   Home Living   Type of [address] [place] [address] One level  (1STE)   Bathroom Shower/Tub Tub/shower unit   [employer] Grab bars in shower; Shower chair   Home Equipment Walker;Cane  (Rollator)   Additional Comments RW   Prior Function   Level of [place] Independent with ADLs; Independent with functional mobility; Independent with IADLS   Lives With Alone   IADLs Independent with driving; Independent with meal prep; Independent with medication management   Falls in the last 6 months 1 to 4   Vocational Part time employment  ( at [employer])   Comments Pt typically lives alone & is independent. However, since 7/2023 pt has been at rehab 2/2 R ankle fx. Pt reports she was set to DC today from STR & return home   General   Family/Caregiver Present No   Subjective   Subjective "I would do 5 stairs at home!"   ADL   Eating Assistance [address] Deficit Other (Comment)  (CAM boot - pt required assistance to don CAM boot this session. Pt states the bed height is too elevated & she is unable to complete on her own. Able to don L shoe independently)   Toileting Assistance  5  Supervision/Setup   Bed Mobility   Supine to Sit 6  Modified independent   Transfers   Sit to Stand 5  Supervision   Additional items Verbal cues   Stand to Sit 5  Supervision   Additional items Armrests; Verbal cues   Additional Comments Pt required verbal cues for safety this session for hand placement & proper RW management during sit <> stands. Pt attempted to pull up on RW initially.  When going to sit pt abandons RW prematurely  (Pt able to ascend/descend 2 standard steps with rail with S to demo safe entry/exit into home)   Functional Mobility   Functional Mobility 6  Modified independent   Additional Comments Greater than functional household distance  (+ LATIF)   Additional items Rolling walker   Balance   Static Sitting Good   Dynamic Sitting Fair +   Static Standing Fair +   Dynamic Standing Fair +   Ambulatory Fair +   Activity Tolerance   Activity Tolerance Patient tolerated treatment well   Medical Staff Made Aware PT Chiquita   Nurse Made Aware RN Skip Stacks   RUE Assessment   RUE Assessment WFL   LUE Assessment   LUE Assessment WFL   Vision-Basic Assessment   Current Vision Wears glasses all the time   Cognition   Overall Cognitive Status WFL   Arousal/Participation Alert   Attention Within functional limits   Orientation Level Oriented X4   Memory Decreased recall of precautions   Following Commands Follows all commands and directions without difficulty   Assessment   Limitation Decreased ADL status; Decreased endurance;Decreased self-care trans;Decreased high-level ADLs   Prognosis Good   Assessment Pt is a 81 y.o. female seen for OT evaluation at [facility], admitted 10/2/2023 w/ Transaminitis. OT completed extensive review of pt's medical and social history. Comorbidities affecting pt's functional performance at time of assessment include: PAF, HTN, obesity, mood disorder, closed R ankle fx (WBAT with CAM boot), DM2, h/o spinal sx, osteoarthritis, ADRIA. Personal factors affecting pt at time of IE include: steps to enter environment, limited home support, behavioral pattern, difficulty performing ADLS, difficulty performing IADLS , limited insight into deficits, compliance, flat affect, and environment. Prior to admission, was STR & was reportedly set to DC today back to home. Prior to STR, pt was living alone in an apartment with 1 KARLA. Pt was I w/  ADLS and w/ IADLS, (+) drove, & required use of RW PTA. Upon evaluation: Pt requires mod I for bed mobility, S/Mod I for functional mobility/transfers, S for UB ADLs and Min A for LB ADLS 2* the following deficits impacting occupational performance: decreased balance, decreased tolerance, impaired problem solving, decreased safety awareness, orthopedic restrictions, and impaired interpersonal skills. Full objective findings from OT assessment regarding body systems outlined above. Pt to benefit from continued skilled OT tx while in the hospital to address deficits as defined above and maximize level of functional independence w/ ADL's and functional mobility. Occupational Performance areas to address include: bathing/shower, toilet hygiene, dressing, functional mobility, community mobility, and clothing management. Based on findings, pt is of high complexity. The patient's raw score on the AM-PAC Daily Activity inpatient short form is 21, standardized score is 44.27, greater than 39.4. Patients at this level are likely to benefit from DC to home. However, please refer to therapist recommendation for discharge planning given other factors that may influence destination. At this time, OT recommendations at time of discharge are DC with Level III resources. Goals   Patient Goals Pt wants to go home   Plan   Treatment Interventions ADL retraining;Functional transfer training; Endurance training;Patient/family training;Equipment evaluation/education; Energy conservation   Goal Expiration Date 10/13/23   OT Treatment Day 0   OT Frequency 1-2x/wk   Recommendation   UB Rehab Discharge Recommendation (PT/OT) Level 3   AM-PAC Daily Activity Inpatient   Lower Body Dressing 3   Bathing 3   Toileting 3   Upper Body Dressing 4   Grooming 4   Eating 4   Daily Activity Raw Score 21   Daily Activity Standardized Score (Calc for Raw Score >=11) 44.27   AM-PAC Applied Cognition Inpatient   Following a Speech/Presentation 3   Understanding Ordinary Conversation 4   Taking Medications 4 Remembering Where Things Are Placed or Put Away 4   Remembering List of 4-5 Errands 4   Taking Care of Complicated Tasks 3   Applied Cognition Raw Score 22   Applied Cognition Standardized Score 47.83   End of Consult   Education Provided Yes   Patient Position at End of Consult Bedside chair;Bed/Chair alarm activated; All needs within reach   Nurse Communication Nurse aware of consult     Pt will achieve the following goals within 10 days. *Pt will complete LB bathing and dressing with Mod I & DME PRN. *Pt will complete toileting w/ Mod I w/ G hygiene/thoroughness using DME PRN    *Pt will perform functional transfers with on/off all surfaces with Mod I using DME as needed w/ G balance/safety. *Pt will improve functional mobility during ADL/IADL/leisure tasks to Mod I using DME as needed w/ G balance/safety. *Pt will demonstrate 100% carryover of precautions s/p review w/o cues w/ Mod I w/ G tolerance/participation t/o functional ADL/IADL/leisure tasks.     *Assess DME needs    Sean Louis OTR/L

## 2023-10-03 NOTE — CASE MANAGEMENT
Case Management Assessment & Discharge Planning Note    Patient name Jenny Kumar  Location /-01 MRN [id number]  : 1940 Date 10/3/2023       Current Admission Date: 10/2/2023  Current Admission Diagnosis:Transaminitis   Patient Active Problem List    Diagnosis Date Noted    Type 2 diabetes mellitus ([address]) 10/02/2023    Aftercare following surgery of the musculoskeletal system 08/10/2023    Nondisplaced fracture of distal end of right fibula 2023    Asymptomatic bacteriuria 2023    Closed right ankle fracture 2023    Hyponatremia 2023    Transaminitis 2023    Bilateral carpal tunnel syndrome 2023    Radiculopathy due to cervical spondylosis at multiple levels 2023    Tendinitis of right shoulder 2023    Breast mass 2023    Calculus in bladder 2023    Herniated nucleus pulposus, L5-S1 2023    Displaced fracture of fifth metatarsal bone, right foot, initial encounter for closed fracture 2023    Arthritis of carpometacarpal [place]) joint of left thumb 2023    Hematuria, undiagnosed cause 2023    ADRIA (obstructive sleep apnea) 2022    Osteoarthritis of knee 2022    Diffuse interstitial pulmonary fibrosis ([address]) 2022    Long term (current) use of anticoagulants 2022    Adjustment insomnia 2022    Spondylolisthesis, grade 2 2022    Lumbar spinal stenosis 2022    Localized, primary osteoarthritis of hand 2022    Incontinence of feces with fecal urgency 2022    Strain of left shoulder 2021    Left shoulder tendonitis 2021    Primary osteoarthritis of left shoulder 2021    Mood disorder ([address]) 2020    Gait abnormality 2020    Failed back surgical syndrome 2019    Status post spinal surgery 11/15/2018    Prediabetes 2018    Medicare annual wellness visit, subsequent 2018    Lumbosacral radiculopathy due to intervertebral disc disorder 09/26/2018    Multiple actinic keratoses 09/26/2018    Class 2 severe obesity due to excess calories with serious comorbidity and body mass index (BMI) of 35.0 to 35.9 in adult  04/25/2016    Paroxysmal atrial fibrillation ([address]) 04/14/2016    Mixed hyperlipidemia 04/14/2016    Essential hypertension 04/14/2016    Acquired hypothyroidism 04/14/2016      LOS (days): 1  Geometric Mean LOS (GMLOS) (days): 3.30  Days to GMLOS:2.5     OBJECTIVE:    Risk of Unplanned Readmission Score: 11.96         Current admission status: Inpatient       Preferred Pharmacy:   [address] [facility]  [address] [facility] [address]  Phone: [phone] Fax: [phone] for [address] [facility], [address], [address] [facility] - [address] [address] [facility] [address]  Phone: [phone] Fax: [phone], [address] [facility] [address] [facility] [address] [facility] [address]  Phone: [phone] Fax: [phone]    [facility] [address] [place], [place] - [address]  [address] [facility] [address]  Phone: [phone] Fax: [phone]    Primary Care Provider: Es Hassan MD    Primary Insurance: MEDICARE  Secondary Insurance:  FOR LIFE    ASSESSMENT:  Active Health Care Proxies       [place], [address] [facility],[address] Representative - Daughter In-Law   Primary Phone: [phone] (Mobile)                 Advance Directives  Does patient have a [address]?: Yes  Does patient have Advance Directives?: Yes  Advance Directives: Living will, Power of  for health care  Primary Contact: Gina Vu         Readmission Root Cause  30 Day Readmission: No    Patient Information  Admitted from[de-identified] Facility  Mental Status: Alert  During Assessment patient was accompanied by: Daughter  Assessment information provided by[de-identified] Patient, Daughter  Primary Caregiver: Self  Support Systems: Irma Camacho of Residence: [address] do you live in?: [address] [facility] entry access options.  Select all that apply.: Stairs  Number of steps to enter home.: 2  Do the steps have railings?: No  Type of Current Residence: Apartment  Floor Level: 1  Upon entering residence, is there a bedroom on the main floor (no further steps)?: Yes  Upon entering residence, is there a bathroom on the main floor (no further steps)?: Yes  In the last 12 months, was there a time when you were not able to pay the mortgage or rent on time?: No  In the last 12 months, how many places have you lived?: 1  In the last 12 months, was there a time when you did not have a steady place to sleep or slept in a shelter (including now)?: No  Living Arrangements: Lives Alone  Is patient a ?: No    Activities of Daily Living Prior to Admission  Functional Status: Independent  Completes ADLs independently?: Yes  Ambulates independently?: Yes  Does patient use assisted devices?: Yes  Assisted Devices (DME) used: Walker, Rollator, Shower Chair  Does patient currently own DME?: Yes  What DME does the patient currently own?: Rollator, Shower Chair, Walker  Does patient have a history of Outpatient Therapy (PT/OT)?: No  Does the patient have a history of Short-Term Rehab?: Yes [facility])  Does patient have a history of HHC?: No  Does patient currently have [address]?: Yes    Current Home Health Care  Type of Current Home Care Services: Home OT, Home PT, Nurse visit  [address][de-identified]  (regferral for [place] made from SNF, patient for discharge today from SNF)  [address] [facility] Provider[de-identified] PCP    Patient Information Continued  Income Source: Pension/assisted  Does patient have prescription coverage?: Yes  Within the past 12 months, you worried that your food would run out before you got the money to buy more.: Never true  Within the past 12 months, the food you bought just didn't last and you didn't have money to get more.: Never true  Does patient receive dialysis treatments?: No  Does patient have a history of substance abuse?: No  Does patient have a history of Mental Health Diagnosis?: No         Means of Transportation  Means of Transport to Appts[de-identified] Family transport  In the past 12 months, has lack of transportation kept you from medical appointments or from getting medications?: No  In the past 12 months, has lack of transportation kept you from meetings, work, or from getting things needed for daily living?: No        DISCHARGE DETAILS:    Discharge planning discussed with[de-identified] patient and her daughter, Chelsea Russell contacted family/caregiver?: Yes  Were Treatment Team discharge recommendations reviewed with patient/caregiver?: Yes  Did patient/caregiver verbalize understanding of patient care needs?: Yes       Contacts  Patient Contacts: Ava Marrero  Relationship to Patient[de-identified] Family  Contact Method: In Person  Reason/Outcome: Discharge Lora requested[de-identified] Nursing, Occupational Therapy, Physical [place] Agency Name[de-identified] [facility] External Referral Reason (only applicable if external HHA name selected): Patient has established relationship with provider  [facility] [facility] Provider[de-identified] PCP  Home Health Services Needed[de-identified] Evaluate Functional Status and Safety, Gait/ADL Training, Other (comment) (patient education and med monitoring)  Homebound Criteria Met[de-identified] Uses an Assist Device (i.e. cane, walker, etc)  Supporting Clincal Findings[de-identified] Limited Endurance       Met with patient with her daughter Tawanna Segura present to review the role of CM and discuss any needs patient may prior to discharge. Patient is a rehab resident at [facility] and was ready for discharge before coming into the hospital.  Patient was seen by PT/OT and their recommendations were for gaye sotoo return home with [address] for PT/OT/SN.   Patient resides alone in a 1st floor apt with 2 KARLA. Patient is independent of ADL's and has a RW, rollater and a shower chair. Spoke with Mariela of [facility] who confirmed patient was for discharge and a referral to [address] [facility] was made. .  Follow up referral to [facility] made via [facility],  Wait availability. Patient's daughter will transport her home. Received information from [facility] that [facility]  [address] can follow patient.  [facility] reserved on [facility] and added to patient's AVS.  Met with patient and she is agreeable to the above

## 2023-10-04 NOTE — PROGRESS NOTES
[facility]  Progress Note  Name: Ivonne Botello  MRN: [id number]  Unit/Bed#: -01 I Date of Admission: 10/2/2023   Date of Service: 10/4/2023 I Hospital Day: 2    Assessment/Plan   * Transaminitis  Assessment & Plan  Previously evaluated by GI in July for transaminitis thought 2/2 shock liver. CMV and EBV IGG positive but no IGM. Hepatitis panel and RUQ ultrasound were negative. Labs obtained due to new jaundice at [facility] showed T bili of 6, prompting ED presentation. S/P cholecystectomy. LFTs on admission:  Direct bilirubin: 7.70  Total bilirubin: 12.2  Alk phos: 118  AST: 690  ALT: 407  GI consulted   CT A/P: Nodular liver morphology and sequela of portal venous hypertension suggesting cirrhosis. Minimal ascites around the liver and layering in the pelvis. Prominent lymph nodes at the katelynn hepatis presumably reactive to primary underlying liver dysfunction. No biliary ductal dilatation to suggest obstructive cause of jaundice. AFP and CA 19-9 are elevated  MRI shows nodular liver, trace perihepatic ascites, 5 mm pancreatic uncinate process cystic lesion  Discussed with GI liver injury likely attributed by drug-induced.   Monitor LFT normal.  If not trending down will require biopsy  Hold Xarelto    Type 2 diabetes mellitus ([address])  Assessment & Plan  Lab Results   Component Value Date    HGBA1C 7.0 (H) 07/20/2023     Patient has not been on any home medications   Sliding scale insulin while admitted  Diabetic diet  (P) 124      Closed right ankle fracture  Assessment & Plan  Continue ambulating with camboot and walker  PT/OT for dispo planning     Mood disorder (HCC)  Assessment & Plan  Continue Lexapro  Ambien nightly as needed    Acquired hypothyroidism  Assessment & Plan  Continue levothyroxine 175 mcg daily    Class 2 severe obesity due to excess calories with serious comorbidity and body mass index (BMI) of 35.0 to 35.9 in adult   Assessment & Plan  Dietary changes and lifestyle modifications  Affects all aspects of care    Essential hypertension  Assessment & Plan  Previously discharged on diovan, sotalol, torsemide  At facility patient is on HCTZ 12.5, losartan 100 mg, and sotalol 80 mg BID  Continue     Mixed hyperlipidemia  Assessment & Plan  Holding Lipitor 20 mg daily given LFTs    Paroxysmal atrial fibrillation (HCC)  Assessment & Plan  Previously on diltiazem and sotalol however patient recently taken off diltiazem at facility  Unclear why diltiazem was stopped   Will continue to hold for now given hepatic impairment   Hold Xarelto in anticipation of liver biopsy               VTE Pharmacologic Prophylaxis: VTE Score: 4 Moderate Risk (Score 3-4) - Pharmacological DVT Prophylaxis Contraindicated. Sequential Compression Devices Ordered. Patient Centered Rounds: I performed bedside rounds with nursing staff today. Discussions with Specialists or Other Care Team Provider: This management, nursing    Education and Discussions with Family / Patient: Patient declined call to . Total Time Spent on Date of Encounter in care of patient: 55 mins. This time was spent on one or more of the following: performing physical exam; counseling and coordination of care; obtaining or reviewing history; documenting in the medical record; reviewing/ordering tests, medications or procedures; communicating with other healthcare professionals and discussing with patient's family/caregivers. Current Length of Stay: 2 day(s)  Current Patient Status: Inpatient   Certification Statement: The patient will continue to require additional inpatient hospital stay due to liver injury  Discharge Plan: Anticipate discharge in >72 hrs to home with home services. Code Status: Level 1 - Full Code    Subjective:   Can stated she feels fine. Not rested well enough.   No itching, poor appetite, nausea or vomiting      Objective:     Vitals:   Temp (24hrs), Av.6 °F (36.4 °C), Min:97.3 °F (36.3 °C), Max:97.9 °F (36.6 °C)    Temp:  [97.3 °F (36.3 °C)-97.9 °F (36.6 °C)] 97.3 °F (36.3 °C)  HR:  [64-71] 71  Resp:  [16] 16  BP: (118-142)/(60-67) 138/63  SpO2:  [97 %-99 %] 97 %  Body mass index is 37.09 kg/m². Input and Output Summary (last 24 hours): Intake/Output Summary (Last 24 hours) at 10/4/2023 1153  Last data filed at 10/4/2023 0817  Gross per 24 hour   Intake 1200 ml   Output 556 ml   Net 644 ml       Physical Exam:   Physical Exam  Vitals and nursing note reviewed. Constitutional:       General: She is not in acute distress. Appearance: She is well-developed. HENT:      Head: Normocephalic and atraumatic. Mouth/Throat:      Mouth: Mucous membranes are moist.      Pharynx: Oropharynx is clear. Eyes:      General: Scleral icterus present. Conjunctiva/sclera: Conjunctivae normal.   Cardiovascular:      Rate and Rhythm: Normal rate and regular rhythm. Heart sounds: No murmur heard. Pulmonary:      Effort: Pulmonary effort is normal. No respiratory distress. Breath sounds: Normal breath sounds. Abdominal:      Palpations: Abdomen is soft. Tenderness: There is no abdominal tenderness. Musculoskeletal:         General: No swelling. Cervical back: Neck supple. Skin:     General: Skin is warm and dry. Capillary Refill: Capillary refill takes less than 2 seconds. Coloration: Skin is jaundiced. Neurological:      Mental Status: She is alert and oriented to person, place, and time.    Psychiatric:         Mood and Affect: Mood normal.          Additional Data:     Labs:  Results from last 7 days   Lab Units 10/03/23  0355   WBC Thousand/uL 6.60   HEMOGLOBIN g/dL 11.5   HEMATOCRIT % 34.6*   PLATELETS Thousands/uL 137*   NEUTROS PCT % 47   LYMPHS PCT % 27   MONOS PCT % 14*   EOS PCT % 10*     Results from last 7 days   Lab Units 10/04/23  0539   SODIUM mmol/L 133*   POTASSIUM mmol/L 3.0*   CHLORIDE mmol/L 105   CO2 mmol/L 26   BUN mg/dL 15 CREATININE mg/dL 0.63   ANION GAP mmol/L 2   CALCIUM mg/dL 8.2*   ALBUMIN g/dL 1.8*   TOTAL BILIRUBIN mg/dL 11.97*   ALK PHOS U/L 116*   ALT U/L 374*   AST U/L 643*   GLUCOSE RANDOM mg/dL 126     Results from last 7 days   Lab Units 10/03/23  1200   INR  >15.00*     Results from last 7 days   Lab Units 10/04/23  1104 10/04/23  0720 10/03/23  2014 10/03/23  1624 10/03/23  1047 10/03/23  0723 10/02/23  2128 10/02/23  1947   POC GLUCOSE mg/dl 159* 119 149* 122 111 75 124 133               Lines/Drains:  Invasive Devices       Peripheral Intravenous Line  Duration             Peripheral IV 10/02/23 Proximal;Right;Ventral (anterior) Forearm 1 day                          Imaging: Reviewed radiology reports from this admission including: MRI abdomen/MRCP    Recent Cultures (last 7 days):         Last 24 Hours Medication List:   Current Facility-Administered Medications   Medication Dose Route Frequency Provider Last Rate    escitalopram  10 mg Oral Daily Jose Ward PA-C      hydrochlorothiazide  12.5 mg Oral Daily Jose Ward PA-C      insulin lispro  1-5 Units Subcutaneous HS Jose Ward PA-C      insulin lispro  1-6 Units Subcutaneous TID AC Marcela Long PA-C      levothyroxine  175 mcg Oral Early Morning Jose Briceño PA-C      losartan  100 mg Oral Daily Jose Ward PA-C      magnesium sulfate  2 g Intravenous Once Melvia Snellen, MD 2 g (10/04/23 0828)    sodium chloride  2 g Oral BID With Meals Jose Ward PA-C      sotalol  80 mg Oral BID Gallito Kinsey PA-C          Today, Patient Was Seen By: Melvia Snellen, MD    **Please Note: This note may have been constructed using a voice recognition system. **

## 2023-10-04 NOTE — ASSESSMENT & PLAN NOTE
Previously discharged on diovan, sotalol, torsemide  At facility patient is on HCTZ 12.5, losartan 100 mg, and sotalol 80 mg BID  Continue mild/YES

## 2023-10-04 NOTE — ASSESSMENT & PLAN NOTE
Previously evaluated by GI in July for transaminitis thought 2/2 shock liver. CMV and EBV IGG positive but no IGM. Hepatitis panel and RUQ ultrasound were negative. Labs obtained due to new jaundice at [facility] showed T bili of 6, prompting ED presentation. S/P cholecystectomy. LFTs on admission:  Direct bilirubin: 7.70  Total bilirubin: 12.2  Alk phos: 118  AST: 690  ALT: 407  GI consulted   CT A/P: Nodular liver morphology and sequela of portal venous hypertension suggesting cirrhosis. Minimal ascites around the liver and layering in the pelvis. Prominent lymph nodes at the katelynn hepatis presumably reactive to primary underlying liver dysfunction. No biliary ductal dilatation to suggest obstructive cause of jaundice. AFP and CA 19-9 are elevated  MRI shows nodular liver, trace perihepatic ascites, 5 mm pancreatic uncinate process cystic lesion  Discussed with GI liver injury likely attributed by drug-induced.   Monitor LFT normal.  If not trending down will require biopsy  Hold Xarelto

## 2023-10-04 NOTE — PLAN OF CARE
Problem: MOBILITY - ADULT  Goal: Maintain or return to baseline ADL function  Description: INTERVENTIONS:  -  Assess patient's ability to carry out ADLs; assess patient's baseline for ADL function and identify physical deficits which impact ability to perform ADLs (bathing, care of mouth/teeth, toileting, grooming, dressing, etc.)  - Assess/evaluate cause of self-care deficits   - Assess range of motion  - Assess patient's mobility; develop plan if impaired  - Assess patient's need for assistive devices and provide as appropriate  - Encourage maximum independence but intervene and supervise when necessary  - Involve family in performance of ADLs  - Assess for home care needs following discharge   - Consider OT consult to assist with ADL evaluation and planning for discharge  - Provide patient education as appropriate  Outcome: Progressing  Goal: Maintains/Returns to pre admission functional level  Description: INTERVENTIONS:  - Perform BMAT or MOVE assessment daily.   - Set and communicate daily mobility goal to care team and patient/family/caregiver. - Collaborate with rehabilitation services on mobility goals if consulted  - Perform Range of Motion  times a day. - Reposition patient every  hours.   - Dangle patient  times a day  - Stand patient  times a day  - Ambulate patient  times a day  - Out of bed to chair  times a day   - Out of bed for meals  times a day  - Out of bed for toileting  - Record patient progress and toleration of activity level   Outcome: Progressing     Problem: PAIN - ADULT  Goal: Verbalizes/displays adequate comfort level or baseline comfort level  Description: Interventions:  - Encourage patient to monitor pain and request assistance  - Assess pain using appropriate pain scale  - Administer analgesics based on type and severity of pain and evaluate response  - Implement non-pharmacological measures as appropriate and evaluate response  - Consider cultural and social influences on pain and pain management  - Notify physician/advanced practitioner if interventions unsuccessful or patient reports new pain  Outcome: Progressing     Problem: INFECTION - ADULT  Goal: Absence or prevention of progression during hospitalization  Description: INTERVENTIONS:  - Assess and monitor for signs and symptoms of infection  - Monitor lab/diagnostic results  - Monitor all insertion sites, i.e. indwelling lines, tubes, and drains  - Monitor endotracheal if appropriate and nasal secretions for changes in amount and color  - [place] appropriate cooling/warming therapies per order  - Administer medications as ordered  - Instruct and encourage patient and family to use good hand hygiene technique  - Identify and instruct in appropriate isolation precautions for identified infection/condition  Outcome: Progressing  Goal: Absence of fever/infection during neutropenic period  Description: INTERVENTIONS:  - Monitor WBC    Outcome: Progressing     Problem: SAFETY ADULT  Goal: Maintain or return to baseline ADL function  Description: INTERVENTIONS:  -  Assess patient's ability to carry out ADLs; assess patient's baseline for ADL function and identify physical deficits which impact ability to perform ADLs (bathing, care of mouth/teeth, toileting, grooming, dressing, etc.)  - Assess/evaluate cause of self-care deficits   - Assess range of motion  - Assess patient's mobility; develop plan if impaired  - Assess patient's need for assistive devices and provide as appropriate  - Encourage maximum independence but intervene and supervise when necessary  - Involve family in performance of ADLs  - Assess for home care needs following discharge   - Consider OT consult to assist with ADL evaluation and planning for discharge  - Provide patient education as appropriate  Outcome: Progressing  Goal: Maintains/Returns to pre admission functional level  Description: INTERVENTIONS:  - Perform BMAT or MOVE assessment daily.   - Set and communicate daily mobility goal to care team and patient/family/caregiver. - Collaborate with rehabilitation services on mobility goals if consulted  - Perform Range of Motion  times a day. - Reposition patient every  hours.   - Dangle patient  times a day  - Stand patient  times a day  - Ambulate patient  times a day  - Out of bed to chair  times a day   - Out of bed for meals times a day  - Out of bed for toileting  - Record patient progress and toleration of activity level   Outcome: Progressing  Goal: Patient will remain free of falls  Description: INTERVENTIONS:  - Educate patient/family on patient safety including physical limitations  - Instruct patient to call for assistance with activity   - Consult OT/PT to assist with strengthening/mobility   - Keep Call bell within reach  - Keep bed low and locked with side rails adjusted as appropriate  - Keep care items and personal belongings within reach  - Initiate and maintain comfort rounds  - Make Fall Risk Sign visible to staff  - Offer Toileting every  Hours, in advance of need  - Initiate/Maintain alarm  - Obtain necessary fall risk management equipment:  - Apply yellow socks and bracelet for high fall risk patients  - Consider moving patient to room near nurses station  Outcome: Progressing     Problem: DISCHARGE PLANNING  Goal: Discharge to home or other facility with appropriate resources  Description: INTERVENTIONS:  - Identify barriers to discharge w/patient and caregiver  - Arrange for needed discharge resources and transportation as appropriate  - Identify discharge learning needs (meds, wound care, etc.)  - Arrange for interpretive services to assist at discharge as needed  - Refer to Case Management Department for coordinating discharge planning if the patient needs post-hospital services based on physician/advanced practitioner order or complex needs related to functional status, cognitive ability, or social support system  Outcome: Progressing Problem: Knowledge Deficit  Goal: Patient/family/caregiver demonstrates understanding of disease process, treatment plan, medications, and discharge instructions  Description: Complete learning assessment and assess knowledge base.   Interventions:  - Provide teaching at level of understanding  - Provide teaching via preferred learning methods  Outcome: Progressing

## 2023-10-04 NOTE — ASSESSMENT & PLAN NOTE
Lab Results   Component Value Date    HGBA1C 7.0 (H) 07/20/2023     Patient has not been on any home medications   Sliding scale insulin while admitted  Diabetic diet  (P) 124

## 2023-10-04 NOTE — PROGRESS NOTES
Progress note - [facility]   Rigo Tinoco 81 y.o. female MRN: [id number]  Unit/Bed#: -01 Encounter: [id number]    ASSESSMENT and PLAN  Transaminitis  83F with hx/o cholecystectomy (2016), acquired hypothyroidism, paroxysmal A-fib on Xarelto, HTN, HLD who presented to  ED from acute rehab on 10/2/2023 for acute painless jaundice.      Differentials include exposures to hepatotoxins, meds, viral hepatitis, ETOH, drugs, travel, food intake, autoimmune hepatitis, occupational disorders, right-sided heart failure (congestive hepatopathy), diabetes mellitus, skin pigmentation, arthritis, hypogonadism and dilated cardiomyopathy (hemochromatosis), obesity and metabolic syndrome (nonalcoholic fatty liver disease), pregnancy (gallstones), inflammatory bowel disease (primary sclerosing cholangitis, gallstones), early onset emphysema (alpha-1 antitrypsin deficiency), celiac disease, and thyroid disease.      - Labs notable for , , Tbili 12.14, Dbili 7.66, , albumin 1.9, plt 137, lipase wnl, AFP 16.91, acetaminophen level wnl, CA 19-9 171, BEV wnl, INR >15  - Repeat INR 10/4 @ 18:00  - MRI & MRCP largely unremarkable, noted 3 mm pancreatic cyst without concerning features  - Awaiting additional lab results  - HOLD Xarelto - if LFTs & INR no better after 2 day washout, will plan liver bx  - Patient will need further workup outpatient with GI     Acute painless jaundice  - No concerning masses on MRI abdomen/MRCP 10/3/2023     Cirrhosis of liver  - New dx, not noted on RUQ US 7/2023     Ascites  - New: trace, noted on non-contrast CT A/P 10/2/2023     Esophageal varices  - New: "wispy gastroesophageal and perigastric varices are present" on CT A/P 10/2/2023     Hepatic encephalopathy  - None to date     Liver lesion  - None to date on CT A/P 10/2/2023 nor MRI abdomen/MRCP 10/3/2023     Paroxysmal atrial fibrillation  - Xarelto on HOLD     SUBJECTIVE  Accompanied by self and history is obtained from self. Feeling well today. Tolerating diet, prefers to eat light. Moving her bowels OK. Review of Systems   Constitutional:  Negative for appetite change, chills and fever. HENT:  Negative for dental problem, mouth sores, sore throat, trouble swallowing and voice change. Respiratory:  Negative for cough and choking. Cardiovascular:  Negative for chest pain and leg swelling. Gastrointestinal:  Negative for abdominal distention, abdominal pain, anal bleeding, blood in stool, constipation, diarrhea, nausea, rectal pain and vomiting. Skin:  Negative for pallor and rash. Neurological:  Negative for weakness, light-headedness and headaches. Psychiatric/Behavioral:  Negative for confusion and sleep disturbance. The patient is not nervous/anxious. Temp:  [97.3 °F (36.3 °C)-97.9 °F (36.6 °C)] 97.3 °F (36.3 °C)  HR:  [64-71] 71  Resp:  [16] 16  BP: (118-142)/(60-67) 138/63     PHYSICAL EXAM  Physical Exam  Vitals and nursing note reviewed. Constitutional:       General: She is not in acute distress. Appearance: She is overweight. She is not toxic-appearing. HENT:      Head: Normocephalic. Mouth/Throat:      Mouth: Mucous membranes are moist.      Pharynx: Oropharynx is clear. Eyes:      General: Scleral icterus present. Extraocular Movements: Extraocular movements intact. Neck:      Trachea: Phonation normal.   Cardiovascular:      Comments: Appears well perfused  Pulmonary:      Effort: Pulmonary effort is normal. No respiratory distress. Abdominal:      General: Abdomen is protuberant. Bowel sounds are normal. There is no distension or abdominal bruit. Palpations: Abdomen is soft. There is no hepatomegaly or splenomegaly. Tenderness: There is no abdominal tenderness. There is no guarding or rebound. Musculoskeletal:      Cervical back: Neck supple. Right lower leg: No edema. Left lower leg: No edema.       Comments: Moving all 4 extremities spontaneously   Skin:     General: Skin is warm and dry. Capillary Refill: Capillary refill takes less than 2 seconds. Coloration: Skin is jaundiced. Skin is not pale. Neurological:      General: No focal deficit present. Mental Status: She is alert and oriented to person, place, and time. Psychiatric:         Behavior: Behavior normal. Behavior is cooperative. Thought Content:  Thought content normal.           LABS & STUDIES  Lab Results   Component Value Date    CALCIUM 8.2 (L) 10/04/2023    K 3.0 (L) 10/04/2023    CO2 26 10/04/2023     10/04/2023    BUN 15 10/04/2023    CREATININE 0.63 10/04/2023    CREATININE 0.69 10/03/2023    CREATININE 0.80 10/02/2023     Lab Results   Component Value Date    WBC 6.60 10/03/2023    WBC 6.45 10/02/2023    WBC 12.37 (H) 07/24/2023    HGB 11.5 10/03/2023    HGB 11.8 10/02/2023    HGB 11.3 (L) 07/24/2023    MCV 87 10/03/2023     (L) 10/03/2023     (L) 10/02/2023     07/24/2023     Lab Results   Component Value Date     (H) 10/04/2023     (H) 10/03/2023     (H) 10/02/2023     (H) 10/04/2023     (H) 10/03/2023     (H) 10/02/2023    GGT 76 (H) 10/03/2023    ALKPHOS 116 (H) 10/04/2023    ALKPHOS 109 (H) 10/03/2023    ALKPHOS 118 (H) 10/02/2023    TBILI 11.97 (H) 10/04/2023    TBILI 12.14 (H) 10/03/2023    TBILI 12.20 (H) 10/02/2023     Lab Results   Component Value Date    LIPASE 57 10/02/2023     Lab Results   Component Value Date    IRON 203 10/03/2023    TIBC <258 10/03/2023    FERRITIN 164 10/03/2023     Lab Results   Component Value Date    INR  10/04/2023      Comment:      Unable to calculate    INR >15.00 (HH) 10/03/2023    INR 3.69 (H) 07/21/2023       MRI abdomen w wo contrast and mrcp    Result Date: 10/4/2023  Narrative: MRI OF THE ABDOMEN WITH AND WITHOUT CONTRAST WITH MRCP INDICATION: 83 years / Female acute painless jaundice, transaminitis, and positive AFP tumor marker. COMPARISON: CT abdomen pelvis 10/2/2023. TECHNIQUE:  Multiplanar/multisequence MRI of the abdomen with 3D MRCP was performed before and after administration of contrast. Imaging performed on 1.5T MRI IV Contrast:  10 mL of Gadobutrol injection (SINGLE-DOSE) FINDINGS: LOWER CHEST:   Mild cardiomegaly. LIVER: Nodular liver surface contour, suggestive of cirrhosis. No suspicious mass. Subcentimeter hepatic cyst in the right hepatic lobe (8/13). The hepatic veins and portal veins are patent. BILE DUCTS:  No intrahepatic or extrahepatic bile duct dilation. Common bile duct is normal in caliber. No choledocholithiasis, biliary stricture or suspicious mass. GALLBLADDER: Surgically absent. PANCREAS: A 5 mm pancreatic cystic lesion in the pancreatic uncinate process (6/40) without worrisome features such as suspicious enhancement or solid components. Normal intrinsic T1 hyperintensity of the pancreas. No main pancreatic duct dilatation. ADRENAL GLANDS:  Unremarkable. SPLEEN:  Normal. KIDNEYS/PROXIMAL URETERS:  No hydroureteronephrosis. No suspicious renal mass. Left renal sinus cyst. BOWEL:   No dilated loops of bowel. PERITONEUM/RETROPERITONEUM: Trace perihepatic ascites. LYMPH NODES: Enlarged katelynn hepatis lymph nodes measuring up to 1.5 cm in short axis (17/64), likely reactive in the setting of cirrhosis. VASCULAR STRUCTURES:  No aneurysm. ABDOMINAL WALL: Small fat-containing umbilical hernia. OSSEOUS STRUCTURES:  No suspicious osseous lesion. Degenerative change of the visualized spine. Grade 1 anterolisthesis L4-L5. Lumbosacral spinal fusion hardware is noted. Impression: Nodular liver surface contour suggestive of cirrhosis with sequela portal hypertension including trace perihepatic ascites. No suspicious hepatic mass. A 5 mm pancreatic uncinate process cystic lesion without worrisome features. No main pancreatic duct dilatation.  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 For simple cyst(s) less than 1.5 cm, recommend followup every 2 year for 2 times. After 4 years, no more followups. Recommend next followup in 2 years. Preferred imaging modality: abdomen MRI and MRCP with and without IV contrast, or triple phase abdomen CT with IV contrast, or abdomen MRI and MRCP without IV  contrast. The recommendations regarding pancreatic findings assumes that patient does not have family history of pancreatic cancer nor have any symptoms potentially attributable to pancreatic cystic lesions (hyperamylasemia, recent-onset diabetes, severe epigastric pain, weight loss, steatorrhea, or jaundice.) If these conditions are not true, then management should be deferred to judgement of specialists such as gastroenterologists or oncologic surgeons. REFERENCES: Recommendations are based on recent consensus statements on management of pancreatic cystic lesions from [address] [facility] Association, [facility] of Radiology, the journal Pancreatology, and our own institutional consensus. [facility] Guidelines on the Diagnosis and Management of Asymptomatic Neoplastic Pancreatic Cysts. Gastroenterology 2015; 691:924-567 (AGA GUIDELINES) International Consensus Guidelines for Management of Intraductal Papillary Mucinous Neoplasm and Mucinous Cystic Neoplasms of the Pancreas. Pancreatology Sep-Oct 2017;17(5):738-753. doi: 10.1016/j.yuan.2017.07.007. Epub 2017 Jul 13 [facility] paper). American College of Radiology: White Paper: Management of Incidental Pancreatic Cysts: A White Paper of the ACR Incidental Findings Committee, 2017 Jul;14(7):911-923. doi: 10.1016/j.jacr. 2017.03.010. Epub 2017 May 19. (ACR GUIDELINES.) The study was marked in EPIC for significant notification.  Workstation performed: [id number]     CT abdomen pelvis wo contrast    Result Date: 10/2/2023  Narrative: CT ABDOMEN AND PELVIS WITHOUT IV CONTRAST INDICATION:   New onset jaundice. COMPARISON: CT of the lumbar spine dated 1/23/2019. Ultrasound dated 7/20/2023. TECHNIQUE:  CT examination of the abdomen and pelvis was performed without intravenous contrast. Multiplanar 2D reformatted images were created from the source data. This examination, like all CT scans performed in the [facility], was performed utilizing techniques to minimize radiation dose exposure, including the use of iterative reconstruction and automated exposure control. Radiation dose length product (DLP) for this visit:  792.82 mGy-cm Enteric contrast was administered. FINDINGS: ABDOMEN LOWER CHEST: Peripheral reticulation and subpleural banding in both lower lobes without shiloh bronchiolectasis or honeycombing. No acute consolidations at the lung bases. Suspect biatrial enlargement of the heart. No acute findings in the visualized portions of the lower chest. LIVER/BILIARY TREE: Liver size is within normal limits. Contour appears micronodular. Parenchyma is mildly heterogeneous, but there is no focal hepatic mass. Main portal vein is normal in caliber. Recannulization of the umbilical vein noted suggesting portal venous hypertension. Wispy gastroesophageal and perigastric varices are present. Intrahepatic and extrahepatic biliary tree appears within normal limits given prior cholecystectomy. No ductal dilatation. GALLBLADDER: Gallbladder is surgically absent. SPLEEN: Borderline splenomegaly measuring 12.5 cm in the long axis with flattening of the hilar concavity. No focal splenic masses. PANCREAS:  Unremarkable. ADRENAL GLANDS:  Unremarkable. KIDNEYS/URETERS:  Unremarkable. No hydronephrosis. STOMACH AND BOWEL: There is colonic diverticulosis without evidence of acute diverticulitis. APPENDIX: Not well demonstrated. No evidence for acute appendicitis. ABDOMINOPELVIC CAVITY: Mild perihepatic ascites. Mild free fluid layering in the pelvis, also. Minimal hazy edema in the mesenteric fat.  No encapsulated collections or free air. Prominent lymph nodes at the katelynn hepatis. Periportal node measuring 19 mm x 13 mm (5/26). Portacaval node measuring 22 mm x 13 mm (5/29 appears increased in size from 2019. VESSELS: Sequelae of portal venous hypertension, discussed above. Abdominal aorta and branch vessels show atherosclerotic calcifications with involvement of the SMA and renal ostia. Cannot assess degree of stenosis without contrast. No aneurysms. PELVIS REPRODUCTIVE ORGANS: Surgical changes of prior hysterectomy. No suspicious adnexal masses. URINARY BLADDER: Bladder base shows mild findings suggesting inferior descent though there is no shiloh cystocele. No wall thickening or perivesicular inflammation. No endoluminal stones. ABDOMINAL WALL/INGUINAL REGIONS: Small amount of fat in the left inguinal canal. Minuscule fat-containing umbilical hernia. OSSEOUS STRUCTURES: Suspect prior L5 laminectomy with L4-S1 posterior fixation. No evidence for hardware failure. No acute fracture or destructive osseous lesion. Impression: Nodular liver morphology and sequela of portal venous hypertension suggesting cirrhosis. Minimal ascites around the liver and layering in the pelvis. Prominent lymph nodes at the katelynn hepatis presumably reactive to primary underlying liver dysfunction. No biliary ductal dilatation to suggest obstructive cause of jaundice. 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 Recommend nonemergent outpatient evaluation with high-resolution chest CT to exclude shiloh UIP pattern fibrosis. Workstation performed: [id number]     XR ankle 3+ vw right    Result Date: 9/14/2023  Narrative: RIGHT ANKLE INDICATION:   Fracture follow-up status post injury in July. COMPARISON: Ankle radiograph 7/20/2023. VIEWS:  XR ANKLE 3+ VW RIGHT Images: 3 FINDINGS: Healing, nondisplaced oblique fracture of the right distal fibula. Alignment unchanged from prior radiograph.  Resolution of overlying soft tissue swelling. Small plantar calcaneal spur. Calcaneocuboid degenerative changes versus os peroneum. No lytic or blastic osseous lesion. Impression: Healing, nondisplaced oblique right distal fibular fracture, alignment unchanged. Resident: Navid Tinoco, the attending radiologist, have reviewed the images and agree with the final report above. Workstation performed: [id number]       Patient expressed understanding and had all questions and concerns addressed. Sergio aDve PA-C   10/04/23  11:59 AM    This chart was completed in part utilizing [employer] speech voice recognition software. Random word insertions, pronoun errors, and incomplete sentences are an occasional consequence of this system due to software limitations, and ambient noise. Any questions or concerns about the content, text, or information contained within the body of this dictation should be directly addressed to the provider for clarification.

## 2023-10-04 NOTE — ASSESSMENT & PLAN NOTE
Previously on diltiazem and sotalol however patient recently taken off diltiazem at facility  Unclear why diltiazem was stopped   Will continue to hold for now given hepatic impairment   Hold Xarelto in anticipation of liver biopsy

## 2023-10-05 NOTE — ASSESSMENT & PLAN NOTE
Lab Results   Component Value Date    HGBA1C 7.0 (H) 07/20/2023     Patient has not been on any home medications   Sliding scale insulin while admitted  Diabetic diet  (P) [phone]

## 2023-10-05 NOTE — PROGRESS NOTES
[facility]  Progress Note  Name: Madeleine Unger  MRN: [id number]  Unit/Bed#: -01 I Date of Admission: 10/2/2023   Date of Service: 10/5/2023 I Hospital Day: 3    Assessment/Plan   * Transaminitis  Assessment & Plan  Previously evaluated by GI in July for transaminitis thought 2/2 shock liver. CMV and EBV IGG positive but no IGM. Hepatitis panel and RUQ ultrasound were negative. Labs obtained due to new jaundice at [facility] showed T bili of 6, prompting ED presentation. S/P cholecystectomy. LFTs on admission:  Direct bilirubin: 7.70  Total bilirubin: 12.2  Alk phos: 118  AST: 690  ALT: 407  GI consulted   CT A/P: Nodular liver morphology and sequela of portal venous hypertension suggesting cirrhosis. Minimal ascites around the liver and layering in the pelvis. Prominent lymph nodes at the katelynn hepatis presumably reactive to primary underlying liver dysfunction. No biliary ductal dilatation to suggest obstructive cause of jaundice. AFP and CA 19-9 are elevated  MRI shows nodular liver, trace perihepatic ascites, 5 mm pancreatic uncinate process cystic lesion  Discussed with GI liver injury likely attributed by drug-induced.   Monitor LFT normal. 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- Gabapentin could be cause, but would like to biopsy    Type 2 diabetes mellitus ([address])  Assessment & Plan  Lab Results   Component Value Date    HGBA1C 7.0 (H) 07/20/2023     Patient has not been on any home medications   Sliding scale insulin while admitted  Diabetic diet  (P) [phone]      Hyponatremia  Assessment & Plan  Previously discharged on 2 g salt tabs BID and torsemide 5 mg daily  Torsemide was stopped by facility for unknown reason   Continue to monitor on salt tabs - Na corrects to 135 on admission    Closed right ankle fracture  Assessment & Plan  Continue ambulating with camboot and walker  PT/OT for dispo planning     Diffuse interstitial pulmonary fibrosis (HCC)  Assessment & Plan  CT:  Reticular interstitial lung abnormality (REENA) noted in both lower lobes. Recommend nonemergent outpatient evaluation with high-resolution chest CT to exclude shiloh UIP pattern fibrosis. Mood disorder (HCC)  Assessment & Plan  Continue Lexapro  Ambien nightly as needed    Acquired hypothyroidism  Assessment & Plan  Continue levothyroxine 175 mcg daily    Class 2 severe obesity due to excess calories with serious comorbidity and body mass index (BMI) of 35.0 to 35.9 in adult   Assessment & Plan  Dietary changes and lifestyle modifications  Affects all aspects of care    Essential hypertension  Assessment & Plan  Previously discharged on diovan, sotalol, torsemide  At facility patient is on HCTZ 12.5, losartan 100 mg, and sotalol 80 mg BID  Continue     Mixed hyperlipidemia  Assessment & Plan  Holding Lipitor 20 mg daily given LFTs    Paroxysmal atrial fibrillation (HCC)  Assessment & Plan  Previously on diltiazem and sotalol however patient recently taken off diltiazem at facility  Unclear why diltiazem was stopped   Will continue to hold for now given hepatic impairment   Hold Xarelto in anticipation of liver biopsy               VTE Pharmacologic Prophylaxis: VTE Score: 4 Moderate Risk (Score 3-4) - Pharmacological DVT Prophylaxis Contraindicated. Sequential Compression Devices Ordered. Patient Centered Rounds: I performed bedside rounds with nursing staff today. Discussions with Specialists or Other Care Team Provider: Case management, nursing, GI    Education and Discussions with Family / Patient: Patient declined call to . Total Time Spent on Date of Encounter in care of patient: 55 mins.  This time was spent on one or more of the following: performing physical exam; counseling and coordination of care; obtaining or reviewing history; documenting in the medical record; reviewing/ordering tests, medications or procedures; communicating with other healthcare professionals and discussing with patient's family/caregivers. Current Length of Stay: 3 day(s)  Current Patient Status: Inpatient   Certification Statement: The patient will continue to require additional inpatient hospital stay due to liver enzyme elevation, possible liver biopsy  Discharge Plan:  pending decision    Code Status: Level 1 - Full Code    Subjective:   Patient does not offer complaints, states only tired    Objective:     Vitals:   Temp (24hrs), Av.4 °F (36.3 °C), Min:97.3 °F (36.3 °C), Max:97.5 °F (36.4 °C)    Temp:  [97.3 °F (36.3 °C)-97.5 °F (36.4 °C)] 97.3 °F (36.3 °C)  HR:  [61-66] 61  Resp:  [16] 16  BP: (115-123)/(50-67) 123/55  SpO2:  [96 %-98 %] 98 %  Body mass index is 37.09 kg/m². Input and Output Summary (last 24 hours): Intake/Output Summary (Last 24 hours) at 10/5/2023 1541  Last data filed at 10/5/2023 0715  Gross per 24 hour   Intake 660 ml   Output --   Net 660 ml       Physical Exam:   Physical Exam  Vitals and nursing note reviewed. Constitutional:       General: She is not in acute distress. Appearance: She is well-developed. HENT:      Head: Normocephalic and atraumatic. Mouth/Throat:      Mouth: Mucous membranes are moist.      Pharynx: Oropharynx is clear. Eyes:      General: Scleral icterus present. Conjunctiva/sclera: Conjunctivae normal.   Cardiovascular:      Rate and Rhythm: Normal rate and regular rhythm. Heart sounds: No murmur heard. Pulmonary:      Effort: Pulmonary effort is normal. No respiratory distress. Breath sounds: Normal breath sounds. Abdominal:      Palpations: Abdomen is soft. Tenderness: There is no abdominal tenderness. Musculoskeletal:         General: No swelling. Cervical back: Neck supple. Skin:     General: Skin is warm and dry. Capillary Refill: Capillary refill takes less than 2 seconds. Coloration: Skin is jaundiced.    Neurological:      Mental Status: She is alert and oriented to person, place, and time.    Psychiatric:         Mood and Affect: Mood normal.          Additional Data:     Labs:  Results from last 7 days   Lab Units 10/05/23  0536 10/03/23  0355   WBC Thousand/uL 7.05 6.60   HEMOGLOBIN g/dL 11.3* 11.5   HEMATOCRIT % 33.7* 34.6*   PLATELETS Thousands/uL 115* 137*   NEUTROS PCT %  --  47   LYMPHS PCT %  --  27   MONOS PCT %  --  14*   EOS PCT %  --  10*     Results from last 7 days   Lab Units 10/05/23  0536   SODIUM mmol/L 133*   POTASSIUM mmol/L 3.3*   CHLORIDE mmol/L 106   CO2 mmol/L 27   BUN mg/dL 14   CREATININE mg/dL 0.63   ANION GAP mmol/L 0   CALCIUM mg/dL 8.2*   ALBUMIN g/dL 1.8*   TOTAL BILIRUBIN mg/dL 12.19*   ALK PHOS U/L 136*   ALT U/L 410*   AST U/L 679*   GLUCOSE RANDOM mg/dL 82     Results from last 7 days   Lab Units 10/05/23  0536   INR  9.04*     Results from last 7 days   Lab Units 10/05/23  1055 10/05/23  0712 10/04/23  2132 10/04/23  1626 10/04/23  1104 10/04/23  0720 10/03/23  2014 10/03/23  1624 10/03/23  1047 10/03/23  0723 10/02/23  2128 10/02/23  1947   POC GLUCOSE mg/dl 166* 79 108 114 159* 119 149* 122 111 75 124 133               Lines/Drains:  Invasive Devices       Peripheral Intravenous Line  Duration             Peripheral IV 10/02/23 Proximal;Right;Ventral (anterior) Forearm 3 days                          Imaging: Reviewed radiology reports from this admission including: MRI abdomen/MRCP    Recent Cultures (last 7 days):         Last 24 Hours Medication List:   Current Facility-Administered Medications   Medication Dose Route Frequency Provider Last Rate    escitalopram  10 mg Oral Daily Salvadore Iba Fountaine, PA-C      hydrochlorothiazide  12.5 mg Oral Daily Salvadore Iba Eamonuntaine, PA-C      insulin lispro  1-5 Units Subcutaneous HS Salvadore Iba Fountaine, PA-C      insulin lispro  1-6 Units Subcutaneous TID JAREK Ward PA-C      levothyroxine  175 mcg Oral Early Morning Salvadore Iba ALBIN Ward      losartan  100 mg Oral Daily Blanca Briceño PA-C      phytonadione  10 mg Intravenous Once Chasity Valle MD 10 mg (10/05/23 1526)    sodium chloride  2 g Oral BID With Meals Blanca Ward PA-C      sotalol  80 mg Oral BID Margarito White PA-C          Today, Patient Was Seen By: Chasity Valle MD    **Please Note: This note may have been constructed using a voice recognition system. **

## 2023-10-05 NOTE — PROGRESS NOTES
Progress note - [facility] Gastroenterology   Wandrnolan Small 81 y.o. female MRN: [id number]  Unit/Bed#: -Amy Encounter: [id number]    ASSESSMENT and PLAN  1. Transaminitis  83F with hx/o cholecystectomy (2016), acquired hypothyroidism, paroxysmal A-fib on Xarelto, HTN, HLD who presented to  ED from acute rehab on 10/2/2023 for acute painless jaundice. Differentials include exposures to hepatotoxins, meds, viral hepatitis, ETOH, drugs, travel, food intake, autoimmune hepatitis, occupational disorders, right-sided heart failure (congestive hepatopathy), diabetes mellitus, skin pigmentation, arthritis, hypogonadism and dilated cardiomyopathy (hemochromatosis), obesity and metabolic syndrome (nonalcoholic fatty liver disease), pregnancy (gallstones), inflammatory bowel disease (primary sclerosing cholangitis, gallstones), early onset emphysema (alpha-1 antitrypsin deficiency), celiac disease, and thyroid disease.      - Transaminases continue to remain elevated in 400-600s, Tbili 12.19, Dbili 7.42, , albumin 1.8, plt down to 115, lipase wnl, AFP 16.91, acetaminophen level wnl, CA 19-9 171, BEV wnl, INR 9.04  - MELD = 43 on 10/5  - MRI & MRCP largely unremarkable, noted 3 mm pancreatic cyst without concerning features  - MELD labs daily  - Awaiting additional lab results  - HOLD Xarelto - looking for INR value off of Xarelto to assess hepatic synthetic function and INR will need to be normal to proceed with liver bx  - Will consult hematology     2. Acute painless jaundice  - No concerning masses on MRI abdomen/MRCP 10/3/2023     3.   Cirrhosis of liver  - New dx, not noted on RUQ US 7/2023     Ascites  - New: trace, noted on non-contrast CT A/P 10/2/2023     Esophageal varices  - New: "wispy gastroesophageal and perigastric varices are present" on CT A/P 10/2/2023     Hepatic encephalopathy  - None to date     Liver lesion  - None to date on CT A/P 10/2/2023 nor MRI abdomen/MRCP 10/3/2023 3.  Paroxysmal atrial fibrillation  - Xarelto on HOLD      SUBJECTIVE  Accompanied by self and history is obtained from self. Offers no GI complaints today. Tolerating diet well. Anxiously awaiting test results. Review of Systems   Constitutional:  Negative for appetite change, chills and fever. HENT:  Negative for dental problem, mouth sores, sore throat, trouble swallowing and voice change. Respiratory:  Negative for cough and choking. Cardiovascular:  Negative for chest pain and leg swelling. Gastrointestinal:  Negative for abdominal distention, abdominal pain, anal bleeding, blood in stool, constipation, diarrhea, nausea, rectal pain and vomiting. Skin:  Negative for pallor and rash. Neurological:  Negative for weakness, light-headedness and headaches. Psychiatric/Behavioral:  Negative for confusion and sleep disturbance. The patient is not nervous/anxious. Temp:  [97.3 °F (36.3 °C)-97.5 °F (36.4 °C)] 97.3 °F (36.3 °C)  HR:  [64-66] 66  Resp:  [16-18] 16  BP: (115-128)/(50-67) 122/50     PHYSICAL EXAM  Physical Exam  Vitals and nursing note reviewed. Constitutional:       General: She is not in acute distress. Appearance: She is overweight. She is not toxic-appearing. Comments: Seated comfortably in chair   HENT:      Head: Normocephalic. Mouth/Throat:      Mouth: Mucous membranes are moist.      Pharynx: Oropharynx is clear. Eyes:      General: Scleral icterus present. Extraocular Movements: Extraocular movements intact. Neck:      Trachea: Phonation normal.   Cardiovascular:      Comments: Appears well perfused  Pulmonary:      Effort: Pulmonary effort is normal. No respiratory distress. Abdominal:      General: Abdomen is protuberant. Bowel sounds are normal. There is no distension or abdominal bruit. Palpations: Abdomen is soft. There is no hepatomegaly or splenomegaly. Tenderness: There is no abdominal tenderness.  There is no guarding or rebound. Musculoskeletal:      Cervical back: Neck supple. Right lower leg: No edema. Left lower leg: No edema. Comments: Moving all 4 extremities spontaneously   Skin:     General: Skin is warm and dry. Capillary Refill: Capillary refill takes less than 2 seconds. Coloration: Skin is jaundiced. Skin is not pale. Neurological:      General: No focal deficit present. Mental Status: She is alert and oriented to person, place, and time. Psychiatric:         Behavior: Behavior normal. Behavior is cooperative. Thought Content:  Thought content normal.           LABS & STUDIES  Lab Results   Component Value Date    CALCIUM 8.2 (L) 10/05/2023    K 3.3 (L) 10/05/2023    CO2 27 10/05/2023     10/05/2023    BUN 14 10/05/2023    CREATININE 0.63 10/05/2023    CREATININE 0.63 10/04/2023    CREATININE 0.69 10/03/2023     Lab Results   Component Value Date    WBC 7.05 10/05/2023    WBC 6.60 10/03/2023    WBC 6.45 10/02/2023    HGB 11.3 (L) 10/05/2023    HGB 11.5 10/03/2023    HGB 11.8 10/02/2023    MCV 87 10/05/2023     (L) 10/05/2023     (L) 10/03/2023     (L) 10/02/2023     Lab Results   Component Value Date     (H) 10/05/2023     (H) 10/04/2023     (H) 10/03/2023     (H) 10/05/2023     (H) 10/04/2023     (H) 10/03/2023    GGT 76 (H) 10/03/2023    ALKPHOS 136 (H) 10/05/2023    ALKPHOS 116 (H) 10/04/2023    ALKPHOS 109 (H) 10/03/2023    TBILI 12.19 (H) 10/05/2023    TBILI 11.97 (H) 10/04/2023    TBILI 12.14 (H) 10/03/2023     Lab Results   Component Value Date    LIPASE 57 10/02/2023     Lab Results   Component Value Date    IRON 203 10/03/2023    TIBC <258 10/03/2023    FERRITIN 164 10/03/2023     Lab Results   Component Value Date    INR 9.04 () 10/05/2023    INR 9.94 () 10/04/2023    INR  10/04/2023      Comment:      Unable to calculate       MRI abdomen w wo contrast and mrcp    Result Date: 10/4/2023  Narrative: MRI OF THE ABDOMEN WITH AND WITHOUT CONTRAST WITH MRCP INDICATION: 81 years / Female acute painless jaundice, transaminitis, and positive AFP tumor marker. COMPARISON: CT abdomen pelvis 10/2/2023. TECHNIQUE:  Multiplanar/multisequence MRI of the abdomen with 3D MRCP was performed before and after administration of contrast. Imaging performed on 1.5T MRI IV Contrast:  10 mL of Gadobutrol injection (SINGLE-DOSE) FINDINGS: LOWER CHEST:   Mild cardiomegaly. LIVER: Nodular liver surface contour, suggestive of cirrhosis. No suspicious mass. Subcentimeter hepatic cyst in the right hepatic lobe (8/13). The hepatic veins and portal veins are patent. BILE DUCTS:  No intrahepatic or extrahepatic bile duct dilation. Common bile duct is normal in caliber. No choledocholithiasis, biliary stricture or suspicious mass. GALLBLADDER: Surgically absent. PANCREAS: A 5 mm pancreatic cystic lesion in the pancreatic uncinate process (6/40) without worrisome features such as suspicious enhancement or solid components. Normal intrinsic T1 hyperintensity of the pancreas. No main pancreatic duct dilatation. ADRENAL GLANDS:  Unremarkable. SPLEEN:  Normal. KIDNEYS/PROXIMAL URETERS:  No hydroureteronephrosis. No suspicious renal mass. Left renal sinus cyst. BOWEL:   No dilated loops of bowel. PERITONEUM/RETROPERITONEUM: Trace perihepatic ascites. LYMPH NODES: Enlarged katelynn hepatis lymph nodes measuring up to 1.5 cm in short axis (17/64), likely reactive in the setting of cirrhosis. VASCULAR STRUCTURES:  No aneurysm. ABDOMINAL WALL: Small fat-containing umbilical hernia. OSSEOUS STRUCTURES:  No suspicious osseous lesion. Degenerative change of the visualized spine. Grade 1 anterolisthesis L4-L5. Lumbosacral spinal fusion hardware is noted. Impression: Nodular liver surface contour suggestive of cirrhosis with sequela portal hypertension including trace perihepatic ascites. No suspicious hepatic mass.  A 5 mm pancreatic uncinate process cystic lesion without worrisome features. No main pancreatic duct dilatation. 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 For simple cyst(s) less than 1.5 cm, recommend followup every 2 year for 2 times. After 4 years, no more followups. Recommend next followup in 2 years. Preferred imaging modality: abdomen MRI and MRCP with and without IV contrast, or triple phase abdomen CT with IV contrast, or abdomen MRI and MRCP without IV  contrast. The recommendations regarding pancreatic findings assumes that patient does not have family history of pancreatic cancer nor have any symptoms potentially attributable to pancreatic cystic lesions (hyperamylasemia, recent-onset diabetes, severe epigastric pain, weight loss, steatorrhea, or jaundice.) If these conditions are not true, then management should be deferred to judgement of specialists such as gastroenterologists or oncologic surgeons. REFERENCES: Recommendations are based on recent consensus statements on management of pancreatic cystic lesions from [address] [facility] Gastroenterology Association, [employer] of Radiology, the journal Pancreatology, and our own institutional consensus. [address] [facility] Guidelines on the Diagnosis and Management of Asymptomatic Neoplastic Pancreatic Cysts. Gastroenterology 2015; 614:882-284 (AGA GUIDELINES) International Consensus Guidelines for Management of Intraductal Papillary Mucinous Neoplasm and Mucinous Cystic Neoplasms of the Pancreas. Pancreatology Sep-Oct 2017;17(5):738-753. doi: 10.1016/j.yuan.2017.07.007. Epub 2017 Jul 13 [facility] paper). American College of Radiology: White Paper: Management of Incidental Pancreatic Cysts: A White Paper of the ACR Incidental Findings Committee, 2017 Jul;14(7):911-923. doi: 10.1016/j.jacr. 2017.03.010. Epub 2017 May 19. (ACR GUIDELINES.) The study was marked in EPIC for significant notification.  Workstation performed: [id number]     CT abdomen pelvis wo contrast    Result Date: 10/2/2023  Narrative: CT ABDOMEN AND PELVIS WITHOUT IV CONTRAST INDICATION:   New onset jaundice. COMPARISON: CT of the lumbar spine dated 1/23/2019. Ultrasound dated 7/20/2023. TECHNIQUE:  CT examination of the abdomen and pelvis was performed without intravenous contrast. Multiplanar 2D reformatted images were created from the source data. This examination, like all CT scans performed in the [facility], was performed utilizing techniques to minimize radiation dose exposure, including the use of iterative reconstruction and automated exposure control. Radiation dose length product (DLP) for this visit:  792.82 mGy-cm Enteric contrast was administered. FINDINGS: ABDOMEN LOWER CHEST: Peripheral reticulation and subpleural banding in both lower lobes without shiloh bronchiolectasis or honeycombing. No acute consolidations at the lung bases. Suspect biatrial enlargement of the heart. No acute findings in the visualized portions of the lower chest. LIVER/BILIARY TREE: Liver size is within normal limits. Contour appears micronodular. Parenchyma is mildly heterogeneous, but there is no focal hepatic mass. Main portal vein is normal in caliber. Recannulization of the umbilical vein noted suggesting portal venous hypertension. Wispy gastroesophageal and perigastric varices are present. Intrahepatic and extrahepatic biliary tree appears within normal limits given prior cholecystectomy. No ductal dilatation. GALLBLADDER: Gallbladder is surgically absent. SPLEEN: Borderline splenomegaly measuring 12.5 cm in the long axis with flattening of the hilar concavity. No focal splenic masses. PANCREAS:  Unremarkable. ADRENAL GLANDS:  Unremarkable. KIDNEYS/URETERS:  Unremarkable. No hydronephrosis. STOMACH AND BOWEL: There is colonic diverticulosis without evidence of acute diverticulitis. APPENDIX: Not well demonstrated.   No evidence for acute appendicitis. ABDOMINOPELVIC CAVITY: Mild perihepatic ascites. Mild free fluid layering in the pelvis, also. Minimal hazy edema in the mesenteric fat. No encapsulated collections or free air. Prominent lymph nodes at the katelynn hepatis. Periportal node measuring 19 mm x 13 mm (5/26). Portacaval node measuring 22 mm x 13 mm (5/29 appears increased in size from 2019. VESSELS: Sequelae of portal venous hypertension, discussed above. Abdominal aorta and branch vessels show atherosclerotic calcifications with involvement of the SMA and renal ostia. Cannot assess degree of stenosis without contrast. No aneurysms. PELVIS REPRODUCTIVE ORGANS: Surgical changes of prior hysterectomy. No suspicious adnexal masses. URINARY BLADDER: Bladder base shows mild findings suggesting inferior descent though there is no shiloh cystocele. No wall thickening or perivesicular inflammation. No endoluminal stones. ABDOMINAL WALL/INGUINAL REGIONS: Small amount of fat in the left inguinal canal. Minuscule fat-containing umbilical hernia. OSSEOUS STRUCTURES: Suspect prior L5 laminectomy with L4-S1 posterior fixation. No evidence for hardware failure. No acute fracture or destructive osseous lesion. Impression: Nodular liver morphology and sequela of portal venous hypertension suggesting cirrhosis. Minimal ascites around the liver and layering in the pelvis. Prominent lymph nodes at the katelynn hepatis presumably reactive to primary underlying liver dysfunction. No biliary ductal dilatation to suggest obstructive cause of jaundice. 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 Recommend nonemergent outpatient evaluation with high-resolution chest CT to exclude shiloh UIP pattern fibrosis. Workstation performed: [id number]     XR ankle 3+ vw right    Result Date: 9/14/2023  Narrative: RIGHT ANKLE INDICATION:   Fracture follow-up status post injury in July. COMPARISON: Ankle radiograph 7/20/2023.  VIEWS:  XR ANKLE 3+ VW RIGHT Images: 3 FINDINGS: Healing, nondisplaced oblique fracture of the right distal fibula. Alignment unchanged from prior radiograph. Resolution of overlying soft tissue swelling. Small plantar calcaneal spur. Calcaneocuboid degenerative changes versus os peroneum. No lytic or blastic osseous lesion. Impression: Healing, nondisplaced oblique right distal fibular fracture, alignment unchanged. Resident: Viviane Danielson, the attending radiologist, have reviewed the images and agree with the final report above. Workstation performed: [id number]       Patient expressed understanding and had all questions and concerns addressed. Vanessa Ortiz PA-C   10/05/23  11:57 AM    This chart was completed in part utilizing codetag speech voice recognition software. Random word insertions, pronoun errors, and incomplete sentences are an occasional consequence of this system due to software limitations, and ambient noise. Any questions or concerns about the content, text, or information contained within the body of this dictation should be directly addressed to the provider for clarification.

## 2023-10-05 NOTE — ASSESSMENT & PLAN NOTE
Previously evaluated by GI in July for transaminitis thought 2/2 shock liver. CMV and EBV IGG positive but no IGM. Hepatitis panel and RUQ ultrasound were negative. Labs obtained due to new jaundice at [facility] showed T bili of 6, prompting ED presentation. S/P cholecystectomy. LFTs on admission:  Direct bilirubin: 7.70  Total bilirubin: 12.2  Alk phos: 118  AST: 690  ALT: 407  GI consulted   CT A/P: Nodular liver morphology and sequela of portal venous hypertension suggesting cirrhosis. Minimal ascites around the liver and layering in the pelvis. Prominent lymph nodes at the katelynn hepatis presumably reactive to primary underlying liver dysfunction. No biliary ductal dilatation to suggest obstructive cause of jaundice. AFP and CA 19-9 are elevated  MRI shows nodular liver, trace perihepatic ascites, 5 mm pancreatic uncinate process cystic lesion  Discussed with GI liver injury likely attributed by drug-induced.   Monitor LFT normal. 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- Gabapentin could be cause, but would like to biopsy

## 2023-10-05 NOTE — PLAN OF CARE
Problem: MOBILITY - ADULT  Goal: Maintain or return to baseline ADL function  Description: INTERVENTIONS:  -  Assess patient's ability to carry out ADLs; assess patient's baseline for ADL function and identify physical deficits which impact ability to perform ADLs (bathing, care of mouth/teeth, toileting, grooming, dressing, etc.)  - Assess/evaluate cause of self-care deficits   - Assess range of motion  - Assess patient's mobility; develop plan if impaired  - Assess patient's need for assistive devices and provide as appropriate  - Encourage maximum independence but intervene and supervise when necessary  - Involve family in performance of ADLs  - Assess for home care needs following discharge   - Consider OT consult to assist with ADL evaluation and planning for discharge  - Provide patient education as appropriate  Outcome: Progressing       Problem: PAIN - ADULT  Goal: Verbalizes/displays adequate comfort level or baseline comfort level  Description: Interventions:  - Encourage patient to monitor pain and request assistance  - Assess pain using appropriate pain scale  - Administer analgesics based on type and severity of pain and evaluate response  - Implement non-pharmacological measures as appropriate and evaluate response  - Consider cultural and social influences on pain and pain management  - Notify physician/advanced practitioner if interventions unsuccessful or patient reports new pain  Outcome: Progressing     Problem: INFECTION - ADULT  Goal: Absence or prevention of progression during hospitalization  Description: INTERVENTIONS:  - Assess and monitor for signs and symptoms of infection  - Monitor lab/diagnostic results  - Monitor all insertion sites, i.e. indwelling lines, tubes, and drains  - Monitor endotracheal if appropriate and nasal secretions for changes in amount and color  - Hensley appropriate cooling/warming therapies per order  - Administer medications as ordered  - Instruct and encourage patient and family to use good hand hygiene technique  - Identify and instruct in appropriate isolation precautions for identified infection/condition  Outcome: Progressing  Goal: Absence of fever/infection during neutropenic period  Description: INTERVENTIONS:  - Monitor WBC    Outcome: Progressing       Goal: Patient will remain free of falls  Description: INTERVENTIONS:  -  Assess patient's ability to carry out ADLs; assess patient's baseline for ADL function and identify physical deficits which impact ability to perform ADLs (bathing, care of mouth/teeth, toileting, grooming, dressing, etc.)  - Assess/evaluate cause of self-care deficits   - Assess range of motion  - Assess patient's mobility; develop plan if impaired  - Assess patient's need for assistive devices and provide as appropriate  - Encourage maximum independence but intervene and supervise when necessary  - Involve family in performance of ADLs  - Assess for home care needs following discharge   - Consider OT consult to assist with ADL evaluation and planning for discharge  - Provide patient education as appropriate  Outcome: Progressing     Problem: DISCHARGE PLANNING  Goal: Discharge to home or other facility with appropriate resources  Description: INTERVENTIONS:  - Identify barriers to discharge w/patient and caregiver  - Arrange for needed discharge resources and transportation as appropriate  - Identify discharge learning needs (meds, wound care, etc.)  - Arrange for interpretive services to assist at discharge as needed  - Refer to Case Management Department for coordinating discharge planning if the patient needs post-hospital services based on physician/advanced practitioner order or complex needs related to functional status, cognitive ability, or social support system  Outcome: Progressing     Problem: Knowledge Deficit  Goal: Patient/family/caregiver demonstrates understanding of disease process, treatment plan, medications, and discharge instructions  Description: Complete learning assessment and assess knowledge base.   Interventions:  - Provide teaching at level of understanding  - Provide teaching via preferred learning methods  Outcome: Progressing     Problem: Potential for Falls  Goal: Patient will remain free of falls  Description: INTERVENTIONS:  -  Assess patient's ability to carry out ADLs; assess patient's baseline for ADL function and identify physical deficits which impact ability to perform ADLs (bathing, care of mouth/teeth, toileting, grooming, dressing, etc.)  - Assess/evaluate cause of self-care deficits   - Assess range of motion  - Assess patient's mobility; develop plan if impaired  - Assess patient's need for assistive devices and provide as appropriate  - Encourage maximum independence but intervene and supervise when necessary  - Involve family in performance of ADLs  - Assess for home care needs following discharge   - Consider OT consult to assist with ADL evaluation and planning for discharge  - Provide patient education as appropriate  Outcome: Progressing

## 2023-10-05 NOTE — PLAN OF CARE
Problem: MOBILITY - ADULT  Goal: Maintain or return to baseline ADL function  Description: INTERVENTIONS:  -  Assess patient's ability to carry out ADLs; assess patient's baseline for ADL function and identify physical deficits which impact ability to perform ADLs (bathing, care of mouth/teeth, toileting, grooming, dressing, etc.)  - Assess/evaluate cause of self-care deficits   - Assess range of motion  - Assess patient's mobility; develop plan if impaired  - Assess patient's need for assistive devices and provide as appropriate  - Encourage maximum independence but intervene and supervise when necessary  - Involve family in performance of ADLs  - Assess for home care needs following discharge   - Consider OT consult to assist with ADL evaluation and planning for discharge  - Provide patient education as appropriate  Outcome: Progressing     Problem: INFECTION - ADULT  Goal: Absence or prevention of progression during hospitalization  Description: INTERVENTIONS:  - Assess and monitor for signs and symptoms of infection  - Monitor lab/diagnostic results  - Monitor all insertion sites, i.e. indwelling lines, tubes, and drains  - Monitor endotracheal if appropriate and nasal secretions for changes in amount and color  - Ault appropriate cooling/warming therapies per order  - Administer medications as ordered  - Instruct and encourage patient and family to use good hand hygiene technique  - Identify and instruct in appropriate isolation precautions for identified infection/condition  Outcome: Progressing     Problem: SAFETY ADULT  Goal: Maintain or return to baseline ADL function  Description: INTERVENTIONS:  -  Assess patient's ability to carry out ADLs; assess patient's baseline for ADL function and identify physical deficits which impact ability to perform ADLs (bathing, care of mouth/teeth, toileting, grooming, dressing, etc.)  - Assess/evaluate cause of self-care deficits   - Assess range of motion  - Assess patient's mobility; develop plan if impaired  - Assess patient's need for assistive devices and provide as appropriate  - Encourage maximum independence but intervene and supervise when necessary  - Involve family in performance of ADLs  - Assess for home care needs following discharge   - Consider OT consult to assist with ADL evaluation and planning for discharge  - Provide patient education as appropriate  Outcome: Progressing

## 2023-10-06 NOTE — PLAN OF CARE
Problem: OCCUPATIONAL THERAPY ADULT  Goal: Performs self-care activities at highest level of function for planned discharge setting. See evaluation for individualized goals. Description: Treatment Interventions: ADL retraining, Functional transfer training, Endurance training, Patient/family training, Equipment evaluation/education, Energy conservation          See flowsheet documentation for full assessment, interventions and recommendations. Outcome: Completed  Note: Limitation: Decreased ADL status, Decreased endurance, Decreased self-care trans, Decreased high-level ADLs  Prognosis: Good  Assessment: Pt seen for OT tx session with focus on functional balance, functional mobility, ADL status, and transfer safety. Patient agreeable to OT treatment session. Pt received supine in bed. Pt completed bed mobility at Mod I level. Pt progressed to Mod I for functional txfers this session. Pt demonstrated carryover of safe txfer techniques from OT IE. Pt completed functional mobility at Mod I level through unit halls with RW. Pt continues to require Min A for LB dressing only to don R CAM boot. However, this is due to environmental factor of elevated hospital bed height. The patient's raw score on the AM-PAC Daily Activity inpatient short form is 21, standardized score is 47.1, greater than 39.4. Patients at this level are likely to benefit from DC to home. Please refer to the recommendation of the Occupational Therapist for safe DC planning. From OT standpoint, recommendation at time of D/C would be DC with Level III resources to address home environment & IADL performance. Pt without further IP OT needs. Will DC OT orders. Pt left supine in bed with call bell in reach, tray table in reach, needs met, bed alarm activated, RN informed.

## 2023-10-06 NOTE — PROGRESS NOTES
[facility]  Progress Note  Name: Welford Paget  MRN: [id number]  Unit/Bed#: -01 I Date of Admission: 10/2/2023   Date of Service: 10/6/2023 I Hospital Day: 4    Assessment/Plan   * Transaminitis  Assessment & Plan  Previously evaluated by GI in July for transaminitis thought 2/2 shock liver. CMV and EBV IGG positive but no IGM. Hepatitis panel and RUQ ultrasound were negative. Labs obtained due to new jaundice at [facility] showed T bili of 6, prompting ED presentation. S/P cholecystectomy. LFTs on admission:  Direct bilirubin: 7.70  Total bilirubin: 12.2  Alk phos: 118  AST: 690  ALT: 407  GI consulted   CT A/P: Nodular liver morphology and sequela of portal venous hypertension suggesting cirrhosis. Minimal ascites around the liver and layering in the pelvis. Prominent lymph nodes at the katelynn hepatis presumably reactive to primary underlying liver dysfunction. No biliary ductal dilatation to suggest obstructive cause of jaundice. AFP and CA 19-9 are elevated  MRI shows nodular liver, trace perihepatic ascites, 5 mm pancreatic uncinate process cystic lesion  Discussed with GI liver injury likely attributed by drug-induced. Monitor LFT normal. 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- Gabapentin could be cause, but would like to biopsy  To medicate given yesterday, INR decreased to 4  INR monitoring      Hyponatremia  Assessment & Plan  Previously discharged on 2 g salt tabs BID and torsemide 5 mg daily  Torsemide was stopped by facility for unknown reason   Continue to monitor on salt tabs - Na corrects to 135 on admission    Closed right ankle fracture  Assessment & Plan  Continue ambulating with camboot and walker  PT/OT for dispo planning     Diffuse interstitial pulmonary fibrosis (HCC)  Assessment & Plan  CT:  Reticular interstitial lung abnormality (REENA) noted in both lower lobes.  Recommend nonemergent outpatient evaluation with high-resolution chest CT to exclude [place] UIP pattern fibrosis. Mood disorder (HCC)  Assessment & Plan  Continue Lexapro  Ambien nightly as needed    Acquired hypothyroidism  Assessment & Plan  Continue levothyroxine 175 mcg daily    Class 2 severe obesity due to excess calories with serious comorbidity and body mass index (BMI) of 35.0 to 35.9 in adult   Assessment & Plan  Dietary changes and lifestyle modifications  Affects all aspects of care    Essential hypertension  Assessment & Plan  Previously discharged on diovan, sotalol, torsemide  At facility patient is on HCTZ 12.5, losartan 100 mg, and sotalol 80 mg BID  Continue     Mixed hyperlipidemia  Assessment & Plan  Holding Lipitor 20 mg daily given LFTs             VTE Pharmacologic Prophylaxis: VTE Score: 4 Moderate Risk (Score 3-4) - Pharmacological DVT Prophylaxis Contraindicated. Sequential Compression Devices Ordered. Patient Centered Rounds: I performed bedside rounds with nursing staff today. Discussions with Specialists or Other Care Team Provider: GI, CM    Education and Discussions with Family / Patient: Patient declined call to . Total Time Spent on Date of Encounter in care of patient: 55 mins. This time was spent on one or more of the following: performing physical exam; counseling and coordination of care; obtaining or reviewing history; documenting in the medical record; reviewing/ordering tests, medications or procedures; communicating with other healthcare professionals and discussing with patient's family/caregivers. Current Length of Stay: 4 day(s)  Current Patient Status: Inpatient   Certification Statement: The patient will continue to require additional inpatient hospital stay due to increased INR, possible liver biopsy, liver failure  Discharge Plan: Anticipate discharge in >72 hrs to home.     Code Status: Level 1 - Full Code    Subjective:   Patient denies any complaints    Objective:     Vitals:   Temp (24hrs), Av.3 °F (36.3 °C), Min:97 °F (36.1 °C), Max:97.7 °F (36.5 °C)    Temp:  [97 °F (36.1 °C)-97.7 °F (36.5 °C)] 97 °F (36.1 °C)  HR:  [61-63] 63  Resp:  [16] 16  BP: (123-127)/(53-55) 127/55  SpO2:  [98 %-99 %] 99 %  Body mass index is 37.09 kg/m². Input and Output Summary (last 24 hours): Intake/Output Summary (Last 24 hours) at 10/6/2023 1223  Last data filed at 10/6/2023 0803  Gross per 24 hour   Intake 720 ml   Output --   Net 720 ml       Physical Exam:   Physical Exam  Vitals and nursing note reviewed. Constitutional:       General: She is not in acute distress. Appearance: She is well-developed. HENT:      Head: Normocephalic and atraumatic. Mouth/Throat:      Mouth: Mucous membranes are moist.      Pharynx: Oropharynx is clear. Eyes:      General: Scleral icterus present. Conjunctiva/sclera: Conjunctivae normal.   Cardiovascular:      Rate and Rhythm: Normal rate and regular rhythm. Heart sounds: No murmur heard. Pulmonary:      Effort: Pulmonary effort is normal. No respiratory distress. Breath sounds: Normal breath sounds. Abdominal:      Palpations: Abdomen is soft. Tenderness: There is no abdominal tenderness. Musculoskeletal:         General: No swelling. Cervical back: Neck supple. Skin:     General: Skin is warm and dry. Capillary Refill: Capillary refill takes less than 2 seconds. Coloration: Skin is jaundiced. Neurological:      Mental Status: She is alert and oriented to person, place, and time.    Psychiatric:         Mood and Affect: Mood normal.          Additional Data:     Labs:  Results from last 7 days   Lab Units 10/06/23  0437 10/05/23  0536 10/03/23  0355   WBC Thousand/uL 8.05   < > 6.60   HEMOGLOBIN g/dL 12.0   < > 11.5   HEMATOCRIT % 35.9   < > 34.6*   PLATELETS Thousands/uL 145*   < > 137*   NEUTROS PCT %  --   --  47   LYMPHS PCT %  --   --  27   MONOS PCT %  --   --  14*   EOS PCT %  --   --  10*    < > = values in this interval not displayed. Results from last 7 days   Lab Units 10/06/23  0437   SODIUM mmol/L 133*   POTASSIUM mmol/L 3.5   CHLORIDE mmol/L 106   CO2 mmol/L 24   BUN mg/dL 18   CREATININE mg/dL 0.80   ANION GAP mmol/L 3   CALCIUM mg/dL 8.3*   ALBUMIN g/dL 1.9*   TOTAL BILIRUBIN mg/dL 13.50*   ALK PHOS U/L 126*   ALT U/L 455*   AST U/L 731*   GLUCOSE RANDOM mg/dL 74     Results from last 7 days   Lab Units 10/06/23  0437   INR  4.32*     Results from last 7 days   Lab Units 10/06/23  1034 10/06/23  0715 10/05/23  2112 10/05/23  1613 10/05/23  1055 10/05/23  0712 10/04/23  2132 10/04/23  1626 10/04/23  1104 10/04/23  0720 10/03/23  2014 10/03/23  1624   POC GLUCOSE mg/dl 117 77 99 149* 166* 79 108 114 159* 119 149* 122               Lines/Drains:  Invasive Devices       Peripheral Intravenous Line  Duration             Peripheral IV 10/02/23 Proximal;Right;Ventral (anterior) Forearm 3 days                          Imaging: No pertinent imaging reviewed. Recent Cultures (last 7 days):         Last 24 Hours Medication List:   Current Facility-Administered Medications   Medication Dose Route Frequency Provider Last Rate    escitalopram  10 mg Oral Daily Mini Dill Fountaine, PA-C      hydrochlorothiazide  12.5 mg Oral Daily Mini Dill Fountaine, PA-C      insulin lispro  1-5 Units Subcutaneous HS Mini Dill Fountaine, PA-C      insulin lispro  1-6 Units Subcutaneous TID AC Marcela Manuel Keyanna, PA-C      levothyroxine  175 mcg Oral Early Morning Mini Dill Keyanna, PA-C      losartan  100 mg Oral Daily Mini Dill Fountaine, PA-C      sodium chloride  2 g Oral BID With Meals Mini Dill Fountaine, PA-C      sotalol  80 mg Oral BID Tone Azevedo PA-C          Today, Patient Was Seen By: Rj Carlton MD    **Please Note: This note may have been constructed using a voice recognition system. **

## 2023-10-06 NOTE — OCCUPATIONAL THERAPY NOTE
Occupational Therapy Tx Note     Patient Name: Anthony Brian  NTQKN'H Date: 10/6/2023  Problem List  Principal Problem:    Transaminitis  Active Problems:    Paroxysmal atrial fibrillation ([address])    Mixed hyperlipidemia    Essential hypertension    Class 2 severe obesity due to excess calories with serious comorbidity and body mass index (BMI) of 35.0 to 35.9 in adult     Acquired hypothyroidism    Mood disorder (HCC)    ADRIA (obstructive sleep apnea)    Diffuse interstitial pulmonary fibrosis (HCC)    Closed right ankle fracture    Hyponatremia    Type 2 diabetes mellitus ([address])            10/06/23 1054   OT Last Visit   OT Visit Date 10/06/23   Note Type   Note Type Treatment   Pain Assessment   Pain Assessment Tool 0-10   Pain Score No Pain   Restrictions/Precautions   Weight Bearing Precautions Per Order Yes   RLE Weight Bearing Per Order WBAT   Braces or Orthoses CAM Boot; Other (Comment)  (Shoe lift for LLE)   Other Precautions Bed Alarm   ADL   LB Dressing Assistance 4  Minimal Assistance   LB Dressing Deficit Other (Comment)  (Donning R CAM boot d/t elevated bed height)   Toileting Comments Declines   Bed Mobility   Supine to Sit 6  Modified independent   Sit to Supine 6  Modified independent   Transfers   Sit to Stand 6  Modified independent   Additional items Increased time required   Stand to Sit 6  Modified independent   Additional items Bedrails   Additional Comments 2 STS performed. Improved RW management & txfer techniques this session.  Did not require verbal cues for safety   Functional Mobility   Functional Mobility 6  Modified independent   Additional Comments Greater than functional household distance  (+ LATIF)   Additional items Rolling walker   Subjective   Subjective "I'd like to go for a walk"   Cognition   Overall Cognitive Status [facility]   Arousal/Participation Alert   Attention Within functional limits   Orientation Level Oriented X4   Memory Within functional limits   Following Commands Follows all commands and directions without difficulty   Activity Tolerance   Activity Tolerance Patient tolerated treatment well   Assessment   Assessment Pt seen for OT tx session with focus on functional balance, functional mobility, ADL status, and transfer safety. Patient agreeable to OT treatment session. Pt received supine in bed. Pt completed bed mobility at Mod I level. Pt progressed to Mod I for functional txfers this session. Pt demonstrated carryover of safe txfer techniques from OT IE. Pt completed functional mobility at Mod I level through unit halls with RW. Pt continues to require Min A for LB dressing only to don R CAM boot. However, this is due to environmental factor of elevated hospital bed height. The patient's raw score on the AM-PAC Daily Activity inpatient short form is 21, standardized score is 47.1, greater than 39.4. Patients at this level are likely to benefit from DC to home. Please refer to the recommendation of the Occupational Therapist for safe DC planning. From OT standpoint, recommendation at time of D/C would be DC with Level III resources to address home environment & IADL performance. Pt without further IP OT needs. Will DC OT orders. Pt left supine in bed with call bell in reach, tray table in reach, needs met, bed alarm activated, RN informed. Plan   OT Treatment Day 1   OT Frequency Other (comment)  (DC)   Recommendation   UB Rehab Discharge Recommendation (PT/OT) Level 3   AM-PAC Daily Activity Inpatient   Lower Body Dressing 3   Bathing 3   Toileting 4   Upper Body Dressing 4   Grooming 4   Eating 4   Daily Activity Raw Score 22   Daily Activity Standardized Score (Calc for Raw Score >=11) 47. 1   AM-PAC Applied Cognition Inpatient   Following a Speech/Presentation 3   Understanding Ordinary Conversation 4   Taking Medications 4   Remembering Where Things Are Placed or Put Away 4   Remembering List of 4-5 Errands 4   Taking Care of Complicated Tasks 3   Applied Cognition Raw Score 22   Applied Cognition Standardized Score 47.83   End of Consult   Education Provided Yes   Patient Position at End of Consult Supine;Bed/Chair alarm activated; All needs within reach   Nurse Communication Nurse aware of consult       Kenya Fermin OTR/ESTELLA

## 2023-10-06 NOTE — PLAN OF CARE
Problem: MOBILITY - ADULT  Goal: Maintain or return to baseline ADL function  Description: INTERVENTIONS:  -  Assess patient's ability to carry out ADLs; assess patient's baseline for ADL function and identify physical deficits which impact ability to perform ADLs (bathing, care of mouth/teeth, toileting, grooming, dressing, etc.)  - Assess/evaluate cause of self-care deficits   - Assess range of motion  - Assess patient's mobility; develop plan if impaired  - Assess patient's need for assistive devices and provide as appropriate  - Encourage maximum independence but intervene and supervise when necessary  - Involve family in performance of ADLs  - Assess for home care needs following discharge   - Consider OT consult to assist with ADL evaluation and planning for discharge  - Provide patient education as appropriate  Outcome: Progressing  Goal: Maintains/Returns to pre admission functional level  Description: INTERVENTIONS:  - Perform BMAT or MOVE assessment daily.   - Set and communicate daily mobility goal to care team and patient/family/caregiver.    - Collaborate with rehabilitation services on mobility goals if consulted  - Dangle patient 4 times a day  - Stand patient 4 times a day  - Ambulate patient 2 times a day  - Out of bed to chair 3 times a day   - Out of bed for meals 3 times a day  - Out of bed for toileting  - Record patient progress and toleration of activity level   Outcome: Progressing     Problem: PAIN - ADULT  Goal: Verbalizes/displays adequate comfort level or baseline comfort level  Description: Interventions:  - Encourage patient to monitor pain and request assistance  - Assess pain using appropriate pain scale  - Administer analgesics based on type and severity of pain and evaluate response  - Implement non-pharmacological measures as appropriate and evaluate response  - Consider cultural and social influences on pain and pain management  - Notify physician/advanced practitioner if interventions unsuccessful or patient reports new pain  Outcome: Progressing     Problem: INFECTION - ADULT  Goal: Absence or prevention of progression during hospitalization  Description: INTERVENTIONS:  - Assess and monitor for signs and symptoms of infection  - Monitor lab/diagnostic results  - Monitor all insertion sites, i.e. indwelling lines, tubes, and drains  - Monitor endotracheal if appropriate and nasal secretions for changes in amount and color  - [place] appropriate cooling/warming therapies per order  - Administer medications as ordered  - Instruct and encourage patient and family to use good hand hygiene technique  - Identify and instruct in appropriate isolation precautions for identified infection/condition  Outcome: Progressing  Goal: Absence of fever/infection during neutropenic period  Description: INTERVENTIONS:  - Monitor WBC    Outcome: Progressing     Problem: SAFETY ADULT  Goal: Maintain or return to baseline ADL function  Description: INTERVENTIONS:  -  Assess patient's ability to carry out ADLs; assess patient's baseline for ADL function and identify physical deficits which impact ability to perform ADLs (bathing, care of mouth/teeth, toileting, grooming, dressing, etc.)  - Assess/evaluate cause of self-care deficits   - Assess range of motion  - Assess patient's mobility; develop plan if impaired  - Assess patient's need for assistive devices and provide as appropriate  - Encourage maximum independence but intervene and supervise when necessary  - Involve family in performance of ADLs  - Assess for home care needs following discharge   - Consider OT consult to assist with ADL evaluation and planning for discharge  - Provide patient education as appropriate  Outcome: Progressing    urses station  Outcome: Progressing     Problem: DISCHARGE PLANNING  Goal: Discharge to home or other facility with appropriate resources  Description: INTERVENTIONS:  - Identify barriers to discharge w/patient and caregiver  - Arrange for needed discharge resources and transportation as appropriate  - Identify discharge learning needs (meds, wound care, etc.)  - Arrange for interpretive services to assist at discharge as needed  - Refer to Case Management Department for coordinating discharge planning if the patient needs post-hospital services based on physician/advanced practitioner order or complex needs related to functional status, cognitive ability, or social support system  Outcome: Progressing     Problem: Knowledge Deficit  Goal: Patient/family/caregiver demonstrates understanding of disease process, treatment plan, medications, and discharge instructions  Description: Complete learning assessment and assess knowledge base.   Interventions:  - Provide teaching at level of understanding  - Provide teaching via preferred learning methods  Outcome: Progressing

## 2023-10-06 NOTE — ASSESSMENT & PLAN NOTE
Previously evaluated by GI in July for transaminitis thought 2/2 shock liver. CMV and EBV IGG positive but no IGM. Hepatitis panel and RUQ ultrasound were negative. Labs obtained due to new jaundice at [facility] showed T bili of 6, prompting ED presentation. S/P cholecystectomy. LFTs on admission:  Direct bilirubin: 7.70  Total bilirubin: 12.2  Alk phos: 118  AST: 690  ALT: 407  GI consulted   CT A/P: Nodular liver morphology and sequela of portal venous hypertension suggesting cirrhosis. Minimal ascites around the liver and layering in the pelvis. Prominent lymph nodes at the katelynn hepatis presumably reactive to primary underlying liver dysfunction. No biliary ductal dilatation to suggest obstructive cause of jaundice. AFP and CA 19-9 are elevated  MRI shows nodular liver, trace perihepatic ascites, 5 mm pancreatic uncinate process cystic lesion  Discussed with GI liver injury likely attributed by drug-induced.   Monitor LFT normal. 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

## 2023-10-06 NOTE — PROGRESS NOTES
Progress note - [facility]   Chris Mendez 81 y.o. female MRN: [id number]  Unit/Bed#: -Amy Encounter: [id number]    ASSESSMENT and PLAN  1. Transaminitis  1. Transaminitis  83F with hx/o cholecystectomy (2016), acquired hypothyroidism, paroxysmal A-fib on Xarelto, HTN, HLD who presented to  ED from acute rehab on 10/2/2023 for acute painless jaundice. Differentials include exposures to hepatotoxins, meds, viral hepatitis, ETOH, drugs, travel, food intake, autoimmune hepatitis, occupational disorders, right-sided heart failure (congestive hepatopathy), diabetes mellitus, skin pigmentation, arthritis, hypogonadism and dilated cardiomyopathy (hemochromatosis), obesity and metabolic syndrome (nonalcoholic fatty liver disease), pregnancy (gallstones), inflammatory bowel disease (primary sclerosing cholangitis, gallstones), early onset emphysema (alpha-1 antitrypsin deficiency), celiac disease, and thyroid disease.      - Transaminases continue to remain elevated in 400-600s, elevated bilirubin and fluctuating platelet function. AFP 16.91, acetaminophen level wnl, CA 19-9 171, BEV wnl. INR improved to 4.32 from 9  - MELD = 36 on 10/6. Not a liver transplant candidate due to age, comorbidities  - MRI & MRCP largely unremarkable, noted 3 mm pancreatic cyst without concerning features  - Awaiting CCP IgG, ANCA, Celiac results  - MELD labs daily  - HOLD Xarelto - will continue to monitor INR off Xarelto through the weekend and give vit K on Sun 10/8 if needed with goals of INR <1.5 and liver biopsy on Mon 10/9     2. Acute painless jaundice  - No concerning masses on MRI abdomen/MRCP 10/3/2023     3.   Cirrhosis of liver  - New dx, not noted on RUQ US 7/2023     Ascites  - New: trace, noted on non-contrast CT A/P 10/2/2023     Esophageal varices  - New: "wispy gastroesophageal and perigastric varices are present" on CT A/P 10/2/2023     Hepatic encephalopathy  - None to date     Liver lesion  - None to date on CT A/P 10/2/2023 nor MRI abdomen/MRCP 10/3/2023     4. Paroxysmal atrial fibrillation  - Xarelto on HOLD      SUBJECTIVE  Accompanied by self and history is obtained from self. Offers no GI complaints today. Tolerating diet fine. Moving her bowels regularly. Review of Systems   Constitutional:  Negative for appetite change, chills and fever. HENT:  Negative for dental problem, mouth sores, sore throat, trouble swallowing and voice change. Respiratory:  Negative for cough and choking. Cardiovascular:  Negative for chest pain and leg swelling. Gastrointestinal:  Negative for abdominal distention, abdominal pain, anal bleeding, blood in stool, constipation, diarrhea, nausea, rectal pain and vomiting. Skin:  Negative for pallor and rash. Neurological:  Negative for weakness, light-headedness and headaches. Psychiatric/Behavioral:  Negative for confusion and sleep disturbance. The patient is not nervous/anxious. Temp:  [97 °F (36.1 °C)-97.7 °F (36.5 °C)] 97 °F (36.1 °C)  HR:  [61-63] 63  Resp:  [16] 16  BP: (123-127)/(53-55) 127/55     PHYSICAL EXAM  Physical Exam  Vitals and nursing note reviewed. Constitutional:       General: She is not in acute distress. Appearance: She is overweight. She is not toxic-appearing. Comments: Resting comfortably in bed   HENT:      Head: Normocephalic. Mouth/Throat:      Mouth: Mucous membranes are moist.      Pharynx: Oropharynx is clear. Eyes:      General: Scleral icterus present. Extraocular Movements: Extraocular movements intact. Neck:      Trachea: Phonation normal.   Cardiovascular:      Comments: Appears well perfused  Pulmonary:      Effort: Pulmonary effort is normal. No respiratory distress. Abdominal:      General: Abdomen is protuberant. Bowel sounds are normal. There is no distension or abdominal bruit. Palpations: Abdomen is soft. There is no hepatomegaly or splenomegaly. Tenderness: There is no abdominal tenderness. There is no guarding or rebound. Musculoskeletal:      Cervical back: Neck supple. Right lower leg: No edema. Left lower leg: No edema. Comments: Moving all 4 extremities spontaneously   Skin:     General: Skin is warm and dry. Capillary Refill: Capillary refill takes less than 2 seconds. Coloration: Skin is jaundiced. Skin is not pale. Neurological:      General: No focal deficit present. Mental Status: She is alert and oriented to person, place, and time. Psychiatric:         Behavior: Behavior normal. Behavior is cooperative. Thought Content:  Thought content normal.           LABS & STUDIES  Lab Results   Component Value Date    CALCIUM 8.3 (L) 10/06/2023    K 3.5 10/06/2023    CO2 24 10/06/2023     10/06/2023    BUN 18 10/06/2023    CREATININE 0.80 10/06/2023    CREATININE 0.63 10/05/2023    CREATININE 0.63 10/04/2023     Lab Results   Component Value Date    WBC 8.05 10/06/2023    WBC 7.05 10/05/2023    WBC 6.60 10/03/2023    HGB 12.0 10/06/2023    HGB 11.3 (L) 10/05/2023    HGB 11.5 10/03/2023    MCV 87 10/06/2023     (L) 10/06/2023     (L) 10/05/2023     (L) 10/03/2023     Lab Results   Component Value Date     (H) 10/06/2023     (H) 10/05/2023     (H) 10/04/2023     (H) 10/06/2023     (H) 10/05/2023     (H) 10/04/2023    GGT 76 (H) 10/03/2023    ALKPHOS 126 (H) 10/06/2023    ALKPHOS 136 (H) 10/05/2023    ALKPHOS 116 (H) 10/04/2023    TBILI 13.50 (H) 10/06/2023    TBILI 12.19 (H) 10/05/2023    TBILI 11.97 (H) 10/04/2023     Lab Results   Component Value Date    LIPASE 57 10/02/2023     Lab Results   Component Value Date    IRON 203 10/03/2023    TIBC <258 10/03/2023    FERRITIN 164 10/03/2023     Lab Results   Component Value Date    INR 4.32 (H) 10/06/2023    INR 9.04 (HH) 10/05/2023    INR 9.94 (HH) 10/04/2023       MRI abdomen w wo contrast and mrcp    Result Date: 10/4/2023  Narrative: MRI OF THE ABDOMEN WITH AND WITHOUT CONTRAST WITH MRCP INDICATION: 83 years / Female acute painless jaundice, transaminitis, and positive AFP tumor marker. COMPARISON: CT abdomen pelvis 10/2/2023. TECHNIQUE:  Multiplanar/multisequence MRI of the abdomen with 3D MRCP was performed before and after administration of contrast. Imaging performed on 1.5T MRI IV Contrast:  10 mL of Gadobutrol injection (SINGLE-DOSE) FINDINGS: LOWER CHEST:   Mild cardiomegaly. LIVER: Nodular liver surface contour, suggestive of cirrhosis. No suspicious mass. Subcentimeter hepatic cyst in the right hepatic lobe (8/13). The hepatic veins and portal veins are patent. BILE DUCTS:  No intrahepatic or extrahepatic bile duct dilation. Common bile duct is normal in caliber. No choledocholithiasis, biliary stricture or suspicious mass. GALLBLADDER: Surgically absent. PANCREAS: A 5 mm pancreatic cystic lesion in the pancreatic uncinate process (6/40) without worrisome features such as suspicious enhancement or solid components. Normal intrinsic T1 hyperintensity of the pancreas. No main pancreatic duct dilatation. ADRENAL GLANDS:  Unremarkable. SPLEEN:  Normal. KIDNEYS/PROXIMAL URETERS:  No hydroureteronephrosis. No suspicious renal mass. Left renal sinus cyst. BOWEL:   No dilated loops of bowel. PERITONEUM/RETROPERITONEUM: Trace perihepatic ascites. LYMPH NODES: Enlarged katelynn hepatis lymph nodes measuring up to 1.5 cm in short axis (17/64), likely reactive in the setting of cirrhosis. VASCULAR STRUCTURES:  No aneurysm. ABDOMINAL WALL: Small fat-containing umbilical hernia. OSSEOUS STRUCTURES:  No suspicious osseous lesion. Degenerative change of the visualized spine. Grade 1 anterolisthesis L4-L5. Lumbosacral spinal fusion hardware is noted. Impression: Nodular liver surface contour suggestive of cirrhosis with sequela portal hypertension including trace perihepatic ascites.  No suspicious hepatic mass. A 5 mm pancreatic uncinate process cystic lesion without worrisome features. No main pancreatic duct dilatation. 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 For simple cyst(s) less than 1.5 cm, recommend followup every 2 year for 2 times. After 4 years, no more followups. Recommend next followup in 2 years. Preferred imaging modality: abdomen MRI and MRCP with and without IV contrast, or triple phase abdomen CT with IV contrast, or abdomen MRI and MRCP without IV  contrast. The recommendations regarding pancreatic findings assumes that patient does not have family history of pancreatic cancer nor have any symptoms potentially attributable to pancreatic cystic lesions (hyperamylasemia, recent-onset diabetes, severe epigastric pain, weight loss, steatorrhea, or jaundice.) If these conditions are not true, then management should be deferred to judgement of specialists such as gastroenterologists or oncologic surgeons. REFERENCES: Recommendations are based on recent consensus statements on management of pancreatic cystic lesions from [address] [facility], [address] [place] [address] of Radiology, the journal Pancreatology, and our own institutional consensus. [address] [facility] Guidelines on the Diagnosis and Management of Asymptomatic Neoplastic Pancreatic Cysts. Gastroenterology 2015; 270:818-159 (AGA GUIDELINES) International Consensus Guidelines for Management of Intraductal Papillary Mucinous Neoplasm and Mucinous Cystic Neoplasms of the Pancreas. Pancreatology Sep-Oct 2017;17(5):738-753. doi: 10.1016/j.yuan.2017.07.007. Epub 2017 Jul 13 [facility] paper). American College of Radiology: White Paper: Management of Incidental Pancreatic Cysts: A White Paper of the ACR Incidental Findings Committee, 2017 Jul;14(7):911-923. doi: 10.1016/j.jacr. 2017.03.010. Epub 2017 May 19.  (ACR GUIDELINES.) The study was marked in EPIC for significant notification. Workstation performed: [id number]     CT abdomen pelvis wo contrast    Result Date: 10/2/2023  Narrative: CT ABDOMEN AND PELVIS WITHOUT IV CONTRAST INDICATION:   New onset jaundice. COMPARISON: CT of the lumbar spine dated 1/23/2019. Ultrasound dated 7/20/2023. TECHNIQUE:  CT examination of the abdomen and pelvis was performed without intravenous contrast. Multiplanar 2D reformatted images were created from the source data. This examination, like all CT scans performed in the [facility], was performed utilizing techniques to minimize radiation dose exposure, including the use of iterative reconstruction and automated exposure control. Radiation dose length product (DLP) for this visit:  792.82 mGy-cm Enteric contrast was administered. FINDINGS: ABDOMEN LOWER CHEST: Peripheral reticulation and subpleural banding in both lower lobes without shiloh bronchiolectasis or honeycombing. No acute consolidations at the lung bases. Suspect biatrial enlargement of the heart. No acute findings in the visualized portions of the lower chest. LIVER/BILIARY TREE: Liver size is within normal limits. Contour appears micronodular. Parenchyma is mildly heterogeneous, but there is no focal hepatic mass. Main portal vein is normal in caliber. Recannulization of the umbilical vein noted suggesting portal venous hypertension. Wispy gastroesophageal and perigastric varices are present. Intrahepatic and extrahepatic biliary tree appears within normal limits given prior cholecystectomy. No ductal dilatation. GALLBLADDER: Gallbladder is surgically absent. SPLEEN: Borderline splenomegaly measuring 12.5 cm in the long axis with flattening of the hilar concavity. No focal splenic masses. PANCREAS:  Unremarkable. ADRENAL GLANDS:  Unremarkable. KIDNEYS/URETERS:  Unremarkable. No hydronephrosis. STOMACH AND BOWEL: There is colonic diverticulosis without evidence of acute diverticulitis. APPENDIX: Not well demonstrated. No evidence for acute appendicitis. ABDOMINOPELVIC CAVITY: Mild perihepatic ascites. Mild free fluid layering in the pelvis, also. Minimal hazy edema in the mesenteric fat. No encapsulated collections or free air. Prominent lymph nodes at the katelynn hepatis. Periportal node measuring 19 mm x 13 mm (5/26). Portacaval node measuring 22 mm x 13 mm (5/29 appears increased in size from 2019. VESSELS: Sequelae of portal venous hypertension, discussed above. Abdominal aorta and branch vessels show atherosclerotic calcifications with involvement of the SMA and renal ostia. Cannot assess degree of stenosis without contrast. No aneurysms. PELVIS REPRODUCTIVE ORGANS: Surgical changes of prior hysterectomy. No suspicious adnexal masses. URINARY BLADDER: Bladder base shows mild findings suggesting inferior descent though there is no shiloh cystocele. No wall thickening or perivesicular inflammation. No endoluminal stones. ABDOMINAL WALL/INGUINAL REGIONS: Small amount of fat in the left inguinal canal. Minuscule fat-containing umbilical hernia. OSSEOUS STRUCTURES: Suspect prior L5 laminectomy with L4-S1 posterior fixation. No evidence for hardware failure. No acute fracture or destructive osseous lesion. Impression: Nodular liver morphology and sequela of portal venous hypertension suggesting cirrhosis. Minimal ascites around the liver and layering in the pelvis. Prominent lymph nodes at the katelynn hepatis presumably reactive to primary underlying liver dysfunction. No biliary ductal dilatation to suggest obstructive cause of jaundice. 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 Recommend nonemergent outpatient evaluation with high-resolution chest CT to exclude shiloh UIP pattern fibrosis. Workstation performed: [id number]     XR ankle 3+ vw right    Result Date: 9/14/2023  Narrative: RIGHT ANKLE INDICATION:   Fracture follow-up status post injury in July. COMPARISON: Ankle radiograph 7/20/2023. VIEWS:  XR ANKLE 3+ VW RIGHT Images: 3 FINDINGS: Healing, nondisplaced oblique fracture of the right distal fibula. Alignment unchanged from prior radiograph. Resolution of overlying soft tissue swelling. Small plantar calcaneal spur. Calcaneocuboid degenerative changes versus os peroneum. No lytic or blastic osseous lesion. Impression: Healing, nondisplaced oblique right distal fibular fracture, alignment unchanged. Resident: Xuan Appiah, the attending radiologist, have reviewed the images and agree with the final report above. Workstation performed: [id number]       Patient expressed understanding and had all questions and concerns addressed. Daniel Granados PA-C   10/06/23  12:13 PM    This chart was completed in part utilizing [employer] speech voice recognition software. Random word insertions, pronoun errors, and incomplete sentences are an occasional consequence of this system due to software limitations, and ambient noise. Any questions or concerns about the content, text, or information contained within the body of this dictation should be directly addressed to the provider for clarification.

## 2023-10-06 NOTE — CASE MANAGEMENT
Case Management Discharge Planning Note    Patient name Cristino Iyer  Location [address] [facility] [place] 219/-01 MRN [id number]  : 1940 Date 10/6/2023       Current Admission Date: 10/2/2023  Current Admission Diagnosis:Transaminitis   Patient Active Problem List    Diagnosis Date Noted    Type 2 diabetes mellitus ([address]) 10/02/2023    Aftercare following surgery of the musculoskeletal system 08/10/2023    Nondisplaced fracture of distal end of right fibula 2023    Asymptomatic bacteriuria 2023    Closed right ankle fracture 2023    Hyponatremia 2023    Transaminitis 2023    Bilateral carpal tunnel syndrome 2023    Radiculopathy due to cervical spondylosis at multiple levels 2023    Tendinitis of right shoulder 2023    Breast mass 2023    Calculus in bladder 2023    Herniated nucleus pulposus, L5-S1 2023    Displaced fracture of fifth metatarsal bone, right foot, initial encounter for closed fracture 2023    Arthritis of carpometacarpal [place]) joint of left thumb 2023    Hematuria, undiagnosed cause 2023    ADRIA (obstructive sleep apnea) 2022    Osteoarthritis of knee 2022    Diffuse interstitial pulmonary fibrosis ([address]) 2022    Long term (current) use of anticoagulants 2022    Adjustment insomnia 2022    Spondylolisthesis, grade 2 2022    Lumbar spinal stenosis 2022    Localized, primary osteoarthritis of hand 2022    Incontinence of feces with fecal urgency 2022    Strain of left shoulder 2021    Left shoulder tendonitis 2021    Primary osteoarthritis of left shoulder 2021    Mood disorder ([address]) 2020    Gait abnormality 2020    Failed back surgical syndrome 2019    Status post spinal surgery 11/15/2018    Prediabetes 2018    Medicare annual wellness visit, subsequent 2018    Lumbosacral radiculopathy due to intervertebral disc disorder 09/26/2018    Multiple actinic keratoses 09/26/2018    Class 2 severe obesity due to excess calories with serious comorbidity and body mass index (BMI) of 35.0 to 35.9 in adult  04/25/2016    Paroxysmal atrial fibrillation ([address]) 04/14/2016    Mixed hyperlipidemia 04/14/2016    Essential hypertension 04/14/2016    Acquired hypothyroidism 04/14/2016      LOS (days): 4  Geometric Mean LOS (GMLOS) (days): 3.30  Days to GMLOS:-0.4     OBJECTIVE:  Risk of Unplanned Readmission Score: 15.96         Current admission status: Inpatient   Preferred Pharmacy:   [address] [facility]  [address] [facility] [address]  Phone: [phone] Fax: [phone] for [address] [facility], [address], [address] [facility] [address] [address]  Phone: [phone] Fax: [phone] [address], [address] [facility] [address] [place] [address],[address]  [facility] [address]  Phone: [phone] Fax: [phone]    [facility] [address] [place], [place] - [address] [facility]  [id number] [place] [address]  Phone: [phone] Fax: [phone]    Primary Care Provider: Judge Viji MD    Primary Insurance: MEDICARE  Secondary Insurance:  FOR LIFE    DISCHARGE DETAILS:        IMM reviewed with patient, patient agrees with discharge determination.                                                                                  IMM Given (Date):: 10/06/23 (916 am)  IMM Given to[de-identified] Patient

## 2023-10-07 NOTE — ASSESSMENT & PLAN NOTE
Previously evaluated by GI in July for transaminitis thought 2/2 shock liver. CMV and EBV IGG positive but no IGM. Hepatitis panel and RUQ ultrasound were negative. Labs obtained due to new jaundice at [facility] showed T bili of 6, prompting ED presentation. S/P cholecystectomy. LFTs on admission:  Direct bilirubin: 7.70  Total bilirubin: 12.2  Alk phos: 118  AST: 690  ALT: 407  GI consulted   CT A/P: Nodular liver morphology and sequela of portal venous hypertension suggesting cirrhosis. Minimal ascites around the liver and layering in the pelvis. Prominent lymph nodes at the katelynn hepatis presumably reactive to primary underlying liver dysfunction. No biliary ductal dilatation to suggest obstructive cause of jaundice. AFP and CA 19-9 are elevated  MRI shows nodular liver, trace perihepatic ascites, 5 mm pancreatic uncinate process cystic lesion  Discussed with GI liver injury likely attributed by drug-induced.   Monitor LFT normal. 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- Gabapentin could be cause, but would like to biopsy  To medicate given yesterday, INR decreased to 4  INR monitoring  Vit K- 10/07  Plan for biopsy on Monday if INR is below 1.5

## 2023-10-07 NOTE — PROGRESS NOTES
Progress note - Gastroenterology   Kris Favorite 81 y.o. female MRN: [id number]  Unit/Bed#: -01 Encounter: [id number]    ASSESSMENT and PLAN  81-year-old female with history of cholecystectomy 2016, hypothyroidism, A-fib on Xarelto who presented to the emergency department from acute rehab for painless jaundice   Found to have AST 65, , total bilirubin 12.14, AFP 16.91. Has undergone extensive work-up including BEV, ASMA, Ca19-9, AFP, previously had hepatitis serologies checked. Anti-smooth muscle body mildly elevated, otherwise other serologies unremarkable. Imaging to date includes CT abdomen/pelvis notable for nodular liver morphology suggestive of cirrhosis, minimal ascites around the liver. Follow-up MRI redemonstrated nodular liver contour suggestive of cirrhosis, trace perihepatic ascites, possible 5 mm IPMN with no pancreatic ductal dilatation. Case was discussed with hepatology, possible drug-induced liver injury in setting of gabapentin. She did get vitamin K 10/5 for coagulopathy with improvement in INR    1. Acute liver injury  2. New diagnosis of cirrhosis on imaging  Suspect drug-induced liver injury after taking gabapentin  LFT stable but remain elevated  No encephalopathy  CCP IgG normal at 5.8, C-ANCA negative, celiac panel negative  Total bilirubin 13.97 today  INR 3.46  Plan for liver biopsy on Monday 10/9  We will give second dose of vitamin K today 10 mg IV, recheck INR in a.m. Continue to trend LFTs, INR  Continue to hold Xarelto        Chief Complaint   Patient presents with    Abnormal Lab     Elevated liver enzymes from routine blood work done yesterday. SUBJECTIVE/HPI   No complaints this a.m.   Denies abdominal pain  Tolerating regular diet well, no nausea or vomiting  Remains mildly jaundiced  LFTs remain elevated but stable, total bilirubin 13.97        /56   Pulse 63   Temp (!) 97.2 °F (36.2 °C)   Resp 15   Ht 5' 4" (1.626 m)   Wt 98 kg (216 lb 0.8 oz)   SpO2 97%   BMI 37.09 kg/m²     PHYSICALEXAM  General appearance: alert, nad  ENT: mild icterus  Head: Normocephalic, without obvious abnormality, atraumatic  Lungs: clear to auscultation bilaterally  Heart: regular rate and rhythm, S1, S2 normal, no murmur, click, rub or gallop  Abdomen: soft, nondistended, non-tender; bowel sounds normal, 3 brown stools yesterday  Extremities: extremities normal, atraumatic, no cyanosis or edema  Neurologic: Grossly normal, no asterixis    Lab Results   Component Value Date    CALCIUM 8.3 (L) 10/06/2023    K 3.5 10/06/2023    CO2 24 10/06/2023     10/06/2023    BUN 18 10/06/2023    CREATININE 0.80 10/06/2023     Lab Results   Component Value Date    WBC 6.55 10/07/2023    HGB 11.6 10/07/2023    HCT 34.4 (L) 10/07/2023    MCV 87 10/07/2023     (L) 10/07/2023     Lab Results   Component Value Date     (H) 10/07/2023     (H) 10/07/2023    GGT 76 (H) 10/03/2023    ALKPHOS 133 (H) 10/07/2023     No results found for: "AMYLASE"  Lab Results   Component Value Date    LIPASE 57 10/02/2023     Lab Results   Component Value Date    IRON 203 10/03/2023    TIBC <258 10/03/2023    FERRITIN 164 10/03/2023     Lab Results   Component Value Date    INR 3.46 (H) 10/07/2023

## 2023-10-07 NOTE — ASSESSMENT & PLAN NOTE
Lab Results   Component Value Date    HGBA1C 7.0 (H) 07/20/2023     Patient has not been on any home medications   Sliding scale insulin while admitted  Diabetic diet  (P) [phone]

## 2023-10-07 NOTE — PROGRESS NOTES
[address] [facility]  Progress Note  Name: Bradley Severs  MRN: [id number]  Unit/Bed#: -01 I Date of Admission: 10/2/2023   Date of Service: 10/7/2023 I Hospital Day: 5    Assessment/Plan   * Transaminitis  Assessment & Plan  Previously evaluated by GI in July for transaminitis thought 2/2 shock liver. CMV and EBV IGG positive but no IGM. Hepatitis panel and RUQ ultrasound were negative. Labs obtained due to new jaundice at [facility] showed T bili of 6, prompting ED presentation. S/P cholecystectomy. LFTs on admission:  Direct bilirubin: 7.70  Total bilirubin: 12.2  Alk phos: 118  AST: 690  ALT: 407  GI consulted   CT A/P: Nodular liver morphology and sequela of portal venous hypertension suggesting cirrhosis. Minimal ascites around the liver and layering in the pelvis. Prominent lymph nodes at the katelynn hepatis presumably reactive to primary underlying liver dysfunction. No biliary ductal dilatation to suggest obstructive cause of jaundice. AFP and CA 19-9 are elevated  MRI shows nodular liver, trace perihepatic ascites, 5 mm pancreatic uncinate process cystic lesion  Discussed with GI liver injury likely attributed by drug-induced.   Monitor LFT normal. 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- Gabapentin could be cause, but would like to biopsy  To medicate given yesterday, INR decreased to 4  INR monitoring  Vit K- 10/07  Plan for biopsy on Monday if INR is below 1.5      Type 2 diabetes mellitus ([address])  Assessment & Plan  Lab Results   Component Value Date    HGBA1C 7.0 (H) 07/20/2023     Patient has not been on any home medications   Sliding scale insulin while admitted  Diabetic diet  (P) 113.8823231708882311      Hyponatremia  Assessment & Plan  Previously discharged on 2 g salt tabs BID and torsemide 5 mg daily  Torsemide was stopped by facility for unknown reason   Continue to monitor on salt tabs - Na corrects to 135 on admission    Closed right ankle fracture  Assessment & Plan  Continue ambulating with camboot and walker  PT/OT for dispo planning     Diffuse interstitial pulmonary fibrosis (HCC)  Assessment & Plan  CT:  Reticular interstitial lung abnormality (REENA) noted in both lower lobes. Recommend nonemergent outpatient evaluation with high-resolution chest CT to exclude [place] UIP pattern fibrosis. Mood disorder (HCC)  Assessment & Plan  Continue Lexapro  Ambien nightly as needed    Acquired hypothyroidism  Assessment & Plan  Continue levothyroxine 175 mcg daily    Class 2 severe obesity due to excess calories with serious comorbidity and body mass index (BMI) of 35.0 to 35.9 in adult   Assessment & Plan  Dietary changes and lifestyle modifications  Affects all aspects of care    Essential hypertension  Assessment & Plan  Previously discharged on diovan, sotalol, torsemide  At facility patient is on HCTZ 12.5, losartan 100 mg, and sotalol 80 mg BID  Continue     Mixed hyperlipidemia  Assessment & Plan  Holding Lipitor 20 mg daily given LFTs    Paroxysmal atrial fibrillation (HCC)  Assessment & Plan  Previously on diltiazem and sotalol however patient recently taken off diltiazem at facility  Unclear why diltiazem was stopped   Will continue to hold for now given hepatic impairment   Hold Xarelto in anticipation of liver biopsy               VTE Pharmacologic Prophylaxis: VTE Score: 4 Moderate Risk (Score 3-4) - Pharmacological DVT Prophylaxis Contraindicated. Sequential Compression Devices Ordered. Patient Centered Rounds: I performed bedside rounds with nursing staff today. Discussions with Specialists or Other Care Team Provider: GI    Education and Discussions with Family / Patient: Attempted to update  (daughter) via phone. Unable to contact. Total Time Spent on Date of Encounter in care of patient: 55 mins.  This time was spent on one or more of the following: performing physical exam; counseling and coordination of care; obtaining or reviewing history; documenting in the medical record; reviewing/ordering tests, medications or procedures; communicating with other healthcare professionals and discussing with patient's family/caregivers. Current Length of Stay: 5 day(s)  Current Patient Status: Inpatient   Certification Statement: The patient will continue to require additional inpatient hospital stay due to liver failure, re biopsy  Discharge Plan: Anticipate discharge in 48-72 hrs to home with home services. Code Status: Level 1 - Full Code    Subjective:   Patient stated she does not have complaints  No acute overnight events    Objective:     Vitals:   Temp (24hrs), Av.3 °F (36.3 °C), Min:97.2 °F (36.2 °C), Max:97.5 °F (36.4 °C)    Temp:  [97.2 °F (36.2 °C)-97.5 °F (36.4 °C)] 97.2 °F (36.2 °C)  HR:  [58-64] 63  Resp:  [15] 15  BP: (112-130)/(47-56) 112/56  SpO2:  [97 %-99 %] 99 %  Body mass index is 37.09 kg/m². Input and Output Summary (last 24 hours): Intake/Output Summary (Last 24 hours) at 10/7/2023 1223  Last data filed at 10/7/2023 0900  Gross per 24 hour   Intake 700 ml   Output 250 ml   Net 450 ml       Physical Exam:   Physical Exam  Vitals and nursing note reviewed. Constitutional:       General: She is not in acute distress. Appearance: She is well-developed. HENT:      Head: Normocephalic and atraumatic. Mouth/Throat:      Mouth: Mucous membranes are moist.      Pharynx: Oropharynx is clear. Eyes:      Conjunctiva/sclera: Conjunctivae normal.   Cardiovascular:      Rate and Rhythm: Normal rate and regular rhythm. Heart sounds: No murmur heard. Pulmonary:      Effort: Pulmonary effort is normal. No respiratory distress. Breath sounds: Wheezing present. Abdominal:      Palpations: Abdomen is soft. Tenderness: There is no abdominal tenderness. Musculoskeletal:         General: No swelling. Cervical back: Neck supple. Skin:     General: Skin is warm and dry. Capillary Refill: Capillary refill takes 2 to 3 seconds. Coloration: Skin is jaundiced. Neurological:      Mental Status: She is alert and oriented to person, place, and time. Psychiatric:         Mood and Affect: Mood normal.         Additional Data:     Labs:  Results from last 7 days   Lab Units 10/07/23  0506 10/05/23  0536 10/03/23  0355   WBC Thousand/uL 6.55   < > 6.60   HEMOGLOBIN g/dL 11.6   < > 11.5   HEMATOCRIT % 34.4*   < > 34.6*   PLATELETS Thousands/uL 143*   < > 137*   NEUTROS PCT %  --   --  47   LYMPHS PCT %  --   --  27   MONOS PCT %  --   --  14*   EOS PCT %  --   --  10*    < > = values in this interval not displayed. Results from last 7 days   Lab Units 10/07/23  1101   SODIUM mmol/L 132*   POTASSIUM mmol/L 3.0*   CHLORIDE mmol/L 106   CO2 mmol/L 23   BUN mg/dL 22   CREATININE mg/dL 0.84   ANION GAP mmol/L 3   CALCIUM mg/dL 8.4   ALBUMIN g/dL 1.8*   TOTAL BILIRUBIN mg/dL 14.74*   ALK PHOS U/L 123*   ALT U/L 466*   AST U/L 707*   GLUCOSE RANDOM mg/dL 159*     Results from last 7 days   Lab Units 10/07/23  0506   INR  3.46*     Results from last 7 days   Lab Units 10/07/23  1049 10/07/23  0705 10/06/23  2056 10/06/23  1557 10/06/23  1034 10/06/23  0715 10/05/23  2112 10/05/23  1613 10/05/23  1055 10/05/23  0712 10/04/23  2132 10/04/23  1626   POC GLUCOSE mg/dl 156* 82 158* 121 117 77 99 149* 166* 79 108 114               Lines/Drains:  Invasive Devices       Peripheral Intravenous Line  Duration             Peripheral IV 10/07/23 Left Antecubital <1 day                          Imaging: No pertinent imaging reviewed.     Recent Cultures (last 7 days):         Last 24 Hours Medication List:   Current Facility-Administered Medications   Medication Dose Route Frequency Provider Last Rate    escitalopram  10 mg Oral Daily Jade Sasha Honeyaine, PA-C      hydrochlorothiazide  12.5 mg Oral Daily Jade [place], PA-C      insulin lispro  1-5 Units Subcutaneous HS Renea Palacios [place] PA-C      insulin lispro  1-6 Units Subcutaneous TID JAREK Long PA-C      levothyroxine  175 mcg Oral Early Morning Megan Briceño PA-C      losartan  100 mg Oral Daily Megan Ward PA-C      potassium chloride  40 mEq Oral Once Miguel Foster MD      potassium chloride  40 mEq Oral Once Miguel Foster MD      sodium chloride  2 g Oral BID With Meals Megan Ward PA-C      sotalol  80 mg Oral BID Baudilio Kurtz PA-C          Today, Patient Was Seen By: Miguel Foster MD    **Please Note: This note may have been constructed using a voice recognition system. **

## 2023-10-07 NOTE — PLAN OF CARE
Problem: MOBILITY - ADULT  Goal: Maintain or return to baseline ADL function  Description: INTERVENTIONS:  -  Assess patient's ability to carry out ADLs; assess patient's baseline for ADL function and identify physical deficits which impact ability to perform ADLs (bathing, care of mouth/teeth, toileting, grooming, dressing, etc.)  - Assess/evaluate cause of self-care deficits   - Assess range of motion  - Assess patient's mobility; develop plan if impaired  - Assess patient's need for assistive devices and provide as appropriate  - Encourage maximum independence but intervene and supervise when necessary  - Involve family in performance of ADLs  - Assess for home care needs following discharge   - Consider OT consult to assist with ADL evaluation and planning for discharge  - Provide patient education as appropriate  Outcome: Progressing  Goal: Maintains/Returns to pre admission functional level  Description: INTERVENTIONS:  - Perform BMAT or MOVE assessment daily.   - Set and communicate daily mobility goal to care team and patient/family/caregiver. - Collaborate with rehabilitation services on mobility goals if consulted  - Perform Range of Motion x times a day. - Reposition patient every x hours.   - Dangle patient x times a day  - Stand patient x times a day  - Ambulate patient x times a day  - Out of bed to chair x times a day   - Out of bed for meals x times a day  - Out of bed for toileting  - Record patient progress and toleration of activity level   Outcome: Progressing     Problem: PAIN - ADULT  Goal: Verbalizes/displays adequate comfort level or baseline comfort level  Description: Interventions:  - Encourage patient to monitor pain and request assistance  - Assess pain using appropriate pain scale  - Administer analgesics based on type and severity of pain and evaluate response  - Implement non-pharmacological measures as appropriate and evaluate response  - Consider cultural and social influences on pain and pain management  - Notify physician/advanced practitioner if interventions unsuccessful or patient reports new pain  Outcome: Progressing     Problem: INFECTION - ADULT  Goal: Absence or prevention of progression during hospitalization  Description: INTERVENTIONS:  - Assess and monitor for signs and symptoms of infection  - Monitor lab/diagnostic results  - Monitor all insertion sites, i.e. indwelling lines, tubes, and drains  - Monitor endotracheal if appropriate and nasal secretions for changes in amount and color  - [place] appropriate cooling/warming therapies per order  - Administer medications as ordered  - Instruct and encourage patient and family to use good hand hygiene technique  - Identify and instruct in appropriate isolation precautions for identified infection/condition  Outcome: Progressing  Goal: Absence of fever/infection during neutropenic period  Description: INTERVENTIONS:  - Monitor WBC    Outcome: Progressing     Problem: SAFETY ADULT  Goal: Maintain or return to baseline ADL function  Description: INTERVENTIONS:  -  Assess patient's ability to carry out ADLs; assess patient's baseline for ADL function and identify physical deficits which impact ability to perform ADLs (bathing, care of mouth/teeth, toileting, grooming, dressing, etc.)  - Assess/evaluate cause of self-care deficits   - Assess range of motion  - Assess patient's mobility; develop plan if impaired  - Assess patient's need for assistive devices and provide as appropriate  - Encourage maximum independence but intervene and supervise when necessary  - Involve family in performance of ADLs  - Assess for home care needs following discharge   - Consider OT consult to assist with ADL evaluation and planning for discharge  - Provide patient education as appropriate  Outcome: Progressing  Goal: Maintains/Returns to pre admission functional level  Description: INTERVENTIONS:  - Perform BMAT or MOVE assessment daily.   - Set and communicate daily mobility goal to care team and patient/family/caregiver. - Collaborate with rehabilitation services on mobility goals if consulted  - Perform Range of Motion x times a day. - Reposition patient every x hours.   - Dangle patient x times a day  - Stand patient x times a day  - Ambulate patient x times a day  - Out of bed to chair x times a day   - Out of bed for meals x times a day  - Out of bed for toileting  - Record patient progress and toleration of activity level   Outcome: Progressing  Goal: Patient will remain free of falls  Description: INTERVENTIONS:  - Educate patient/family on patient safety including physical limitations  - Instruct patient to call for assistance with activity   - Consult OT/PT to assist with strengthening/mobility   - Keep Call bell within reach  - Keep bed low and locked with side rails adjusted as appropriate  - Keep care items and personal belongings within reach  - Initiate and maintain comfort rounds  - Make Fall Risk Sign visible to staff  - Offer Toileting every x Hours, in advance of need  - Initiate/Maintain bedalarm  - Obtain necessary fall risk management equipment:   - Apply yellow socks and bracelet for high fall risk patients  - Consider moving patient to room near nurses station  Outcome: Progressing     Problem: DISCHARGE PLANNING  Goal: Discharge to home or other facility with appropriate resources  Description: INTERVENTIONS:  - Identify barriers to discharge w/patient and caregiver  - Arrange for needed discharge resources and transportation as appropriate  - Identify discharge learning needs (meds, wound care, etc.)  - Arrange for interpretive services to assist at discharge as needed  - Refer to Case Management Department for coordinating discharge planning if the patient needs post-hospital services based on physician/advanced practitioner order or complex needs related to functional status, cognitive ability, or social support system  Outcome: Progressing     Problem: Knowledge Deficit  Goal: Patient/family/caregiver demonstrates understanding of disease process, treatment plan, medications, and discharge instructions  Description: Complete learning assessment and assess knowledge base.   Interventions:  - Provide teaching at level of understanding  - Provide teaching via preferred learning methods  Outcome: Progressing     Problem: Potential for Falls  Goal: Patient will remain free of falls  Description: INTERVENTIONS:  - Educate patient/family on patient safety including physical limitations  - Instruct patient to call for assistance with activity   - Consult OT/PT to assist with strengthening/mobility   - Keep Call bell within reach  - Keep bed low and locked with side rails adjusted as appropriate  - Keep care items and personal belongings within reach  - Initiate and maintain comfort rounds  - Make Fall Risk Sign visible to staff  - Offer Toileting every x Hours, in advance of need  - Initiate/Maintain bedalarm  - Obtain necessary fall risk management equipment: x  - Apply yellow socks and bracelet for high fall risk patients  - Consider moving patient to room near nurses station  Outcome: Progressing

## 2023-10-08 NOTE — ASSESSMENT & PLAN NOTE
Previously evaluated by GI in July for transaminitis thought 2/2 shock liver. CMV and EBV IGG positive but no IGM. Hepatitis panel and RUQ ultrasound were negative. Labs obtained due to new jaundice at [facility] showed T bili of 6, prompting ED presentation. S/P cholecystectomy. LFTs on admission:  Direct bilirubin: 7.70  Total bilirubin: 12.2  Alk phos: 118  AST: 690  ALT: 407  GI consulted   CT A/P: Nodular liver morphology and sequela of portal venous hypertension suggesting cirrhosis. Minimal ascites around the liver and layering in the pelvis. Prominent lymph nodes at the katelynn hepatis presumably reactive to primary underlying liver dysfunction. No biliary ductal dilatation to suggest obstructive cause of jaundice. AFP and CA 19-9 are elevated  MRI shows nodular liver, trace perihepatic ascites, 5 mm pancreatic uncinate process cystic lesion  Discussed with GI liver injury likely attributed by drug-induced.   Monitor LFT normal. 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- Gabapentin could be cause, but would like to biopsy  To medicate given yesterday, INR decreased to 4  INR monitoring  Vit K- 10/07  Plan for biopsy if INR is below 1.5

## 2023-10-08 NOTE — ASSESSMENT & PLAN NOTE
Lab Results   Component Value Date    HGBA1C 7.0 (H) 07/20/2023     Patient has not been on any home medications   Patient had blood sugar of 64 this morning likely depletion of glycogen stores, will change diet to regular and discontinue SSI

## 2023-10-08 NOTE — PROGRESS NOTES
[address]  Progress Note  Name: Jack Fisher  MRN: [id number]  Unit/Bed#: -01 I Date of Admission: 10/2/2023   Date of Service: 10/8/2023 I Hospital Day: 6    Assessment/Plan   * Transaminitis  Assessment & Plan  Previously evaluated by GI in July for transaminitis thought 2/2 shock liver. CMV and EBV IGG positive but no IGM. Hepatitis panel and RUQ ultrasound were negative. Labs obtained due to new jaundice at [facility] showed T bili of 6, prompting ED presentation. S/P cholecystectomy. LFTs on admission:  Direct bilirubin: 7.70  Total bilirubin: 12.2  Alk phos: 118  AST: 690  ALT: 407  GI consulted   CT A/P: Nodular liver morphology and sequela of portal venous hypertension suggesting cirrhosis. Minimal ascites around the liver and layering in the pelvis. Prominent lymph nodes at the katelynn hepatis presumably reactive to primary underlying liver dysfunction. No biliary ductal dilatation to suggest obstructive cause of jaundice. AFP and CA 19-9 are elevated  MRI shows nodular liver, trace perihepatic ascites, 5 mm pancreatic uncinate process cystic lesion  Discussed with GI liver injury likely attributed by drug-induced.   Monitor LFT normal. 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- Gabapentin could be cause, but would like to biopsy  To medicate given yesterday, INR decreased to 4  INR monitoring  Vit K- 10/07  Plan for biopsy if INR is below 1.5      Type 2 diabetes mellitus ([address])  Assessment & Plan  Lab Results   Component Value Date    HGBA1C 7.0 (H) 07/20/2023     Patient has not been on any home medications   Patient had blood sugar of 64 this morning likely depletion of glycogen stores, will change diet to regular and discontinue SSI      Hyponatremia  Assessment & Plan  Previously discharged on 2 g salt tabs BID and torsemide 5 mg daily  Torsemide was stopped by facility for unknown reason   Continue to monitor on salt tabs - Na corrects to 135 on admission    Closed right ankle fracture  Assessment & Plan  Continue ambulating with camboot and walker  PT evaluated patient on 10/3-recommended level 3 rehab    Diffuse interstitial pulmonary fibrosis (HCC)  Assessment & Plan  CT:  Reticular interstitial lung abnormality (REENA) noted in both lower lobes. Recommend nonemergent outpatient evaluation with high-resolution chest CT to exclude [place] UIP pattern fibrosis. Mood disorder (HCC)  Assessment & Plan  Continue Lexapro  Ambien nightly as needed    Acquired hypothyroidism  Assessment & Plan  Continue levothyroxine 175 mcg daily    Class 2 severe obesity due to excess calories with serious comorbidity and body mass index (BMI) of 35.0 to 35.9 in adult   Assessment & Plan  Dietary changes and lifestyle modifications  Affects all aspects of care    Essential hypertension  Assessment & Plan  Previously discharged on diovan, sotalol, torsemide  At facility patient is on HCTZ 12.5, losartan 100 mg, and sotalol 80 mg BID  Continue     Mixed hyperlipidemia  Assessment & Plan  Holding Lipitor 20 mg daily given LFTs    Paroxysmal atrial fibrillation (HCC)  Assessment & Plan  Previously on diltiazem and sotalol however patient recently taken off diltiazem at facility  Unclear why diltiazem was stopped   Will continue to hold for now given hepatic impairment   Hold Xarelto in anticipation of liver biopsy             VTE Pharmacologic Prophylaxis: VTE Score: 4 Moderate Risk (Score 3-4) - Pharmacological DVT Prophylaxis Contraindicated. Sequential Compression Devices Ordered. Patient Centered Rounds: I performed bedside rounds with nursing staff today. Discussions with Specialists or Other Care Team Provider: GI    Education and Discussions with Family / Patient: Updated  (daughter) via phone. Total Time Spent on Date of Encounter in care of patient: 57 mins.  This time was spent on one or more of the following: performing physical exam; counseling and coordination of care; obtaining or reviewing history; documenting in the medical record; reviewing/ordering tests, medications or procedures; communicating with other healthcare professionals and discussing with patient's family/caregivers. Current Length of Stay: 6 day(s)  Current Patient Status: Inpatient   Certification Statement: The patient will continue to require additional inpatient hospital stay due to liver bioposy  Discharge Plan:  Pending liver biopsy    Code Status: Level 1 - Full Code    Subjective:   Denies complaints    Objective:     Vitals:   Temp (24hrs), Av.4 °F (36.3 °C), Min:97.2 °F (36.2 °C), Max:97.7 °F (36.5 °C)    Temp:  [97.2 °F (36.2 °C)-97.7 °F (36.5 °C)] 97.2 °F (36.2 °C)  HR:  [50-63] 50  Resp:  [15-18] 15  BP: (107-141)/(57-59) 107/57  SpO2:  [97 %-99 %] 97 %  Body mass index is 37.09 kg/m². Input and Output Summary (last 24 hours):   No intake or output data in the 24 hours ending 10/08/23 1335    Physical Exam:   Physical Exam  Vitals and nursing note reviewed. Constitutional:       General: She is not in acute distress. Appearance: She is well-developed. HENT:      Head: Normocephalic and atraumatic. Mouth/Throat:      Mouth: Mucous membranes are moist.      Pharynx: Oropharynx is clear. Eyes:      Conjunctiva/sclera: Conjunctivae normal.   Cardiovascular:      Rate and Rhythm: Normal rate and regular rhythm. Heart sounds: No murmur heard. Pulmonary:      Effort: Pulmonary effort is normal. No respiratory distress. Breath sounds: Wheezing present. Abdominal:      Palpations: Abdomen is soft. Tenderness: There is no abdominal tenderness. Musculoskeletal:         General: No swelling. Cervical back: Neck supple. Skin:     General: Skin is warm and dry. Capillary Refill: Capillary refill takes 2 to 3 seconds. Coloration: Skin is jaundiced.    Neurological:      Mental Status: She is alert and oriented to person, place, and time. Psychiatric:         Mood and Affect: Mood normal.          Additional Data:     Labs:  Results from last 7 days   Lab Units 10/08/23  0446   WBC Thousand/uL 6.76   HEMOGLOBIN g/dL 12.0   HEMATOCRIT % 35.5   PLATELETS Thousands/uL 152   NEUTROS PCT % 41*   LYMPHS PCT % 30   MONOS PCT % 15*   EOS PCT % 11*     Results from last 7 days   Lab Units 10/08/23  0446   SODIUM mmol/L 132*   POTASSIUM mmol/L 3.7   CHLORIDE mmol/L 107   CO2 mmol/L 23   BUN mg/dL 20   CREATININE mg/dL 0.68   ANION GAP mmol/L 2   CALCIUM mg/dL 8.5   ALBUMIN g/dL 1.8*   TOTAL BILIRUBIN mg/dL 14.44*   ALK PHOS U/L 128*   ALT U/L 457*   AST U/L 698*   GLUCOSE RANDOM mg/dL 84     Results from last 7 days   Lab Units 10/08/23  0446   INR  3.19*     Results from last 7 days   Lab Units 10/08/23  1036 10/08/23  0815 10/08/23  0710 10/07/23  2048 10/07/23  1530 10/07/23  1049 10/07/23  0705 10/06/23  2056 10/06/23  1557 10/06/23  1034 10/06/23  0715 10/05/23  2112   POC GLUCOSE mg/dl 126 100 64* 145* 162* 156* 82 158* 121 117 77 99               Lines/Drains:  Invasive Devices       Peripheral Intravenous Line  Duration             Peripheral IV 10/08/23 Left;Ventral (anterior) Forearm <1 day                          Imaging: No pertinent imaging reviewed.     Recent Cultures (last 7 days):         Last 24 Hours Medication List:   Current Facility-Administered Medications   Medication Dose Route Frequency Provider Last Rate    escitalopram  10 mg Oral Daily Claudio Schwab [place], PA-C      hydrochlorothiazide  12.5 mg Oral Daily Claudio Schwab [place], [place]      levothyroxine  175 mcg Oral Early Morning Claudio Schwab [place], PA-C      losartan  100 mg Oral Daily Claudio Schwab [place], PA-C      sodium chloride  2 g Oral BID With Meals Claudio Schwab [place], PA-C      sotalol  80 mg Oral BID Tracie Styles PA-C          Today, Patient Was Seen By: Neri Reyes MD    **Please Note: This note may have been constructed using a voice recognition system. **

## 2023-10-08 NOTE — PLAN OF CARE
Problem: MOBILITY - ADULT  Goal: Maintain or return to baseline ADL function  Description: INTERVENTIONS:  -  Assess patient's ability to carry out ADLs; assess patient's baseline for ADL function and identify physical deficits which impact ability to perform ADLs (bathing, care of mouth/teeth, toileting, grooming, dressing, etc.)  - Assess/evaluate cause of self-care deficits   - Assess range of motion  - Assess patient's mobility; develop plan if impaired  - Assess patient's need for assistive devices and provide as appropriate  - Encourage maximum independence but intervene and supervise when necessary  - Involve family in performance of ADLs  - Assess for home care needs following discharge   - Consider OT consult to assist with ADL evaluation and planning for discharge  - Provide patient education as appropriate  Outcome: Progressing     Problem: PAIN - ADULT  Goal: Verbalizes/displays adequate comfort level or baseline comfort level  Description: Interventions:  - Encourage patient to monitor pain and request assistance  - Assess pain using appropriate pain scale  - Administer analgesics based on type and severity of pain and evaluate response  - Implement non-pharmacological measures as appropriate and evaluate response  - Consider cultural and social influences on pain and pain management  - Notify physician/advanced practitioner if interventions unsuccessful or patient reports new pain  Outcome: Progressing     Problem: INFECTION - ADULT  Goal: Absence or prevention of progression during hospitalization  Description: INTERVENTIONS:  - Assess and monitor for signs and symptoms of infection  - Monitor lab/diagnostic results  - Monitor all insertion sites, i.e. indwelling lines, tubes, and drains  - Monitor endotracheal if appropriate and nasal secretions for changes in amount and color  - [place] appropriate cooling/warming therapies per order  - Administer medications as ordered  - Instruct and encourage patient and family to use good hand hygiene technique  - Identify and instruct in appropriate isolation precautions for identified infection/condition  Outcome: Progressing  Goal: Absence of fever/infection during neutropenic period  Description: INTERVENTIONS:  - Monitor WBC    Outcome: Progressing     Problem: SAFETY ADULT  Goal: Maintain or return to baseline ADL function  Description: INTERVENTIONS:  -  Assess patient's ability to carry out ADLs; assess patient's baseline for ADL function and identify physical deficits which impact ability to perform ADLs (bathing, care of mouth/teeth, toileting, grooming, dressing, etc.)  - Assess/evaluate cause of self-care deficits   - Assess range of motion  - Assess patient's mobility; develop plan if impaired  - Assess patient's need for assistive devices and provide as appropriate  - Encourage maximum independence but intervene and supervise when necessary  - Involve family in performance of ADLs  - Assess for home care needs following discharge   - Consider OT consult to assist with ADL evaluation and planning for discharge  - Provide patient education as appropriate  Outcome: Progressing    Problem: DISCHARGE PLANNING  Goal: Discharge to home or other facility with appropriate resources  Description: INTERVENTIONS:  - Identify barriers to discharge w/patient and caregiver  - Arrange for needed discharge resources and transportation as appropriate  - Identify discharge learning needs (meds, wound care, etc.)  - Arrange for interpretive services to assist at discharge as needed  - Refer to Case Management Department for coordinating discharge planning if the patient needs post-hospital services based on physician/advanced practitioner order or complex needs related to functional status, cognitive ability, or social support system  Outcome: Progressing     Problem: Knowledge Deficit  Goal: Patient/family/caregiver demonstrates understanding of disease process, treatment plan, medications, and discharge instructions  Description: Complete learning assessment and assess knowledge base.   Interventions:  - Provide teaching at level of understanding  - Provide teaching via preferred learning methods  Outcome: Progressing     Problem: Potential for Falls  Goal: Patient will remain free of falls  Description: INTERVENTIONS:  - Educate patient/family on patient safety including physical limitations  - Instruct patient to call for assistance with activity   - Consult OT/PT to assist with strengthening/mobility   - Keep Call bell within reach  - Keep bed low and locked with side rails adjusted as appropriate  - Keep care items and personal belongings within reach  - Initiate and maintain comfort rounds  - Make Fall Risk Sign visible to staff  - Offer Toileting every 2 Hours, in advance of need  - Obtain necessary fall risk management equipment: nonskid footwear  - Apply yellow socks and bracelet for high fall risk patients  - Consider moving patient to room near nurses station  Outcome: Progressing

## 2023-10-08 NOTE — PROGRESS NOTES
Progress note - Gastroenterology   Markos Camacho 81 y.o. female MRN: [id number]  Unit/Bed#: -01 Encounter: [id number]    ASSESSMENT and PLAN  81-year-old female with history of cholecystectomy 2016, hypothyroidism, A-fib on Xarelto who presented to the emergency department from acute rehab for painless jaundice   Found to have significantly elevated LFTs with AST 65, , total bilirubin 12.14,   INR elevated at 4.32 trending down with vitamin K. Previously on Xarelto which is being held AFP 16.91, CA 19-9 also slightly elevated but. Anti-smooth muscle body mildly elevated, otherwise other serologies unremarkable. HSV, CMV and ceruloplasmin pending 10/8   Imaging to date includes CT abdomen/pelvis notable for nodular liver morphology suggestive of cirrhosis, minimal ascites around the liver. Follow-up MRI redemonstrated nodular liver contour suggestive of cirrhosis, trace perihepatic ascites, possible 5 mm IPMN with no pancreatic ductal dilatation. Case was discussed with hepatology, possible drug-induced liver injury in setting of gabapentin. She did get vitamin K 10/5 for coagulopathy with improvement in INR    1. Acute liver injury likely secondary to drug-induced liver injury in setting of gabapentin use  2. Liver nodularity on imaging with trace perihepatic ascites  Possible drug-induced liver injury after taking gabapentin  LFTs stable but remain elevated, pending today  INR remains elevated, 3.19 today after receiving vitamin K 10/7  No signs of hepatic encephalopathy  -Continue to trend LFTs  -We will give additional vitamin K 10 mg IV today. Recheck INR in a.m. 10/9  -Plan for liver biopsy when INR 1.5 or less  -Continue to hold Xarelto      Chief Complaint   Patient presents with    Abnormal Lab     Elevated liver enzymes from routine blood work done yesterday.         SUBJECTIVE/HPI   Alert and oriented x3, conversing appropriately  Denies abdominal pain  Tolerating regular diet well, no nausea or vomiting  INR remains elevated at 3.19 this a.m. after receiving vitamin K IV yesterday  LFTs pending    /57   Pulse (!) 50   Temp (!) 97.2 °F (36.2 °C)   Resp 15   Ht 5' 4" (1.626 m)   Wt 98 kg (216 lb 0.8 oz)   SpO2 97%   BMI 37.09 kg/m²     PHYSICALEXAM  General appearance: alert, appears comfortable, remains jaundiced  Eyes:  + icterus   Head: Normocephalic, without obvious abnormality, atraumatic  Lungs: clear to auscultation bilaterally  Heart: regular rate and rhythm, S1, S2 normal, no murmur, click, rub or gallop  Abdomen: soft, nondistended, non-tender; with palpation bowel sounds normal  Extremities: extremities normal, atraumatic, no cyanosis or edema  Neurologic: Grossly normal    Lab Results   Component Value Date    CALCIUM 8.5 10/08/2023    K 3.7 10/08/2023    CO2 23 10/08/2023     10/08/2023    BUN 20 10/08/2023    CREATININE 0.68 10/08/2023     Lab Results   Component Value Date    WBC 6.76 10/08/2023    HGB 12.0 10/08/2023    HCT 35.5 10/08/2023    MCV 86 10/08/2023     10/08/2023     Lab Results   Component Value Date     (H) 10/07/2023     (H) 10/07/2023    GGT 76 (H) 10/03/2023    ALKPHOS 123 (H) 10/07/2023       Lab Results   Component Value Date    LIPASE 57 10/02/2023     Lab Results   Component Value Date    IRON 203 10/03/2023    TIBC <258 10/03/2023    FERRITIN 164 10/03/2023     Lab Results   Component Value Date    INR 3.19 (H) 10/08/2023

## 2023-10-08 NOTE — PLAN OF CARE
Problem: MOBILITY - ADULT  Goal: Maintain or return to baseline ADL function  Description: INTERVENTIONS:  -  Assess patient's ability to carry out ADLs; assess patient's baseline for ADL function and identify physical deficits which impact ability to perform ADLs (bathing, care of mouth/teeth, toileting, grooming, dressing, etc.)  - Assess/evaluate cause of self-care deficits   - Assess range of motion  - Assess patient's mobility; develop plan if impaired  - Assess patient's need for assistive devices and provide as appropriate  - Encourage maximum independence but intervene and supervise when necessary  - Involve family in performance of ADLs  - Assess for home care needs following discharge   - Consider OT consult to assist with ADL evaluation and planning for discharge  - Provide patient education as appropriate  Outcome: Progressing  Goal: Maintains/Returns to pre admission functional level  Description: INTERVENTIONS:  - Perform BMAT or MOVE assessment daily.   - Set and communicate daily mobility goal to care team and patient/family/caregiver. - Collaborate with rehabilitation services on mobility goals if consulted  - Perform Range of Motion 3 times a day. - Reposition patient every 3 hours.   - Dangle patient 3 times a day  - Stand patient 3 times a day  - Ambulate patient 3 times a day  - Out of bed to chair 3 times a day   - Out of bed for meals 3 times a day  - Out of bed for toileting  - Record patient progress and toleration of activity level   Outcome: Progressing     Problem: PAIN - ADULT  Goal: Verbalizes/displays adequate comfort level or baseline comfort level  Description: Interventions:  - Encourage patient to monitor pain and request assistance  - Assess pain using appropriate pain scale  - Administer analgesics based on type and severity of pain and evaluate response  - Implement non-pharmacological measures as appropriate and evaluate response  - Consider cultural and social influences on pain and pain management  - Notify physician/advanced practitioner if interventions unsuccessful or patient reports new pain  Outcome: Progressing     Problem: INFECTION - ADULT  Goal: Absence or prevention of progression during hospitalization  Description: INTERVENTIONS:  - Assess and monitor for signs and symptoms of infection  - Monitor lab/diagnostic results  - Monitor all insertion sites, i.e. indwelling lines, tubes, and drains  - Monitor endotracheal if appropriate and nasal secretions for changes in amount and color  - [place] appropriate cooling/warming therapies per order  - Administer medications as ordered  - Instruct and encourage patient and family to use good hand hygiene technique  - Identify and instruct in appropriate isolation precautions for identified infection/condition  Outcome: Progressing  Goal: Absence of fever/infection during neutropenic period  Description: INTERVENTIONS:  - Monitor WBC    Outcome: Progressing     Problem: SAFETY ADULT  Goal: Maintain or return to baseline ADL function  Description: INTERVENTIONS:  -  Assess patient's ability to carry out ADLs; assess patient's baseline for ADL function and identify physical deficits which impact ability to perform ADLs (bathing, care of mouth/teeth, toileting, grooming, dressing, etc.)  - Assess/evaluate cause of self-care deficits   - Assess range of motion  - Assess patient's mobility; develop plan if impaired  - Assess patient's need for assistive devices and provide as appropriate  - Encourage maximum independence but intervene and supervise when necessary  - Involve family in performance of ADLs  - Assess for home care needs following discharge   - Consider OT consult to assist with ADL evaluation and planning for discharge  - Provide patient education as appropriate  Outcome: Progressing  Goal: Maintains/Returns to pre admission functional level  Description: INTERVENTIONS:  - Perform BMAT or MOVE assessment daily.   - Set and communicate daily mobility goal to care team and patient/family/caregiver. - Collaborate with rehabilitation services on mobility goals if consulted  - Perform Range of Motion 3 times a day. - Reposition patient every 3 hours.   - Dangle patient 3 times a day  - Stand patient 3 times a day  - Ambulate patient 3 times a day  - Out of bed to chair 3 times a day   - Out of bed for meals 3 times a day  - Out of bed for toileting  - Record patient progress and toleration of activity level   Outcome: Progressing  Goal: Patient will remain free of falls  Description: INTERVENTIONS:  - Educate patient/family on patient safety including physical limitations  - Instruct patient to call for assistance with activity   - Consult OT/PT to assist with strengthening/mobility   - Keep Call bell within reach  - Keep bed low and locked with side rails adjusted as appropriate  - Keep care items and personal belongings within reach  - Initiate and maintain comfort rounds  - Make Fall Risk Sign visible to staff  - Offer Toileting every 2 Hours, in advance of need  - Initiate/Maintain alarm  - Obtain necessary fall risk management equipment  - Apply yellow socks and bracelet for high fall risk patients  - Consider moving patient to room near nurses station  Outcome: Progressing     Problem: DISCHARGE PLANNING  Goal: Discharge to home or other facility with appropriate resources  Description: INTERVENTIONS:  - Identify barriers to discharge w/patient and caregiver  - Arrange for needed discharge resources and transportation as appropriate  - Identify discharge learning needs (meds, wound care, etc.)  - Arrange for interpretive services to assist at discharge as needed  - Refer to Case Management Department for coordinating discharge planning if the patient needs post-hospital services based on physician/advanced practitioner order or complex needs related to functional status, cognitive ability, or social support system  Outcome: Progressing     Problem: Knowledge Deficit  Goal: Patient/family/caregiver demonstrates understanding of disease process, treatment plan, medications, and discharge instructions  Description: Complete learning assessment and assess knowledge base.   Interventions:  - Provide teaching at level of understanding  - Provide teaching via preferred learning methods  Outcome: Progressing     Problem: Potential for Falls  Goal: Patient will remain free of falls  Description: INTERVENTIONS:  - Educate patient/family on patient safety including physical limitations  - Instruct patient to call for assistance with activity   - Consult OT/PT to assist with strengthening/mobility   - Keep Call bell within reach  - Keep bed low and locked with side rails adjusted as appropriate  - Keep care items and personal belongings within reach  - Initiate and maintain comfort rounds  - Make Fall Risk Sign visible to staff  - Offer Toileting every 2 Hours, in advance of need  - Initiate/Maintain alarm  - Obtain necessary fall risk management equipment  - Apply yellow socks and bracelet for high fall risk patients  - Consider moving patient to room near nurses station  Outcome: Progressing

## 2023-10-09 NOTE — ASSESSMENT & PLAN NOTE
• Previously discharged on diovan, sotalol, torsemide  o At facility patient is on HCTZ 12.5, losartan 100 mg, and sotalol 80 mg BID  o Continue

## 2023-10-09 NOTE — TELEMEDICINE
e-Consult (IPC)  - Interventional Radiology  Roosevelt Bourgeois 81 y.o. female MRN: [id number]  Unit/Bed#: -01 Encounter: [id number]          Interventional Radiology has been consulted to evaluate Roosevelt Bourgeois    We were consulted by GI service concerning this patient with acute liver injury. Inpatient Consult to IR  Consult performed by: Josselyn Molina MD  Consult ordered by: Neo Ortega DO        10/09/23    Assessment/Recommendation:   81year-old female with history of acute liver injury, suspected drug-induced, however further evaluation is required with tissue sampling as the LFTs have continued to increase. Last Xarelto dose was on 10/3. Platelets of 971. INR 3.22, GFR 69. Please continue to lower the INR with FFP. Please make patient NPO at midnight tonight and if INR is less than 3.0 tomorrow morning, then we can proceed with TJLB with sedation. If INR is less than 1.5, then we will consider percutaneous liver biopsy. A steroid prep will be ordered given documented allergy to contrast.    5-10 minutes, >50% of the total time devoted to medical consultative verbal/EMR discussion between providers. Written report will be generated in the EMR. Thank you for allowing Interventional Radiology to participate in the care of Roosevelt Bourgeois. Please don't hesitate to call or TigerText us with any questions.      Josselyn Molina MD

## 2023-10-09 NOTE — ASSESSMENT & PLAN NOTE
• CT:  Reticular interstitial lung abnormality (REENA) noted in both lower lobes. Recommend nonemergent outpatient evaluation with high-resolution chest CT to exclude [place] UIP pattern fibrosis.

## 2023-10-09 NOTE — PROGRESS NOTES
[facility]  Progress Note  Name: Efren Dave  MRN: [id number]  Unit/Bed#: -01 I Date of Admission: 10/2/2023   Date of Service: 10/9/2023 I Hospital Day: 7    Assessment/Plan   Type 2 diabetes mellitus [facility])  Assessment & Plan  Lab Results   Component Value Date    HGBA1C 7.0 (H) 07/20/2023     • Patient has not been on any home medications   • Patient had blood sugar of 64 this morning likely depletion of glycogen stores, will change diet to regular and discontinue SSI      Hyponatremia  Assessment & Plan  • Previously discharged on 2 g salt tabs BID and torsemide 5 mg daily  o Torsemide was stopped by facility for unknown reason   o Continue to monitor on salt tabs - Na corrects to 135 on admission    Closed right ankle fracture  Assessment & Plan  • Continue ambulating with camboot and walker  • PT evaluated patient on 10/3-recommended level 3 rehab    Diffuse interstitial pulmonary fibrosis (HCC)  Assessment & Plan  • CT:  Reticular interstitial lung abnormality (REENA) noted in both lower lobes. Recommend nonemergent outpatient evaluation with high-resolution chest CT to exclude [place] UIP pattern fibrosis.     Mood disorder (HCC)  Assessment & Plan  • Continue Lexapro  • Ambien nightly as needed    Acquired hypothyroidism  Assessment & Plan  • Continue levothyroxine 175 mcg daily    Class 2 severe obesity due to excess calories with serious comorbidity and body mass index (BMI) of 35.0 to 35.9 in adult   Assessment & Plan  • Dietary changes and lifestyle modifications  • Affects all aspects of care    Essential hypertension  Assessment & Plan  • Previously discharged on diovan, sotalol, torsemide  o At facility patient is on HCTZ 12.5, losartan 100 mg, and sotalol 80 mg BID  o Continue     Mixed hyperlipidemia  Assessment & Plan  • Holding Lipitor 20 mg daily given LFTs    Paroxysmal atrial fibrillation (HCC)  Assessment & Plan  • Previously on diltiazem and sotalol however patient recently taken off diltiazem at facility  o Unclear why diltiazem was stopped   o Will continue to hold for now given hepatic impairment   • Hold Xarelto in anticipation of liver biopsy    * Transaminitis  Assessment & Plan  Previously evaluated by GI in July for transaminitis thought 2/2 shock liver. CMV and EBV IGG positive but no IGM. Hepatitis panel and RUQ ultrasound were negative. Labs obtained due to new jaundice at [facility] showed T bili of 6, prompting ED presentation. S/P cholecystectomy. • LFTs on admission:  o Direct bilirubin: 7.70  o Total bilirubin: 12.2  o Alk phos: 118  o AST: 690  o ALT: 407  • GI consulted   o CT A/P: Nodular liver morphology and sequela of portal venous hypertension suggesting cirrhosis. Minimal ascites around the liver and layering in the pelvis. Prominent lymph nodes at the katelynn hepatis presumably reactive to primary underlying liver dysfunction. No biliary ductal dilatation to suggest obstructive cause of jaundice. AFP and CA 19-9 are elevated  MRI shows nodular liver, trace perihepatic ascites, 5 mm pancreatic uncinate process cystic lesion  Discussed with GI liver injury likely attributed by drug-induced. Monitor LFT normal. 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- Gabapentin could be cause, but would like to biopsy  Patient LFTs are trending up. Awaiting results from this morning  Vit K- 10/07 and 10/8  Trend PT/INR  Plan for biopsy if INR is below 1.5        Labs & Imaging: I have personally reviewed pertinent reports. VTE Prophylaxis: in place. Code Status:   Level 1 - Full Code    Patient Centered Rounds: I have performed bedside rounds with nursing staff today. Discussions with Specialists or Other Care Team Provider: GI    Education and Discussions with Family / Patient: Patient declined family update. Current Length of Stay: 7 day(s)    Current Patient Status: Inpatient   Certification Statement:  The patient will continue to require additional inpatient hospital stay due to see my assessment and plan. Subjective:   Patient is seen and examined at bedside. Denies any abdominal pain, fever, chill, nausea, vomiting or any other complains. Afebrile  All other ROS are negative. Objective:    Vitals: Blood pressure 132/54, pulse 72, temperature (!) 97.2 °F (36.2 °C), resp. rate 20, height 5' 4" (1.626 m), weight 98 kg (216 lb 0.8 oz), SpO2 99 %. ,Body mass index is 37.09 kg/m². SPO2 RA Rest    Flowsheet Row ED to Hosp-Admission (Current) from 10/2/2023 in [address] Med Surg [facility]   SpO2 99 %   SpO2 Activity At Rest   O2 Device None (Room air)   O2 Flow Rate --        I&O:     Intake/Output Summary (Last 24 hours) at 10/9/2023 1015  Last data filed at 10/8/2023 1807  Gross per 24 hour   Intake 870 ml   Output --   Net 870 ml       Physical Exam:    General- Alert, lying comfortably in bed. Not in any acute distress. Neck- Supple, No JVD  Noted to have icterus  CVS- regular, S1 and S2 normal  Chest- Bilateral Air entry, No rhochi, crackles or wheezing present. Abdomen- soft, nontender, not distended, no guarding or rigidity, BS+  Extremities-  No pedal edema, No calf tenderness  CNS-   Alert, awake and orientedx3. No focal deficits present.     Invasive Devices     Peripheral Intravenous Line  Duration           Peripheral IV 10/08/23 Left;Ventral (anterior) Forearm <1 day                      Social History  reviewed  Family History   Problem Relation Age of Onset   • Cancer Mother         ovarian   • Heart disease Father         cardiac disorder   • Cancer Father    • Heart disease Brother    • Heart disease Paternal Aunt    • Heart disease Paternal Uncle     reviewed    Meds:  Current Facility-Administered Medications   Medication Dose Route Frequency Provider Last Rate Last Admin   • escitalopram (LEXAPRO) tablet 10 mg  10 mg Oral Daily Usama Ward PA-C   10 mg at 10/09/23 8874   • hydrochlorothiazide (HYDRODIURIL) tablet 12.5 mg  12.5 mg Oral Daily Peter Ward PA-C   12.5 mg at 10/09/23 5228   • levothyroxine tablet 175 mcg  175 mcg Oral Early Morning Peter Ward PA-C   175 mcg at 10/09/23 7319   • losartan (COZAAR) tablet 100 mg  100 mg Oral Daily Peter Ward PA-C   100 mg at 10/09/23 4125   • sodium chloride tablet 2 g  2 g Oral BID With Meals Peter Ward PA-C   2 g at 10/08/23 1716   • sotalol (BETAPACE) tablet 80 mg  80 mg Oral BID Peter Ward PA-C   80 mg at 10/09/23 0809      Medications Prior to Admission   Medication   • atorvastatin (LIPITOR) 20 mg tablet   • diltiazem (CARDIZEM CD) 360 MG 24 hr capsule   • Diovan 320 MG tablet   • escitalopram (LEXAPRO) 10 mg tablet   • gabapentin (NEURONTIN) 100 mg capsule   • hydrochlorothiazide (HYDRODIURIL) 25 mg tablet   • HYDROcodone-acetaminophen (Norco) 5-325 mg per tablet   • levothyroxine 175 mcg tablet   • liotrix (THYROLAR-1) 60 (12.5-50) MG (MCG) TABS   • rivaroxaban (XARELTO) 20 mg tablet   • sodium chloride 1 g tablet   • sotalol (BETAPACE) 80 mg tablet   • torsemide (DEMADEX) 5 MG tablet   • zolpidem (AMBIEN) 10 mg tablet       Labs:  Results from last 7 days   Lab Units 10/08/23  0446 10/07/23  0506 10/06/23  0437 10/05/23  0536 10/03/23  0355 10/02/23  1521   WBC Thousand/uL 6.76 6.55 8.05   < > 6.60 6.45   HEMOGLOBIN g/dL 12.0 11.6 12.0   < > 11.5 11.8   HEMATOCRIT % 35.5 34.4* 35.9   < > 34.6* 36.6   PLATELETS Thousands/uL 152 143* 145*   < > 137* 144*   NEUTROS PCT % 41*  --   --   --  47 48   LYMPHS PCT % 30  --   --   --  27 26   MONOS PCT % 15*  --   --   --  14* 16*   EOS PCT % 11*  --   --   --  10* 8*    < > = values in this interval not displayed.      Results from last 7 days   Lab Units 10/08/23  0446 10/07/23  1101 10/07/23  0506 10/06/23  0437   POTASSIUM mmol/L 3.7 3.0*  --  3.5   CHLORIDE mmol/L 107 106  --  106   CO2 mmol/L 23 23  -- 24   BUN mg/dL 20 22  --  18   CREATININE mg/dL 0.68 0.84  --  0.80   CALCIUM mg/dL 8.5 8.4  --  8.3*   ALK PHOS U/L 128* 123* 133* 126*   ALT U/L 457* 466* 451* 455*   AST U/L 698* 707* 699* 731*     Lab Results   Component Value Date    CKTOTAL 50 07/21/2023     Results from last 7 days   Lab Units 10/08/23  0446 10/07/23  0506 10/06/23  0437   INR  3.19* 3.46* 4.32*     No results found for: "Buddy Rued", "Meryle Primer", "WOUNDCULT", "SPUTUMCULTUR"      Imaging:  Results for orders placed during the hospital encounter of 07/20/23    XR Trauma chest portable    Narrative  CHEST    INDICATION:   TRAUMA. COMPARISON: 4/13/2016    EXAM PERFORMED/VIEWS:  XR CHEST PORTABLE      FINDINGS:    Heart shadow appears unremarkable. Atherosclerotic aortic calcifications are noted. The lungs are clear. No pneumothorax or pleural effusion. Osseous structures appear within normal limits for patient age. Impression  No acute cardiopulmonary disease. Workstation performed: [id number]    Results for orders placed during the hospital encounter of 04/13/16    X-ray chest 2 views    Narrative  CHEST    INDICATION:  Chest pain which began this morning. COMPARISON:  None    VIEWS:  Frontal and lateral projections; 2 images    FINDINGS:    Cardiomediastinal silhouette appears unremarkable. The lungs are clear. No pneumothorax or pleural effusion. Visualized osseous structures appear within normal limits for the patient's age. Impression  No active pulmonary disease.       Workstation performed: [id number]      Last 24 Hours Medication List:   Current Facility-Administered Medications   Medication Dose Route Frequency Provider Last Rate   • escitalopram  10 mg Oral Daily Stacey Ward PA-C     • hydrochlorothiazide  12.5 mg Oral Daily Vianney Boykin     • levothyroxine  175 mcg Oral Early Morning Stacey Briceño PA-C     • losartan  100 mg Oral Daily Stacey Ochoa ALBIN Ward     • sodium chloride  2 g Oral BID With Meals Jomar Briceoñ PA-C     • sotalol  80 mg Oral BID Benedict Conway PA-C          Today, Patient Was Seen By: Jac Valentino MD    ** Please Note: Dictation voice to text software may have been used in the creation of this document.  **

## 2023-10-09 NOTE — ASSESSMENT & PLAN NOTE
Lab Results   Component Value Date    HGBA1C 7.0 (H) 07/20/2023     • Patient has not been on any home medications   • Patient had blood sugar of 64 this morning likely depletion of glycogen stores, will change diet to regular and discontinue SSI

## 2023-10-09 NOTE — PLAN OF CARE
Problem: MOBILITY - ADULT  Goal: Maintain or return to baseline ADL function  Description: INTERVENTIONS:  -  Assess patient's ability to carry out ADLs; assess patient's baseline for ADL function and identify physical deficits which impact ability to perform ADLs (bathing, care of mouth/teeth, toileting, grooming, dressing, etc.)  - Assess/evaluate cause of self-care deficits   - Assess range of motion  - Assess patient's mobility; develop plan if impaired  - Assess patient's need for assistive devices and provide as appropriate  - Encourage maximum independence but intervene and supervise when necessary  - Involve family in performance of ADLs  - Assess for home care needs following discharge   - Consider OT consult to assist with ADL evaluation and planning for discharge  - Provide patient education as appropriate  Outcome: Progressing     Problem: PAIN - ADULT  Goal: Verbalizes/displays adequate comfort level or baseline comfort level  Description: Interventions:  - Encourage patient to monitor pain and request assistance  - Assess pain using appropriate pain scale  - Administer analgesics based on type and severity of pain and evaluate response  - Implement non-pharmacological measures as appropriate and evaluate response  - Consider cultural and social influences on pain and pain management  - Notify physician/advanced practitioner if interventions unsuccessful or patient reports new pain  Outcome: Progressing     Problem: INFECTION - ADULT  Goal: Absence or prevention of progression during hospitalization  Description: INTERVENTIONS:  - Assess and monitor for signs and symptoms of infection  - Monitor lab/diagnostic results  - Monitor all insertion sites, i.e. indwelling lines, tubes, and drains  - Monitor endotracheal if appropriate and nasal secretions for changes in amount and color  - [place] appropriate cooling/warming therapies per order  - Administer medications as ordered  - Instruct and encourage patient and family to use good hand hygiene technique  - Identify and instruct in appropriate isolation precautions for identified infection/condition  Outcome: Progressing     Problem: SAFETY ADULT  Goal: Maintain or return to baseline ADL function  Description: INTERVENTIONS:  -  Assess patient's ability to carry out ADLs; assess patient's baseline for ADL function and identify physical deficits which impact ability to perform ADLs (bathing, care of mouth/teeth, toileting, grooming, dressing, etc.)  - Assess/evaluate cause of self-care deficits   - Assess range of motion  - Assess patient's mobility; develop plan if impaired  - Assess patient's need for assistive devices and provide as appropriate  - Encourage maximum independence but intervene and supervise when necessary  - Involve family in performance of ADLs  - Assess for home care needs following discharge   - Consider OT consult to assist with ADL evaluation and planning for discharge  - Provide patient education as appropriate  Outcome: Progressing

## 2023-10-09 NOTE — PLAN OF CARE
Problem: MOBILITY - ADULT  Goal: Maintain or return to baseline ADL function  Description: INTERVENTIONS:  -  Assess patient's ability to carry out ADLs; assess patient's baseline for ADL function and identify physical deficits which impact ability to perform ADLs (bathing, care of mouth/teeth, toileting, grooming, dressing, etc.)  - Assess/evaluate cause of self-care deficits   - Assess range of motion  - Assess patient's mobility; develop plan if impaired  - Assess patient's need for assistive devices and provide as appropriate  - Encourage maximum independence but intervene and supervise when necessary  - Involve family in performance of ADLs  - Assess for home care needs following discharge   - Consider OT consult to assist with ADL evaluation and planning for discharge  - Provide patient education as appropriate  Outcome: Progressing  Goal: Maintains/Returns to pre admission functional level  Description: INTERVENTIONS:  - Perform BMAT or MOVE assessment daily.   - Set and communicate daily mobility goal to care team and patient/family/caregiver. - Collaborate with rehabilitation services on mobility goals if consulted  - Perform Range of Motion 3 times a day. - Reposition patient every 3 hours.   - Dangle patient 3 times a day  - Stand patient 3 times a day  - Ambulate patient 3 times a day  - Out of bed to chair 3 times a day   - Out of bed for meals 3 times a day  - Out of bed for toileting  - Record patient progress and toleration of activity level   Outcome: Progressing     Problem: PAIN - ADULT  Goal: Verbalizes/displays adequate comfort level or baseline comfort level  Description: Interventions:  - Encourage patient to monitor pain and request assistance  - Assess pain using appropriate pain scale  - Administer analgesics based on type and severity of pain and evaluate response  - Implement non-pharmacological measures as appropriate and evaluate response  - Consider cultural and social influences on pain and pain management  - Notify physician/advanced practitioner if interventions unsuccessful or patient reports new pain  Outcome: Progressing     Problem: INFECTION - ADULT  Goal: Absence or prevention of progression during hospitalization  Description: INTERVENTIONS:  - Assess and monitor for signs and symptoms of infection  - Monitor lab/diagnostic results  - Monitor all insertion sites, i.e. indwelling lines, tubes, and drains  - Monitor endotracheal if appropriate and nasal secretions for changes in amount and color  - [place] appropriate cooling/warming therapies per order  - Administer medications as ordered  - Instruct and encourage patient and family to use good hand hygiene technique  - Identify and instruct in appropriate isolation precautions for identified infection/condition  Outcome: Progressing  Goal: Absence of fever/infection during neutropenic period  Description: INTERVENTIONS:  - Monitor WBC    Outcome: Progressing     Problem: SAFETY ADULT  Goal: Maintain or return to baseline ADL function  Description: INTERVENTIONS:  -  Assess patient's ability to carry out ADLs; assess patient's baseline for ADL function and identify physical deficits which impact ability to perform ADLs (bathing, care of mouth/teeth, toileting, grooming, dressing, etc.)  - Assess/evaluate cause of self-care deficits   - Assess range of motion  - Assess patient's mobility; develop plan if impaired  - Assess patient's need for assistive devices and provide as appropriate  - Encourage maximum independence but intervene and supervise when necessary  - Involve family in performance of ADLs  - Assess for home care needs following discharge   - Consider OT consult to assist with ADL evaluation and planning for discharge  - Provide patient education as appropriate  Outcome: Progressing  Goal: Maintains/Returns to pre admission functional level  Description: INTERVENTIONS:  - Perform BMAT or MOVE assessment daily.   - Set and communicate daily mobility goal to care team and patient/family/caregiver. - Collaborate with rehabilitation services on mobility goals if consulted  - Perform Range of Motion 3 times a day. - Reposition patient every 3 hours.   - Dangle patient 3 times a day  - Stand patient 3 times a day  - Ambulate patient 3 times a day  - Out of bed to chair 3 times a day   - Out of bed for meals 3 times a day  - Out of bed for toileting  - Record patient progress and toleration of activity level   Outcome: Progressing  Goal: Patient will remain free of falls  Description: INTERVENTIONS:  - Educate patient/family on patient safety including physical limitations  - Instruct patient to call for assistance with activity   - Consult OT/PT to assist with strengthening/mobility   - Keep Call bell within reach  - Keep bed low and locked with side rails adjusted as appropriate  - Keep care items and personal belongings within reach  - Initiate and maintain comfort rounds  - Make Fall Risk Sign visible to staff  - Offer Toileting every 2 Hours, in advance of need  - Initiate/Maintain alarm  - Obtain necessary fall risk management equipment  - Apply yellow socks and bracelet for high fall risk patients  - Consider moving patient to room near nurses station  Outcome: Progressing     Problem: DISCHARGE PLANNING  Goal: Discharge to home or other facility with appropriate resources  Description: INTERVENTIONS:  - Identify barriers to discharge w/patient and caregiver  - Arrange for needed discharge resources and transportation as appropriate  - Identify discharge learning needs (meds, wound care, etc.)  - Arrange for interpretive services to assist at discharge as needed  - Refer to Case Management Department for coordinating discharge planning if the patient needs post-hospital services based on physician/advanced practitioner order or complex needs related to functional status, cognitive ability, or social support system  Outcome: Progressing     Problem: Knowledge Deficit  Goal: Patient/family/caregiver demonstrates understanding of disease process, treatment plan, medications, and discharge instructions  Description: Complete learning assessment and assess knowledge base.   Interventions:  - Provide teaching at level of understanding  - Provide teaching via preferred learning methods  Outcome: Progressing     Problem: Potential for Falls  Goal: Patient will remain free of falls  Description: INTERVENTIONS:  - Educate patient/family on patient safety including physical limitations  - Instruct patient to call for assistance with activity   - Consult OT/PT to assist with strengthening/mobility   - Keep Call bell within reach  - Keep bed low and locked with side rails adjusted as appropriate  - Keep care items and personal belongings within reach  - Initiate and maintain comfort rounds  - Make Fall Risk Sign visible to staff  - Offer Toileting every 2 Hours, in advance of need  - Initiate/Maintain alarm  - Obtain necessary fall risk management equipment  - Apply yellow socks and bracelet for high fall risk patients  - Consider moving patient to room near nurses station  Outcome: Progressing

## 2023-10-09 NOTE — ASSESSMENT & PLAN NOTE
Previously evaluated by GI in July for transaminitis thought 2/2 shock liver. CMV and EBV IGG positive but no IGM. Hepatitis panel and RUQ ultrasound were negative. Labs obtained due to new jaundice at [facility] showed T bili of 6, prompting ED presentation. S/P cholecystectomy. • LFTs on admission:  o Direct bilirubin: 7.70  o Total bilirubin: 12.2  o Alk phos: 118  o AST: 690  o ALT: 407  • GI consulted   o CT A/P: Nodular liver morphology and sequela of portal venous hypertension suggesting cirrhosis. Minimal ascites around the liver and layering in the pelvis. Prominent lymph nodes at the katelynn hepatis presumably reactive to primary underlying liver dysfunction. No biliary ductal dilatation to suggest obstructive cause of jaundice. AFP and CA 19-9 are elevated  MRI shows nodular liver, trace perihepatic ascites, 5 mm pancreatic uncinate process cystic lesion  Discussed with GI liver injury likely attributed by drug-induced. Monitor LFT normal. 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- Gabapentin could be cause, but would like to biopsy  Patient LFTs are trending up.   Awaiting results from this morning  Vit K- 10/07 and 10/8  Trend PT/INR  Plan for biopsy if INR is below 1.5

## 2023-10-09 NOTE — ASSESSMENT & PLAN NOTE
• Continue ambulating with camboot and walker  • PT evaluated patient on 10/3-recommended level 3 rehab

## 2023-10-09 NOTE — ASSESSMENT & PLAN NOTE
• Previously on diltiazem and sotalol however patient recently taken off diltiazem at facility  o Unclear why diltiazem was stopped   o Will continue to hold for now given hepatic impairment   • Hold Xarelto in anticipation of liver biopsy

## 2023-10-09 NOTE — ASSESSMENT & PLAN NOTE
• Previously discharged on 2 g salt tabs BID and torsemide 5 mg daily  o Torsemide was stopped by facility for unknown reason   o Continue to monitor on salt tabs - Na corrects to 135 on admission

## 2023-10-10 NOTE — ASSESSMENT & PLAN NOTE
• Previously discharged on diovan, sotalol, torsemide  o At facility patient is on HCTZ 12.5, losartan 100 mg, and sotalol 80 mg BID  o Hold hydrochlorothiazide, Cozaar in setting of worsening creatinine  o Will order hydralazine as needed

## 2023-10-10 NOTE — ASSESSMENT & PLAN NOTE
Previously evaluated by GI in July for transaminitis thought 2/2 shock liver. CMV and EBV IGG positive but no IGM. Hepatitis panel and RUQ ultrasound were negative. Labs obtained due to new jaundice at [facility] showed T bili of 6, prompting ED presentation. S/P cholecystectomy. • LFTs on admission:  o Direct bilirubin: 7.70  o Total bilirubin: 12.2  o Alk phos: 118  o AST: 690  o ALT: 407  • GI consulted   o CT A/P: Nodular liver morphology and sequela of portal venous hypertension suggesting cirrhosis. Minimal ascites around the liver and layering in the pelvis. Prominent lymph nodes at the katelynn hepatis presumably reactive to primary underlying liver dysfunction. No biliary ductal dilatation to suggest obstructive cause of jaundice. AFP and CA 19-9 are elevated  INR this morning is 2.91  MRI shows nodular liver, trace perihepatic ascites, 5 mm pancreatic uncinate process cystic lesion  Discussed with GI liver injury likely attributed by drug-induced. Monitor LFT normal. 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- Gabapentin could be cause, but would like to biopsy  Patient LFTs are trending up. Vit K- 10/07 and 10/8  Trend PT/INR  Patient received 1 unit of FFP at midnight  AST, ALT and alk phos--683/477/115  Total bilirubin is 16.75  As per IR if INR is less than 3.0 tomorrow morning, then we can proceed with TJLB with sedation. If INR is less than 1.5, then we will consider percutaneous liver biopsy.   IR ordered steroid prep for the procedure given allergy to contrast

## 2023-10-10 NOTE — BRIEF OP NOTE (RAD/CATH)
INTERVENTIONAL RADIOLOGY PROCEDURE NOTE    Date: 10/10/2023    Procedure: Transjugular liver biopsy with pressure measurements  Procedure Summary       Date:  Room / Location:     Anesthesia Start:  Anesthesia Stop:     Procedure:  Diagnosis:     Scheduled Providers:  Responsible Provider:     Anesthesia Type: Not recorded ASA Status: Not recorded            Preoperative diagnosis:   1. Jaundice    2. Elevated bilirubin    3. Elevated transaminase level    4. Thrombocytopenia (HCC)         Postoperative diagnosis: Same. Surgeon: Juan C Lima MD     Assistant: None. No qualified resident was available. Blood loss: 5 mL    Specimens: 4 core needle samples placed into formalin. Findings:   Successful transjugular liver biopsy. Pressure measurements as follows:  Right atrial pressure: 12/6 mmHg (mean 8)  Free hepatic pressure: 13/9 mmHg (mean 11)  Wedged hepatic pressure: 22/18 mmHg (mean 20)    Complications: None immediate.     Anesthesia: conscious sedation and local

## 2023-10-10 NOTE — SEDATION DOCUMENTATION
Rt Atrial Pressure  12 / 6 (6)    PreHepatic Pressure  13 / 9 (11)    Wedge Hepatic Pressure 22 / 18 (20)

## 2023-10-10 NOTE — PLAN OF CARE
Problem: MOBILITY - ADULT  Goal: Maintain or return to baseline ADL function  Description: INTERVENTIONS:  -  Assess patient's ability to carry out ADLs; assess patient's baseline for ADL function and identify physical deficits which impact ability to perform ADLs (bathing, care of mouth/teeth, toileting, grooming, dressing, etc.)  - Assess/evaluate cause of self-care deficits   - Assess range of motion  - Assess patient's mobility; develop plan if impaired  - Assess patient's need for assistive devices and provide as appropriate  - Encourage maximum independence but intervene and supervise when necessary  - Involve family in performance of ADLs  - Assess for home care needs following discharge   - Consider OT consult to assist with ADL evaluation and planning for discharge  - Provide patient education as appropriate  Outcome: Progressing  Goal: Maintains/Returns to pre admission functional level  Description: INTERVENTIONS:  - Perform BMAT or MOVE assessment daily.   - Set and communicate daily mobility goal to care team and patient/family/caregiver. - Collaborate with rehabilitation services on mobility goals if consulted  - Perform Range of Motion 3 times a day. - Reposition patient every 3 hours.   - Dangle patient 3 times a day  - Stand patient 3 times a day  - Ambulate patient 3 times a day  - Out of bed to chair 3 times a day   - Out of bed for meals 3 times a day  - Out of bed for toileting  - Record patient progress and toleration of activity level   Outcome: Progressing     Problem: PAIN - ADULT  Goal: Verbalizes/displays adequate comfort level or baseline comfort level  Description: Interventions:  - Encourage patient to monitor pain and request assistance  - Assess pain using appropriate pain scale  - Administer analgesics based on type and severity of pain and evaluate response  - Implement non-pharmacological measures as appropriate and evaluate response  - Consider cultural and social influences on pain and pain management  - Notify physician/advanced practitioner if interventions unsuccessful or patient reports new pain  Outcome: Progressing     Problem: INFECTION - ADULT  Goal: Absence or prevention of progression during hospitalization  Description: INTERVENTIONS:  - Assess and monitor for signs and symptoms of infection  - Monitor lab/diagnostic results  - Monitor all insertion sites, i.e. indwelling lines, tubes, and drains  - Monitor endotracheal if appropriate and nasal secretions for changes in amount and color  - [place] appropriate cooling/warming therapies per order  - Administer medications as ordered  - Instruct and encourage patient and family to use good hand hygiene technique  - Identify and instruct in appropriate isolation precautions for identified infection/condition  Outcome: Progressing  Goal: Absence of fever/infection during neutropenic period  Description: INTERVENTIONS:  - Monitor WBC    Outcome: Progressing     Problem: SAFETY ADULT  Goal: Maintain or return to baseline ADL function  Description: INTERVENTIONS:  -  Assess patient's ability to carry out ADLs; assess patient's baseline for ADL function and identify physical deficits which impact ability to perform ADLs (bathing, care of mouth/teeth, toileting, grooming, dressing, etc.)  - Assess/evaluate cause of self-care deficits   - Assess range of motion  - Assess patient's mobility; develop plan if impaired  - Assess patient's need for assistive devices and provide as appropriate  - Encourage maximum independence but intervene and supervise when necessary  - Involve family in performance of ADLs  - Assess for home care needs following discharge   - Consider OT consult to assist with ADL evaluation and planning for discharge  - Provide patient education as appropriate  Outcome: Progressing  Goal: Maintains/Returns to pre admission functional level  Description: INTERVENTIONS:  - Perform BMAT or MOVE assessment daily.   - Set and communicate daily mobility goal to care team and patient/family/caregiver. - Collaborate with rehabilitation services on mobility goals if consulted  - Perform Range of Motion 3 times a day. - Reposition patient every 3 hours.   - Dangle patient 3 times a day  - Stand patient 3 times a day  - Ambulate patient 3 times a day  - Out of bed to chair 3 times a day   - Out of bed for meals 3 times a day  - Out of bed for toileting  - Record patient progress and toleration of activity level   Outcome: Progressing  Goal: Patient will remain free of falls  Description: INTERVENTIONS:  - Educate patient/family on patient safety including physical limitations  - Instruct patient to call for assistance with activity   - Consult OT/PT to assist with strengthening/mobility   - Keep Call bell within reach  - Keep bed low and locked with side rails adjusted as appropriate  - Keep care items and personal belongings within reach  - Initiate and maintain comfort rounds  - Make Fall Risk Sign visible to staff  - Offer Toileting every 3 Hours, in advance of need  - Initiate/Maintain alarm  - Obtain necessary fall risk management equipment:   - Apply yellow socks and bracelet for high fall risk patients  - Consider moving patient to room near nurses station  Outcome: Progressing     Problem: DISCHARGE PLANNING  Goal: Discharge to home or other facility with appropriate resources  Description: INTERVENTIONS:  - Identify barriers to discharge w/patient and caregiver  - Arrange for needed discharge resources and transportation as appropriate  - Identify discharge learning needs (meds, wound care, etc.)  - Arrange for interpretive services to assist at discharge as needed  - Refer to Case Management Department for coordinating discharge planning if the patient needs post-hospital services based on physician/advanced practitioner order or complex needs related to functional status, cognitive ability, or social support system  Outcome: Progressing     Problem: Knowledge Deficit  Goal: Patient/family/caregiver demonstrates understanding of disease process, treatment plan, medications, and discharge instructions  Description: Complete learning assessment and assess knowledge base. Interventions:  - Provide teaching at level of understanding  - Provide teaching via preferred learning methods  Outcome: Progressing     Problem: Potential for Falls  Goal: Patient will remain free of falls  Description: INTERVENTIONS:  - Educate patient/family on patient safety including physical limitations  - Instruct patient to call for assistance with activity   - Consult OT/PT to assist with strengthening/mobility   - Keep Call bell within reach  - Keep bed low and locked with side rails adjusted as appropriate  - Keep care items and personal belongings within reach  - Initiate and maintain comfort rounds  - Make Fall Risk Sign visible to staff  - Offer Toileting every 2 Hours, in advance of need  - Initiate/Maintain bed alarm  - Obtain necessary fall risk management equipment: walker  - Apply yellow socks and bracelet for high fall risk patients  - Consider moving patient to room near nurses station  Outcome: Progressing     Problem: Nutrition/Hydration-ADULT  Goal: Nutrient/Hydration intake appropriate for improving, restoring or maintaining nutritional needs  Description: Monitor and assess patient's nutrition/hydration status for malnutrition. Collaborate with interdisciplinary team and initiate plan and interventions as ordered. Monitor patient's weight and dietary intake as ordered or per policy. Utilize nutrition screening tool and intervene as necessary. Determine patient's food preferences and provide high-protein, high-caloric foods as appropriate.      INTERVENTIONS:  - Monitor oral intake, urinary output, labs, and treatment plans  - Assess nutrition and hydration status and recommend course of action  - Evaluate amount of meals eaten  - Assist patient with eating if necessary   - Allow adequate time for meals  - Recommend/ encourage appropriate diets, oral nutritional supplements, and vitamin/mineral supplements  - Order, calculate, and assess calorie counts as needed  - Recommend, monitor, and adjust tube feedings and TPN/PPN based on assessed needs  - Assess need for intravenous fluids  - Provide specific nutrition/hydration education as appropriate  - Include patient/family/caregiver in decisions related to nutrition  Outcome: Progressing

## 2023-10-10 NOTE — ASSESSMENT & PLAN NOTE
Lab Results   Component Value Date    HGBA1C 7.0 (H) 07/20/2023     • Patient has not been on any home medications   • Patient was noted to be hypoglycemic and diet switched to regular  • Patient is n.p.o. for the procedure

## 2023-10-10 NOTE — DISCHARGE INSTRUCTIONS
Transjugular Liver Biopsy   WHAT YOU NEED TO KNOW:   A TJLB is a procedure to remove a sample of tissue from your liver. The sample can be sent to a lab and tested for liver disease, cancer, or infection. After the procedure your neck, abdomen, and right shoulder may be sore. You may also have mild swelling and bruising in your neck. These symptoms should get better in 48 to 72 hours. DISCHARGE INSTRUCTIONS:         Contact Interventional Radiology at [phone]   You cannot stop the bleeding from your wound even after you hold firm pressure for 10 minutes. Blood soaks through your bandage. You have severe pain in your abdomen. Your abdomen is larger than usual and feels hard. Your neck is more swollen and you have trouble swallowing. You feel weak or dizzy. Your heart is beating faster than usual.   You have a fever or chills. Your pain does not get better after you take pain medicine. Your wound is red, swollen, or draining pus. You have nausea or are vomiting. Your skin is itchy, swollen, or you have a rash. You have questions or concerns about your condition or care. Medicines: You may need any of the following:  Acetaminophen decreases pain and fever. It is available without a doctor's order. Acetaminophen can cause liver damage if not taken correctly. Take your home medicine as directed. Resume your normal diet. Self-care:   Rest as directed. Do not play sports, exercise, or lift anything heavier than 5 pounds for up to 1 week. Resume your normal diet. Small sips of flat soda will help with mild nausea. Drink liquids as directed. Liquids will help flush the contrast liquid out of your body. Ask how much liquid to drink each day and which liquids are best for you. Apply firm, steady pressure if bleeding occurs. A small amount of bleeding from your wound is possible. Apply pressure with a clean gauze or towel for 5 to 10 minutes.  Call 911 if bleeding becomes heavy or does not stop. Ask your healthcare provider when to take your blood thinner or antiplatelet medicine. You may need to wait 24 to 72 hours to take your medicine. This will prevent bleeding. Follow up with your healthcare provider as directed: Write down your questions so you remember to ask them during your visits. © 2017 [employer] [facility] is for End User's use only and may not be sold, redistributed or otherwise used for commercial purposes. All illustrations and images included in CareNotes® are the copyrighted property of [employer] or [employer]. The above information is an  only. It is not intended as medical advice for individual conditions or treatments.  Talk to your doctor, nurse or pharmacist before following any medical regimen to see if it is safe and ef

## 2023-10-10 NOTE — PROGRESS NOTES
[facility] - [facility]   Rosy Goldman 81 y.o. female MRN: [id number]  Unit/Bed#: -01 Encounter: [id number]    ASSESSMENT and PLAN    83F with history of cholecystectomy 2016, hypothyroidism, A-fib on Xarelto who presented to the emergency department from acute rehab for painless jaundice     1. Acute liver injury likely secondary to drug-induced liver injury in setting of gabapentin use  2. Elevated autoimmune markers  2. Liver nodularity     LFTs remain stably elevated  INR 2.9 after vitamin K for liver biopsy  Hepatitis panel nonreactive. EBV and CMV consistent with prior infection  ANCA, RF, and anti-smooth muscle antibody elevated  Plan transjugular liver biopsy today to assess for drug-induced liver injury versus autoimmune hepatitis  Received preprocedure steroids today for dye prep, repeat LFTs tomorrow    Discussed with Dr. Jacqueline Duque         Chief Complaint   Patient presents with    Abnormal Lab     Elevated liver enzymes from routine blood work done yesterday. SUBJECTIVE/HPI   No GI complaints. For liver biopsy today.     /53   Pulse 71   Temp 97.5 °F (36.4 °C)   Resp 20   Ht 5' 4" (1.626 m)   Wt 98 kg (216 lb 0.8 oz)   SpO2 98%   BMI 37.09 kg/m²     PHYSICALEXAM  General appearance: alert, appears stated age and cooperative  Eyes: PERLLA, EOMI, + icterus   Head: Normocephalic, without obvious abnormality, atraumatic  Lungs: clear to auscultation bilaterally  Heart: regular rate and rhythm, S1, S2 normal, no murmur, click, rub or gallop  Abdomen: soft, non-tender; bowel sounds normal; no masses,  no organomegaly  Extremities: extremities normal, atraumatic, no cyanosis or edema  Neurologic: Grossly normal    Lab Results   Component Value Date    CALCIUM 8.5 10/10/2023    K 3.7 10/10/2023    CO2 24 10/10/2023     10/10/2023    BUN 32 (H) 10/10/2023    CREATININE 1.76 (H) 10/10/2023     Lab Results   Component Value Date    WBC 6.16 10/10/2023 HGB 12.1 10/10/2023    HCT 35.6 10/10/2023    MCV 86 10/10/2023     (L) 10/10/2023     Lab Results   Component Value Date     (H) 10/10/2023     (H) 10/10/2023    GGT 76 (H) 10/03/2023    ALKPHOS 115 (H) 10/10/2023     No results found for: "AMYLASE"  Lab Results   Component Value Date    LIPASE 57 10/02/2023     Lab Results   Component Value Date    IRON 203 10/03/2023    TIBC <258 10/03/2023    FERRITIN 164 10/03/2023     Lab Results   Component Value Date    INR 2.91 (H) 10/10/2023

## 2023-10-10 NOTE — PROGRESS NOTES
[facility]  Progress Note  Name: Alverto Pavon  MRN: [id number]  Unit/Bed#: -01 I Date of Admission: 10/2/2023   Date of Service: 10/10/2023 I Hospital Day: 8    Assessment/Plan   Type 2 diabetes mellitus [facility])  Assessment & Plan  Lab Results   Component Value Date    HGBA1C 7.0 (H) 07/20/2023     • Patient has not been on any home medications   • Patient was noted to be hypoglycemic and diet switched to regular  • Patient is n.p.o. for the procedure      Hyponatremia  Assessment & Plan  • Previously discharged on 2 g salt tabs BID and torsemide 5 mg daily  o Torsemide was stopped by facility for unknown reason   o Continue to monitor on salt tabs - Na corrects to 135 on admission    Closed right ankle fracture  Assessment & Plan  • Continue ambulating with camboot and walker  • PT evaluated patient on 10/3-recommended level 3 rehab    Diffuse interstitial pulmonary fibrosis ([address])  Assessment & Plan  • CT:  Reticular interstitial lung abnormality (REENA) noted in both lower lobes. Recommend nonemergent outpatient evaluation with high-resolution chest CT to exclude shiloh UIP pattern fibrosis.     Mood disorder (HCC)  Assessment & Plan  • Continue Lexapro  • Ambien nightly as needed    Acquired hypothyroidism  Assessment & Plan  • Continue levothyroxine 175 mcg daily    Class 2 severe obesity due to excess calories with serious comorbidity and body mass index (BMI) of 35.0 to 35.9 in adult   Assessment & Plan  • Dietary changes and lifestyle modifications  • Affects all aspects of care    Essential hypertension  Assessment & Plan  • Previously discharged on diovan, sotalol, torsemide  o At facility patient is on HCTZ 12.5, losartan 100 mg, and sotalol 80 mg BID  o Hold hydrochlorothiazide, Cozaar in setting of worsening creatinine  o Will order hydralazine as needed    Mixed hyperlipidemia  Assessment & Plan  • Holding Lipitor 20 mg daily given LFTs    Paroxysmal atrial fibrillation [facility])  Assessment & Plan  • Previously on diltiazem and sotalol however patient recently taken off diltiazem at facility  o Unclear why diltiazem was stopped   o Will continue to hold for now given hepatic impairment   • Hold Xarelto in anticipation of liver biopsy    * Transaminitis  Assessment & Plan  Previously evaluated by GI in July for transaminitis thought 2/2 shock liver. CMV and EBV IGG positive but no IGM. Hepatitis panel and RUQ ultrasound were negative. Labs obtained due to new jaundice at [facility] showed T bili of 6, prompting ED presentation. S/P cholecystectomy. • LFTs on admission:  o Direct bilirubin: 7.70  o Total bilirubin: 12.2  o Alk phos: 118  o AST: 690  o ALT: 407  • GI consulted   o CT A/P: Nodular liver morphology and sequela of portal venous hypertension suggesting cirrhosis. Minimal ascites around the liver and layering in the pelvis. Prominent lymph nodes at the katelynn hepatis presumably reactive to primary underlying liver dysfunction. No biliary ductal dilatation to suggest obstructive cause of jaundice. AFP and CA 19-9 are elevated  INR this morning is 2.91  MRI shows nodular liver, trace perihepatic ascites, 5 mm pancreatic uncinate process cystic lesion  Discussed with GI liver injury likely attributed by drug-induced. Monitor LFT normal. 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- Gabapentin could be cause, but would like to biopsy  Patient LFTs are trending up. Vit K- 10/07 and 10/8  Trend PT/INR  Patient received 1 unit of FFP at midnight  AST, ALT and alk phos--683/477/115  Total bilirubin is 16.75  As per IR if INR is less than 3.0 tomorrow morning, then we can proceed with TJLB with sedation. If INR is less than 1.5, then we will consider percutaneous liver biopsy. IR ordered steroid prep for the procedure given allergy to contrast          Labs & Imaging: I have personally reviewed pertinent reports.       VTE Prophylaxis: in place.    Code Status:   Level 1 - Full Code    Patient Centered Rounds: I have performed bedside rounds with nursing staff today. Discussions with Specialists or Other Care Team Provider: GI    Education and Discussions with Family / Patient: Daughter on phone    Current Length of Stay: 8 day(s)    Current Patient Status: Inpatient   Certification Statement: The patient will continue to require additional inpatient hospital stay due to see my assessment and plan. Subjective:   Patient is seen and examined at bedside. Denies any new complaints. Afebrile. No abdominal pain, nausea or vomiting. All other ROS are negative. Objective:    Vitals: Blood pressure 103/53, pulse 71, temperature 97.5 °F (36.4 °C), resp. rate 20, height 5' 4" (1.626 m), weight 98 kg (216 lb 0.8 oz), SpO2 98 %. ,Body mass index is 37.09 kg/m². SPO2 RA Rest    Flowsheet Row ED to Hosp-Admission (Current) from 10/2/2023 in [address] [facility]   SpO2 98 %   SpO2 Activity At Rest   O2 Device None (Room air)   O2 Flow Rate --        I&O:     Intake/Output Summary (Last 24 hours) at 10/10/2023 0750  Last data filed at 10/10/2023 0152  Gross per 24 hour   Intake 631.25 ml   Output --   Net 631.25 ml       Physical Exam:    General- Alert, lying comfortably in bed. Not in any acute distress. HEENT- RUDY, EOM intact. Positive icterus   CVS- regular, S1 and S2 normal  Chest- Bilateral Air entry, No rhochi, crackles or wheezing present. Abdomen- soft, nontender, not distended, no guarding or rigidity, BS+  Extremities-  No pedal edema, No calf tenderness                         Normal ROM in all extremities. CNS-   Alert, awake and orientedx3. No focal deficits present.     Invasive Devices     Peripheral Intravenous Line  Duration           Peripheral IV 10/09/23 Distal;Left;Upper;Ventral (anterior) Arm <1 day                      Social History  reviewed  Family History   Problem Relation Age of Onset   • Cancer Mother         ovarian   • Heart disease Father         cardiac disorder   • Cancer Father    • Heart disease Brother    • Heart disease Paternal Aunt    • Heart disease Paternal Uncle     reviewed    Meds:  Current Facility-Administered Medications   Medication Dose Route Frequency Provider Last Rate Last Admin   • diphenhydrAMINE (BENADRYL) tablet 50 mg  50 mg Oral 60 Min Pre-Op Amado Sánchez MD       • escitalopram (LEXAPRO) tablet 10 mg  10 mg Oral Daily Jade Ward PA-C   10 mg at 10/09/23 6673   • hydrALAZINE (APRESOLINE) injection 10 mg  10 mg Intravenous Q6H PRN Julio Cesar Spivey MD       • levothyroxine tablet 175 mcg  175 mcg Oral Early Morning Jade Briceño PA-C   175 mcg at 10/10/23 8971   • magnesium sulfate 2 g/50 mL IVPB (premix) 2 g  2 g Intravenous Once Julio Cesar Spivey MD       • methylprednisolone (MEDROL) tablet 32 mg  32 mg Oral Q10H Amado Sánchez MD   32 mg at 10/10/23 0057   • sodium chloride 0.9 % infusion  75 mL/hr Intravenous Continuous Julio Cesar Spivey MD       • sodium chloride tablet 2 g  2 g Oral BID With Meals Jade Ward PA-C   2 g at 10/09/23 1554   • sotalol (BETAPACE) tablet 80 mg  80 mg Oral BID Jade Ward PA-C   80 mg at 10/09/23 0809      Medications Prior to Admission   Medication   • atorvastatin (LIPITOR) 20 mg tablet   • diltiazem (CARDIZEM CD) 360 MG 24 hr capsule   • Diovan 320 MG tablet   • escitalopram (LEXAPRO) 10 mg tablet   • gabapentin (NEURONTIN) 100 mg capsule   • hydrochlorothiazide (HYDRODIURIL) 25 mg tablet   • HYDROcodone-acetaminophen (Norco) 5-325 mg per tablet   • levothyroxine 175 mcg tablet   • liotrix (THYROLAR-1) 60 (12.5-50) MG (MCG) TABS   • rivaroxaban (XARELTO) 20 mg tablet   • sodium chloride 1 g tablet   • sotalol (BETAPACE) 80 mg tablet   • torsemide (DEMADEX) 5 MG tablet   • zolpidem (AMBIEN) 10 mg tablet       Labs:  Results from last 7 days   Lab Units 10/10/23  8021 10/08/23  0446 10/07/23  0506   WBC Thousand/uL 6.16 6.76 6.55   HEMOGLOBIN g/dL 12.1 12.0 11.6   HEMATOCRIT % 35.6 35.5 34.4*   PLATELETS Thousands/uL 139* 152 143*   NEUTROS PCT % 70 41*  --    LYMPHS PCT % 21 30  --    MONOS PCT % 5 15*  --    EOS PCT % 2 11*  --      Results from last 7 days   Lab Units 10/10/23  0453 10/09/23  1203 10/08/23  0446   POTASSIUM mmol/L 3.7 3.6 3.7   CHLORIDE mmol/L 103 105 107   CO2 mmol/L 24 24 23   BUN mg/dL 32* 22 20   CREATININE mg/dL 1.76* 0.79 0.68   CALCIUM mg/dL 8.5 8.4 8.5   ALK PHOS U/L 115* 116* 128*   ALT U/L 477* 491* 457*   AST U/L 683* 743* 698*     Lab Results   Component Value Date    CKTOTAL 50 07/21/2023     Results from last 7 days   Lab Units 10/10/23  0453 10/09/23  1203 10/08/23  0446   INR  2.91* 3.22* 3.19*     No results found for: "BLOODCX", "Van Speed", "WOUNDCULT", "Lexi Freud"      Imaging:  Results for orders placed during the hospital encounter of 07/20/23    XR Trauma chest portable    Narrative  CHEST    INDICATION:   TRAUMA. COMPARISON: 4/13/2016    EXAM PERFORMED/VIEWS:  XR CHEST PORTABLE      FINDINGS:    Heart shadow appears unremarkable. Atherosclerotic aortic calcifications are noted. The lungs are clear. No pneumothorax or pleural effusion. Osseous structures appear within normal limits for patient age. Impression  No acute cardiopulmonary disease. Workstation performed: [id number]    Results for orders placed during the hospital encounter of 04/13/16    X-ray chest 2 views    Narrative  CHEST    INDICATION:  Chest pain which began this morning. COMPARISON:  None    VIEWS:  Frontal and lateral projections; 2 images    FINDINGS:    Cardiomediastinal silhouette appears unremarkable. The lungs are clear. No pneumothorax or pleural effusion. Visualized osseous structures appear within normal limits for the patient's age. Impression  No active pulmonary disease.       Workstation performed: [id number]      Last 24 Hours Medication List:   Current Facility-Administered Medications   Medication Dose Route Frequency Provider Last Rate   • diphenhydrAMINE  50 mg Oral 60 Min Pre-Op Madisyn Galaviz MD     • escitalopram  10 mg Oral Daily Esperanza Briceño PA-C     • hydrALAZINE  10 mg Intravenous Q6H PRN Nasreen Chase MD     • levothyroxine  175 mcg Oral Early Morning Esperanza Ward PA-C     • magnesium sulfate  2 g Intravenous Once Nasreen Chase MD     • methylPREDNISolone  32 mg Oral Q10H Madisyn Galaviz MD     • sodium chloride  75 mL/hr Intravenous Continuous Nasreen Chase MD     • sodium chloride  2 g Oral BID With Meals Esperanza Ward PA-C     • sotalol  80 mg Oral BID Rodolfo Ballard PA-C          Today, Patient Was Seen By: Nasreen Chase MD    ** Please Note: Dictation voice to text software may have been used in the creation of this document.  **

## 2023-10-10 NOTE — ASSESSMENT & PLAN NOTE
• CT:  Reticular interstitial lung abnormality (ERENA) noted in both lower lobes. Recommend nonemergent outpatient evaluation with high-resolution chest CT to exclude [place] UIP pattern fibrosis.

## 2023-10-11 PROBLEM — N17.9 AKI (ACUTE KIDNEY INJURY) (HCC): Status: ACTIVE | Noted: 2023-01-01

## 2023-10-11 NOTE — ASSESSMENT & PLAN NOTE
Patient noted to have VALENTIN  Creatinine went up on October 10th to 1.76 from 0.79  Creatinine this morning is 2.92  Urinary retention protocol  Nephrology consult  Avoid hypotension/nephrotoxic medication.   Patient is off hydrochlorothiazide and Cozaar  Patient received albumin this morning  Trend BMP

## 2023-10-11 NOTE — PROGRESS NOTES
Progress note - [facility]   Bobby Gottir 81 y.o. female MRN: [id number]  Unit/Bed#: -01 Encounter: [id number]    ASSESSMENT and PLAN    83F with history of cholecystectomy 2016, hypothyroidism, A-fib on Xarelto who presented to the emergency department from acute rehab for painless jaundice      1. Acute liver injury, suspected drug-induced liver injury in setting of gabapentin use  2. Elevated autoimmune markers  2. Liver nodularity    ANCA, RF, and anti-smooth muscle antibody elevated  Hepatitis panel nonreactive, EBV and CMV consistent with prior infection  check serologies for immunity to hep a and B, with vaccination if nonreactive    Transjugular biopsy 10/10/2023, pathology ordered as stat  LFTs improving today, perhaps related to Medrol given for preprocedure dye prep  We will start prednisone 40 mg daily   Check LFTs daily during admission and weekly thereafter  Plan outpatient hepatology follow-up      ADDENDUM:   Received Tiger text from pathologist with preliminary read of pathology, biopsy shows chronic hepatitis with severe activity and prominent plasma cells, consistent with autoimmune hepatitis. No change to above plan, start prednisone today and follow LFTs. Chief Complaint   Patient presents with    Abnormal Lab     Elevated liver enzymes from routine blood work done yesterday. SUBJECTIVE/HPI   No issues after liver biopsy. Denies fevers, chills, nausea or abdominal pain.     /50   Pulse 62   Temp (!) 97 °F (36.1 °C)   Resp 16   Ht 5' 4" (1.626 m)   Wt 98 kg (216 lb 0.8 oz)   SpO2 98%   BMI 37.09 kg/m²     PHYSICALEXAM  General appearance: alert, appears stated age and cooperative  Eyes: PERLLA, EOMI, +  icterus   Head: Normocephalic, without obvious abnormality, atraumatic  Lungs: clear to auscultation bilaterally  Heart: regular rate and rhythm, S1, S2 normal, no murmur, click, rub or gallop  Abdomen: soft, non-tender; bowel sounds normal; no masses,  no organomegaly  Extremities: extremities normal, atraumatic, no cyanosis or edema  Neurologic: Grossly normal    Lab Results   Component Value Date    CALCIUM 8.5 10/11/2023    K 3.8 10/11/2023    CO2 15 (L) 10/11/2023     10/11/2023    BUN 45 (H) 10/11/2023    CREATININE 2.92 (H) 10/11/2023     Lab Results   Component Value Date    WBC 13.97 (H) 10/11/2023    HGB 11.7 10/11/2023    HCT 35.9 10/11/2023    MCV 90 10/11/2023     (L) 10/11/2023     Lab Results   Component Value Date     (H) 10/11/2023     (H) 10/11/2023    GGT 76 (H) 10/03/2023    ALKPHOS 97 10/11/2023     No results found for: "AMYLASE"  Lab Results   Component Value Date    LIPASE 57 10/02/2023     Lab Results   Component Value Date    IRON 203 10/03/2023    TIBC <258 10/03/2023    FERRITIN 164 10/03/2023     Lab Results   Component Value Date    INR 3.91 (H) 10/11/2023

## 2023-10-11 NOTE — ASSESSMENT & PLAN NOTE
Previously on diltiazem and sotalol however patient recently taken off diltiazem at facility  Unclear why diltiazem was stopped   Will continue to hold for now given hepatic impairment   Hold Xarelto in anticipation of liver biopsy  Continue to hold Xarelto as INR is 3.91

## 2023-10-11 NOTE — ASSESSMENT & PLAN NOTE
Previously discharged on diovan, sotalol, torsemide  At facility patient is on HCTZ 12.5, losartan 100 mg, and sotalol 80 mg BID  Hold hydrochlorothiazide, Cozaar in setting of worsening creatinine  Will order hydralazine as needed

## 2023-10-11 NOTE — PLAN OF CARE
Problem: MOBILITY - ADULT  Goal: Maintain or return to baseline ADL function  Description: INTERVENTIONS:  -  Assess patient's ability to carry out ADLs; assess patient's baseline for ADL function and identify physical deficits which impact ability to perform ADLs (bathing, care of mouth/teeth, toileting, grooming, dressing, etc.)  - Assess/evaluate cause of self-care deficits   - Assess range of motion  - Assess patient's mobility; develop plan if impaired  - Assess patient's need for assistive devices and provide as appropriate  - Encourage maximum independence but intervene and supervise when necessary  - Involve family in performance of ADLs  - Assess for home care needs following discharge   - Consider OT consult to assist with ADL evaluation and planning for discharge  - Provide patient education as appropriate  10/11/2023 0121 by Joel Jensen RN  Outcome: Progressing  10/11/2023 0121 by Joel Jensen RN  Outcome: Progressing  Goal: Maintains/Returns to pre admission functional level  Description: INTERVENTIONS:  - Perform BMAT or MOVE assessment daily.   - Set and communicate daily mobility goal to care team and patient/family/caregiver. - Collaborate with rehabilitation services on mobility goals if consulted  - Perform Range of Motion 3 times a day. - Reposition patient every 2 hours.   - Dangle patient 3 times a day  - Stand patient 3 times a day  - Ambulate patient 3 times a day  - Out of bed to chair 3 times a day   - Out of bed for meals 3 times a day  - Out of bed for toileting  - Record patient progress and toleration of activity level   10/11/2023 0121 by Joel Jensen RN  Outcome: Progressing  10/11/2023 0121 by Joel Jensen RN  Outcome: Progressing     Problem: PAIN - ADULT  Goal: Verbalizes/displays adequate comfort level or baseline comfort level  Description: Interventions:  - Encourage patient to monitor pain and request assistance  - Assess pain using appropriate pain scale  - Administer analgesics based on type and severity of pain and evaluate response  - Implement non-pharmacological measures as appropriate and evaluate response  - Consider cultural and social influences on pain and pain management  - Notify physician/advanced practitioner if interventions unsuccessful or patient reports new pain  10/11/2023 0121 by Timoteo Arevalo RN  Outcome: Progressing  10/11/2023 0121 by Timoteo Arevalo RN  Outcome: Progressing     Problem: INFECTION - ADULT  Goal: Absence or prevention of progression during hospitalization  Description: INTERVENTIONS:  - Assess and monitor for signs and symptoms of infection  - Monitor lab/diagnostic results  - Monitor all insertion sites, i.e. indwelling lines, tubes, and drains  - Monitor endotracheal if appropriate and nasal secretions for changes in amount and color  - [place] appropriate cooling/warming therapies per order  - Administer medications as ordered  - Instruct and encourage patient and family to use good hand hygiene technique  - Identify and instruct in appropriate isolation precautions for identified infection/condition  10/11/2023 0121 by Timoteo Arevlao RN  Outcome: Progressing  10/11/2023 0121 by Timoteo Arevalo RN  Outcome: Progressing  Goal: Absence of fever/infection during neutropenic period  Description: INTERVENTIONS:  - Monitor WBC    10/11/2023 0121 by Timoteo Arevalo RN  Outcome: Progressing  10/11/2023 0121 by Timoteo Arevalo RN  Outcome: Progressing     Problem: SAFETY ADULT  Goal: Maintain or return to baseline ADL function  Description: INTERVENTIONS:  -  Assess patient's ability to carry out ADLs; assess patient's baseline for ADL function and identify physical deficits which impact ability to perform ADLs (bathing, care of mouth/teeth, toileting, grooming, dressing, etc.)  - Assess/evaluate cause of self-care deficits   - Assess range of motion  - Assess patient's mobility; develop plan if impaired  - Assess patient's need for assistive devices and provide as appropriate  - Encourage maximum independence but intervene and supervise when necessary  - Involve family in performance of ADLs  - Assess for home care needs following discharge   - Consider OT consult to assist with ADL evaluation and planning for discharge  - Provide patient education as appropriate  10/11/2023 0121 by Chente Velazquez RN  Outcome: Progressing  10/11/2023 0121 by Chente Velazquez RN  Outcome: Progressing  Goal: Maintains/Returns to pre admission functional level  Description: INTERVENTIONS:  - Perform BMAT or MOVE assessment daily.   - Set and communicate daily mobility goal to care team and patient/family/caregiver. - Collaborate with rehabilitation services on mobility goals if consulted  - Perform Range of Motion 3 times a day. - Reposition patient every 2 hours.   - Dangle patient 3 times a day  - Stand patient 3 times a day  - Ambulate patient 3 times a day  - Out of bed to chair 3 times a day   - Out of bed for meals 3 times a day  - Out of bed for toileting  - Record patient progress and toleration of activity level   10/11/2023 0121 by Chente Velazquez RN  Outcome: Progressing  10/11/2023 0121 by Chente Velazquez RN  Outcome: Progressing  Goal: Patient will remain free of falls  Description: INTERVENTIONS:  - Educate patient/family on patient safety including physical limitations  - Instruct patient to call for assistance with activity   - Consult OT/PT to assist with strengthening/mobility   - Keep Call bell within reach  - Keep bed low and locked with side rails adjusted as appropriate  - Keep care items and personal belongings within reach  - Initiate and maintain comfort rounds  - Make Fall Risk Sign visible to staff  - Offer Toileting every 2 Hours, in advance of need  - Initiate/Maintain bed alarm  - Obtain necessary fall risk management equipment  - Apply yellow socks and bracelet for high fall risk patients  - Consider moving patient to room near nurses station  10/11/2023 0121 by Janice Perkins RN  Outcome: Progressing  10/11/2023 0121 by Janice Perkins RN  Outcome: Progressing     Problem: DISCHARGE PLANNING  Goal: Discharge to home or other facility with appropriate resources  Description: INTERVENTIONS:  - Identify barriers to discharge w/patient and caregiver  - Arrange for needed discharge resources and transportation as appropriate  - Identify discharge learning needs (meds, wound care, etc.)  - Arrange for interpretive services to assist at discharge as needed  - Refer to Case Management Department for coordinating discharge planning if the patient needs post-hospital services based on physician/advanced practitioner order or complex needs related to functional status, cognitive ability, or social support system  10/11/2023 0121 by Janice Perkins RN  Outcome: Progressing  10/11/2023 0121 by Janice Perkins RN  Outcome: Progressing     Problem: Knowledge Deficit  Goal: Patient/family/caregiver demonstrates understanding of disease process, treatment plan, medications, and discharge instructions  Description: Complete learning assessment and assess knowledge base.   Interventions:  - Provide teaching at level of understanding  - Provide teaching via preferred learning methods  10/11/2023 0121 by Janice Perkins RN  Outcome: Progressing  10/11/2023 0121 by Janice Perkins RN  Outcome: Progressing     Problem: Potential for Falls  Goal: Patient will remain free of falls  Description: INTERVENTIONS:  - Educate patient/family on patient safety including physical limitations  - Instruct patient to call for assistance with activity   - Consult OT/PT to assist with strengthening/mobility   - Keep Call bell within reach  - Keep bed low and locked with side rails adjusted as appropriate  - Keep care items and personal belongings within reach  - Initiate and maintain comfort rounds  - Make Fall Risk Sign visible to staff  - Offer Toileting every 2 Hours, in advance of need  - Initiate/Maintain bed alarm  - Obtain necessary fall risk management equipment  - Apply yellow socks and bracelet for high fall risk patients  - Consider moving patient to room near nurses station  10/11/2023 0121 by Snehal Velez RN  Outcome: Progressing  10/11/2023 0121 by Snehal Velez RN  Outcome: Progressing     Problem: Nutrition/Hydration-ADULT  Goal: Nutrient/Hydration intake appropriate for improving, restoring or maintaining nutritional needs  Description: Monitor and assess patient's nutrition/hydration status for malnutrition. Collaborate with interdisciplinary team and initiate plan and interventions as ordered. Monitor patient's weight and dietary intake as ordered or per policy. Utilize nutrition screening tool and intervene as necessary. Determine patient's food preferences and provide high-protein, high-caloric foods as appropriate.      INTERVENTIONS:  - Monitor oral intake, urinary output, labs, and treatment plans  - Assess nutrition and hydration status and recommend course of action  - Evaluate amount of meals eaten  - Assist patient with eating if necessary   - Allow adequate time for meals  - Recommend/ encourage appropriate diets, oral nutritional supplements, and vitamin/mineral supplements  - Order, calculate, and assess calorie counts as needed  - Recommend, monitor, and adjust tube feedings and TPN/PPN based on assessed needs  - Assess need for intravenous fluids  - Provide specific nutrition/hydration education as appropriate  - Include patient/family/caregiver in decisions related to nutrition  10/11/2023 0121 by Snehal Velez RN  Outcome: Progressing  10/11/2023 0121 by Snehal Velez RN  Outcome: Progressing

## 2023-10-11 NOTE — ASSESSMENT & PLAN NOTE
Lab Results   Component Value Date    HGBA1C 7.0 (H) 07/20/2023     Patient has not been on any home medications   Patient was noted to be hypoglycemic   Accu-Chek ACHS with Humalog sliding scale

## 2023-10-11 NOTE — PROGRESS NOTES
[facility]  Progress Note  Name: Madeleine Unger  MRN: [id number]  Unit/Bed#: -01 I Date of Admission: 10/2/2023   Date of Service: 10/11/2023  Hospital Day: 9    Assessment/Plan   VALENTIN (acute kidney injury) [facility])  Assessment & Plan  Patient noted to have VALENTIN  Creatinine went up on October 10th to 1.76 from 0.79  Creatinine this morning is 2.92  Urinary retention protocol  Nephrology consult  Avoid hypotension/nephrotoxic medication. Patient is off hydrochlorothiazide and Cozaar  Patient received albumin this morning  Trend BMP    Type 2 diabetes mellitus ([address])  Assessment & Plan  Lab Results   Component Value Date    HGBA1C 7.0 (H) 07/20/2023     Patient has not been on any home medications   Patient was noted to be hypoglycemic   Accu-Chek ACHS with Humalog sliding scale        Hyponatremia  Assessment & Plan  Previously discharged on 2 g salt tabs BID and torsemide 5 mg daily  Torsemide was stopped by facility for unknown reason   Sodium this morning is 129  On salt tablets  Nephrology consult    Closed right ankle fracture  Assessment & Plan  Continue ambulating with camboot and walker  PT evaluated patient on 10/3-recommended level 3 rehab    Diffuse interstitial pulmonary fibrosis (HCC)  Assessment & Plan  CT:  Reticular interstitial lung abnormality (REENA) noted in both lower lobes. Recommend nonemergent outpatient evaluation with high-resolution chest CT to exclude [place] UIP pattern fibrosis.     Mood disorder (HCC)  Assessment & Plan  Continue Lexapro  Ambien nightly as needed    Acquired hypothyroidism  Assessment & Plan  Continue levothyroxine 175 mcg daily    Class 2 severe obesity due to excess calories with serious comorbidity and body mass index (BMI) of 35.0 to 35.9 in adult   Assessment & Plan  Dietary changes and lifestyle modifications  Affects all aspects of care    Essential hypertension  Assessment & Plan  Previously discharged on diovan, sotalol, torsemide  At facility patient is on HCTZ 12.5, losartan 100 mg, and sotalol 80 mg BID  Hold hydrochlorothiazide, Cozaar in setting of worsening creatinine  Will order hydralazine as needed    Mixed hyperlipidemia  Assessment & Plan  Holding Lipitor 20 mg daily given LFTs    Paroxysmal atrial fibrillation (HCC)  Assessment & Plan  Previously on diltiazem and sotalol however patient recently taken off diltiazem at facility  Unclear why diltiazem was stopped   Will continue to hold for now given hepatic impairment   Hold Xarelto in anticipation of liver biopsy  Continue to hold Xarelto as INR is 3.91    * Transaminitis  Assessment & Plan  Previously evaluated by GI in July for transaminitis thought 2/2 shock liver. CMV and EBV IGG positive but no IGM. Hepatitis panel and RUQ ultrasound were negative. Labs obtained due to new jaundice at [facility] showed T bili of 6, prompting ED presentation. S/P cholecystectomy. LFTs on admission:  Direct bilirubin: 7.70  Total bilirubin: 12.2  Alk phos: 118  AST: 690  ALT: 407  GI consulted   CT A/P: Nodular liver morphology and sequela of portal venous hypertension suggesting cirrhosis. Minimal ascites around the liver and layering in the pelvis. Prominent lymph nodes at the katelynn hepatis presumably reactive to primary underlying liver dysfunction. No biliary ductal dilatation to suggest obstructive cause of jaundice. AFP and CA 19-9 are elevated  INR this morning is 2.91  MRI shows nodular liver, trace perihepatic ascites, 5 mm pancreatic uncinate process cystic lesion  Discussed with GI liver injury likely attributed by drug-induced. Monitor LFT normal. 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- Gabapentin could be cause, but would like to biopsy  Patient LFTs are trending up.    Vit K- 10/07 and 10/8  Trend PT/INR  Patient received 1 unit of FFP at midnight  AST, ALT and alk phos--683/477/115  Total bilirubin is 16.75  Patient underwent TJLB(transjugular liver biopsy)with sedation on 10/10   Patient received steroid prep for the procedure given allergy to contrast prior to biopsy  LFTs are trending down  Follow-up biopsy results  Trend LFTs        Labs & Imaging: I have personally reviewed pertinent reports. VTE Prophylaxis: in place. Code Status:   Level 1 - Full Code    Patient Centered Rounds: I have performed bedside rounds with nursing staff today. Discussions with Specialists or Other Care Team Provider: GI and nephrology    Education and Discussions with Family / Patient: Daughter on phone    Current Length of Stay: 9 day(s)    Current Patient Status: Inpatient   Certification Statement: The patient will continue to require additional inpatient hospital stay due to see my assessment and plan. Subjective:   Patient is seen and examined at bedside. Denies any abdominal pain, nausea and vomiting. Afebrile  All other ROS are negative. Objective:    Vitals: Blood pressure 101/50, pulse 62, temperature (!) 97 °F (36.1 °C), resp. rate 16, height 5' 4" (1.626 m), weight 98 kg (216 lb 0.8 oz), SpO2 98 %. ,Body mass index is 37.09 kg/m². SPO2 RA Rest      Flowsheet Row ED to Hosp-Admission (Current) from 10/2/2023 in [facility]   SpO2 98 %   SpO2 Activity At Rest   O2 Device None (Room air)   O2 Flow Rate --          I&O: No intake or output data in the 24 hours ending 10/11/23 0753    Physical Exam:    General- Alert, lying comfortably in bed. Not in any acute distress. Neck- Supple, No JVD  CVS- regular, S1 and S2 normal  Chest- Bilateral Air entry, No rhochi, crackles or wheezing present. Abdomen- soft, nontender, not distended, no guarding or rigidity, BS+  Extremities-  No pedal edema, No calf tenderness  CNS-   Alert, awake and orientedx3. No focal deficits present.     Invasive Devices       Peripheral Intravenous Line  Duration             Peripheral IV 10/09/23 Distal;Left;Upper;Ventral (anterior) Arm 1 day                          Social History  reviewed  Family History   Problem Relation Age of Onset    Cancer Mother         ovarian    Heart disease Father         cardiac disorder    Cancer Father     Heart disease Brother     Heart disease Paternal Aunt     Heart disease Paternal Uncle     reviewed    Meds:  Current Facility-Administered Medications   Medication Dose Route Frequency Provider Last Rate Last Admin    diphenhydrAMINE (BENADRYL) tablet 50 mg  50 mg Oral 60 Min Pre-Op Eunice Mccray MD   50 mg at 10/10/23 1243    escitalopram (LEXAPRO) tablet 10 mg  10 mg Oral Daily [facility] Alie Ward PA-C   10 mg at 10/10/23 5835    hydrALAZINE (APRESOLINE) injection 10 mg  10 mg Intravenous Q6H PRN Phoebe Rojas MD        levothyroxine tablet 175 mcg  175 mcg Oral Early Morning [facility] Alie Briceño PA-C   175 mcg at 10/11/23 0517    sodium chloride 0.9 % infusion  100 mL/hr Intravenous Continuous Es Benites PA-C 100 mL/hr at 10/11/23 0439 100 mL/hr at 10/11/23 0439    sodium chloride tablet 2 g  2 g Oral BID With Meals [facility] Alie Ward PA-C   2 g at 10/10/23 1832    sotalol (BETAPACE) tablet 80 mg  80 mg Oral BID [facility] Alie Ward PA-C   80 mg at 10/09/23 0809      Medications Prior to Admission   Medication    atorvastatin (LIPITOR) 20 mg tablet    diltiazem (CARDIZEM CD) 360 MG 24 hr capsule    Diovan 320 MG tablet    escitalopram (LEXAPRO) 10 mg tablet    gabapentin (NEURONTIN) 100 mg capsule    hydrochlorothiazide (HYDRODIURIL) 25 mg tablet    HYDROcodone-acetaminophen (Norco) 5-325 mg per tablet    levothyroxine 175 mcg tablet    liotrix (THYROLAR-1) 60 (12.5-50) MG (MCG) TABS    rivaroxaban (XARELTO) 20 mg tablet    sodium chloride 1 g tablet    sotalol (BETAPACE) 80 mg tablet    torsemide (DEMADEX) 5 MG tablet    zolpidem (AMBIEN) 10 mg tablet       Labs:  Results from last 7 days   Lab Units 10/11/23  0016 10/10/23  0453 10/08/23  0446   WBC Thousand/uL 13.97* 6.16 6.76   HEMOGLOBIN g/dL 11.7 12.1 12.0   HEMATOCRIT % 35.9 35.6 35.5   PLATELETS Thousands/uL 110* 139* 152   NEUTROS PCT %  --  70 41*   LYMPHS PCT %  --  21 30   LYMPHO PCT % 7*  --   --    MONOS PCT %  --  5 15*   MONO PCT % 2*  --   --    EOS PCT % 0 2 11*     Results from last 7 days   Lab Units 10/11/23  0016 10/10/23  0453 10/09/23  1203   POTASSIUM mmol/L 3.8 3.7 3.6   CHLORIDE mmol/L 105 103 105   CO2 mmol/L 15* 24 24   BUN mg/dL 45* 32* 22   CREATININE mg/dL 2.92* 1.76* 0.79   CALCIUM mg/dL 8.5 8.5 8.4   ALK PHOS U/L 97 115* 116*   ALT U/L 355* 477* 491*   AST U/L 376* 683* 743*     Lab Results   Component Value Date    CKTOTAL 50 07/21/2023     Results from last 7 days   Lab Units 10/11/23  0016 10/10/23  0453 10/09/23  1203   INR  3.91* 2.91* 3.22*     No results found for: "BLOODCX", "Carmelita Porteous", "WOUNDCULT", "Leon Lock"      Imaging:  Results for orders placed during the hospital encounter of 07/20/23    XR Trauma chest portable    Narrative  CHEST    INDICATION:   TRAUMA. COMPARISON: 4/13/2016    EXAM PERFORMED/VIEWS:  XR CHEST PORTABLE      FINDINGS:    Heart shadow appears unremarkable. Atherosclerotic aortic calcifications are noted. The lungs are clear. No pneumothorax or pleural effusion. Osseous structures appear within normal limits for patient age. Impression  No acute cardiopulmonary disease. Workstation performed: [id number]    Results for orders placed during the hospital encounter of 04/13/16    X-ray chest 2 views    Narrative  CHEST    INDICATION:  Chest pain which began this morning. COMPARISON:  None    VIEWS:  Frontal and lateral projections; 2 images    FINDINGS:    Cardiomediastinal silhouette appears unremarkable. The lungs are clear. No pneumothorax or pleural effusion. Visualized osseous structures appear within normal limits for the patient's age. Impression  No active pulmonary disease.       Workstation performed: [id number]      Last 24 Hours Medication List:   Current Facility-Administered Medications   Medication Dose Route Frequency Provider Last Rate    diphenhydrAMINE  50 mg Oral 60 Min Pre-Op Lainey Henao MD      escitalopram  10 mg Oral Daily Ronald Briceño PA-C      hydrALAZINE  10 mg Intravenous Q6H PRN Marcos Cantrell MD      levothyroxine  175 mcg Oral Early Morning Ronald Ward PA-C      sodium chloride  100 mL/hr Intravenous Continuous Saida Maldonado PA-C 100 mL/hr (10/11/23 0439)    sodium chloride  2 g Oral BID With Meals Ronald Ward PA-C      sotalol  80 mg Oral BID Erick Hilliard PA-C          Today, Patient Was Seen By: Marcos Cantrell MD    ** Please Note: Dictation voice to text software may have been used in the creation of this document.  **

## 2023-10-11 NOTE — ASSESSMENT & PLAN NOTE
Previously evaluated by GI in July for transaminitis thought 2/2 shock liver. CMV and EBV IGG positive but no IGM. Hepatitis panel and RUQ ultrasound were negative. Labs obtained due to new jaundice at [facility] showed T bili of 6, prompting ED presentation. S/P cholecystectomy. LFTs on admission:  Direct bilirubin: 7.70  Total bilirubin: 12.2  Alk phos: 118  AST: 690  ALT: 407  GI consulted   CT A/P: Nodular liver morphology and sequela of portal venous hypertension suggesting cirrhosis. Minimal ascites around the liver and layering in the pelvis. Prominent lymph nodes at the katelynn hepatis presumably reactive to primary underlying liver dysfunction. No biliary ductal dilatation to suggest obstructive cause of jaundice. AFP and CA 19-9 are elevated  INR this morning is 2.91  MRI shows nodular liver, trace perihepatic ascites, 5 mm pancreatic uncinate process cystic lesion  Discussed with GI liver injury likely attributed by drug-induced. Monitor LFT normal. 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- Gabapentin could be cause, but would like to biopsy  Patient LFTs are trending up.    Vit K- 10/07 and 10/8  Trend PT/INR  Patient received 1 unit of FFP at midnight  AST, ALT and alk phos--683/477/115  Total bilirubin is 16.75  Patient underwent TJLB(transjugular liver biopsy)with sedation on 10/10   Patient received steroid prep for the procedure given allergy to contrast prior to biopsy  LFTs are trending down  Follow-up biopsy results  Trend LFTs

## 2023-10-11 NOTE — CONSULTS
Consultation - Nephrology   Yumiko Garcia 81 y.o. female MRN: [id number]  Unit/Bed#: -01 Encounter: [id number]    ASSESSMENT and PLAN:   Acute kidney injury  -Baseline creatinine: 0.6-0.7 mg/dl  -Admission creatinine: 0.80 mg/dl  - Work up:   UA with microscopy: UA with 0-1 RBC per high-power field, 2-4 WBC, numerous bacteria, calcium oxalate crystals, positive nitrite  Imaging: MRI abdomen with and without contrast on 10/3 did not show any hydronephrosis. Finding of pancreatic process cystic lesion, liver cirrhosis with sequelae of portal hypertension including trace ascites. Also had CT abdomen which showed nodular liver morphology suggesting liver cirrhosis and minimal ascites. No hydronephrosis  -Etiology: Acute kidney injury likely prerenal/early ATN in the setting of hypotension while having autoregulatory failure from being on ARB/losartan. Possibility of ATN from elevated bilirubin. Also could have component of some contrast nephropathy as patient underwent IV contrast administration during transjugular liver biopsy on 10/10. Patient also with liver cirrhosis and elevated liver enzymes with total bilirubin elevated, consider high possibility of hepatorenal syndrome as well  [facility] Course: Renal function was stable at creatinine 0.97 till 10/9 but worsened to 1.76 on 10/10 in the setting of hypotension with systolic blood pressure in 60s on 10/10, blood pressure has been low since 10/9. Pratima Alcazar Renal function worsened to creatinine 2.92 on 10/11 midnight and further worsened to creatinine 3.04 on 10/11 which is the peak creatinine so far  -Patient underwent transjugular liver biopsy on 10/10 received just 5 mL of IV contrast during the procedure  -Serological work-up: Rheumatoid factor positive. P-ANCA was positive at 1 is to 160 but MPO and MS-3 antibody was negative. Atypical p-ANCA was negative. BEV was negative.   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 Less likely GN with no significant RBC on urine analysis. Repeat ANCA panel as MPO and NE-3 was negative, wonder if p-ANCA is falsely positive. No indication for renal replacement therapy at this time but discussed with patient that if renal function continued to worsen may need to consider renal replacement therapy and she verbalized understanding and agreed with the plan  Monitor input and output may consider PureWick catheter  Started on IV albumin, started on octreotide and midodrine for possibility of hepatorenal syndrome. Patient has sulfa allergy, monitor any reaction to use of midodrine  Agree with holding all antihypertensive medication especially losartan  Recommend avoiding Sotalol in the setting of acute kidney injury due to risk of QT prolongation  Avoid nephrotoxins and dose all medications per EGFR. Avoid hypotension. Hyponatremia  -Admission sodium on 10/2 was 132 meq/L  -Patient has chronic hyponatremia with previous sodium level in July around 125-133 meq/L. Previously was discharged on salt tablet 2 g twice daily and torsemide 5 mg daily  -Most likely due to excessive ADH in the setting of liver cirrhosis and also possibility of SIADH from use of Lexapro  -Sodium level currently stable at 130 meq/L, recommend fluid restriction 1.5 L/day. Continue salt tablets 2 g twice daily. Ordered work-up for hyponatremia as well    BP/hypotension  -Blood pressure is soft but improving slightly today, continue to hold ARB and HCTZ. Last dose of ARB/losartan 100 mg was on 10/9.   And last dose of HCTZ was in 10/9  -Currently BP soft  -check AM cortisol   -Plan: Continue to hold antihypertensive medications, would recommend holding off on sotalol in the setting of acute kidney injury due to risk of QT prolongation. Would recommend discussion with cardiology regarding alternatives    Transaminitis/acute liver injury suspected secondary to drug-induced liver injury, GI on board  -CT abdomen suggestive of liver cirrhosis with finding of liver nodularity  -GI on board and suspected liver injury from gabapentin use  -Autoimmune work-up done by GI suggestive of nonreactive hepatitis panel. Others: Diabetes mellitus type 2      Above plan regarding management of acute kidney injury with IV albumin, midodrine, octreotide was discussed with primary team and agreed with the plan    HISTORY OF PRESENT ILLNESS:  Requesting Physician: Lane Hsu MD  Reason for Consult: Acute kidney injury    Karla Tomlinson is a 81 y.o. female with history of A-fib, hypertension, hyperlipidemia, hypothyroidism, transaminitis, recent nondisplaced fracture of right fibula and was at rehab who was admitted to [address] [place] [address] after presenting with jaundice and elevated bilirubin. Patient was at [facility] and had blood work drawn which was suggestive of elevated bilirubin to 6.8 mg/dL and elevated liver enzymes. During recent hospital admission elevated liver enzymes was suspected from the shock liver.     PAST MEDICAL HISTORY:  Past Medical History:   Diagnosis Date    Anxiety     Carpal tunnel syndrome, bilateral     Cholecystitis     Chronic calculous cholecystitis     last assessed 4/25/16    History of cardioversion     History of radiofrequency ablation procedure for cardiac arrhythmia     Hyperlipidemia     Hypertension     Hypokalemia 7/20/2023    Sleep apnea     no trouble since Afib corrected       PAST SURGICAL HISTORY:  Past Surgical History:   Procedure Laterality Date    APPENDECTOMY      BACK SURGERY  11/15/2018    L4-5 Screws and rods    BLADDER SUSPENSION      CARDIOVERSION      atrial - onset 2015 - x5 in 2014 and 2015    CHOLECYSTECTOMY      April 2016    HYSTERECTOMY      IR BIOPSY TRANSJUGULAR LIVER  10/10/2023    KNEE ARTHROSCOPY W/ MENISCAL REPAIR Bilateral     therapeutic - last assessed 4/14/16    KNEE SURGERY      PA LAPAROSCOPY SURG CHOLECYSTECTOMY N/A 4/26/2016    Procedure: CHOLECYSTECTOMY LAPAROSCOPIC;  Surgeon: Bonny Mckeon MD;  Location: [facility];  Service: General    PA NEUROPLASTY &/TRANSPOS MEDIAN NRV CARPAL TUNNE Bilateral 7/31/2023    Procedure: RELEASE CARPAL TUNNEL, BILATERAL OPEN;  Surgeon: Jeff William MD;  Location: [facility];  Service: Orthopedics    TONSILLECTOMY      WISDOM TOOTH EXTRACTION         SOCIAL HISTORY:  Social History     Substance and Sexual Activity   Alcohol Use Yes    Comment: social; occasional     Social History     Substance and Sexual Activity   Drug Use No     Social History     Tobacco Use   Smoking Status Never   Smokeless Tobacco Never       FAMILY HISTORY:  Family History   Problem Relation Age of Onset    Cancer Mother         ovarian    Heart disease Father         cardiac disorder    Cancer Father     Heart disease Brother     Heart disease Paternal Aunt     Heart disease Paternal Uncle        ALLERGIES:  Allergies   Allergen Reactions    Pneumovax [Pneumococcal Polysaccharide Vaccine] Hives    Medical Tape     Nuts - Food Allergy     Omnipaque [Iohexol] Hives    Other Hives     Pine nuts    Penicillins Hives    Sulfa Antibiotics Hives    Sulfur Other (See Comments)    Iodine - Food Allergy Rash    Latex Rash and Other (See Comments)       MEDICATIONS:    Current Facility-Administered Medications:     albumin human (FLEXBUMIN) 5 % injection 12.5 g, 12.5 g, Intravenous, Once, Fely David MD    diphenhydrAMINE (BENADRYL) tablet 50 mg, 50 mg, Oral, 60 Min Pre-Op, Ailin Vidal MD, 50 mg at 10/10/23 1243    escitalopram (LEXAPRO) tablet 10 mg, 10 mg, Oral, Daily, Timur Ward PA-C, 10 mg at 10/11/23 0962    hydrALAZINE (APRESOLINE) injection 10 mg, 10 mg, Intravenous, Q6H PRN, Fely David MD levothyroxine tablet 175 mcg, 175 mcg, Oral, Early Morning, MURALI Bell-ALYCE, 175 mcg at 10/11/23 8379    predniSONE tablet 40 mg, 40 mg, Oral, Daily, Yaz Garcia DO    sodium chloride 0.9 % infusion, 100 mL/hr, Intravenous, Continuous, Lexy Gamez PA-C, Last Rate: 100 mL/hr at 10/11/23 0439, 100 mL/hr at 10/11/23 0439    sodium chloride tablet 2 g, 2 g, Oral, BID With Meals, MURALI Bell-ALYCE, 2 g at 10/11/23 0828    sotalol (BETAPACE) tablet 80 mg, 80 mg, Oral, BID, MURALI Bell-ALYCE, 80 mg at 10/09/23 6868    REVIEW OF SYSTEMS:   Review of Systems   Constitutional:  Negative for chills and fever. HENT:  Negative for ear pain and sore throat. Eyes:  Negative for pain and visual disturbance. Respiratory:  Negative for cough and shortness of breath. Cardiovascular:  Positive for leg swelling. Negative for chest pain and palpitations. Gastrointestinal:  Negative for abdominal pain and vomiting. Genitourinary:  Negative for dysuria and hematuria. Musculoskeletal:  Negative for arthralgias and back pain. Skin:  Negative for color change and rash. Neurological:  Negative for seizures and syncope. All other systems reviewed and are negative. All the systems were reviewed and were negative except as documented on the HPI. PHYSICAL EXAM:  Current Weight: Weight - Scale: 98 kg (216 lb 0.8 oz)  First Weight: Weight - Scale: 98 kg (216 lb 0.8 oz)  Vitals:    10/11/23 0640 10/11/23 0650 10/11/23 0700 10/11/23 0731   BP: 109/53 102/63 109/59 101/50   Pulse:  59 58 62   Resp:    16   Temp:    (!) 97 °F (36.1 °C)   TempSrc:       SpO2:  98% 100% 98%   Weight:       Height:         No intake or output data in the 24 hours ending 10/11/23 1059  Physical Exam  Constitutional:       General: She is not in acute distress. Appearance: Normal appearance. She is well-developed. HENT:      Head: Normocephalic and atraumatic.       Nose: Nose normal. Mouth/Throat:      Mouth: Mucous membranes are moist.   Eyes:      General: Scleral icterus present. Conjunctiva/sclera: Conjunctivae normal.      Pupils: Pupils are equal, round, and reactive to light. Neck:      Thyroid: No thyromegaly. Vascular: No JVD. Cardiovascular:      Rate and Rhythm: Normal rate and regular rhythm. Heart sounds: Normal heart sounds. No murmur heard. No friction rub. Pulmonary:      Effort: Pulmonary effort is normal. No respiratory distress. Breath sounds: Normal breath sounds. No wheezing or rales. Abdominal:      General: Bowel sounds are normal. There is no distension. Palpations: Abdomen is soft. Tenderness: There is no abdominal tenderness. Musculoskeletal:         General: No deformity. Cervical back: Neck supple. Right lower leg: Edema present. Left lower leg: Edema present. Skin:     General: Skin is warm and dry. Findings: No rash. Neurological:      Mental Status: She is alert and oriented to person, place, and time. Psychiatric:         Mood and Affect: Mood normal.         Behavior: Behavior normal.         Thought Content:  Thought content normal.           Invasive Devices:        Lab Results:   Results from last 7 days   Lab Units 10/11/23  0958 10/11/23  0016 10/10/23  0453 10/09/23  1203 10/08/23  0446   WBC Thousand/uL  --  13.97* 6.16  --  6.76   HEMOGLOBIN g/dL  --  11.7 12.1  --  12.0   HEMATOCRIT %  --  35.9 35.6  --  35.5   PLATELETS Thousands/uL  --  110* 139*  --  152   POTASSIUM mmol/L 4.2 3.8 3.7 3.6 3.7   CHLORIDE mmol/L 104 105 103 105 107   CO2 mmol/L 20* 15* 24 24 23   BUN mg/dL 49* 45* 32* 22 20   CREATININE mg/dL 3.04* 2.92* 1.76* 0.79 0.68   CALCIUM mg/dL 8.5 8.5 8.5 8.4 8.5   MAGNESIUM mg/dL  --  2.2 1.7*  --   --    ALK PHOS U/L  --  97 115* 116* 128*   ALT U/L  --  355* 477* 491* 457*   AST U/L  --  376* 683* 743* 698*       Other Studies: Reviewed results of MRI abdomen and CT abdomen      Portions of the record may have been created with voice recognition software. Occasional wrong word or "sound a like" substitutions may have occurred due to the inherent limitations of voice recognition software. Read the chart carefully and recognize, using context, where substitutions have occurred. If you have any questions, please contact the dictating provider.

## 2023-10-11 NOTE — ASSESSMENT & PLAN NOTE
Previously discharged on 2 g salt tabs BID and torsemide 5 mg daily  Torsemide was stopped by facility for unknown reason   Sodium this morning is 129  On salt tablets  Nephrology consult

## 2023-10-12 NOTE — ASSESSMENT & PLAN NOTE
Patient noted to have VALENTIN likely prerenal/early ATN in setting of hypotension  Creatinine went up on October 10th to 1.76 from 0.79  Creatinine this morning is 3.28  Urinary retention protocol  Nephrology consult appreciated  Neurology started patient on octreotide, midodrine and IV albumin  Strict I&O's  Avoid hypotension/nephrotoxic medication.   Patient is off hydrochlorothiazide and Cozaar  Hold sotalol in setting of VALENTIN  Trend BMP

## 2023-10-12 NOTE — PLAN OF CARE
Problem: MOBILITY - ADULT  Goal: Maintain or return to baseline ADL function  Description: INTERVENTIONS:  -  Assess patient's ability to carry out ADLs; assess patient's baseline for ADL function and identify physical deficits which impact ability to perform ADLs (bathing, care of mouth/teeth, toileting, grooming, dressing, etc.)  - Assess/evaluate cause of self-care deficits   - Assess range of motion  - Assess patient's mobility; develop plan if impaired  - Assess patient's need for assistive devices and provide as appropriate  - Encourage maximum independence but intervene and supervise when necessary  - Involve family in performance of ADLs  - Assess for home care needs following discharge   - Consider OT consult to assist with ADL evaluation and planning for discharge  - Provide patient education as appropriate  Outcome: Progressing     Problem: PAIN - ADULT  Goal: Verbalizes/displays adequate comfort level or baseline comfort level  Description: Interventions:  - Encourage patient to monitor pain and request assistance  - Assess pain using appropriate pain scale  - Administer analgesics based on type and severity of pain and evaluate response  - Implement non-pharmacological measures as appropriate and evaluate response  - Consider cultural and social influences on pain and pain management  - Notify physician/advanced practitioner if interventions unsuccessful or patient reports new pain  Outcome: Progressing     Problem: INFECTION - ADULT  Goal: Absence or prevention of progression during hospitalization  Description: INTERVENTIONS:  - Assess and monitor for signs and symptoms of infection  - Monitor lab/diagnostic results  - Monitor all insertion sites, i.e. indwelling lines, tubes, and drains  - Monitor endotracheal if appropriate and nasal secretions for changes in amount and color  - [place] appropriate cooling/warming therapies per order  - Administer medications as ordered  - Instruct and encourage patient and family to use good hand hygiene technique  - Identify and instruct in appropriate isolation precautions for identified infection/condition  Outcome: Progressing  Goal: Absence of fever/infection during neutropenic period  Description: INTERVENTIONS:  - Monitor WBC    Outcome: Progressing     Problem: DISCHARGE PLANNING  Goal: Discharge to home or other facility with appropriate resources  Description: INTERVENTIONS:  - Identify barriers to discharge w/patient and caregiver  - Arrange for needed discharge resources and transportation as appropriate  - Identify discharge learning needs (meds, wound care, etc.)  - Arrange for interpretive services to assist at discharge as needed  - Refer to Case Management Department for coordinating discharge planning if the patient needs post-hospital services based on physician/advanced practitioner order or complex needs related to functional status, cognitive ability, or social support system  Outcome: Progressing     Problem: Knowledge Deficit  Goal: Patient/family/caregiver demonstrates understanding of disease process, treatment plan, medications, and discharge instructions  Description: Complete learning assessment and assess knowledge base.   Interventions:  - Provide teaching at level of understanding  - Provide teaching via preferred learning methods  Outcome: Progressing

## 2023-10-12 NOTE — PLAN OF CARE
Problem: PAIN - ADULT  Goal: Verbalizes/displays adequate comfort level or baseline comfort level  Description: Interventions:  - Encourage patient to monitor pain and request assistance  - Assess pain using appropriate pain scale  - Administer analgesics based on type and severity of pain and evaluate response  - Implement non-pharmacological measures as appropriate and evaluate response  - Consider cultural and social influences on pain and pain management  - Notify physician/advanced practitioner if interventions unsuccessful or patient reports new pain  Outcome: Progressing     Problem: INFECTION - ADULT  Goal: Absence or prevention of progression during hospitalization  Description: INTERVENTIONS:  - Assess and monitor for signs and symptoms of infection  - Monitor lab/diagnostic results  - Monitor all insertion sites, i.e. indwelling lines, tubes, and drains  - Monitor endotracheal if appropriate and nasal secretions for changes in amount and color  - [place] appropriate cooling/warming therapies per order  - Administer medications as ordered  - Instruct and encourage patient and family to use good hand hygiene technique  - Identify and instruct in appropriate isolation precautions for identified infection/condition  Outcome: Progressing

## 2023-10-12 NOTE — NURSING NOTE
Patient received into room 314 from room 219 for telemetry monitoring. Patient placed on telemetry with noted sinus bradycardia rhythm with PVCs. Clinical coordinator, Mir Chavarria, made aware patient was not charted on while on med/surg 2 floor.

## 2023-10-12 NOTE — PROGRESS NOTES
[facility]  Progress Note  Name: Ivana De La Torre  MRN: [id number]  Unit/Bed#: -01 I Date of Admission: 10/2/2023   Date of Service: 10/12/2023 I Hospital Day: 10    Assessment/Plan   VALENTIN (acute kidney injury) [facility])  Assessment & Plan  Patient noted to have VALENTIN likely prerenal/early ATN in setting of hypotension  Creatinine went up on October 10th to 1.76 from 0.79  Creatinine this morning is 3.28  Urinary retention protocol  Nephrology consult appreciated  Neurology started patient on octreotide, midodrine and IV albumin  Strict I&O's  Avoid hypotension/nephrotoxic medication. Patient is off hydrochlorothiazide and Cozaar  Hold sotalol in setting of VALENTIN  Trend BMP    Type 2 diabetes mellitus ([address])  Assessment & Plan  Lab Results   Component Value Date    HGBA1C 7.0 (H) 07/20/2023     Patient has not been on any home medications   Patient was noted to be hypoglycemic   Accu-Chek ACHS with Humalog sliding scale        Hyponatremia  Assessment & Plan  Previously discharged on 2 g salt tabs BID and torsemide 5 mg daily  Torsemide was stopped by facility for unknown reason   Sodium this morning is 131  On salt tablets  Nephrology consult appreciated    Closed right ankle fracture  Assessment & Plan  Continue ambulating with camboot and walker  PT evaluated patient on 10/3-recommended level 3 rehab  Will request repeat PT evaluation    Diffuse interstitial pulmonary fibrosis ([address])  Assessment & Plan  CT:  Reticular interstitial lung abnormality (REENA) noted in both lower lobes. Recommend nonemergent outpatient evaluation with high-resolution chest CT to exclude shiloh UIP pattern fibrosis.     Mood disorder (HCC)  Assessment & Plan  Continue Lexapro  Ambien nightly as needed    Acquired hypothyroidism  Assessment & Plan  Continue levothyroxine 175 mcg daily    Class 2 severe obesity due to excess calories with serious comorbidity and body mass index (BMI) of 35.0 to 35.9 in adult Assessment & Plan  Dietary changes and lifestyle modifications  Affects all aspects of care    Essential hypertension  Assessment & Plan  Previously discharged on diovan, sotalol, torsemide  At facility patient is on HCTZ 12.5, losartan 100 mg, and sotalol 80 mg BID  Hold hydrochlorothiazide, Cozaar in setting of worsening creatinine  Will order hydralazine as needed    Mixed hyperlipidemia  Assessment & Plan  Holding Lipitor 20 mg daily given LFTs    Paroxysmal atrial fibrillation (HCC)  Assessment & Plan  Previously on diltiazem and sotalol however patient recently taken off diltiazem at facility  Unclear why diltiazem was stopped   Will continue to hold for now given hepatic impairment   Hold Xarelto in anticipation of liver biopsy  Continue to hold Xarelto as INR is 3.82    * Transaminitis  Assessment & Plan  Previously evaluated by GI in July for transaminitis thought 2/2 shock liver. CMV and EBV IGG positive but no IGM. Hepatitis panel and RUQ ultrasound were negative. Labs obtained due to new jaundice at [facility] showed T bili of 6, prompting ED presentation. S/P cholecystectomy. LFTs on admission:  Direct bilirubin: 7.70  Total bilirubin: 12.2  Alk phos: 118  AST: 690  ALT: 407  GI consulted   CT A/P: Nodular liver morphology and sequela of portal venous hypertension suggesting cirrhosis. Minimal ascites around the liver and layering in the pelvis. Prominent lymph nodes at the katelynn hepatis presumably reactive to primary underlying liver dysfunction. No biliary ductal dilatation to suggest obstructive cause of jaundice. AFP and CA 19-9 are elevated  INR this morning is 2.91  MRI shows nodular liver, trace perihepatic ascites, 5 mm pancreatic uncinate process cystic lesion  Discussed with GI liver injury likely attributed by drug-induced.   Monitor LFT normal. 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- Gabapentin could be cause, but would like to biopsy  Patient LFTs are trending up.   Vit K- 10/07 and 10/8  Trend PT/INR  Patient received 1 unit of FFP at midnight  AST, ALT and alk phos--683/477/115  Total bilirubin is 16.75  Patient underwent TJLB(transjugular liver biopsy)with sedation on 10/10   Patient received steroid prep for the procedure given allergy to contrast prior to biopsy  LFTs are trending down  Follow-up biopsy results  GI started patient on prednisone 10 mg daily  Trend LFTs        Labs & Imaging: I have personally reviewed pertinent reports. VTE Prophylaxis: in place. Code Status:   Level 1 - Full Code    Patient Centered Rounds: I have performed bedside rounds with nursing staff today. Discussions with Specialists or Other Care Team Provider: Nephrology and GI    Education and Discussions with Family / Patient: Daughter Rebekah    Current Length of Stay: 10 day(s)    Current Patient Status: Inpatient   Certification Statement: The patient will continue to require additional inpatient hospital stay due to see my assessment and plan. Subjective:   Patient is seen and examined at bedside. No new complaints. Afebrile  All other ROS are negative. Objective:    Vitals: Blood pressure 143/71, pulse 59, temperature (!) 97.3 °F (36.3 °C), resp. rate 16, height 5' 4" (1.626 m), weight 98 kg (216 lb 0.8 oz), SpO2 97 %. ,Body mass index is 37.09 kg/m². SPO2 RA Rest      Flowsheet Row ED to Hosp-Admission (Current) from 10/2/2023 in [facility]   SpO2 97 %   SpO2 Activity At Rest   O2 Device None (Room air)   O2 Flow Rate --          I&O:   Intake/Output Summary (Last 24 hours) at 10/12/2023 0818  Last data filed at 10/12/2023 0153  Gross per 24 hour   Intake --   Output 41 ml   Net -41 ml       Physical Exam:    General- Alert, lying comfortably in bed. Not in any acute distress. Neck- Supple, No JVD  CVS- regular, S1 and S2 normal  Chest- Bilateral Air entry, No rhochi, crackles or wheezing present.   Abdomen- soft, nontender, not distended, no guarding or rigidity, BS+  Extremities-  No pedal edema, No calf tenderness  Answering questions appropriately  CNS-   Alert, awake and orientedx3. No focal deficits present. Invasive Devices       Peripheral Intravenous Line  Duration             Peripheral IV 10/11/23 Dorsal (posterior); Left Forearm <1 day                          Social History  reviewed  Family History   Problem Relation Age of Onset    Cancer Mother         ovarian    Heart disease Father         cardiac disorder    Cancer Father     Heart disease Brother     Heart disease Paternal Aunt     Heart disease Paternal Uncle     reviewed    Meds:  Current Facility-Administered Medications   Medication Dose Route Frequency Provider Last Rate Last Admin    acetaminophen (TYLENOL) tablet 650 mg  650 mg Oral Once Nato Lan MD        albumin human (FLEXBUMIN) 25 % injection 25 g  25 g Intravenous Q6H Merlin Robin, MD   25 g at 10/12/23 0238    diphenhydrAMINE (BENADRYL) tablet 50 mg  50 mg Oral [address] [address], MD   50 mg at 10/10/23 1243    escitalopram (LEXAPRO) tablet 10 mg  10 mg Oral Daily Latonia Ward PA-C   10 mg at 10/11/23 0136    hydrALAZINE (APRESOLINE) injection 10 mg  10 mg Intravenous Q6H PRN Nato Lan MD        levothyroxine tablet 175 mcg  175 mcg Oral Early Morning Latonia Ward PA-C   175 mcg at 10/12/23 0647    midodrine (PROAMATINE) tablet 10 mg  10 mg Oral TID AC Merlin Robin, MD   10 mg at 10/12/23 0647    moisture barrier miconazole 2% cream (aka BIRDIE MOISTURE BARRIER ANTIFUNGAL CREAM)   Topical BID Nato Lan MD   Given at 10/11/23 1853    nystatin (MYCOSTATIN) cream   Topical BID Nato Lan MD   Given at 10/11/23 1853    octreotide (SandoSTATIN) injection 100 mcg  100 mcg Subcutaneous [facility] Merlin Robin, MD   100 mcg at 10/12/23 0727    predniSONE tablet 40 mg  40 mg Oral Daily Zion Bridges DO   40 mg at 10/11/23 1102    sodium chloride 0.9 % infusion  100 mL/hr Intravenous Continuous Angélica Drake PA-C 100 mL/hr at 10/11/23 0439 100 mL/hr at 10/11/23 0439    sodium chloride tablet 2 g  2 g Oral BID With Meals Karen Edinson Ward PA-C   2 g at 10/12/23 2258      Medications Prior to Admission   Medication    atorvastatin (LIPITOR) 20 mg tablet    diltiazem (CARDIZEM CD) 360 MG 24 hr capsule    Diovan 320 MG tablet    escitalopram (LEXAPRO) 10 mg tablet    gabapentin (NEURONTIN) 100 mg capsule    hydrochlorothiazide (HYDRODIURIL) 25 mg tablet    HYDROcodone-acetaminophen (Norco) 5-325 mg per tablet    levothyroxine 175 mcg tablet    liotrix (THYROLAR-1) 60 (12.5-50) MG (MCG) TABS    rivaroxaban (XARELTO) 20 mg tablet    sodium chloride 1 g tablet    sotalol (BETAPACE) 80 mg tablet    torsemide (DEMADEX) 5 MG tablet    zolpidem (AMBIEN) 10 mg tablet       Labs:  Results from last 7 days   Lab Units 10/12/23  0203 10/11/23  0016 10/10/23  0453 10/08/23  0446   WBC Thousand/uL 12.79* 13.97* 6.16 6.76   HEMOGLOBIN g/dL 11.1* 11.7 12.1 12.0   HEMATOCRIT % 33.2* 35.9 35.6 35.5   PLATELETS Thousands/uL 130* 110* 139* 152   NEUTROS PCT % 77*  --  70 41*   LYMPHS PCT % 13*  --  21 30   LYMPHO PCT %  --  7*  --   --    MONOS PCT % 9  --  5 15*   MONO PCT %  --  2*  --   --    EOS PCT % 0 0 2 11*     Results from last 7 days   Lab Units 10/12/23  0203 10/11/23  0958 10/11/23  0016 10/10/23  0453   POTASSIUM mmol/L 3.6 4.2 3.8 3.7   CHLORIDE mmol/L 105 104 105 103   CO2 mmol/L 18* 20* 15* 24   BUN mg/dL 55* 49* 45* 32*   CREATININE mg/dL 3.28* 3.04* 2.92* 1.76*   CALCIUM mg/dL 8.4 8.5 8.5 8.5   ALK PHOS U/L 94  --  97 115*   ALT U/L 293*  --  355* 477*   AST U/L 193*  --  376* 683*     Lab Results   Component Value Date    CKTOTAL 50 07/21/2023     Results from last 7 days   Lab Units 10/12/23  0203 10/11/23  0016 10/10/23  0453   INR  3.82* 3.91* 2.91*     No results found for: "BLOODCX", "Lulu Tana", "WOUNDCULT", "SPUTUMCULTUR"      Imaging:  Results for orders placed during the hospital encounter of 07/20/23    XR Trauma chest portable    Narrative  CHEST    INDICATION:   TRAUMA. COMPARISON: 4/13/2016    EXAM PERFORMED/VIEWS:  XR CHEST PORTABLE      FINDINGS:    Heart shadow appears unremarkable. Atherosclerotic aortic calcifications are noted. The lungs are clear. No pneumothorax or pleural effusion. Osseous structures appear within normal limits for patient age. Impression  No acute cardiopulmonary disease. Workstation performed: [id number]    Results for orders placed during the hospital encounter of 04/13/16    X-ray chest 2 views    Narrative  CHEST    INDICATION:  Chest pain which began this morning. COMPARISON:  None    VIEWS:  Frontal and lateral projections; 2 images    FINDINGS:    Cardiomediastinal silhouette appears unremarkable. The lungs are clear. No pneumothorax or pleural effusion. Visualized osseous structures appear within normal limits for the patient's age. Impression  No active pulmonary disease.       Workstation performed: [id number]      Last 24 Hours Medication List:   Current Facility-Administered Medications   Medication Dose Route Frequency Provider Last Rate    acetaminophen  650 mg Oral Once Marcos Cantrell MD      Albumin 25%  25 g Intravenous Q6H Asya Gusman MD      diphenhydrAMINE  50 mg Oral [facility],[address], MD      escitalopram  10 mg Oral Daily Ronald Briceño PA-C      hydrALAZINE  10 mg Intravenous Q6H PRN Marcos Cantrell MD      levothyroxine  175 mcg Oral Early Morning Ronald Ward PA-C      midodrine  10 mg Oral TID AC sAya Gusman MD      BIRDIE ANTIFUNGAL   Topical BID Marcos Cantrell MD      nystatin   Topical BID Marcos Cantrell MD      octreotide  100 mcg Subcutaneous [facility] Asya Gusman MD      predniSONE  40 mg Oral Daily Cassi Payne DO      sodium chloride  100 mL/hr Intravenous Continuous Saida Maldonado PA-C 100 mL/hr (10/11/23 8301)    sodium chloride  2 g Oral BID With Meals Татьяна Whitley PA-C          Today, Patient Was Seen By: Vic Amezquita MD    ** Please Note: Dictation voice to text software may have been used in the creation of this document.  **

## 2023-10-12 NOTE — ASSESSMENT & PLAN NOTE
Previously on diltiazem and sotalol however patient recently taken off diltiazem at facility  Unclear why diltiazem was stopped   Will continue to hold for now given hepatic impairment   Hold Xarelto in anticipation of liver biopsy  Continue to hold Xarelto as INR is 3.82

## 2023-10-12 NOTE — PROGRESS NOTES
Progress note - [facility]   [facility] 81 y.o. female MRN: [id number]  Unit/Bed#: -01 Encounter: [id number]    ASSESSMENT and PLAN    83F with history of cholecystectomy 2016, hypothyroidism, A-fib on Xarelto who presented to the emergency department from acute rehab for painless jaundice      1. Acute liver injury, suspect due to autoimmune hepatitis rather than DILI from gabapentin   2. Elevated autoimmune markers  2. 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 Minimal macrovesicular steatosis (less than 5%) CMV neg. Alk phos, ALT, AST improving, bilirubin increased today from 15->20, tends to lag behind. Continue to trend on prednisone 40 mg daily. Pt with hypotension overnight, worsening Cr, being transferred to telemetry. Also slightly confused with asterixis, possible deconditioning versus worsening acute liver failure although INR improving    -Check LFTs daily during admission and weekly thereafter  -monitor mental status, consider checking ammonia level  -nephrology following, likely secondary to ATN with hepatorenal syndrome / hypotension / losartan / elevated bilirubin / contrast induced nephropathy, IVF stopped, on albumin, octreotide, midodrine  -Defer to primary team regarding hypotension  -Plan outpatient hepatology follow-up      Chief Complaint   Patient presents with    Abnormal Lab     Elevated liver enzymes from routine blood work done yesterday. SUBJECTIVE/HPI   Pt seen and examined. Grace diet. No N/V/Abd pain. Had normal BM yesterday without bleeding.  Being transferred to telemetry given hypotension. A & O X3 but seems slightly confused with asterixis.     /71   Pulse 59   Temp (!) 97.3 °F (36.3 °C)   Resp 16   Ht 5' 4" (1.626 m)   Wt 98 kg (216 lb 0.8 oz)   SpO2 97%   BMI 37.09 kg/m²     PHYSICALEXAM  General appearance: alert, appears stated age and cooperative  Eyes: PERLLA, EOMI, +  icterus   Head: Normocephalic, without obvious abnormality, atraumatic  Lungs: clear to auscultation bilaterally  Heart: regular rate and rhythm, S1, S2 normal, no murmur, click, rub or gallop  Abdomen: soft, non-tender; bowel sounds normal; no masses,  no organomegaly  Extremities: extremities normal, atraumatic, no cyanosis or edema,   Neurologic: A & O X3, seems slightly confused with + asterixis    Lab Results   Component Value Date    CALCIUM 8.4 10/12/2023    K 3.6 10/12/2023    CO2 18 (L) 10/12/2023     10/12/2023    BUN 55 (H) 10/12/2023    CREATININE 3.28 (H) 10/12/2023     Lab Results   Component Value Date    WBC 12.79 (H) 10/12/2023    HGB 11.1 (L) 10/12/2023    HCT 33.2 (L) 10/12/2023    MCV 88 10/12/2023     (L) 10/12/2023     Lab Results   Component Value Date     (H) 10/12/2023     (H) 10/12/2023    GGT 76 (H) 10/03/2023    ALKPHOS 94 10/12/2023     No results found for: "AMYLASE"  Lab Results   Component Value Date    LIPASE 57 10/02/2023     Lab Results   Component Value Date    IRON 203 10/03/2023    TIBC <258 10/03/2023    FERRITIN 164 10/03/2023     Lab Results   Component Value Date    INR 3.82 (H) 10/12/2023

## 2023-10-12 NOTE — PROGRESS NOTES
NEPHROLOGY PROGRESS NOTE   Cristino Iyer 81 y.o. female MRN: [id number]  Unit/Bed#: -01 Encounter: [id number]  Reason for Consult: VALENTIN    ASSESSMENT/PLAN:  Acute Kidney Injury- likely secondary to ATN with hepatorenal syndrome / hypotension / losartan / elevated bilirubin / contrast induced nephropathy (10/2 & 5mL IV contrast 10/10 for liver biopsy)  Baseline creatinine 0.6-0.7  Admission creatinine 0.8  Creatinine worsening to 3.2 today but rate of rise may be slowing  Hopeful start of plateau phase of ATN  Serological work-up: Rheumatoid factor positive. P-ANCA was positive at 1 is to 160 but MPO and MS-3 antibody was negative. Atypical p-ANCA was negative. BEV was negative. 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   Imaging: no hydronephrosis  UA: dirty, trace blood and protein  Pure wick in place, monitor urine output  Stop IVF  Continue HRS cocktail with albumin, octreotide, midodrine   No indication for RRT currently but will monitor, daughter would agree to dialysis if needed for patient   Hyponatremia- secondary to cirrhosis and lexapro and hctz, monitor with sodium bicarbonate tablets   Currently stable in the low 130s  Workup (inaccurate after IVF): serum osm 306, urine sodium 42, urine osm __  Continue salt tablets 2g BID and fluid restriction [id number]  Metabolic Acidosis- start sodium bicarbonate tablets   H/O Hypertension, now with Hypotension- BP improving  Hold losartan  Holding sotalol  Cirrhosis- GI on board  Suspected injury from gabapentin    Disposition:  Stop IVF. Discussed with GI AP and SLIM attending and they agree with plan. SUBJECTIVE:  Patient appears slightly confused today. She is slow to respond and when discussing dialysis keeps stating she doesn't understand.   I spoke with daughter over the phone and explained current situation in regards to her worsening renal function. Hopefully patient is starting to plateau  but we are not in the clear yet. She understands and would lean toward proceeding with dialysis if necessary. She also asked her mother what she would want and patient states "I am confused".       OBJECTIVE:  Current Weight: Weight - Scale: 98 kg (216 lb 0.8 oz)  Vitals:    10/11/23 1849 10/11/23 2124 10/12/23 0123 10/12/23 0713   BP: 127/63 121/58 111/56 143/71   Pulse: 72 57 58 59   Resp:  16  16   Temp:    (!) 97.3 °F (36.3 °C)   TempSrc:       SpO2: 98% 97% 97% 97%   Weight:       Height:           Intake/Output Summary (Last 24 hours) at 10/12/2023 1059  Last data filed at 10/12/2023 0153  Gross per 24 hour   Intake --   Output 41 ml   Net -41 ml     General: NAD  Skin: no rash  Eyes: + scleral icterus  ENMT: mm slightly dry  Neck: no masses  Respiratory: CTAB  Cardiac: RRR  Extremities: + bilateral LE edema  GI: soft nt nd  Neuro: alert awake  Psych: mood and affect appropriate    Medications:    Current Facility-Administered Medications:     acetaminophen (TYLENOL) tablet 650 mg, 650 mg, Oral, Once, Yamilex Brewer MD    albumin human (FLEXBUMIN) 25 % injection 25 g, 25 g, Intravenous, Q6H, Jerry Lui MD, 25 g at 10/12/23 1000    diphenhydrAMINE (BENADRYL) tablet 50 mg, 50 mg, Oral, 60 Min Pre-Op, Bernabe Tobias MD, 50 mg at 10/10/23 1243    escitalopram (LEXAPRO) tablet 10 mg, 10 mg, Oral, Daily, Phi Ward PA-C, 10 mg at 10/12/23 1001    hydrALAZINE (APRESOLINE) injection 10 mg, 10 mg, Intravenous, Q6H PRN, Yamilex Brewer MD    insulin lispro (HumaLOG) 100 units/mL subcutaneous injection 1-6 Units, 1-6 Units, Subcutaneous, TID AC **AND** Fingerstick Glucose (POCT), , , TID AC, Yamilex Brewer MD    levothyroxine tablet 175 mcg, 175 mcg, Oral, Early Morning, Phi Ward PA-C, 175 mcg at 10/12/23 0647    midodrine (PROAMATINE) tablet 10 mg, 10 mg, Oral, TID AC, Jerry Lui MD, 10 mg at 10/12/23 1033    moisture barrier miconazole 2% cream (aka BIRDIE MOISTURE BARRIER ANTIFUNGAL CREAM), , Topical, BID, Kenyon Chan MD, Given at 10/12/23 1001    nystatin (MYCOSTATIN) cream, , Topical, BID, Kenyon Chan MD, Given at 10/12/23 1001    octreotide (SandoSTATIN) injection 100 mcg, 100 mcg, Subcutaneous, Q8H [address], Bailey Jeffers MD, 100 mcg at 10/12/23 4146    predniSONE tablet 40 mg, 40 mg, Oral, Daily, Codey Triana DO, 40 mg at 10/12/23 1001    sodium bicarbonate tablet 650 mg, 650 mg, Oral, TID after meals, [facility], PAKory    sodium chloride tablet 2 g, 2 g, Oral, BID With Meals, Burnetta Gottron [place], PA-C, 2 g at 10/12/23 4202    Laboratory Results:  Results from last 7 days   Lab Units 10/12/23  0203 10/11/23  0958 10/11/23  0016 10/10/23  0453 10/09/23  1203 10/08/23  0446 10/07/23  1101 10/07/23  0506 10/06/23  0437   WBC Thousand/uL 12.79*  --  13.97* 6.16  --  6.76  --  6.55 8.05   HEMOGLOBIN g/dL 11.1*  --  11.7 12.1  --  12.0  --  11.6 12.0   HEMATOCRIT % 33.2*  --  35.9 35.6  --  35.5  --  34.4* 35.9   PLATELETS Thousands/uL 130*  --  110* 139*  --  152  --  143* 145*   POTASSIUM mmol/L 3.6 4.2 3.8 3.7 3.6 3.7 3.0*  --  3.5   CHLORIDE mmol/L 105 104 105 103 105 107 106  --  106   CO2 mmol/L 18* 20* 15* 24 24 23 23  --  24   BUN mg/dL 55* 49* 45* 32* 22 20 22  --  18   CREATININE mg/dL 3.28* 3.04* 2.92* 1.76* 0.79 0.68 0.84  --  0.80   CALCIUM mg/dL 8.4 8.5 8.5 8.5 8.4 8.5 8.4  --  8.3*   MAGNESIUM mg/dL 2.3  --  2.2 1.7*  --   --   --   --   --      I have personally reviewed the blood work as stated above and in my note. I have personally reviewed SLIM, GI, renal notes.

## 2023-10-12 NOTE — ASSESSMENT & PLAN NOTE
Previously discharged on 2 g salt tabs BID and torsemide 5 mg daily  Torsemide was stopped by facility for unknown reason   Sodium this morning is 131  On salt tablets  Nephrology consult appreciated

## 2023-10-12 NOTE — ASSESSMENT & PLAN NOTE
Previously evaluated by GI in July for transaminitis thought 2/2 shock liver. CMV and EBV IGG positive but no IGM. Hepatitis panel and RUQ ultrasound were negative. Labs obtained due to new jaundice at [facility] showed T bili of 6, prompting ED presentation. S/P cholecystectomy. LFTs on admission:  Direct bilirubin: 7.70  Total bilirubin: 12.2  Alk phos: 118  AST: 690  ALT: 407  GI consulted   CT A/P: Nodular liver morphology and sequela of portal venous hypertension suggesting cirrhosis. Minimal ascites around the liver and layering in the pelvis. Prominent lymph nodes at the katelynn hepatis presumably reactive to primary underlying liver dysfunction. No biliary ductal dilatation to suggest obstructive cause of jaundice. AFP and CA 19-9 are elevated  INR this morning is 2.91  MRI shows nodular liver, trace perihepatic ascites, 5 mm pancreatic uncinate process cystic lesion  Discussed with GI liver injury likely attributed by drug-induced. Monitor LFT normal. 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- Gabapentin could be cause, but would like to biopsy  Patient LFTs are trending up.    Vit K- 10/07 and 10/8  Trend PT/INR  Patient received 1 unit of FFP at midnight  AST, ALT and alk phos--683/477/115  Total bilirubin is 16.75  Patient underwent TJLB(transjugular liver biopsy)with sedation on 10/10   Patient received steroid prep for the procedure given allergy to contrast prior to biopsy  LFTs are trending down  Follow-up biopsy results  GI started patient on prednisone 10 mg daily  Trend LFTs

## 2023-10-12 NOTE — ASSESSMENT & PLAN NOTE
Continue ambulating with camboot and walker  PT evaluated patient on 10/3-recommended level 3 rehab  Will request repeat PT evaluation

## 2023-10-13 NOTE — ASSESSMENT & PLAN NOTE
Previously evaluated by GI in July for transaminitis thought 2/2 shock liver. CMV and EBV IGG positive but no IGM. Hepatitis panel and RUQ ultrasound were negative. Labs obtained due to new jaundice at [facility] showed T bili of 6, prompting ED presentation. S/P cholecystectomy. LFTs on admission:  Direct bilirubin: 7.70  Total bilirubin: 12.2  Alk phos: 118  AST: 690  ALT: 407  GI consulted   CT A/P: Nodular liver morphology and sequela of portal venous hypertension suggesting cirrhosis. Minimal ascites around the liver and layering in the pelvis. Prominent lymph nodes at the katelynn hepatis presumably reactive to primary underlying liver dysfunction. No biliary ductal dilatation to suggest obstructive cause of jaundice. AFP and CA 19-9 are elevated  INR this morning is 5.10  MRI shows nodular liver, trace perihepatic ascites, 5 mm pancreatic uncinate process cystic lesion  Discussed with GI liver injury likely attributed by drug-induced. Monitor LFT normal. 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- Gabapentin could be cause, but would like to biopsy  Patient LFTs are trending up.    Vit K- 10/07 and 10/8  Trend PT/INR  Patient received 1 unit of FFP at midnight  AST, ALT and alk phos--683/477/115  Total bilirubin is 16.75  Patient underwent TJLB(transjugular liver biopsy)with sedation on 10/10   Patient received steroid prep for the procedure given allergy to contrast prior to biopsy  LFTs are trending down  Follow-up biopsy results  GI started patient on prednisone 10 mg daily  Ammonia is 166  GI ordered infectious work-up including blood cultures, urine culture   Patient is ordered retention lactulose enema and when awake will start oral lactulose   Discussed with GI regarding placing Dobbhoff  Trend LFTs

## 2023-10-13 NOTE — PHYSICAL THERAPY NOTE
PHYSICAL THERAPY CANCELLATION NOTE    Patient Name: Jennifer White  BNCOE'X Date: 10/13/2023       10/13/23 6412   Note Type   Note type Cancelled Session; Re-Evaluation   Additional Comments PT re-eval orders received (pt seen on 10/3, was supervision to modified I w/ RW and ambulated 250 ft, and navigates 2 steps - DC'd from PT caseload). Pt now w/ worsening lethargy, increased A to mobilize per RN documentation. Attempted to see for PT re-eval when pt in , however spoke to [employer] and pt just transferred to ICU.  Will f/u as appropriate       Juan Osuna, PT, DPT

## 2023-10-13 NOTE — PLAN OF CARE
Problem: MOBILITY - ADULT  Goal: Maintain or return to baseline ADL function  Description: INTERVENTIONS:  -  Assess patient's ability to carry out ADLs; assess patient's baseline for ADL function and identify physical deficits which impact ability to perform ADLs (bathing, care of mouth/teeth, toileting, grooming, dressing, etc.)  - Assess/evaluate cause of self-care deficits   - Assess range of motion  - Assess patient's mobility; develop plan if impaired  - Assess patient's need for assistive devices and provide as appropriate  - Encourage maximum independence but intervene and supervise when necessary  - Involve family in performance of ADLs  - Assess for home care needs following discharge   - Consider OT consult to assist with ADL evaluation and planning for discharge  - Provide patient education as appropriate  Outcome: Progressing  Goal: Maintains/Returns to pre admission functional level  Description: INTERVENTIONS:  - Perform BMAT or MOVE assessment daily.   - Set and communicate daily mobility goal to care team and patient/family/caregiver. - Collaborate with rehabilitation services on mobility goals if consulted  - Perform Range of Motion 3 times a day. - Reposition patient every 2 hours.   - Dangle patient 2 times a day  - Stand patient 2 times a day  - Ambulate patient 2 times a day  - Out of bed to chair 2 times a day   - Out of bed for meals 2 times a day  - Out of bed for toileting  - Record patient progress and toleration of activity level   Outcome: Progressing     Problem: PAIN - ADULT  Goal: Verbalizes/displays adequate comfort level or baseline comfort level  Description: Interventions:  - Encourage patient to monitor pain and request assistance  - Assess pain using appropriate pain scale  - Administer analgesics based on type and severity of pain and evaluate response  - Implement non-pharmacological measures as appropriate and evaluate response  - Consider cultural and social influences on pain and pain management  - Notify physician/advanced practitioner if interventions unsuccessful or patient reports new pain  Outcome: Progressing     Problem: INFECTION - ADULT  Goal: Absence or prevention of progression during hospitalization  Description: INTERVENTIONS:  - Assess and monitor for signs and symptoms of infection  - Monitor lab/diagnostic results  - Monitor all insertion sites, i.e. indwelling lines, tubes, and drains  - Monitor endotracheal if appropriate and nasal secretions for changes in amount and color  - [place] appropriate cooling/warming therapies per order  - Administer medications as ordered  - Instruct and encourage patient and family to use good hand hygiene technique  - Identify and instruct in appropriate isolation precautions for identified infection/condition  Outcome: Progressing  Goal: Absence of fever/infection during neutropenic period  Description: INTERVENTIONS:  - Monitor WBC    Outcome: Progressing     Problem: SAFETY ADULT  Goal: Maintain or return to baseline ADL function  Description: INTERVENTIONS:  -  Assess patient's ability to carry out ADLs; assess patient's baseline for ADL function and identify physical deficits which impact ability to perform ADLs (bathing, care of mouth/teeth, toileting, grooming, dressing, etc.)  - Assess/evaluate cause of self-care deficits   - Assess range of motion  - Assess patient's mobility; develop plan if impaired  - Assess patient's need for assistive devices and provide as appropriate  - Encourage maximum independence but intervene and supervise when necessary  - Involve family in performance of ADLs  - Assess for home care needs following discharge   - Consider OT consult to assist with ADL evaluation and planning for discharge  - Provide patient education as appropriate  Outcome: Progressing  Goal: Maintains/Returns to pre admission functional level  Description: INTERVENTIONS:  - Perform BMAT or MOVE assessment daily.   - Set and communicate daily mobility goal to care team and patient/family/caregiver. - Collaborate with rehabilitation services on mobility goals if consulted  - Perform Range of Motion 3 times a day. - Reposition patient every 2 hours.   - Dangle patient 2 times a day  - Stand patient 2 times a day  - Ambulate patient 2 times a day  - Out of bed to chair 2 times a day   - Out of bed for meals 2 times a day  - Out of bed for toileting  - Record patient progress and toleration of activity level   Outcome: Progressing  Goal: Patient will remain free of falls  Description: INTERVENTIONS:  - Educate patient/family on patient safety including physical limitations  - Instruct patient to call for assistance with activity   - Consult OT/PT to assist with strengthening/mobility   - Keep Call bell within reach  - Keep bed low and locked with side rails adjusted as appropriate  - Keep care items and personal belongings within reach  - Initiate and maintain comfort rounds  - Make Fall Risk Sign visible to staff  - Offer Toileting every 2 Hours, in advance of need  - Initiate/Maintain bed alarm  - Obtain necessary fall risk management equipment:   - Apply yellow socks and bracelet for high fall risk patients  - Consider moving patient to room near nurses station  Outcome: Progressing

## 2023-10-13 NOTE — PROGRESS NOTES
[facility]  Progress Note  Name: Freya Garcia  MRN: [id number]  Unit/Bed#: -01 I Date of Admission: 10/2/2023   Date of Service: 10/13/2023  Hospital Day: 11    Assessment/Plan   VALENTIN (acute kidney injury) [facility])  Assessment & Plan  Patient noted to have VALENTIN likely prerenal/early ATN in setting of hypotension  Creatinine went up on October 10th to 1.76 from 0.79  Creatinine this morning is 2.85  Urinary retention protocol  Nephrology consult appreciated  Neurology started patient on octreotide, midodrine and IV albumin  Strict I&O's  Avoid hypotension/nephrotoxic medication. Patient is off hydrochlorothiazide and Cozaar  Hold sotalol in setting of VALENTIN  Trend BMP    Type 2 diabetes mellitus ([address])  Assessment & Plan  Lab Results   Component Value Date    HGBA1C 7.0 (H) 07/20/2023     Patient has not been on any home medications   Patient was noted to be hypoglycemic   Accu-Chek ACHS with Humalog sliding scale        Hyponatremia  Assessment & Plan  Previously discharged on 2 g salt tabs BID and torsemide 5 mg daily  Torsemide was stopped by facility for unknown reason   Sodium this morning is 136  On salt tablets  Nephrology consult appreciated  Resolved    Closed right ankle fracture  Assessment & Plan  Continue ambulating with camboot and walker  PT evaluated patient on 10/3-recommended level 3 rehab  Will request repeat PT evaluation    Diffuse interstitial pulmonary fibrosis ([address])  Assessment & Plan  CT:  Reticular interstitial lung abnormality (REENA) noted in both lower lobes. Recommend nonemergent outpatient evaluation with high-resolution chest CT to exclude [place] UIP pattern fibrosis.     Mood disorder (HCC)  Assessment & Plan  Continue Lexapro  Ambien nightly as needed    Acquired hypothyroidism  Assessment & Plan  Continue levothyroxine 175 mcg daily    Class 2 severe obesity due to excess calories with serious comorbidity and body mass index (BMI) of 35.0 to 35.9 in adult   Assessment & Plan  Dietary changes and lifestyle modifications  Affects all aspects of care    Essential hypertension  Assessment & Plan  Previously discharged on diovan, sotalol, torsemide  At facility patient is on HCTZ 12.5, losartan 100 mg, and sotalol 80 mg BID  Hold hydrochlorothiazide, Cozaar in setting of worsening creatinine  Will order hydralazine as needed    Mixed hyperlipidemia  Assessment & Plan  Holding Lipitor 20 mg daily given LFTs    Paroxysmal atrial fibrillation (HCC)  Assessment & Plan  Previously on diltiazem and sotalol however patient recently taken off diltiazem at facility  Unclear why diltiazem was stopped   Will continue to hold for now given hepatic impairment   Hold Xarelto in anticipation of liver biopsy  Continue to hold Xarelto     * Transaminitis  Assessment & Plan  Previously evaluated by GI in July for transaminitis thought 2/2 shock liver. CMV and EBV IGG positive but no IGM. Hepatitis panel and RUQ ultrasound were negative. Labs obtained due to new jaundice at [facility] showed T bili of 6, prompting ED presentation. S/P cholecystectomy. LFTs on admission:  Direct bilirubin: 7.70  Total bilirubin: 12.2  Alk phos: 118  AST: 690  ALT: 407  GI consulted   CT A/P: Nodular liver morphology and sequela of portal venous hypertension suggesting cirrhosis. Minimal ascites around the liver and layering in the pelvis. Prominent lymph nodes at the katelynn hepatis presumably reactive to primary underlying liver dysfunction. No biliary ductal dilatation to suggest obstructive cause of jaundice. AFP and CA 19-9 are elevated  INR this morning is 5.10  MRI shows nodular liver, trace perihepatic ascites, 5 mm pancreatic uncinate process cystic lesion  Discussed with GI liver injury likely attributed by drug-induced. Monitor LFT normal. 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- Gabapentin could be cause, but would like to biopsy  Patient LFTs are trending up.    Vit K- 10/07 and 10/8  Trend PT/INR  Patient received 1 unit of FFP at midnight  AST, ALT and alk phos--683/477/115  Total bilirubin is 16.75  Patient underwent TJLB(transjugular liver biopsy)with sedation on 10/10   Patient received steroid prep for the procedure given allergy to contrast prior to biopsy  LFTs are trending down  Follow-up biopsy results  GI started patient on prednisone 10 mg daily  Ammonia is 166  GI ordered infectious work-up including blood cultures, urine culture   Patient is ordered retention lactulose enema and when awake will start oral lactulose   Discussed with GI regarding placing Dobbhoff  Trend LFTs           Labs & Imaging: I have personally reviewed pertinent reports. VTE Prophylaxis: in place. Code Status:   Level 1 - Full Code    Patient Centered Rounds: I have performed bedside rounds with nursing staff today. Discussions with Specialists or Other Care Team Provider: GI and nephrology    Education and Discussions with Family / Patient: DIL on phone    Current Length of Stay: 11 day(s)    Current Patient Status: Inpatient   Certification Statement: The patient will continue to require additional inpatient hospital stay due to see my assessment and plan. Subjective:   Patient is seen and examined at bedside. Patient is lethargic but opening eyes to verbal commands afebrile  All other ROS are negative. Objective:    Vitals: Blood pressure (!) 174/90, pulse 62, temperature (!) 97.3 °F (36.3 °C), resp. rate 20, height 5' 4" (1.626 m), weight 98 kg (216 lb 0.8 oz), SpO2 96 %. ,Body mass index is 37.09 kg/m².   SPO2 RA Rest      Flowsheet Row ED to Hosp-Admission (Current) from 10/2/2023 in [facility]   SpO2 96 %   SpO2 Activity At Rest   O2 Device None (Room air)   O2 Flow Rate --          I&O:   Intake/Output Summary (Last 24 hours) at 10/13/2023 1348  Last data filed at 10/12/2023 1901  Gross per 24 hour   Intake 0 ml   Output 300 ml   Net -300 ml       Physical Exam:    General- lying comfortably in bed. Not in any acute distress. Neck- Supple, No JVD  CVS- regular, S1 and S2 normal  Chest- Bilateral Air entry, No rhochi, crackles or wheezing present. Abdomen- soft, nontender, not distended, no guarding or rigidity, BS+  Extremities-  No pedal edema, No calf tenderness  CNS-lethargic. No focal deficits present. Invasive Devices       Peripheral Intravenous Line  Duration             Peripheral IV 10/11/23 Dorsal (posterior); Left Forearm 1 day                          Social History  reviewed  Family History   Problem Relation Age of Onset    Cancer Mother         ovarian    Heart disease Father         cardiac disorder    Cancer Father     Heart disease Brother     Heart disease Paternal Aunt     Heart disease Paternal Uncle     reviewed    Meds:  Current Facility-Administered Medications   Medication Dose Route Frequency Provider Last Rate Last Admin    acetaminophen (TYLENOL) tablet 650 mg  650 mg Oral Once Feng Murphy MD        albumin human (FLEXBUMIN) 25 % injection 25 g  25 g Intravenous Q6H Brittany Fuentes MD   25 g at 10/13/23 0901    dextromethorphan-guaiFENesin (ROBITUSSIN DM) oral syrup 10 mL  10 mL Oral Q8H PRN Shreya Escobar PA-C   10 mL at 10/12/23 1951    diphenhydrAMINE (BENADRYL) tablet 50 mg  50 mg Oral [facility], MD   50 mg at 10/10/23 1243    escitalopram (LEXAPRO) tablet 10 mg  10 mg Oral Daily Anthony Ward PA-C   10 mg at 10/13/23 0901    hydrALAZINE (APRESOLINE) injection 10 mg  10 mg Intravenous Q6H PRN Feng Murphy MD        insulin lispro (HumaLOG) 100 units/mL subcutaneous injection 1-6 Units  1-6 Units Subcutaneous TID AC Feng Murphy MD   1 Units at 10/12/23 1719    lactulose oral solution 20 g  20 g Oral BID Feng Murphy MD        lactulose retention enema 200 g  200 g Rectal Q4H Eileen King PA-C        levothyroxine tablet 175 mcg  175 mcg Oral Early Morning Vergie Glaze Vianney Briceño   175 mcg at 10/13/23 0534    midodrine (PROAMATINE) tablet 5 mg  5 mg Oral TID AC Flory Ramires PA-C        moisture barrier miconazole 2% cream (aka BIRDIE MOISTURE BARRIER ANTIFUNGAL CREAM)   Topical BID Marien Hodgkins, MD   Given at 10/13/23 0901    nystatin (MYCOSTATIN) cream   Topical BID Marien Hodgkins, MD   Given at 10/13/23 0909    octreotide (SandoSTATIN) injection 100 mcg  100 mcg Subcutaneous [facility] Christiano Garcia MD   100 mcg at 10/13/23 0534    potassium chloride 20 mEq IVPB (premix)  20 mEq Intravenous Once Marien Hodgkins, MD        predniSONE tablet 40 mg  40 mg Oral Daily Aroldo Roper DO   40 mg at 10/13/23 0901    sodium bicarbonate tablet 1,300 mg  1,300 mg Oral TID after meals Flory Ramires PA-C        sodium chloride tablet 1 g  1 g Oral BID With Meals Flory Ramires PA-C          Medications Prior to Admission   Medication    atorvastatin (LIPITOR) 20 mg tablet    diltiazem (CARDIZEM CD) 360 MG 24 hr capsule    Diovan 320 MG tablet    escitalopram (LEXAPRO) 10 mg tablet    gabapentin (NEURONTIN) 100 mg capsule    hydrochlorothiazide (HYDRODIURIL) 25 mg tablet    HYDROcodone-acetaminophen (Norco) 5-325 mg per tablet    levothyroxine 175 mcg tablet    liotrix (THYROLAR-1) 60 (12.5-50) MG (MCG) TABS    rivaroxaban (XARELTO) 20 mg tablet    sodium chloride 1 g tablet    sotalol (BETAPACE) 80 mg tablet    torsemide (DEMADEX) 5 MG tablet    zolpidem (AMBIEN) 10 mg tablet       Labs:  Results from last 7 days   Lab Units 10/13/23  0922 10/12/23  0203 10/11/23  0016 10/10/23  0453   WBC Thousand/uL 10.08 12.79* 13.97* 6.16   HEMOGLOBIN g/dL 9.6* 11.1* 11.7 12.1   HEMATOCRIT % 29.1* 33.2* 35.9 35.6   PLATELETS Thousands/uL 112* 130* 110* 139*   NEUTROS PCT % 68 77*  --  70   LYMPHS PCT % 18 13*  --  21   LYMPHO PCT %  --   --  7*  --    MONOS PCT % 13* 9  --  5   MONO PCT %  --   --  2*  --    EOS PCT % 0 0 0 2     Results from last 7 days   Lab Units 10/13/23  0920 10/12/23  0203 10/11/23  0958 10/11/23  0016   POTASSIUM mmol/L 3.3* 3.6 4.2 3.8   CHLORIDE mmol/L 109* 105 104 105   CO2 mmol/L 16* 18* 20* 15*   BUN mg/dL 63* 55* 49* 45*   CREATININE mg/dL 2.85* 3.28* 3.04* 2.92*   CALCIUM mg/dL 9.1 8.4 8.5 8.5   ALK PHOS U/L 64 94  --  97   ALT U/L 191* 293*  --  355*   AST U/L 105* 193*  --  376*     Lab Results   Component Value Date    CKTOTAL 50 07/21/2023     Results from last 7 days   Lab Units 10/13/23  0920 10/12/23  0203 10/11/23  0016   INR  5.10* 3.82* 3.91*     No results found for: "BLOODCX", "Clare [place]", "WOUNDCULT", "Tammi Merino"      Imaging:  Results for orders placed during the hospital encounter of 07/20/23    XR Trauma chest portable    Narrative  CHEST    INDICATION:   TRAUMA. COMPARISON: 4/13/2016    EXAM PERFORMED/VIEWS:  XR CHEST PORTABLE      FINDINGS:    Heart shadow appears unremarkable. Atherosclerotic aortic calcifications are noted. The lungs are clear. No pneumothorax or pleural effusion. Osseous structures appear within normal limits for patient age. Impression  No acute cardiopulmonary disease. Workstation performed: [id number]    Results for orders placed during the hospital encounter of 04/13/16    X-ray chest 2 views    Narrative  CHEST    INDICATION:  Chest pain which began this morning. COMPARISON:  None    VIEWS:  Frontal and lateral projections; 2 images    FINDINGS:    Cardiomediastinal silhouette appears unremarkable. The lungs are clear. No pneumothorax or pleural effusion. Visualized osseous structures appear within normal limits for the patient's age. Impression  No active pulmonary disease.       Workstation performed: [id number]      Last 24 Hours Medication List:   Current Facility-Administered Medications   Medication Dose Route Frequency Provider Last Rate    acetaminophen  650 mg Oral Once Lane Hsu MD      Albumin 25%  25 g Intravenous Emelina Prater MD dextromethorphan-guaiFENesin  10 mL Oral Q8H PRN Radha Solo PA-C      diphenhydrAMINE  50 mg Oral 60 Min Pre-Op Kassy Jacob MD      escitalopram  10 mg Oral Daily Vergie Glaze FountaineALBIN      hydrALAZINE  10 mg Intravenous Q6H PRN Marien Hodgkins, MD      insulin lispro  1-6 Units Subcutaneous TID AC Marien Hodgkins, MD      lactulose  20 g Oral BID Marien Hodgkins, MD      lactulose  200 g Rectal Q4H Gagan Duong PA-C      levothyroxine  175 mcg Oral Early Morning Vergie Glaze KeyannaALBIN      midodrine  5 mg Oral TID AC lFory Ramires PA-C      BIRDIE ANTIFUNGAL   Topical BID Marien Hodgkins, MD      nystatin   Topical BID Marien Hodgkins, MD      octreotide  100 mcg Subcutaneous [facility] Christiano Garcia MD      potassium chloride  20 mEq Intravenous Once Marien Hodgkins, MD      predniSONE  40 mg Oral Daily Aroldo Roper DO      sodium bicarbonate  1,300 mg Oral TID after meals Flory Ramires PA-C      sodium chloride  1 g Oral BID With Meals Flory Ramires PA-C          Today, Patient Was Seen By: Marien Hodgkins, MD    ** Please Note: Dictation voice to text software may have been used in the creation of this document.  **

## 2023-10-13 NOTE — ASSESSMENT & PLAN NOTE
Previously on diltiazem and sotalol however patient recently taken off diltiazem at facility  Unclear why diltiazem was stopped   Will continue to hold for now given hepatic impairment   Hold Xarelto in anticipation of liver biopsy  Continue to hold Xarelto

## 2023-10-13 NOTE — ASSESSMENT & PLAN NOTE
Patient noted to have VALENTIN likely prerenal/early ATN in setting of hypotension  Creatinine went up on October 10th to 1.76 from 0.79  Creatinine this morning is 2.85  Urinary retention protocol  Nephrology consult appreciated  Neurology started patient on octreotide, midodrine and IV albumin  Strict I&O's  Avoid hypotension/nephrotoxic medication.   Patient is off hydrochlorothiazide and Cozaar  Hold sotalol in setting of VALENTIN  Trend BMP

## 2023-10-13 NOTE — ASSESSMENT & PLAN NOTE
Previously discharged on 2 g salt tabs BID and torsemide 5 mg daily  Torsemide was stopped by facility for unknown reason   Sodium this morning is 136  On salt tablets  Nephrology consult appreciated  Resolved

## 2023-10-13 NOTE — PROGRESS NOTES
NEPHROLOGY PROGRESS NOTE   Gretta Franco 81 y.o. female MRN: [id number]  Unit/Bed#: -01 Encounter: [id number]      ASSESSMENT/PLAN:  Acute kidney injury: Suspect ATN due to hypotension, losartan, hctz, elevated bilirubin +/- HRS. 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 twice daily  Continue 1500 cc/day oral fluid restriction  Would not resume hctz   Work up: serum osm 306, urine sodium 42, urine osm 406   Metabolic acidosis: Bicarb worsening to 16, likely due to NS which was d/c yesterday + VALENTIN   Increase oral bicarb 1300 mg p.o. 3 times daily  Hypertension: Initially was hypotensive so home medications were held and treated with albumin and midodrine  Blood pressure now elevated--decrease midodrine to 5 mg tid   Holding  losartan, hctz and sotalol  Hypokalemia: Potassium 3.3  K-dur 40meq po x 1   Acute liver injury: Biopsy consistent with autoimmune hepatitis. Currently on steroids per GI  Encephalopathy: Ammonia 166 and started on lactulose enema    Plan Summary:   Follow up repeat ANCA   K-dur 40meq po x 1   Decrease midodrine to 5mg po tid   Continue albumin, octreotide, midodrine   Decrease NaCl to 1g bid   Increase sodium bicarb to 1300mg po tid   Check am BMP     SUBJECTIVE:  Patient lethargic.   She does say what when I wake her up but then quickly falls asleep.     OBJECTIVE:  Current Weight: Weight - Scale: 98 kg (216 lb 0.8 oz)  Vitals:    10/13/23 0806   BP: (!) 178/91   Pulse: 63   Resp: 20   Temp: (!) 97.3 °F (36.3 °C)   SpO2: 95%       Intake/Output Summary (Last 24 hours) at 10/13/2023 1126  Last data filed at 10/12/2023 1901  Gross per 24 hour   Intake 0 ml   Output 300 ml   Net -300 ml       General:  ill appearing  Skin:  No rash  Eyes:  Sclerae anicteric, no periorbital edema   ENT:  Moist mucous membranes  Neck:  Trachea midline, symmetric   Chest:  Clear to auscultation bilaterally with no wheezes, rales or rhonchi  CVS:  Regular rate and rhythm  Abdomen:  Soft, nontender, nondistended  Neuro: lethargic   Extremities: no lower extremity edema       Medications:  Scheduled Meds:  Current Facility-Administered Medications   Medication Dose Route Frequency Provider Last Rate    acetaminophen  650 mg Oral Once Julian Witt MD      Albumin 25%  25 g Intravenous Q6H Elena Zamudio MD      dextromethorphan-guaiFENesin  10 mL Oral Q8H PRN Farzad Sheehan PA-C      diphenhydrAMINE  50 mg Oral [address] [facility], [place]      escitalopram  10 mg Oral Daily Adriana Ward PA-C      hydrALAZINE  10 mg Intravenous Q6H PRN Julian Witt MD      insulin lispro  1-6 Units Subcutaneous TID AC Julian Witt MD      lactulose  20 g Oral BID Julian Witt MD      levothyroxine  175 mcg Oral Early Morning Adriana Ward PA-C      midodrine  10 mg Oral TID AC Elena Zamudio MD      BIRDIE ANTIFUNGAL   Topical BID Julian Witt MD      nystatin   Topical BID Julian Witt MD      octreotide  100 mcg Subcutaneous [facility] Elena Zamudio MD      phytonadione  10 mg Intravenous Once Julian Witt MD      predniSONE  40 mg Oral Daily Sarah Ng DO      sodium bicarbonate  650 mg Oral TID after meals [facility], PA-C      sodium chloride  2 g Oral BID With Meals [facility] Mar Ward PA-C         PRN Meds:.  dextromethorphan-guaiFENesin    hydrALAZINE    Continuous Infusions:     Laboratory Results:  Results from last 7 days   Lab Units 10/13/23  0922 10/13/23  0920 10/12/23  0203 10/11/23  1114 10/11/23  0016 10/10/23  0453 10/09/23  1203 10/08/23  0446 10/07/23  1101 10/07/23  0506   WBC Thousand/uL 10.08  --  12.79*  --  13.97* 6.16  --  6.76  --  6.55   HEMOGLOBIN g/dL 9.6*  --  11.1*  --  11.7 12.1  --  12.0  --  11.6   HEMATOCRIT % 29.1*  --  33.2*  --  35.9 35.6  --  35.5  --  34.4*   PLATELETS Thousands/uL 112*  --  130*  --  110* 139*  --  152  --  143*   SODIUM mmol/L  --  136 131* 130* 129* 130* 131* 132*   < >  --    POTASSIUM mmol/L  --  3.3* 3.6 4.2 3.8 3.7 3.6 3.7   < >  --    CHLORIDE mmol/L  --  109* 105 104 105 103 105 107   < >  --    CO2 mmol/L  --  16* 18* 20* 15* 24 24 23   < >  --    BUN mg/dL  --  63* 55* 49* 45* 32* 22 20   < >  --    CREATININE mg/dL  --  2.85* 3.28* 3.04* 2.92* 1.76* 0.79 0.68   < >  --    CALCIUM mg/dL  --  9.1 8.4 8.5 8.5 8.5 8.4 8.5   < >  --    MAGNESIUM mg/dL  --   --  2.3  --  2.2 1.7*  --   --   --   --     < > = values in this interval not displayed.

## 2023-10-13 NOTE — PLAN OF CARE
Problem: MOBILITY - ADULT  Goal: Maintain or return to baseline ADL function  Description: INTERVENTIONS:  -  Assess patient's ability to carry out ADLs; assess patient's baseline for ADL function and identify physical deficits which impact ability to perform ADLs (bathing, care of mouth/teeth, toileting, grooming, dressing, etc.)  - Assess/evaluate cause of self-care deficits   - Assess range of motion  - Assess patient's mobility; develop plan if impaired  - Assess patient's need for assistive devices and provide as appropriate  - Encourage maximum independence but intervene and supervise when necessary  - Involve family in performance of ADLs  - Assess for home care needs following discharge   - Consider OT consult to assist with ADL evaluation and planning for discharge  - Provide patient education as appropriate  Outcome: Progressing     Problem: INFECTION - ADULT  Goal: Absence or prevention of progression during hospitalization  Description: INTERVENTIONS:  - Assess and monitor for signs and symptoms of infection  - Monitor lab/diagnostic results  - Monitor all insertion sites, i.e. indwelling lines, tubes, and drains  - Monitor endotracheal if appropriate and nasal secretions for changes in amount and color  - [place] appropriate cooling/warming therapies per order  - Administer medications as ordered  - Instruct and encourage patient and family to use good hand hygiene technique  - Identify and instruct in appropriate isolation precautions for identified infection/condition  Outcome: Progressing

## 2023-10-13 NOTE — PROGRESS NOTES
Progress note - [facility]   [facility] 81 y.o. female MRN: [id number]  Unit/Bed#: -Amy Encounter: [id number]    ASSESSMENT and PLAN    83F with history of cholecystectomy 2016, hypothyroidism, A-fib on Xarelto who presented to the emergency department from acute rehab for painless jaundice      1. Acute liver injury, suspect due to autoimmune hepatitis rather than DILI from gabapentin   2. Elevated autoimmune markers  2. 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 Minimal macrovesicular steatosis (less than 5%) CMV neg. Alk phos, ALT, AST improving slowly improving. Creatinine improved suspect hepatorenal syndrome, nephrology following. Patient somnolent today, ammonia elevated with INR increasing likely due to acute liver failure. -Unable to tolerate oral lactulose, lactulose enema given  -consider increasing prednisone to 60 mg daily  -Recheck INR after 10 mg IV vitamin K   -Check LFTs, INR daily during admission and weekly thereafter  -monitor mental status,   -nephrology following, likely secondary to ATN with hepatorenal syndrome / hypotension / losartan / elevated bilirubin / contrast induced nephropathy, IVF stopped, on albumin, octreotide, midodrine  -Consider N-acetylcysteine and transfer to facility capable of transplant if no improvement with above measures  -Will need outpatient hepatology follow-up as well       Chief Complaint   Patient presents with    Abnormal Lab     Elevated liver enzymes from routine blood work done yesterday. SUBJECTIVE/HPI   Pt seen and examined. Somnolent today. D/w RN. No N/V/Abd pain. Had normal BM X2 today. Unable to tolerate oral lactulose, 1 enema given and Vit K 10 mg IV ordered now.     BP (!) 178/91   Pulse 63   Temp (!) 97.3 °F (36.3 °C)   Resp 20   Ht 5' 4" (1.626 m)   Wt 98 kg (216 lb 0.8 oz)   SpO2 95%   BMI 37.09 kg/m²     PHYSICALEXAM  General appearance: appears stated age, somnolent  Eyes: PERLLA, EOMI, +  icterus   Head: Normocephalic, without obvious abnormality, atraumatic  Lungs: clear to auscultation bilaterally  Heart: regular rate and rhythm, S1, S2 normal, no murmur, click, rub or gallop  Abdomen: soft, non-tender; bowel sounds normal; no masses,  no organomegaly  Extremities: extremities normal, atraumatic, no cyanosis or edema,   Neurologic: unable to be assessed    Lab Results   Component Value Date    CALCIUM 9.1 10/13/2023    K 3.3 (L) 10/13/2023    CO2 16 (L) 10/13/2023     (H) 10/13/2023    BUN 63 (H) 10/13/2023    CREATININE 2.85 (H) 10/13/2023     Lab Results   Component Value Date    WBC 10.08 10/13/2023    HGB 9.6 (L) 10/13/2023    HCT 29.1 (L) 10/13/2023    MCV 88 10/13/2023     (L) 10/13/2023     Lab Results   Component Value Date     (H) 10/13/2023     (H) 10/13/2023    GGT 76 (H) 10/03/2023    ALKPHOS 64 10/13/2023     No results found for: "AMYLASE"  Lab Results   Component Value Date    LIPASE 57 10/02/2023     Lab Results   Component Value Date    IRON 203 10/03/2023    TIBC <258 10/03/2023    FERRITIN 164 10/03/2023     Lab Results   Component Value Date    INR 5.10 (HH) 10/13/2023

## 2023-10-14 PROBLEM — R65.10 SIRS (SYSTEMIC INFLAMMATORY RESPONSE SYNDROME) (HCC): Status: ACTIVE | Noted: 2023-01-01

## 2023-10-14 PROBLEM — J96.00 ACUTE RESPIRATORY FAILURE (HCC): Status: ACTIVE | Noted: 2023-01-01

## 2023-10-14 PROBLEM — E87.20 METABOLIC ACIDOSIS: Status: ACTIVE | Noted: 2023-10-14

## 2023-10-14 PROBLEM — R82.90 ABNORMAL URINALYSIS: Status: ACTIVE | Noted: 2023-10-14

## 2023-10-14 PROBLEM — G93.41 ACUTE METABOLIC ENCEPHALOPATHY: Status: ACTIVE | Noted: 2023-01-01

## 2023-10-14 PROBLEM — R19.5 DARK STOOLS: Status: ACTIVE | Noted: 2023-01-01

## 2023-10-14 NOTE — PROGRESS NOTES
[facility]  Progress Note  Name: Piedad Barahona  MRN: [id number]  Unit/Bed#: -01 I Date of Admission: 10/2/2023   Date of Service: 10/14/2023 I Hospital Day: 12    Assessment/Plan   VALENTIN (acute kidney injury) [facility])  Assessment & Plan  Patient noted to have VALENTIN likely prerenal/early ATN in setting of hypotension  Creatinine went up on October 10th to 1.76 from 0.79  Creatinine this morning is 2.53 and is improving  Urinary retention protocol  Nephrology consult appreciated  Continue patient on octreotide, midodrine and IV albumin as per nephrology  Strict I&O's  Avoid hypotension/nephrotoxic medication. Patient is off hydrochlorothiazide and Cozaar  Hold sotalol in setting of VALENTIN  Trend BMP    Type 2 diabetes mellitus ([address])  Assessment & Plan  Lab Results   Component Value Date    HGBA1C 7.0 (H) 07/20/2023     Patient has not been on any home medications   Patient was noted to be hypoglycemic   Accu-Chek ACHS with Humalog sliding scale        Hyponatremia  Assessment & Plan  Previously discharged on 2 g salt tabs BID and torsemide 5 mg daily  Torsemide was stopped by facility for unknown reason   Sodium this morning is 140  On salt tablets-decreased to 1 g twice daily  Nephrology consult appreciated  Resolved    Closed right ankle fracture  Assessment & Plan  Continue ambulating with camboot and walker  PT evaluated patient on 10/3-recommended level 3 rehab  Will request repeat PT evaluation    Diffuse interstitial pulmonary fibrosis ([address])  Assessment & Plan  CT:  Reticular interstitial lung abnormality (REENA) noted in both lower lobes. Recommend nonemergent outpatient evaluation with high-resolution chest CT to exclude [place] UIP pattern fibrosis.     Mood disorder (HCC)  Assessment & Plan  Continue Lexapro  Ambien nightly as needed    Acquired hypothyroidism  Assessment & Plan  Continue levothyroxine 175 mcg daily    Class 2 severe obesity due to excess calories with serious comorbidity and body mass index (BMI) of 35.0 to 35.9 in adult   Assessment & Plan  Dietary changes and lifestyle modifications  Affects all aspects of care    Essential hypertension  Assessment & Plan  Previously discharged on diovan, sotalol, torsemide  At facility patient is on HCTZ 12.5, losartan 100 mg, and sotalol 80 mg BID  Hold hydrochlorothiazide, Cozaar in setting of worsening creatinine  Will order hydralazine as needed    Mixed hyperlipidemia  Assessment & Plan  Holding Lipitor 20 mg daily given LFTs    Paroxysmal atrial fibrillation (HCC)  Assessment & Plan  Previously on diltiazem and sotalol however patient recently taken off diltiazem at facility  Unclear why diltiazem was stopped   Will continue to hold for now given hepatic impairment   Hold Xarelto in anticipation of liver biopsy  Continue to hold Xarelto     * Transaminitis  Assessment & Plan  Previously evaluated by GI in July for transaminitis thought 2/2 shock liver. CMV and EBV IGG positive but no IGM. Hepatitis panel and RUQ ultrasound were negative. [facility], prompting ED presentation. S/P cholecystectomy. LFTs on admission:  Direct bilirubin: 7.70  Total bilirubin: 12.2  Alk phos: 118  AST: 690  ALT: 407  GI consulted   CT A/P: Nodular liver morphology and sequela of portal venous hypertension suggesting cirrhosis. Minimal ascites around the liver and layering in the pelvis. Prominent lymph nodes at the katelynn hepatis presumably reactive to primary underlying liver dysfunction. No biliary ductal dilatation to suggest obstructive cause of jaundice. AFP and CA 19-9 are elevated  INR this morning is 5.40  MRI shows nodular liver, trace perihepatic ascites, 5 mm pancreatic uncinate process cystic lesion  Discussed with GI liver injury likely attributed by drug-induced.   Monitor LFT normal. 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- Gabapentin could be cause, but would like to biopsy  Patient LFTs are trending up. Vit K- 10/07 and 10/8, 10/13,10/14  Trend PT/INR  Patient received 1 unit of FFP at midnight prior to biopsy  Patient underwent TJLB(transjugular liver biopsy)with sedation on 10/10   Patient received steroid prep for the procedure given allergy to contrast prior to biopsy  AST, ALT and alk phos are trending down  Total bilirubin is 19.16  LFTs are trending down  Follow-up biopsy results  GI started patient on prednisone 10 mg daily  Ammonia is 166- 119  GI ordered infectious work-up including blood cultures, urine culture   Follow-up culture results  Continue on retention lactulose enema   Will discuss with GI regarding placing Dobbhoff  Trend LFTs         Labs & Imaging: I have personally reviewed pertinent reports. VTE Prophylaxis: in place. Code Status:   Level 1 - Full Code    Patient Centered Rounds: I have performed bedside rounds with nursing staff today. Discussions with Specialists or Other Care Team Provider: GI and nephrology    Education and Discussions with Family / Patient: Daughter-in-law Vega Days- no response    Current Length of Stay: 12 day(s)    Current Patient Status: Inpatient   Certification Statement: The patient will continue to require additional inpatient hospital stay due to see my assessment and plan. Subjective:   Patient is seen and examined at bedside. Patient is lethargic and moaning. Afebrile    Objective:    Vitals: Blood pressure 165/77, pulse 66, temperature (!) 97.2 °F (36.2 °C), temperature source Axillary, resp. rate 20, height 5' 4" (1.626 m), weight 98 kg (216 lb 0.8 oz), SpO2 97 %. ,Body mass index is 37.09 kg/m².   SPO2 RA Rest      Flowsheet Row ED to Hosp-Admission (Current) from 10/2/2023 in [facility]   SpO2 97 %   SpO2 Activity At Rest   O2 Device None (Room air)   O2 Flow Rate --          I&O:   Intake/Output Summary (Last 24 hours) at 10/14/2023 0655  Last data filed at 10/13/2023 2130  Gross per 24 hour   Intake 160 ml   Output 700 ml   Net -540 ml       Physical Exam:    General- lying comfortably in bed. Not in any acute distress. Positive icterus  Neck- Supple, No JVD  CVS- regular, S1 and S2 normal  Chest- Bilateral Air entry, decreased at bases. Abdomen- soft, nontender, not distended, no guarding or rigidity, BS+  Extremities-  No pedal edema, No calf tenderness  CNS-   lethargic and moaning to verbal commands/when moved    Invasive Devices       Peripheral Intravenous Line  Duration             Peripheral IV 10/11/23 Dorsal (posterior); Left Forearm 2 days    Peripheral IV 10/13/23 Proximal;Right;Ventral (anterior) Forearm <1 day              Drain  Duration             Rectal Tube With balloon <1 day                          Social History  reviewed  Family History   Problem Relation Age of Onset    Cancer Mother         ovarian    Heart disease Father         cardiac disorder    Cancer Father     Heart disease Brother     Heart disease Paternal Aunt     Heart disease Paternal Uncle     reviewed    Meds:  Current Facility-Administered Medications   Medication Dose Route Frequency Provider Last Rate Last Admin    acetaminophen (TYLENOL) tablet 650 mg  650 mg Oral Once Marcos Cantrell MD        albumin human (FLEXBUMIN) 25 % injection 25 g  25 g Intravenous Q6H Asya Gusman MD 0 mL/hr at 10/13/23 2130 25 g at 10/14/23 0224    dextromethorphan-guaiFENesin (ROBITUSSIN DM) oral syrup 10 mL  10 mL Oral Q8H PRN Dalila Mark PA-C   10 mL at 10/12/23 1951    diphenhydrAMINE (BENADRYL) tablet 50 mg  50 mg Oral 60 Min Pre-Op Lainey Henao MD   50 mg at 10/10/23 1243    escitalopram (LEXAPRO) tablet 10 mg  10 mg Oral Daily Ronald Ward PA-C   10 mg at 10/13/23 0901    hydrALAZINE (APRESOLINE) injection 10 mg  10 mg Intravenous Q6H PRN Marcos Cantrell MD        insulin lispro (HumaLOG) 100 units/mL subcutaneous injection 1-6 Units  1-6 Units Subcutaneous Q6H [address] [facility] RAJ [place]        lactulose oral solution 20 g  20 g Oral BID Yamilex Brewer MD        lactulose retention enema 200 g  200 g Rectal Q4H Areli Garvin PA-C   200 g at 10/14/23 0541    levothyroxine tablet 175 mcg  175 mcg Oral Early Morning Phi Ward PA-C   175 mcg at 10/13/23 0534    midodrine (PROAMATINE) tablet 5 mg  5 mg Oral TID AC Jerry Lui MD        moisture barrier miconazole 2% cream (aka BIRDIE MOISTURE BARRIER ANTIFUNGAL CREAM)   Topical BID Yamilex Brewer MD   Given at 10/13/23 1905    nystatin (MYCOSTATIN) cream   Topical BID Yamilex Brewer MD   Given at 10/13/23 1905    octreotide (SandoSTATIN) injection 100 mcg  100 mcg Subcutaneous [facility] Jerry Lui MD   100 mcg at 10/14/23 8291    phytonadione (AQUA-MEPHYTON) 10 mg/mL SC/IM injection 10 mg  10 mg Subcutaneous Once Yamilex Brewer MD        potassium chloride 20 mEq IVPB (premix)  20 mEq Intravenous Q2H Yamilex Brewer MD        predniSONE tablet 40 mg  40 mg Oral Daily Leonel [facility], DO   40 mg at 10/13/23 0901    sodium bicarbonate tablet 1,300 mg  1,300 mg Oral TID after meals Eugene Cook PA-C        sodium chloride tablet 1 g  1 g Oral BID With Meals Eugene Cook PA-C          Medications Prior to Admission   Medication    atorvastatin (LIPITOR) 20 mg tablet    diltiazem (CARDIZEM CD) 360 MG 24 hr capsule    Diovan 320 MG tablet    escitalopram (LEXAPRO) 10 mg tablet    gabapentin (NEURONTIN) 100 mg capsule    hydrochlorothiazide (HYDRODIURIL) 25 mg tablet    HYDROcodone-acetaminophen (Norco) 5-325 mg per tablet    levothyroxine 175 mcg tablet    liotrix (THYROLAR-1) 60 (12.5-50) MG (MCG) TABS    rivaroxaban (XARELTO) 20 mg tablet    sodium chloride 1 g tablet    sotalol (BETAPACE) 80 mg tablet    torsemide (DEMADEX) 5 MG tablet    zolpidem (AMBIEN) 10 mg tablet       Labs:  Results from last 7 days   Lab Units 10/14/23  0444 10/13/23  0922 10/12/23  0203   WBC Thousand/uL 7.84 10.08 12.79*   HEMOGLOBIN g/dL 9.1* 9.6* 11.1*   HEMATOCRIT % 27.0* 29.1* 33.2*   PLATELETS Thousands/uL 107* 112* 130*   NEUTROS PCT % 62 68 77*   LYMPHS PCT % 21 18 13*   MONOS PCT % 15* 13* 9   EOS PCT % 0 0 0     Results from last 7 days   Lab Units 10/14/23  0444 10/13/23  0920 10/12/23  0203   POTASSIUM mmol/L 3.2* 3.3* 3.6   CHLORIDE mmol/L 111* 109* 105   CO2 mmol/L 17* 16* 18*   BUN mg/dL 72* 63* 55*   CREATININE mg/dL 2.53* 2.85* 3.28*   CALCIUM mg/dL 9.7 9.1 8.4   ALK PHOS U/L 60 64 94   ALT U/L 168* 191* 293*   AST U/L 92* 105* 193*     Lab Results   Component Value Date    CKTOTAL 50 07/21/2023     Results from last 7 days   Lab Units 10/14/23  0444 10/13/23  1652 10/13/23  1347   INR  5.40* 4.45* 4.87*     Lab Results   Component Value Date    BLOODCX Received in Microbiology Lab. Culture in Progress. 10/13/2023    BLOODCX Received in Microbiology Lab. Culture in Progress. 10/13/2023         Imaging:  Results for orders placed during the hospital encounter of 10/02/23    XR chest portable    Narrative  CHEST    INDICATION:  AMS, rule out infection. COMPARISON: 7/20/2023    EXAM PERFORMED/VIEWS:  XR CHEST PORTABLE      FINDINGS:  The patient is rotated to the right. Cardiomediastinal silhouette appears stable. The lungs are clear. No pneumothorax or pleural effusion. Osseous structures appear within normal limits for patient age. Impression  No acute cardiopulmonary disease. Workstation performed: [id number]    Results for orders placed during the hospital encounter of 04/13/16    X-ray chest 2 views    Narrative  CHEST    INDICATION:  Chest pain which began this morning. COMPARISON:  None    VIEWS:  Frontal and lateral projections; 2 images    FINDINGS:    Cardiomediastinal silhouette appears unremarkable. The lungs are clear. No pneumothorax or pleural effusion.     Visualized osseous structures appear within normal limits for the patient's age.    Impression  No active pulmonary disease. Workstation performed: [id number]      Last 24 Hours Medication List:   Current Facility-Administered Medications   Medication Dose Route Frequency Provider Last Rate    acetaminophen  650 mg Oral Once Danuta Knight MD      Albumin 25%  25 g Intravenous Q6H Lea Casarez MD 0 g (10/13/23 3160)    dextromethorphan-guaiFENesin  10 mL Oral Q8H PRN Aurelia Lopez PA-C      diphenhydrAMINE  50 mg Oral [address] [facility], [place]      escitalopram  10 mg Oral Daily [facility] KeyannaALBIN      hydrALAZINE  10 mg Intravenous Q6H PRN Danuta Knight MD      insulin lispro  1-6 Units Subcutaneous Q6H [facility] Willie RAJ Walker      lactulose  20 g Oral BID Danuta Knight MD      lactulose  200 g Rectal Q4H Jina Subramanian PA-C      levothyroxine  175 mcg Oral Early Morning [facility] Fount[facility]ALBIN      midodrine  5 mg Oral TID AC Lea Casarez MD      BIRDIE ANTIFUNGAL   Topical BID Danuta Knight MD      nystatin   Topical BID Danuta Knight MD      octreotide  100 mcg Subcutaneous [facility] Lea Casarez MD      phytonadione  10 mg Subcutaneous Once Danuta Knight MD      potassium chloride  20 mEq Intravenous Q2H Danuta Knight MD      predniSONE  40 mg Oral Daily Krista Bautista DO      sodium bicarbonate  1,300 mg Oral TID after meals Ramone Crisostomo PA-C      sodium chloride  1 g Oral BID With Meals Ramone Crisostomo PA-C          Today, Patient Was Seen By: Danuta Knight MD    ** Please Note: Dictation voice to text software may have been used in the creation of this document.  **

## 2023-10-14 NOTE — PROGRESS NOTES
Gretta Franco  [id number]    38 y.o.  female      ASSESSMENT and PLAN      1. Acute liver injury, suspect due to autoimmune hepatitis rather than DILI from gabapentin   2. Elevated autoimmune markers  2. Liver nodularity   -ANCA, RF, and anti-smooth muscle antibody elevated  Hepatitis panel nonreactive, EBV and CMV consistent with prior infection  check serologies for immunity to hep a and B, with vaccination if nonreactive  -Transjugular biopsy 10/10/2023, pathology Chronic hepatitis with severe activity (grade 4, [place] system), bridging necrosis and prominent plasma cells, consistent with autoimmune hepatitis. Minimal macrovesicular steatosis (less than 5%) CMV neg. Alk phos, ALT, AST fluctuating. Possible hepatorenal syndrome. Patient somnolent today but with some response to stimulation, ammonia still elevated with INR increasing likely due to acute liver failure. Continue lactulose through NG tube  Okay to discontinue enemas given positive result with NG administration  Continue prednisone 40 daily  Trend LFTs, INR, ammonia  Appreciate nephrology follow-up and recommendations  Consider addition of N-acetylcysteine if no improvement    Chief Complaint   Patient presents with    Abnormal Lab     Elevated liver enzymes from routine blood work done yesterday. SUBJECTIVE/HPI remains encephalopathic but now responsive to voice and tactile stimulus. Having nonbloody bowel movements after Colace enemas and now lactulose per NG tube. No melena or hematochezia. No hematemesis.     /74 (BP Location: Left arm)   Pulse 91   Temp 97.8 °F (36.6 °C) (Axillary)   Resp 21   Ht 5' 4" (1.626 m)   Wt 98 kg (216 lb 0.8 oz)   SpO2 97%   BMI 37.09 kg/m²     PHYSICALEXAM  General appearance: Appears acutely ill, responds nonverbally to tactile and verbal stimulus  Head: Normocephalic, without obvious abnormality, NG tube present  Lungs: Distant but clear to auscultation bilaterally  Heart: regular rate and rhythm, S1, S2 normal  Abdomen: soft, non-tender; bowel sounds normal; no masses,  no organomegaly  Neurologic: Nonverbal, unable to assess asterixis    Lab Results   Component Value Date    CALCIUM 9.9 10/14/2023    K 3.4 (L) 10/14/2023    CO2 18 (L) 10/14/2023     (H) 10/14/2023    BUN 72 (H) 10/14/2023    CREATININE 2.32 (H) 10/14/2023     Lab Results   Component Value Date    WBC 7.84 10/14/2023    HGB 9.1 (L) 10/14/2023    HCT 27.0 (L) 10/14/2023    MCV 87 10/14/2023     (L) 10/14/2023     Lab Results   Component Value Date     (H) 10/14/2023    AST 92 (H) 10/14/2023    GGT 76 (H) 10/03/2023    ALKPHOS 60 10/14/2023     No results found for: "AMYLASE"  Lab Results   Component Value Date    LIPASE 57 10/02/2023     Lab Results   Component Value Date    IRON 203 10/03/2023    TIBC <258 10/03/2023    FERRITIN 164 10/03/2023     Lab Results   Component Value Date    INR 5.40 (HH) 10/14/2023       Counseling / Coordination of Care  Total floor / unit time spent today 30 minutes.

## 2023-10-14 NOTE — PHYSICAL THERAPY NOTE
Physical Therapy Cancellation Note       10/14/23 1016   PT Last Visit   PT Visit Date 10/14/23   Note Type   Note type Cancelled Session   Additional Comments Per chart, patient currently not responsive to verbal stimuli. Not appropriate for P.T. Re-eval. Will hold until medically stable. Kristofer Sutton

## 2023-10-14 NOTE — PLAN OF CARE
Problem: MOBILITY - ADULT  Goal: Maintain or return to baseline ADL function  Description: INTERVENTIONS:  -  Assess patient's ability to carry out ADLs; assess patient's baseline for ADL function and identify physical deficits which impact ability to perform ADLs (bathing, care of mouth/teeth, toileting, grooming, dressing, etc.)  - Assess/evaluate cause of self-care deficits   - Assess range of motion  - Assess patient's mobility; develop plan if impaired  - Assess patient's need for assistive devices and provide as appropriate  - Encourage maximum independence but intervene and supervise when necessary  - Involve family in performance of ADLs  - Assess for home care needs following discharge   - Consider OT consult to assist with ADL evaluation and planning for discharge  - Provide patient education as appropriate  Outcome: Progressing  Goal: Maintains/Returns to pre admission functional level  Description: INTERVENTIONS:  - Perform BMAT or MOVE assessment daily.   - Set and communicate daily mobility goal to care team and patient/family/caregiver. - Collaborate with rehabilitation services on mobility goals if consulted  - Perform Range of Motion 2 times a day. - Reposition patient every 2 hours.   - Dangle patient 2 times a day  - Stand patient 2 times a day  - Ambulate patient 2 times a day  - Out of bed to chair 2 times a day   - Out of bed for meals 2 times a day  - Out of bed for toileting  - Record patient progress and toleration of activity level   Outcome: Progressing     Problem: PAIN - ADULT  Goal: Verbalizes/displays adequate comfort level or baseline comfort level  Description: Interventions:  - Encourage patient to monitor pain and request assistance  - Assess pain using appropriate pain scale  - Administer analgesics based on type and severity of pain and evaluate response  - Implement non-pharmacological measures as appropriate and evaluate response  - Consider cultural and social influences on pain and pain management  - Notify physician/advanced practitioner if interventions unsuccessful or patient reports new pain  Outcome: Progressing     Problem: INFECTION - ADULT  Goal: Absence or prevention of progression during hospitalization  Description: INTERVENTIONS:  - Assess and monitor for signs and symptoms of infection  - Monitor lab/diagnostic results  - Monitor all insertion sites, i.e. indwelling lines, tubes, and drains  - Monitor endotracheal if appropriate and nasal secretions for changes in amount and color  - Gardner appropriate cooling/warming therapies per order  - Administer medications as ordered  - Instruct and encourage patient and family to use good hand hygiene technique  - Identify and instruct in appropriate isolation precautions for identified infection/condition  Outcome: Progressing  Goal: Absence of fever/infection during neutropenic period  Description: INTERVENTIONS:  - Monitor WBC    Outcome: Progressing     Problem: SAFETY ADULT  Goal: Maintain or return to baseline ADL function  Description: INTERVENTIONS:  -  Assess patient's ability to carry out ADLs; assess patient's baseline for ADL function and identify physical deficits which impact ability to perform ADLs (bathing, care of mouth/teeth, toileting, grooming, dressing, etc.)  - Assess/evaluate cause of self-care deficits   - Assess range of motion  - Assess patient's mobility; develop plan if impaired  - Assess patient's need for assistive devices and provide as appropriate  - Encourage maximum independence but intervene and supervise when necessary  - Involve family in performance of ADLs  - Assess for home care needs following discharge   - Consider OT consult to assist with ADL evaluation and planning for discharge  - Provide patient education as appropriate  Outcome: Progressing  Goal: Maintains/Returns to pre admission functional level  Description: INTERVENTIONS:  - Perform BMAT or MOVE assessment daily.   - Set and communicate daily mobility goal to care team and patient/family/caregiver. - Collaborate with rehabilitation services on mobility goals if consulted  - Perform Range of Motion 2 times a day. - Reposition patient every 2 hours.   - Dangle patient 2 times a day  - Stand patient 2 times a day  - Ambulate patient 2 times a day  - Out of bed to chair 2 times a day   - Out of bed for meals 2 times a day  - Out of bed for toileting  - Record patient progress and toleration of activity level   Outcome: Progressing  Goal: Patient will remain free of falls  Description: INTERVENTIONS:  - Educate patient/family on patient safety including physical limitations  - Instruct patient to call for assistance with activity   - Consult OT/PT to assist with strengthening/mobility   - Keep Call bell within reach  - Keep bed low and locked with side rails adjusted as appropriate  - Keep care items and personal belongings within reach  - Initiate and maintain comfort rounds  - Make Fall Risk Sign visible to staff  - Offer Toileting every 2 Hours, in advance of need  - Initiate/Maintain bed alarm  - Obtain necessary fall risk management equipment  - Apply yellow socks and bracelet for high fall risk patients  - Consider moving patient to room near nurses station  Outcome: Progressing     Problem: DISCHARGE PLANNING  Goal: Discharge to home or other facility with appropriate resources  Description: INTERVENTIONS:  - Identify barriers to discharge w/patient and caregiver  - Arrange for needed discharge resources and transportation as appropriate  - Identify discharge learning needs (meds, wound care, etc.)  - Arrange for interpretive services to assist at discharge as needed  - Refer to Case Management Department for coordinating discharge planning if the patient needs post-hospital services based on physician/advanced practitioner order or complex needs related to functional status, cognitive ability, or social support system  Outcome: Progressing     Problem: Knowledge Deficit  Goal: Patient/family/caregiver demonstrates understanding of disease process, treatment plan, medications, and discharge instructions  Description: Complete learning assessment and assess knowledge base. Interventions:  - Provide teaching at level of understanding  - Provide teaching via preferred learning methods  Outcome: Progressing     Problem: Potential for Falls  Goal: Patient will remain free of falls  Description: INTERVENTIONS:  - Educate patient/family on patient safety including physical limitations  - Instruct patient to call for assistance with activity   - Consult OT/PT to assist with strengthening/mobility   - Keep Call bell within reach  - Keep bed low and locked with side rails adjusted as appropriate  - Keep care items and personal belongings within reach  - Initiate and maintain comfort rounds  - Make Fall Risk Sign visible to staff  - Offer Toileting every 2 Hours, in advance of need  - Initiate/Maintain bed alarm  - Obtain necessary fall risk management equipment  - Apply yellow socks and bracelet for high fall risk patients  - Consider moving patient to room near nurses station  Outcome: Progressing     Problem: Nutrition/Hydration-ADULT  Goal: Nutrient/Hydration intake appropriate for improving, restoring or maintaining nutritional needs  Description: Monitor and assess patient's nutrition/hydration status for malnutrition. Collaborate with interdisciplinary team and initiate plan and interventions as ordered. Monitor patient's weight and dietary intake as ordered or per policy. Utilize nutrition screening tool and intervene as necessary. Determine patient's food preferences and provide high-protein, high-caloric foods as appropriate.      INTERVENTIONS:  - Monitor oral intake, urinary output, labs, and treatment plans  - Assess nutrition and hydration status and recommend course of action  - Evaluate amount of meals eaten  - Assist patient with eating if necessary   - Allow adequate time for meals  - Recommend/ encourage appropriate diets, oral nutritional supplements, and vitamin/mineral supplements  - Order, calculate, and assess calorie counts as needed  - Recommend, monitor, and adjust tube feedings and TPN/PPN based on assessed needs  - Assess need for intravenous fluids  - Provide specific nutrition/hydration education as appropriate  - Include patient/family/caregiver in decisions related to nutrition  Outcome: Progressing     Problem: Prexisting or High Potential for Compromised Skin Integrity  Goal: Skin integrity is maintained or improved  Description: INTERVENTIONS:  - Identify patients at risk for skin breakdown  - Assess and monitor skin integrity  - Assess and monitor nutrition and hydration status  - Monitor labs   - Assess for incontinence   - Turn and reposition patient  - Assist with mobility/ambulation  - Relieve pressure over bony prominences  - Avoid friction and shearing  - Provide appropriate hygiene as needed including keeping skin clean and dry  - Evaluate need for skin moisturizer/barrier cream  - Collaborate with interdisciplinary team   - Patient/family teaching  - Consider wound care consult   Outcome: Progressing

## 2023-10-14 NOTE — ASSESSMENT & PLAN NOTE
Patient noted to have VALENTIN likely prerenal/early ATN in setting of hypotension  Creatinine went up on October 10th to 1.76 from 0.79  Creatinine this morning is 2.53 and is improving  Urinary retention protocol  Nephrology consult appreciated  Continue patient on octreotide, midodrine and IV albumin as per nephrology  Strict I&O's  Avoid hypotension/nephrotoxic medication.   Patient is off hydrochlorothiazide and Cozaar  Hold sotalol in setting of VALENTIN  Trend BMP

## 2023-10-14 NOTE — PROGRESS NOTES
Progress Note - Nephrology   Kimberly Goldman 81 y.o. female MRN: [id number]  Unit/Bed#: -01 Encounter: [id number]    52-year-old female with history of hypertension, hyperlipidemia, atrial fibrillation, hypothyroidism, transaminitis recent nondisplaced fracture of right fibula who presented from rehab with jaundice and elevated bilirubin. Of note patient had a recent hospitalization evaded liver enzymes and suspected from shock liver. Neurology consulted and following for acute kidney injury and hyponatremia. ASSESSMENT and PLAN:  Acute kidney injury  Etiology: Suspect ATN in the setting hypotension, ? HRS with elevated bilirubin, along with loss of RAAS with losartan and HCTZ use. Less likely DAVID with IV contrast exposure during transjugular liver biopsy on 10/10/23 with 4 ml used.    Admission creatinine 0.8  Creatinine 3.2 on 10/12/2023  Current creatinine continues to slowly improve and 2.53 today  Remains on albumin 25 g every 6 hours, octreotide 100 mcg subcu every 8 hours along with midodrine 5 mg p.o. 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  Plan  Avoid nephrotoxins, NSAIDs, and limit IV contrast if possible  Avoid hypotension, perturbations of blood pressure to prevent decreased renal perfusion  Adjust meds to appropriate GFR  Optimize hemodynamic status to avoid delay in renal recovery  Need accurate I/O and daily weights  Monitor BMP daily  No indication for renal replacement therapy as renal function continues to improve  Creatinine continues to improve no changes in current treatment at this time    Hyponatremia  Etiology:  Suspect secondary to increasing ADH in the setting of liver cirrhosis, SIADH Lexapro and HCTZ  Baseline sodium 125-1 33 since July 2023  Previous admission and discharged on salt tablets 2 g twice daily and torsemide 5 mg daily  Admission sodium on 10/2/2023 was 132  Current sodium 140>136>131  Currently on sodium salt tablet 1 g p.o. twice daily-  Work-up:  Serum osmolality 306, urine sodium 42, urine osmolality 357  Plan:  discontinue salt tablets-sodium increased to 140 and not taking PO currently  Continue with fluid striction 1.5 L daily  Monitor BMP closely  Replace potassium magnesium as needed    Metabolic acidosis:  Etiology: Likely in the setting of acute kidney injury and normal saline use  Sodium bicarbonate was increased to 1300 mg p.o. 3 times daily yesterday  UnFortunately patient at this time no longer taking p.o. Was unsuccessful overnight inserting NG tube  Current bicarb 17  Monitor for now    Blood pressure/Hypertension:  Of note patient was initially hypotensive all home medications were held  Currently on midodrine 5 mg 3 times daily with hold parameters for SBP greater than 140  Currently holding losartan HCTZ and sotalol  Current blood pressure 150s 170s over the last 24 hours  Maximize hemodynamics to maintain MAP >65  Avoid hypotension or fluctuations in blood pressure  Will continue to trend  Of note, previous a.m. cortisol level obtained due to hypotension- results were low at 3.3. Low a.m. cortisol possibly due to use of steroids. May consider repeating in future once off steroids.      Hypokalemia  Potassium 3.2  IV supplementation today per primary team  Recommend potassium greater than 4.0    Acute liver injury  Biopsy consistent with autoimmune hepatitis  GI following  Currently on steroids per GI    Encephalopathy:   Current ammonia 119-receiving lactulose via enema  Management per primary team      Medical records through [facility] and care everywhere has been reviewed for this encounter      Review of systems  Patient seen and examined at bedside: not responding to verbal stimui. patient with encephalopathy  Review of Systems   Unable to perform ROS: Acuity of condition          Physical exam:  Current Weight: Weight - Scale: 98 kg (216 lb 0.8 oz)  Vitals:    10/13/23 1630 10/13/23 2000 10/13/23 2247 10/14/23 0015   BP: 160/90 151/74 (!) 172/86 165/77   BP Location: Left arm Left arm Left arm    Pulse: 71 60 67 66   Resp: 22 20 16 20   Temp: 97.5 °F (36.4 °C) (!) 97.1 °F (36.2 °C) 97.5 °F (36.4 °C) (!) 97.2 °F (36.2 °C)   TempSrc: Axillary Axillary Axillary Axillary   SpO2: 95% 97% 96% 97%   Weight:       Height:           Intake/Output Summary (Last 24 hours) at 10/14/2023 0732  Last data filed at 10/14/2023 0649  Gross per 24 hour   Intake 210 ml   Output 700 ml   Net -490 ml       Physical Exam  Vitals and nursing note reviewed. Constitutional:       Comments: lehtargic   HENT:      Head: Normocephalic and atraumatic. Nose: Nose normal.      Mouth/Throat:      Mouth: Mucous membranes are moist.      Pharynx: Oropharynx is clear. Eyes:      Extraocular Movements: Extraocular movements intact. Cardiovascular:      Rate and Rhythm: Tachycardia present. Rhythm irregular. Pulses: Normal pulses. Heart sounds: Normal heart sounds. Pulmonary:      Effort: Pulmonary effort is normal.      Breath sounds: Wheezing present. Abdominal:      General: Bowel sounds are normal.      Palpations: Abdomen is soft. Genitourinary:     Comments: Dung Makos in place for cloud yellow urine  Musculoskeletal:      Cervical back: Normal range of motion and neck supple. Comments: Generalized edema   Skin:     Coloration: Skin is jaundiced. Neurological:      General: No focal deficit present.    Psychiatric:         Behavior: Behavior normal.            Medications:    Current Facility-Administered Medications: acetaminophen (TYLENOL) tablet 650 mg, 650 mg, Oral, Once, Marien Hodgkins, MD    albumin human (FLEXBUMIN) 25 % injection 25 g, 25 g, Intravenous, Q6H, Christiano Garcia MD, Last Rate: 0 mL/hr at 10/13/23 2130, 25 g at 10/14/23 0224    dextromethorphan-guaiFENesin (ROBITUSSIN DM) oral syrup 10 mL, 10 mL, Oral, Q8H PRN, Bud Mark PA-C, 10 mL at 10/12/23 1951    diphenhydrAMINE (BENADRYL) tablet 50 mg, 50 mg, Oral, 60 Min Pre-Op, Kassy Jacob MD, 50 mg at 10/10/23 1243    escitalopram (LEXAPRO) tablet 10 mg, 10 mg, Oral, Daily, Live Ward PA-C, 10 mg at 10/13/23 0901    hydrALAZINE (APRESOLINE) injection 10 mg, 10 mg, Intravenous, Q6H PRN, Marien Hodgkins, MD    insulin lispro (HumaLOG) 100 units/mL subcutaneous injection 1-6 Units, 1-6 Units, Subcutaneous, Q6H [facility] **AND** Fingerstick Glucose (POCT), , , Q6H, RAJ Marshall    lactulose oral solution 20 g, 20 g, Oral, BID, Marien Hodgkins, MD    lactulose retention enema 200 g, 200 g, Rectal, Q4H, Lee Ann Jacinto PA-C, 200 g at 10/14/23 0541    levothyroxine tablet 175 mcg, 175 mcg, Oral, Early Morning, Addye Kelly Ward PA-C, 175 mcg at 10/13/23 0534    midodrine (PROAMATINE) tablet 5 mg, 5 mg, Oral, TID AC, Christiano Garcia MD    moisture barrier miconazole 2% cream (aka BIRDIE MOISTURE BARRIER ANTIFUNGAL CREAM), , Topical, BID, Marien Hodgkins, MD, Given at 10/13/23 1905    nystatin (MYCOSTATIN) cream, , Topical, BID, Marien Hodgkins, MD, Given at 10/13/23 1905    octreotide (SandoSTATIN) injection 100 mcg, 100 mcg, Subcutaneous, Q8H [facility], Christiano Garcia MD, 100 mcg at 10/14/23 4770    potassium chloride 20 mEq IVPB (premix), 20 mEq, Intravenous, Q2H, Marien Hodgkins, MD, Last Rate: 50 mL/hr at 10/14/23 0728, 20 mEq at 10/14/23 5179    predniSONE tablet 40 mg, 40 mg, Oral, Daily, Aroldo Roper DO, 40 mg at 10/13/23 0901    sodium bicarbonate tablet 1,300 mg, 1,300 mg, Oral, TID after meals, Flory Ramires PA-C sodium chloride tablet 1 g, 1 g, Oral, BID With Meals, Flory Ramires PA-C    Laboratory Results:  Results from last 7 days   Lab Units 10/14/23  0444 10/13/23  7533 10/13/23  0920 10/12/23  0203 10/11/23  7414 10/11/23  0016 10/10/23  0453 10/09/23  1203 10/08/23  0446   WBC Thousand/uL 7.84 10.08  --  12.79*  --  13.97* 6.16  --  6.76   HEMOGLOBIN g/dL 9.1* 9.6*  --  11.1*  --  11.7 12.1  --  12.0   HEMATOCRIT % 27.0* 29.1*  --  33.2*  --  35.9 35.6  --  35.5   PLATELETS Thousands/uL 107* 112*  --  130*  --  110* 139*  --  152   SODIUM mmol/L 140  --  136 131* 130* 129* 130* 131* 132*   POTASSIUM mmol/L 3.2*  --  3.3* 3.6 4.2 3.8 3.7 3.6 3.7   CHLORIDE mmol/L 111*  --  109* 105 104 105 103 105 107   CO2 mmol/L 17*  --  16* 18* 20* 15* 24 24 23   BUN mg/dL 72*  --  63* 55* 49* 45* 32* 22 20   CREATININE mg/dL 2.53*  --  2.85* 3.28* 3.04* 2.92* 1.76* 0.79 0.68   CALCIUM mg/dL 9.7  --  9.1 8.4 8.5 8.5 8.5 8.4 8.5   MAGNESIUM mg/dL 2.2  --   --  2.3  --  2.2 1.7*  --   --            Portions of the record may have been created with voice recognition software. Occasional wrong word or "sound a like" substitutions may have occurred due to the inherent limitations of voice recognition software.  Read the chart carefully and recognize,

## 2023-10-14 NOTE — ASSESSMENT & PLAN NOTE
Previously discharged on 2 g salt tabs BID and torsemide 5 mg daily  Torsemide was stopped by facility for unknown reason   Sodium this morning is 140  On salt tablets-decreased to 1 g twice daily  Nephrology consult appreciated  Resolved

## 2023-10-14 NOTE — ASSESSMENT & PLAN NOTE
Previously evaluated by GI in July for transaminitis thought 2/2 shock liver. CMV and EBV IGG positive but no IGM. Hepatitis panel and RUQ ultrasound were negative. Labs obtained due to new jaundice at [facility] showed T bili of 6, prompting ED presentation. S/P cholecystectomy. LFTs on admission:  Direct bilirubin: 7.70  Total bilirubin: 12.2  Alk phos: 118  AST: 690  ALT: 407  GI consulted   CT A/P: Nodular liver morphology and sequela of portal venous hypertension suggesting cirrhosis. Minimal ascites around the liver and layering in the pelvis. Prominent lymph nodes at the katelynn hepatis presumably reactive to primary underlying liver dysfunction. No biliary ductal dilatation to suggest obstructive cause of jaundice. AFP and CA 19-9 are elevated  INR this morning is 5.40  MRI shows nodular liver, trace perihepatic ascites, 5 mm pancreatic uncinate process cystic lesion  Discussed with GI liver injury likely attributed by drug-induced. Monitor LFT normal. 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- Gabapentin could be cause, but would like to biopsy  Patient LFTs are trending up. Vit K- 10/07 and 10/8, 10/13,10/14  Trend PT/INR  Patient received 1 unit of FFP at midnight prior to biopsy  Patient underwent TJLB(transjugular liver biopsy)with sedation on 10/10   Patient received steroid prep for the procedure given allergy to contrast prior to biopsy  AST, ALT and alk phos are trending down  Total bilirubin is 19.16  LFTs are trending down  Follow-up biopsy results  GI started patient on prednisone 40 mg daily  Switched to IV Solu-Medrol as patient is not able to take p.o.   Ammonia is 166- 119  GI ordered infectious work-up including blood cultures, urine culture   Follow-up culture results  Continue on retention lactulose enema   Will discuss with GI regarding placing Dobbhoff  Trend LFTs

## 2023-10-15 PROBLEM — D65 DIC (DISSEMINATED INTRAVASCULAR COAGULATION) (HCC): Status: ACTIVE | Noted: 2023-01-01

## 2023-10-15 PROBLEM — K92.2 GIB (GASTROINTESTINAL BLEEDING): Status: ACTIVE | Noted: 2023-10-14

## 2023-10-15 PROBLEM — R04.89 DIFFUSE PULMONARY ALVEOLAR HEMORRHAGE: Status: ACTIVE | Noted: 2023-10-15

## 2023-10-15 PROBLEM — N30.00 ACUTE CYSTITIS: Status: ACTIVE | Noted: 2023-10-15

## 2023-10-15 PROBLEM — E87.1 HYPONATREMIA: Status: RESOLVED | Noted: 2023-07-20 | Resolved: 2023-10-15

## 2023-10-15 NOTE — ASSESSMENT & PLAN NOTE
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

## 2023-10-15 NOTE — ASSESSMENT & PLAN NOTE
10/2 admitted to [facility] after being sent to the ER from rehab for her broken ankle with sudden jaundice   10/2 CT CAP -- Nodular liver morphology and sequela of portal venous hypertension suggesting cirrhosis. Minimal ascites around the liver and layering in the pelvis. Prominent lymph nodes at the katelynn hepatis presumably reactive to primary underlying liver dysfunction. No biliary ductal dilatation to suggest obstructive cause of jaundice  10/3 MRI abdomen -- Nodular liver surface contour suggestive of cirrhosis with sequela portal hypertension including trace perihepatic ascites. No suspicious hepatic mass. A 5 mm pancreatic uncinate process cystic lesion without worrisome features. No main pancreatic duct dilatation. 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  Will consider FFP if s/s active bleeding   Ammonia levels continue to rise, though she is now having robust BM -- continue lactulose per NGT  Trend hepatic enzymes, ammonia, INR   Appreciate GI input   Avoid hypotension, hepatotoxic agents   Referral for transplant per GI

## 2023-10-15 NOTE — PROGRESS NOTES
Attempted to place fecal management system tube without success to minimize stool coming in contact with skin. Rectal tone poor with inability to retain balloon. Will continue frequent incontinence care with changing of pads at least every 1-2 hours with repositioning as needed.

## 2023-10-15 NOTE — ASSESSMENT & PLAN NOTE
Progressive respiratory failure through the afternoon with tachypnea, sonorous respirations, forced/prolonged expirations refractory to xopenex and racemic epi   10/14 ETT for airway protection, respiratory insufficiency after speaking to the family at length, who were all in agreement  10/15 Bronch with evidence of [facility]    Plan:   Vent day 2  ACVC 12/400/40/6   Sedation: propofol for goal RASS -1  Pain: fentanyl PRN  Wean fio2 as tolerated for goal spo2 > 90%  Scheduled oral care, HOB 30 degrees

## 2023-10-15 NOTE — ASSESSMENT & PLAN NOTE
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

## 2023-10-15 NOTE — PROGRESS NOTES
NEPHROLOGY PROGRESS NOTE   Hung Cartwright 81 y.o. female MRN: [id number]  Unit/Bed#: -01 Encounter: [id number]      ASSESSMENT & PLAN    Acute kidney injury-back secondary to ATN in the setting of hypotension, could be related to HRS with an elevated bilirubin, bile cast nephropathy? Contrast associated nephropathy? 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, hydrochlorothiazide and history of liver cirrhosis  Was on salt tablets and torsemide  Current sodium levels are trending upwards and this is likely secondary to access to free water  Low bicarbonate  In the setting of cirrhosis unfortunately has decompensated further  Hypotension  A-line pressures are adequate  Acute liver injury  Secondary to autoimmune hepatitis  Currently on steroids  Unfortunately is decompensated and required intubation overnight  Notes reviewed from critical care and GI      SUBJECTIVE:    Patient was seen today  unfortunately her condition is worsened she required intubation last night  Her creatinine has remained stable  Now a level 2 DNR    12 point review of systems was otherwise negative besides what is mentioned above.     Medications:    Current Facility-Administered Medications:     acetylcysteine (ACETADOTE) 10,000 mg in dextrose 5 % 1,000 mL IVPB, 100 mg/kg, Intravenous, Once, Masco RAJ Lewis, Last Rate: 65.6 mL/hr at 10/14/23 2320, 10,000 mg at 10/14/23 2320    albumin human (FLEXBUMIN) 25 % injection 25 g, 25 g, Intravenous, Q6H, RAJ Little, Last Rate: 0 mL/hr at 10/14/23 1135, 25 g at 10/15/23 1407    aztreonam (AZACTAM) 1,000 mg in sodium chloride 0.9 % 50 mL IVPB, 1,000 mg, Intravenous, Q12H, Masco RAJ Lewis, Stopped at 10/15/23 0835    chlorhexidine (PERIDEX) 0.12 % oral rinse 15 mL, 15 mL, Mouth/Throat, Q12H [address], RAJ Little, 15 mL at 10/15/23 0803    fentaNYL 1000 mcg in sodium chloride 0.9% 100mL infusion, 75 mcg/hr, Intravenous, Continuous, Nickie RAJ Caicedo, Last Rate: 7.5 mL/hr at 10/15/23 1231, 75 mcg/hr at 10/15/23 1231    fentanyl citrate (PF) 100 MCG/2ML 50 mcg, 50 mcg, Intravenous, Q4H PRN, RAJ Timmons, 50 mcg at 10/15/23 0475    hydrALAZINE (APRESOLINE) injection 20 mg, 20 mg, Intravenous, Q4H PRN, RAJ Timmons, 20 mg at 10/15/23 0930    insulin regular (HumuLIN R,NovoLIN R) 1 Units/mL in sodium chloride 0.9 % 100 mL infusion, 0.3-21 Units/hr, Intravenous, Titrated, RAJ Portillo, Last Rate: 1 mL/hr at 10/15/23 1406, 1 Units/hr at 10/15/23 1406    lactulose oral solution 30 g, 30 g, Oral, 4x Daily, RAJ Little, 30 g at 10/15/23 1152    levalbuterol (XOPENEX) inhalation solution 1.25 mg, 1.25 mg, Nebulization, Q4H PRN, RAJ Timmons, 1.25 mg at 10/14/23 1753    levothyroxine tablet 175 mcg, 175 mcg, Oral, Early Morning, RAJ Little, 175 mcg at 10/15/23 5174    methylPREDNISolone sodium succinate (Solu-MEDROL) 500 mg in sodium chloride 0.9 % 250 mL IVPB, 500 mg, Intravenous, Q12H DEON, RAJ Portillo, Last Rate: 250 mL/hr at 10/15/23 1221, 500 mg at 10/15/23 1221    moisture barrier miconazole 2% cream (aka BIRDIE MOISTURE BARRIER ANTIFUNGAL CREAM), , Topical, BID, RAJ Timmons, Given at 10/15/23 0856    nystatin (MYCOSTATIN) cream, , Topical, BID, RAJ Timmons, Given at 10/15/23 0856    octreotide (SandoSTATIN) injection 100 mcg, 100 mcg, Subcutaneous, Q8H [address], RAJ Little, 100 mcg at 10/15/23 1407    pantoprazole (PROTONIX) injection 40 mg, 40 mg, Intravenous, Q12H [address], RAJ Little, 40 mg at 10/15/23 0842    potassium chloride 20 mEq IVPB (premix), 20 mEq, Intravenous, Q2H, RAJ Bentley, Last Rate: 50 mL/hr at 10/15/23 1401, 20 mEq at 10/15/23 1401    propofol (DIPRIVAN) 1000 mg in 100 mL infusion (premix), 5-50 mcg/kg/min, Intravenous, Titrated, RAJ Sepulveda, Last Rate: 3.2 mL/hr at 10/15/23 1338, 5 mcg/kg/min at 10/15/23 1338    rifaximin (XIFAXAN) tablet 550 mg, 550 mg, Oral, Q12H DEON, RAJ Sepulveda, 550 mg at 10/15/23 1153    sodium bicarbonate tablet 1,300 mg, 1,300 mg, Per NG Tube, TID after meals, RAJ Anne, 1,300 mg at 10/15/23 1153    torsemide (DEMADEX) tablet 10 mg, 10 mg, Oral, Daily, RAJ Sepulveda, 10 mg at 10/15/23 1152    OBJECTIVE:    Vitals:    10/15/23 1418 10/15/23 1430 10/15/23 1452 10/15/23 1500   BP: (!) 117/46  110/59    BP Location:       Pulse: 75 73 68 79   Resp: 12  12    Temp: 97.7 °F (36.5 °C)  97.6 °F (36.4 °C)    TempSrc:       SpO2:  99%  100%   Weight:       Height:            Temp:  [97.3 °F (36.3 °C)-98.9 °F (37.2 °C)] 97.6 °F (36.4 °C)  HR:  [] 79  Resp:  [12-40] 12  BP: (109-191)/() 110/59  SpO2:  [92 %-100 %] 100 %     Body mass index is 40.26 kg/m². Weight (last 2 days)       Date/Time Weight    10/15/23 0600 106 (234.57)            I/O last 3 completed shifts: In: 3492.7 [I.V.:905.4;  Blood:200; NG/GT:500; IV Piggyback:1887.3]  Out: 800 [Urine:300; Stool:500]    I/O this shift:  In: 1829.2 [I.V.:36.6; Blood:649; NG/GT:500; IV Piggyback:643.6]  Out: -       Physical exam:    General: no acute distress, cooperative  Eyes: conjunctivae pink, anicteric sclerae  ENT: ET tube in place  Neck: ROM intact, no JVD  Chest: No respiratory distress, no accessory muscle use  CVS: normal rate, non pericardial friction rub  Abdomen: Distended abdomen  Extremities: no edema of both legs  Skin: Jaundice  Neuro: awake, alert, oriented, grossly intact  Psych:  Pleasant affect    Invasive Devices:      Lab Results:   Results from last 7 days   Lab Units 10/15/23  1106 10/15/23  0423 10/14/23  2340 10/14/23  2052 10/14/23  1918 10/14/23  1623 10/14/23  1419 10/14/23  0444 10/13/23  1348 10/13/23  0922 10/13/23  0920 10/12/23  0203 10/11/23  0958 10/11/23  0016 10/10/23  0453   WBC Thousand/uL  --  6.13  --   --   --   --   --  7.84  --  10.08  --  12.79*  --  13.97* 6.16   HEMOGLOBIN g/dL 8.2* 7.7*  --   --   --   --   --  9.1*  --  9.6*  --  11.1*  --  11.7 12.1   I STAT HEMOGLOBIN g/dl  --   --   --  9.5*  --  8.8*  --   --   --   --   --   --   --   --   --    HEMATOCRIT %  --  22.4*  --   --   --   --   --  27.0*  --  29.1*  --  33.2*  --  35.9 35.6   HEMATOCRIT, ISTAT %  --   --   --  28*  --  26*  --   --   --   --   --   --   --   --   --    PLATELETS Thousands/uL  --  70* 85*  --   --   --   --  107*  --  112*  --  130*  --  110* 139*   POTASSIUM mmol/L 3.5 3.1*  --   --  3.9  --  3.4* 3.2*  --   --  3.3* 3.6   < > 3.8 3.7   CHLORIDE mmol/L 113* 114*  --   --  113*  --  112* 111*  --   --  109* 105   < > 105 103   CO2 mmol/L 20* 18*  --   --  15*  --  18* 17*  --   --  16* 18*   < > 15* 24   CO2, I-STAT mmol/L  --   --   --  18*  --  17*  --   --   --   --   --   --   --   --   --    BUN mg/dL 70* 72*  --   --  72*  --  72* 72*  --   --  63* 55*   < > 45* 32*   CREATININE mg/dL 2.05* 2.09*  --   --  2.28*  --  2.32* 2.53*  --   --  2.85* 3.28*   < > 2.92* 1.76*   CALCIUM mg/dL 10.1 9.8  --   --  9.6  --  9.9 9.7  --   --  9.1 8.4   < > 8.5 8.5   MAGNESIUM mg/dL  --  2.5  --   --   --   --   --  2.2  --   --   --  2.3  --  2.2 1.7*   PHOSPHORUS mg/dL  --  2.3  --   --   --   --   --  3.6  --   --   --   --   --   --   --    ALK PHOS U/L  --  45  --   --   --   --   --  60  --   --  64 94  --  97 115*   ALT U/L  --  127*  --   --   --   --   --  168*  --   --  191* 293*  --  355* 477*   AST U/L  --  63*  --   --   --   --   --  92*  --   --  105* 193*  --  376* 683*   GLUCOSE, ISTAT mg/dl  --   --   --  178*  --  188*  --   --   --   --   --   --   --   --   --    BLOOD CULTURE   --   --   --   --   --   --   --   --  No Growth at 24 hrs.   No Growth at 24 hrs.  --   --   -- --   --   --     < > = values in this interval not displayed. Portions of the record may have been created with voice recognition software. Occasional wrong word or "sound a like" substitutions may have occurred due to the inherent limitations of voice recognition software. Read the chart carefully and recognize, using context, where substitutions have occurred. If you have any questions, please contact the dictating provider.

## 2023-10-15 NOTE — ASSESSMENT & PLAN NOTE
In the setting of acute hepatic failure, worsening ammonia   10/14 ETT for airway protection, worsening respiratory status as above   No CTH since admission, though given progressively worse INR, will obtain [facility]     Plan:  CTH pending   Serial neuro assessments   Maintain normothermia, normoglycemia   Ammonemia treatment as above

## 2023-10-15 NOTE — ASSESSMENT & PLAN NOTE
Progressive respiratory failure through the afternoon with tachypnea, sonorous respirations, forced/prolonged expirations refractory to xopenex and racemic epi   10/14 ETT for airway protection, respiratory insufficiency after speaking to the family at length, who were all in agreement    Plan:   Vent day 2  ACVC 12/400/40/6   Sedation: propofol for goal RASS -1  Pain: fentanyl PRN  Wean fio2 as tolerated for goal spo2 > 90%  Scheduled oral care, HOB 30 degrees

## 2023-10-15 NOTE — ASSESSMENT & PLAN NOTE
Progressively worsening on serial BMP   Most recent serum bicarb 15, AG 17  Likely multifactorial etiology in the setting of worsening hepatic failure, lactic acidosis     Plan:   Volume overloaded on clinical exam -- will hold on further crystalloid   Now intubated, will continue to trend serially   CO2 improving slightly on trending labs

## 2023-10-15 NOTE — PLAN OF CARE
Problem: MOBILITY - ADULT  Goal: Maintain or return to baseline ADL function  Description: INTERVENTIONS:  -  Assess patient's ability to carry out ADLs; assess patient's baseline for ADL function and identify physical deficits which impact ability to perform ADLs (bathing, care of mouth/teeth, toileting, grooming, dressing, etc.)  - Assess/evaluate cause of self-care deficits   - Assess range of motion  - Assess patient's mobility; develop plan if impaired  - Assess patient's need for assistive devices and provide as appropriate  - Encourage maximum independence but intervene and supervise when necessary  - Involve family in performance of ADLs  - Assess for home care needs following discharge   - Consider OT consult to assist with ADL evaluation and planning for discharge  - Provide patient education as appropriate  Outcome: Progressing  Goal: Maintains/Returns to pre admission functional level  Description: INTERVENTIONS:  - Perform BMAT or MOVE assessment daily.   - Set and communicate daily mobility goal to care team and patient/family/caregiver. - Collaborate with rehabilitation services on mobility goals if consulted  - Perform Range of Motion 2 times a day. - Reposition patient every 2 hours.   - Dangle patient 2 times a day  - Stand patient 2 times a day  - Ambulate patient 2 times a day  - Out of bed to chair 2 times a day   - Out of bed for meals 2 times a day  - Out of bed for toileting  - Record patient progress and toleration of activity level   Outcome: Progressing     Problem: PAIN - ADULT  Goal: Verbalizes/displays adequate comfort level or baseline comfort level  Description: Interventions:  - Encourage patient to monitor pain and request assistance  - Assess pain using appropriate pain scale  - Administer analgesics based on type and severity of pain and evaluate response  - Implement non-pharmacological measures as appropriate and evaluate response  - Consider cultural and social influences on pain and pain management  - Notify physician/advanced practitioner if interventions unsuccessful or patient reports new pain  Outcome: Progressing     Problem: INFECTION - ADULT  Goal: Absence or prevention of progression during hospitalization  Description: INTERVENTIONS:  - Assess and monitor for signs and symptoms of infection  - Monitor lab/diagnostic results  - Monitor all insertion sites, i.e. indwelling lines, tubes, and drains  - Monitor endotracheal if appropriate and nasal secretions for changes in amount and color  - [place] appropriate cooling/warming therapies per order  - Administer medications as ordered  - Instruct and encourage patient and family to use good hand hygiene technique  - Identify and instruct in appropriate isolation precautions for identified infection/condition  Outcome: Progressing  Goal: Absence of fever/infection during neutropenic period  Description: INTERVENTIONS:  - Monitor WBC    Outcome: Progressing     Problem: SAFETY ADULT  Goal: Maintain or return to baseline ADL function  Description: INTERVENTIONS:  -  Assess patient's ability to carry out ADLs; assess patient's baseline for ADL function and identify physical deficits which impact ability to perform ADLs (bathing, care of mouth/teeth, toileting, grooming, dressing, etc.)  - Assess/evaluate cause of self-care deficits   - Assess range of motion  - Assess patient's mobility; develop plan if impaired  - Assess patient's need for assistive devices and provide as appropriate  - Encourage maximum independence but intervene and supervise when necessary  - Involve family in performance of ADLs  - Assess for home care needs following discharge   - Consider OT consult to assist with ADL evaluation and planning for discharge  - Provide patient education as appropriate  Outcome: Progressing  Goal: Maintains/Returns to pre admission functional level  Description: INTERVENTIONS:  - Perform BMAT or MOVE assessment daily.   - Set and communicate daily mobility goal to care team and patient/family/caregiver. - Collaborate with rehabilitation services on mobility goals if consulted  - Perform Range of Motion 2 times a day. - Reposition patient every 2 hours.   - Dangle patient 2 times a day  - Stand patient 2 times a day  - Ambulate patient 2 times a day  - Out of bed to chair 2 times a day   - Out of bed for meals 2 times a day  - Out of bed for toileting  - Record patient progress and toleration of activity level   Outcome: Progressing  Goal: Patient will remain free of falls  Description: INTERVENTIONS:  - Educate patient/family on patient safety including physical limitations  - Instruct patient to call for assistance with activity   - Consult OT/PT to assist with strengthening/mobility   - Keep Call bell within reach  - Keep bed low and locked with side rails adjusted as appropriate  - Keep care items and personal belongings within reach  - Initiate and maintain comfort rounds  - Make Fall Risk Sign visible to staff  - Offer Toileting every 2 Hours, in advance of need  - Initiate/Maintain bed alarm  - Obtain necessary fall risk management equipment  - Apply yellow socks and bracelet for high fall risk patients  - Consider moving patient to room near nurses station  Outcome: Progressing     Problem: DISCHARGE PLANNING  Goal: Discharge to home or other facility with appropriate resources  Description: INTERVENTIONS:  - Identify barriers to discharge w/patient and caregiver  - Arrange for needed discharge resources and transportation as appropriate  - Identify discharge learning needs (meds, wound care, etc.)  - Arrange for interpretive services to assist at discharge as needed  - Refer to Case Management Department for coordinating discharge planning if the patient needs post-hospital services based on physician/advanced practitioner order or complex needs related to functional status, cognitive ability, or social support system  Outcome: Progressing     Problem: Knowledge Deficit  Goal: Patient/family/caregiver demonstrates understanding of disease process, treatment plan, medications, and discharge instructions  Description: Complete learning assessment and assess knowledge base. Interventions:  - Provide teaching at level of understanding  - Provide teaching via preferred learning methods  Outcome: Progressing     Problem: Potential for Falls  Goal: Patient will remain free of falls  Description: INTERVENTIONS:  - Educate patient/family on patient safety including physical limitations  - Instruct patient to call for assistance with activity   - Consult OT/PT to assist with strengthening/mobility   - Keep Call bell within reach  - Keep bed low and locked with side rails adjusted as appropriate  - Keep care items and personal belongings within reach  - Initiate and maintain comfort rounds  - Make Fall Risk Sign visible to staff  - Offer Toileting every 2 Hours, in advance of need  - Initiate/Maintain bed alarm  - Obtain necessary fall risk management equipment  - Apply yellow socks and bracelet for high fall risk patients  - Consider moving patient to room near nurses station  Outcome: Progressing     Problem: Nutrition/Hydration-ADULT  Goal: Nutrient/Hydration intake appropriate for improving, restoring or maintaining nutritional needs  Description: Monitor and assess patient's nutrition/hydration status for malnutrition. Collaborate with interdisciplinary team and initiate plan and interventions as ordered. Monitor patient's weight and dietary intake as ordered or per policy. Utilize nutrition screening tool and intervene as necessary. Determine patient's food preferences and provide high-protein, high-caloric foods as appropriate.      INTERVENTIONS:  - Monitor oral intake, urinary output, labs, and treatment plans  - Assess nutrition and hydration status and recommend course of action  - Evaluate amount of meals eaten  - Assist patient with eating if necessary   - Allow adequate time for meals  - Recommend/ encourage appropriate diets, oral nutritional supplements, and vitamin/mineral supplements  - Order, calculate, and assess calorie counts as needed  - Recommend, monitor, and adjust tube feedings and TPN/PPN based on assessed needs  - Assess need for intravenous fluids  - Provide specific nutrition/hydration education as appropriate  - Include patient/family/caregiver in decisions related to nutrition  Outcome: Progressing     Problem: Prexisting or High Potential for Compromised Skin Integrity  Goal: Skin integrity is maintained or improved  Description: INTERVENTIONS:  - Identify patients at risk for skin breakdown  - Assess and monitor skin integrity  - Assess and monitor nutrition and hydration status  - Monitor labs   - Assess for incontinence   - Turn and reposition patient  - Assist with mobility/ambulation  - Relieve pressure over bony prominences  - Avoid friction and shearing  - Provide appropriate hygiene as needed including keeping skin clean and dry  - Evaluate need for skin moisturizer/barrier cream  - Collaborate with interdisciplinary team   - Patient/family teaching  - Consider wound care consult   Outcome: Progressing     Problem: NEUROSENSORY - ADULT  Goal: Achieves stable or improved neurological status  Description: INTERVENTIONS  - Monitor and report changes in neurological status  - Monitor vital signs such as temperature, blood pressure, glucose, and any other labs ordered   - Initiate measures to prevent increased intracranial pressure  - Monitor for seizure activity and implement precautions if appropriate      Outcome: Progressing  Goal: Remains free of injury related to seizures activity  Description: INTERVENTIONS  - Maintain airway, patient safety  and administer oxygen as ordered  - Monitor patient for seizure activity, document and report duration and description of seizure to physician/advanced practitioner  - If seizure occurs,  ensure patient safety during seizure  - Reorient patient post seizure  - Seizure pads on all 4 side rails  - Instruct patient/family to notify RN of any seizure activity including if an aura is experienced  - Instruct patient/family to call for assistance with activity based on nursing assessment  - Administer anti-seizure medications if ordered    Outcome: Progressing     Problem: CARDIOVASCULAR - ADULT  Goal: Maintains optimal cardiac output and hemodynamic stability  Description: INTERVENTIONS:  - Monitor I/O, vital signs and rhythm  - Monitor for S/S and trends of decreased cardiac output  - Administer and titrate ordered vasoactive medications to optimize hemodynamic stability  - Assess quality of pulses, skin color and temperature  - Assess for signs of decreased coronary artery perfusion  - Instruct patient to report change in severity of symptoms  Outcome: Progressing     Problem: RESPIRATORY - ADULT  Goal: Achieves optimal ventilation and oxygenation  Description: INTERVENTIONS:  - Assess for changes in respiratory status  - Assess for changes in mentation and behavior  - Position to facilitate oxygenation and minimize respiratory effort  - Oxygen administered by appropriate delivery if ordered  - Initiate smoking cessation education as indicated  - Encourage broncho-pulmonary hygiene including cough, deep breathe, Incentive Spirometry  - Assess the need for suctioning and aspirate as needed  - Assess and instruct to report SOB or any respiratory difficulty  - Respiratory Therapy support as indicated  Outcome: Progressing     Problem: GASTROINTESTINAL - ADULT  Goal: Maintains or returns to baseline bowel function  Description: INTERVENTIONS:  - Assess bowel function  - Encourage oral fluids to ensure adequate hydration  - Administer IV fluids if ordered to ensure adequate hydration  - Administer ordered medications as needed  - Encourage mobilization and activity  - Consider nutritional services referral to assist patient with adequate nutrition and appropriate food choices  Outcome: Progressing     Problem: METABOLIC, FLUID AND ELECTROLYTES - ADULT  Goal: Electrolytes maintained within normal limits  Description: INTERVENTIONS:  - Monitor labs and assess patient for signs and symptoms of electrolyte imbalances  - Administer electrolyte replacement as ordered  - Monitor response to electrolyte replacements, including repeat lab results as appropriate  - Instruct patient on fluid and nutrition as appropriate  Outcome: Progressing  Goal: Fluid balance maintained  Description: INTERVENTIONS:  - Monitor labs   - Monitor I/O and WT  - Instruct patient on fluid and nutrition as appropriate  - Assess for signs & symptoms of volume excess or deficit  Outcome: Progressing  Goal: Glucose maintained within target range  Description: INTERVENTIONS:  - Monitor Blood Glucose as ordered  - Assess for signs and symptoms of hyperglycemia and hypoglycemia  - Administer ordered medications to maintain glucose within target range  - Assess nutritional intake and initiate nutrition service referral as needed  Outcome: Progressing     Problem: SKIN/TISSUE INTEGRITY - ADULT  Goal: Skin Integrity remains intact(Skin Breakdown Prevention)  Description: Assess:  -Perform [place] assessment every shift  -Clean and moisturize skin every shift  -Inspect skin when repositioning, toileting, and assisting with ADLS  -Assess under medical devices such as SCDs every shift  -Assess extremities for adequate circulation and sensation     Bed Management:  -Have minimal linens on bed & keep smooth, unwrinkled  -Change linens as needed when moist or perspiring  -Avoid sitting or lying in one position for more than 2 hours while in bed  -Keep HOB at 30degrees     Toileting:  -Offer bedside commode  -Assess for incontinence every shift  -Use incontinent care products after each incontinent episode     Activity:  -Encourage or provide ROM exercises   -Turn and reposition patient every 2 Hours  -Use appropriate equipment to lift or move patient in bed    Skin Care:  -Avoid use of baby powder, tape, friction and shearing, hot water or constrictive clothing  -Relieve pressure over bony prominences using heel boots  -Do not massage red bony areas    Next Steps:  -Teach patient strategies to minimize risks    -Consider consults to  interdisciplinary teams   Outcome: Progressing  Goal: Incision(s), wounds(s) or drain site(s) healing without S/S of infection  Description: INTERVENTIONS  - Assess and document dressing, incision, wound bed, drain sites and surrounding tissue  - Provide patient and family education  - Perform skin care/dressing changes every shift  Outcome: Progressing  Goal: Pressure injury heals and does not worsen  Description: Interventions:  - Implement low air loss mattress or specialty surface (Criteria met)  - Apply silicone foam dressing  - Apply fecal or urinary incontinence containment device   - Perform passive or active ROM every shift  - Turn and reposition patient & offload bony prominences every 2 hours   - Utilize friction reducing device or surface for transfers   - Consider consults to  interdisciplinary teams   - Use incontinent care products after each incontinent episode   - Consider nutrition services referral as needed  Outcome: Progressing     Problem: HEMATOLOGIC - ADULT  Goal: Maintains hematologic stability  Description: INTERVENTIONS  - Assess for signs and symptoms of bleeding or hemorrhage  - Monitor labs  - Administer supportive blood products/factors as ordered and appropriate  Outcome: Progressing

## 2023-10-15 NOTE — RESPIRATORY THERAPY NOTE
Pt on vent with 100% fio2 at time of procedure. 3 bal's done. Pt tolerated procedure .  Samples taken to lab

## 2023-10-15 NOTE — PROGRESS NOTES
Progress note - Gastroenterology   Anthony Roc 81 y.o. female MRN: [id number]  Unit/Bed#: -01 Encounter: [id number]    ASSESSMENT and PLAN    Patient intubated and sedated 10/14, continues with NG tube. 1.  Acute liver injury, suspect due to autoimmune hepatitis rather than DILI from gabapentin   2. Elevated autoimmune markers  3. 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 Minimal macrovesicular steatosis (less than 5%). -Liver enzymes continue trending down with a.m. labs today; AST 63, , alkaline phosphatase 45, T. bili 18.05. INR 4.70, vitamin K administered. Ammonia 122. - Monitor liver enzymes, INR, ammonia  - Continue lactulose through NG tube  - Continue prednisone 40 daily  -Continue pantoprazole 40 mg IV push every 12 hours  -Agree with NAC,N-acetylcysteine  -Appreciate nephrology follow-up and recommendations    4. Anemia  5  Melena  Melena reported overnight with bowel movement. Patient's nurse reports brown BM as of this morning. 2 units FFP administered (3 units so far this admission ) with hemoglobin 9.1 as of 10/14 a.m., hemoglobin 7.7 with a.m. labs today. Discuss patient with Dr. Sheial Haas on rounds regarding, inpatient EGD and/or colonoscopy with stabilization of patient. Chief Complaint   Patient presents with    Abnormal Lab     Elevated liver enzymes from routine blood work done yesterday. [address] [facility]   Patient currently intubated and sedated. Possible melena/black tarry stools last evening. Patient was given 2 units FFP. Patient's nurse this morning notes brown stool.   Multiple attempts to place rectal tube, ?rectal prolapse . /80   Pulse 103   Temp 97.5 °F (36.4 °C) (Axillary)   Resp 20   Ht 5' 4" (1.626 m)   Wt 106 kg (234 lb 9.1 oz)   SpO2 99%   BMI 40.26 kg/m²     PHYSICALEXAM  General appearance: Intubated and sedated. Eyes: PERLLA, EOMI, no icterus   Head: Normocephalic, without obvious abnormality, atraumatic  Lungs: clear to auscultation bilaterally  Heart: regular rate and rhythm, S1, S2 normal, no murmur, click, rub or gallop  Abdomen: soft, non-tender; bowel sounds normal; no masses,  no organomegaly. NG-tube present. Extremities: extremities normal, atraumatic, no cyanosis.   Bilateral upper and lower extremity edema   Neurologic: Grossly normal    Lab Results   Component Value Date    GLUCOSE 178 (H) 10/14/2023    CALCIUM 9.8 10/15/2023    K 3.1 (L) 10/15/2023    CO2 18 (L) 10/15/2023     (H) 10/15/2023    BUN 72 (H) 10/15/2023    CREATININE 2.09 (H) 10/15/2023     Lab Results   Component Value Date    WBC 6.13 10/15/2023    HGB 7.7 (L) 10/15/2023    HCT 22.4 (L) 10/15/2023    MCV 86 10/15/2023    PLT 70 (L) 10/15/2023     Lab Results   Component Value Date     (H) 10/15/2023    AST 63 (H) 10/15/2023    GGT 76 (H) 10/03/2023    ALKPHOS 45 10/15/2023     No results found for: "AMYLASE"  Lab Results   Component Value Date    LIPASE 57 10/02/2023     Lab Results   Component Value Date    IRON 203 10/03/2023    TIBC <258 10/03/2023    FERRITIN 164 10/03/2023     Lab Results   Component Value Date    INR 4.78 (H) 10/15/2023

## 2023-10-15 NOTE — ASSESSMENT & PLAN NOTE
Progressively worsening on serial BMP   Most recent serum bicarb 15, AG 17  Likely multifactorial etiology in the setting of worsening hepatic failure, lactic acidosis     Plan:   Volume overloaded on clinical exam -- will hold on further crystalloid   Now intubated, will continue to trend serially

## 2023-10-15 NOTE — SEPSIS NOTE
Sepsis Note   Rosy Goldman 81 y.o. female MRN: [id number]  Unit/Bed#: -01 Encounter: [id number]      Concern for UTI POA with admission UA showing large leuks, + nitrites, innum bacteria   Family denies urinary complaints prior to admission   Culture data review does not yield previous positive cultures or resistant culture data   Elevated tbili noted, but likely not in keeping with sepsis as etiology, as this was presenting complaint and is being treated separately   LA 3.5 acknowledged, however given concern for volume overload, will not bolus with crystalloid, but instead continue with scheduled albumin   Given worsening clinical status, will treat for UTI with Aztreonam (allergies)  BC x2, Urine cx pending        Initial Sepsis Screening       Row Name 10/14/23 2129 10/14/23 2000             Is the patient's history suggestive of a new or worsening infection? --  -ES Yes (Proceed)  -ES       Suspected source of infection --  -ES urinary tract infection  -ES       Indicate SIRS criteria --  -ES Tachypnea > 20 resp per min; Tachycardia > 90 bpm  -ES       Are two or more of the above signs & symptoms of infection both present and new to the patient? --  -ES Yes (Proceed)  -ES       Assess for evidence of organ dysfunction: Are any of the below criteria present within 6 hours of suspected infection and SIRS criteria that are NOT considered to be chronic conditions? -- Lactate > 2. 0; Bilirubin > 2. 0;Creatinine > 2.0 AND > 0.5 above baseline;New need for invasive/non-invasive ventilation  -ES       Date of presentation of severe sepsis -- 10/14/23  -ES       Time of presentation of severe sepsis -- 2000  -ES       Sepsis Note: Click "NEXT" below (NOT "close") to generate sepsis note based on above information.  -- YES (proceed by clicking "NEXT")  -ES                 User Key  (r) = Recorded By, (t) = Taken By, (c) = Cosigned By      [facility] Name Provider Type    ES Raul Solano, [address] [place] [address] Nurse Practitioner Body mass index is 37.09 kg/m².   Wt Readings from Last 1 Encounters:   10/02/23 98 kg (216 lb 0.8 oz)        Ideal body weight: 54.7 kg (120 lb 9.5 oz)  Adjusted ideal body weight: 72 kg (158 lb 12.4 oz)

## 2023-10-15 NOTE — PROCEDURES
Intubation    Date/Time: 10/14/2023 8:06 PM    Performed by: RAJ Goodman  Authorized by: RAJ Goodman    Patient location:  Bedside  Consent:     Consent obtained:  Verbal    Consent given by:  Healthcare agent (son, daughter in law at bedside, daughter via telephone)    Risks discussed:  Aspiration, bleeding, death, hypoxia, dental trauma, laryngeal injury, pneumothorax and brain injury    Alternatives discussed:  Alternative treatment and observation  Universal protocol:     Site/side marked: yes      Immediately prior to procedure, a time out was called: yes      Patient identity confirmed:  Arm band, hospital-assigned identification number and anonymous protocol, patient vented/unresponsive  Pre-procedure details:     Patient status:  Altered mental status    Mallampati score:  3    Pretreatment medications:  See MAR for details and etomidate    Paralytics:  Succinylcholine  Indications:     Indications for intubation: respiratory distress and airway protection    Procedure details:     Preoxygenation:  Bag valve mask    CPR in progress: no      Intubation method:  Oral    Oral intubation technique: Alline Scripture. Laryngoscope blade: Mac 3    Tube size (mm):  8.0    Tube type:  Cuffed    Number of attempts:  1    Ventilation between attempts: no      Cricoid pressure: no      Tube visualized through cords: yes    Placement assessment:     ETT to lip:  21    Tube secured with:  ETT slater    Breath sounds:  Equal and absent over the epigastrium    Placement verification: chest rise, condensation, CXR verification, equal breath sounds and ETCO2 detector      CXR findings:  ETT in proper place    Ventilator settings:  [address] 12/350/100/6  Post-procedure details:     Patient tolerance of procedure: Tolerated well, no immediate complications  Comments:      Clemens Kehr continued with tachypnea, sonorous respirations, prolonged and forced expiration and lethargy.  Noisy upper airway breathing noted, refractory to racemic epi x1. In speaking with son, daughter in law, and daughter, decision is made to move forward with intubation. We discussed risks and benefits. All parties agreed. ETT passed on first attempt. CXR revealed ETT at the level of the roverto, and was retracted 2cm by RT. Repeat CXR confirms ETT placement.

## 2023-10-15 NOTE — ASSESSMENT & PLAN NOTE
Unclear if this is 2/2 infectious source or reaction to worsening hepatic failure, respiratory failure requiring ETT   SIRS criteria met 10/14 with tachypnea, tachycardia, LA 3.5 -- Sepsis alert called   Admission UA 10/2: large leuks, positive nitrites, innum bacteria   10/13 BC x2 -- neg x24H   Urine culture pending   10/14 -- LA 3.5, procal 0.38  Will not given further crystalloid IVF 2/2 concern for volume overload on clinical exam and lack of hypotension, will continue instead with scheduled albumin   Afebrile, no leukocytosis, HD stable     Plan:   Trend UA, LA, procal   Given allergies, will treat with Aztreonam for now after conferring with pharmacy   Abx D1: Aztreonam D1

## 2023-10-15 NOTE — ASSESSMENT & PLAN NOTE
In the setting of acute hepatic failure, worsening ammonia   10/14 ETT for airway protection, worsening respiratory status as above   No CTH since admission, though given progressively worse INR, will obtain [address]     Plan:  10/15 CTH -- **  Npw sedated on propofol for ETT  Serial neuro assessments   Maintain normothermia, normoglycemia   Ammonemia treatment as above

## 2023-10-15 NOTE — ASSESSMENT & PLAN NOTE
Lab Results   Component Value Date    HGBA1C 7.0 (H) 07/20/2023       Recent Labs     10/13/23  2358 10/14/23  0621 10/14/23  1143 10/14/23  1727   POCGLU 134 127 171* 189*       Blood Sugar Average: Last 72 hrs:  (P) 152.375    Does not appear to be on home regimen for DM2  Q6h SSI for goal glucose 140-180

## 2023-10-15 NOTE — ASSESSMENT & PLAN NOTE
Creat baseline 0.5-0.6  Creat peak this admission: 3.28  In the setting of HRS vs contrast nephropathy vs decreased PO intake 2/2 acute illness   Previously requiring midodrine earlier in her stay, which has since been discontinued given borderline hypertension     Plan:   Creat slowly downtrending -- continue to trend   Urinary retention protocol   Hold home torsemide, though may require eventual dosing of Lasix for volume overload on exam  Avoid hypotension, nephrotoxic agents

## 2023-10-15 NOTE — ASSESSMENT & PLAN NOTE
10/14 noted dark, tarry stools   Concern for GIB vs lactulose stool     Plan:   Continue NPO, NGT   Protonix IV BID   Check hemoccult   Check DIC panel, LDH, fibrinogen, ddimer   If hemoccult positive, will administer FFP given INR >6

## 2023-10-15 NOTE — ASSESSMENT & PLAN NOTE
10/2 admitted to [address] after being sent to the ER from rehab for her broken ankle with sudden jaundice   10/2 CT CAP -- Nodular liver morphology and sequela of portal venous hypertension suggesting cirrhosis. Minimal ascites around the liver and layering in the pelvis. Prominent lymph nodes at the katelynn hepatis presumably reactive to primary underlying liver dysfunction. No biliary ductal dilatation to suggest obstructive cause of jaundice  10/3 MRI abdomen -- Nodular liver surface contour suggestive of cirrhosis with sequela portal hypertension including trace perihepatic ascites. No suspicious hepatic mass. A 5 mm pancreatic uncinate process cystic lesion without worrisome features. No main pancreatic duct dilatation.  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   Slowly uptrending transaminitis since admission   10/7 peak AST/ALT of 707/466  10/11 peak Tbili 20.19  Worsening INR, now to 6.11   Vit K: 10/7 10/8, 10/13, 10/14 x2  FFP: 10/10 (1U prior to biopsy), 10/15 2U FFP for hemeoccult +    Plan:   Steroids D5: Solumedrol 40mg qd D2 (previously prednisone qd, until unable to take PO)  Albumin 24g q6h   Octreotide q8h   NAC initiated 10/14 -- continue   INR continues to rise -- Vit K 10 x1, 2U FFP overnight for INR >6 and hemoccult + x3  Ammonia levels continue to rise, though she is now having robust BM -- continue lactulose per NGT  Trend hepatic enzymes, ammonia, INR   Appreciate GI input   Avoid hypotension, hepatotoxic agents   Referral for transplant per GI

## 2023-10-15 NOTE — ASSESSMENT & PLAN NOTE
Creat baseline 0.5-0.6  Creat peak this admission: 3.28  In the setting of HRS vs contrast nephropathy vs decreased PO intake 2/2 acute illness   Previously requiring midodrine earlier in her stay, which has since been discontinued given borderline hypertension     Plan:   Creat slowly downtrending -- continue to trend   Urinary retention protocol   Hold home torsemide, though may require eventual dosing of Lasix for volume overload on exam (AM BNP pending)  Avoid hypotension, nephrotoxic agents

## 2023-10-15 NOTE — ASSESSMENT & PLAN NOTE
Home regimen: HCTZ, valsartan, sotalol, cardizem, torsemide -- all on hold given transaminitis and VALENTIN during admission     Plan:   Continue to hold above while on sedation   If persistently hypertensive, low threshold to add medications back slowly   Goal SBP <160

## 2023-10-15 NOTE — ASSESSMENT & PLAN NOTE
In the setting of acute hepatic failure, worsening ammonia   10/14 ETT for airway protection, worsening respiratory status as above   No CTH since admission, though given progressively worse INR, will obtain [address]     Plan:  10/15 CTH -- neg   Now sedated on propofol for ETT  Serial neuro assessments   Maintain normothermia, normoglycemia   Ammonemia treatment as above

## 2023-10-15 NOTE — ASSESSMENT & PLAN NOTE
10/2 admitted to [facility] after being sent to the ER from rehab for her broken ankle with sudden jaundice   10/2 CT CAP -- Nodular liver morphology and sequela of portal venous hypertension suggesting cirrhosis. Minimal ascites around the liver and layering in the pelvis. Prominent lymph nodes at the katelynn hepatis presumably reactive to primary underlying liver dysfunction. No biliary ductal dilatation to suggest obstructive cause of jaundice  10/3 MRI abdomen -- Nodular liver surface contour suggestive of cirrhosis with sequela portal hypertension including trace perihepatic ascites. No suspicious hepatic mass. A 5 mm pancreatic uncinate process cystic lesion without worrisome features. No main pancreatic duct dilatation.  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

## 2023-10-15 NOTE — ASSESSMENT & PLAN NOTE
Prior to admission, was in rehab pending discharge to home independently following fx   Will need PT/OT when appropriate

## 2023-10-15 NOTE — ASSESSMENT & PLAN NOTE
10/14 noted dark, tarry stools   Concern for GIB vs lactulose stool   Hemoccult + x3 overnight, 2U FFP given     Plan:   Continue NPO, NGT   Protonix IV BID   Check DIC panel, LDH, fibrinogen, ddimer

## 2023-10-15 NOTE — ASSESSMENT & PLAN NOTE
Lab Results   Component Value Date    HGBA1C 7.0 (H) 07/20/2023       Recent Labs     10/15/23  1232 10/15/23  1405 10/15/23  1551 10/15/23  1807   POCGLU 133 147* 156* 136         Blood Sugar Average: Last 72 hrs:  (P) 148.2254494660039706    Does not appear to be on home regimen for DM2  10/15 Insulin gtt for glucose 140-180 given SDS

## 2023-10-15 NOTE — ASSESSMENT & PLAN NOTE
Home regimen: Cardizem 360mg qd, sotalol BID  AC: Xarelto     Plan:   Hold Xarelto in the setting of supratherapeutic INR 2/2 hepatitis   Hold cardizem, sotalol given NPO initially -- will continue to hold given HR 80 and now post intubation on sedation   Goal HR <120  Continuous tele -- currently controlled AF

## 2023-10-15 NOTE — PROCEDURES
Arterial Line Insertion    Date/Time: 10/14/2023 11:30 PM    Performed by: Guzman Cunha by: RAJ Mulligan    Patient location:  ICU  Other Assisting Provider: Yes (comment) Darya Lyon RN)    Consent:     Consent obtained:  Verbal    Consent given by: patient's son. Risks discussed:  Bleeding, infection and repeat procedure  Universal protocol:     Site/side marked: yes      Immediately prior to procedure a time out was called: yes      Patient identity confirmed:  Arm band, hospital-assigned identification number and anonymous protocol, patient vented/unresponsive  Indications:     Indications: hemodynamic monitoring and multiple ABGs    Pre-procedure details:     Skin preparation:  Chlorhexidine    Preparation: Patient was prepped and draped in sterile fashion    Anesthesia (see MAR for exact dosages): Anesthesia method:  None  Procedure details:     Location / Tip of Catheter:  Radial    Laterality:  Right    Josesito's test performed: yes      Josesito's test abnormal: no      Needle gauge:  20 G    Placement technique:  Seldinger and ultrasound guided    Ultrasound image availability:  Not saved    Number of attempts:  1    Successful placement: yes      Transducer: waveform confirmed    Post-procedure details:     Post-procedure:  Secured with tape, sterile dressing applied, sutured and wrist guard applied    CMS:  Unchanged    Patient tolerance of procedure:   Tolerated well, no immediate complications

## 2023-10-15 NOTE — ASSESSMENT & PLAN NOTE
Unclear if this is 2/2 infectious source or reaction to worsening hepatic failure, respiratory failure requiring ETT   SIRS criteria met 10/14 with tachypnea, tachycardia, LA 3.5 -- Sepsis alert called   Admission UA 10/2: large leuks, positive nitrites, innum bacteria   10/13 BC x2 -- neg x24H   10/14 -- LA 3.5, procal 0.38  Afebrile, no leukocytosis, HD stable   10/15 LA cleared     Plan:   UC + ecoli, klebsiella   Abx D3: Aztreonam D3  Trend fever, WBC curve, procal

## 2023-10-15 NOTE — PROGRESS NOTES
[address] [facility]  Progress Note  Name: Jack Fisher  MRN: [id number]  Unit/Bed#: -01 I Date of Admission: 10/2/2023   Date of Service: 10/15/2023 I Hospital Day: 13    Assessment/Plan   * Acute respiratory failure [facility])  Assessment & Plan  Progressive respiratory failure through the afternoon with tachypnea, sonorous respirations, forced/prolonged expirations refractory to xopenex and racemic epi   10/14 ETT for airway protection, respiratory insufficiency after speaking to the family at length, who were all in agreement    Plan:   Vent day 2  ACVC 12/400/40/6   Sedation: propofol for goal RASS -1  Pain: fentanyl PRN  Wean fio2 as tolerated for goal spo2 > 90%  Scheduled oral care, HOB 30 degrees     Transaminitis  Assessment & Plan  10/2 admitted to [facility] after being sent to the ER from rehab for her broken ankle with sudden jaundice   10/2 CT CAP -- Nodular liver morphology and sequela of portal venous hypertension suggesting cirrhosis. Minimal ascites around the liver and layering in the pelvis. Prominent lymph nodes at the katelynn hepatis presumably reactive to primary underlying liver dysfunction. No biliary ductal dilatation to suggest obstructive cause of jaundice  10/3 MRI abdomen -- Nodular liver surface contour suggestive of cirrhosis with sequela portal hypertension including trace perihepatic ascites. No suspicious hepatic mass. A 5 mm pancreatic uncinate process cystic lesion without worrisome features. No main pancreatic duct dilatation.  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   Slowly uptrending transaminitis since admission   10/7 peak AST/ALT of 707/466  10/11 peak Tbili 20.19  Worsening INR, now to 6.11   Vit K: 10/7 10/8, 10/13, 10/14 x2  FFP: 10/10 (1U prior to biopsy), 10/15 2U FFP for hemeoccult +    Plan:   Steroids D5: Solumedrol 40mg qd D2 (previously prednisone qd, until unable to take PO)  Albumin 24g q6h   Octreotide q8h   NAC initiated 10/14 -- continue   INR continues to rise -- Vit K 10 x1, 2U FFP overnight for INR >6 and hemoccult + x3  Ammonia levels continue to rise, though she is now having robust BM -- continue lactulose per NGT  Trend hepatic enzymes, ammonia, INR   Appreciate GI input   Avoid hypotension, hepatotoxic agents   Referral for transplant per GI    SIRS (systemic inflammatory response syndrome) (HCC)  Assessment & Plan  Unclear if this is 2/2 infectious source or reaction to worsening hepatic failure, respiratory failure requiring ETT   SIRS criteria met 10/14 with tachypnea, tachycardia, LA 3.5 -- Sepsis alert called   Admission UA 10/2: large leuks, positive nitrites, innum bacteria   10/13 BC x2 -- neg x24H   Urine culture pending   10/14 -- LA 3.5, procal 0.38  Will not given further crystalloid IVF 2/2 concern for volume overload on clinical exam and lack of hypotension, will continue instead with scheduled albumin   Afebrile, no leukocytosis, HD stable     Plan:   Trend UA, LA, procal   Given allergies, will treat with Aztreonam for now after conferring with pharmacy   Abx D2: Aztreonam D2  Lactic improving, however noting it will be slow to clear given degree of hepatic impairment     Acute metabolic encephalopathy  Assessment & Plan  In the setting of acute hepatic failure, worsening ammonia   10/14 ETT for airway protection, worsening respiratory status as above   No CTH since admission, though given progressively worse INR, will obtain [address]     Plan:  10/15 CTH -- **  Npw sedated on propofol for ETT  Serial neuro assessments   Maintain normothermia, normoglycemia   Ammonemia treatment as above     Type 2 diabetes mellitus [facility])  Assessment & Plan  Lab Results   Component Value Date    HGBA1C 7.0 (H) 07/20/2023       Recent Labs     10/14/23  0621 10/14/23  1143 10/14/23  1727 10/14/23  2345   POCGLU 127 171* 189* 190* Blood Sugar Average: Last 72 hrs:  (P) [id number]    Does not appear to be on home regimen for DM2  Q6h SSI for goal glucose 914-160      Metabolic acidosis  Assessment & Plan  Progressively worsening on serial BMP   Most recent serum bicarb 15, AG 17  Likely multifactorial etiology in the setting of worsening hepatic failure, lactic acidosis     Plan:   Volume overloaded on clinical exam -- will hold on further crystalloid   Now intubated, will continue to trend serially     GIB (gastrointestinal bleeding)  Assessment & Plan  10/14 noted dark, tarry stools   Concern for GIB vs lactulose stool   Hemoccult + x3 overnight, 2U FFP given     Plan:   Continue NPO, NGT   Protonix IV BID   Check DIC panel, LDH, fibrinogen, ddimer     VALENTIN (acute kidney injury) ([address])  Assessment & Plan  Creat baseline 0.5-0.6  Creat peak this admission: 3.28  In the setting of HRS vs contrast nephropathy vs decreased PO intake 2/2 acute illness   Previously requiring midodrine earlier in her stay, which has since been discontinued given borderline hypertension     Plan:   Creat slowly downtrending -- continue to trend   Urinary retention protocol   Hold home torsemide, though may require eventual dosing of Lasix for volume overload on exam (AM BNP pending)  Avoid hypotension, nephrotoxic agents     Hyponatremia  Assessment & Plan  sNa jill this admission 129  Hx of SIADH, 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, torsemide as OP   Previously on salt tabs per Nephrology     Plan:   sNa currently WNL, though uptrending in the setting of NPO   Trend     Closed right ankle fracture  Assessment & Plan  Prior to admission, was in rehab pending discharge to home independently following fx   Will need PT/OT when appropriate     Diffuse interstitial pulmonary fibrosis ([address])  Assessment & Plan  10/2 CTAP -- Reticular interstitial lung abnormality (REENA) noted in both lower lobes   Not on home meds, does not follow with Pulm as OP     ADRIA (obstructive sleep apnea)  Assessment & Plan  Per anesthesia noted from 2016, does not tolerate CPAP     Mood disorder ([address])  Assessment & Plan  Home ambien held in the setting of [address] continued      Acquired hypothyroidism  Assessment & Plan  Cont home synthroid   Check TSH    Class 2 severe obesity due to excess calories with serious comorbidity and body mass index (BMI) of 35.0 to 35.9 in adult   Assessment & Plan  Noted     Essential hypertension  Assessment & Plan  Home regimen: HCTZ, valsartan, sotalol, cardizem, torsemide -- all on hold given transaminitis and VALENTIN during admission     Plan:   Continue to hold above while on sedation   If persistently hypertensive, low threshold to add medications back slowly   Goal SBP <160    Mixed hyperlipidemia  Assessment & Plan  Hold home statin 2/2 transaminitis     Paroxysmal atrial fibrillation (HCC)  Assessment & Plan  Home regimen: Cardizem 360mg qd, sotalol BID  AC: Xarelto     Plan:   Hold Xarelto in the setting of supratherapeutic INR 2/2 hepatitis   Hold cardizem, sotalol given NPO initially -- will continue to hold given HR 80 and now post intubation on sedation   Goal HR <120  Continuous tele -- currently controlled AF             ICU Core Measures     Vented Patient  VAP Bundle  VAP bundle ordered     A: Assess, Prevent, and Manage Pain Has pain been assessed? NA  Need for changes to pain regimen? NA   B: Both Spontaneous Awakening Trials (SATs) and Spontaneous Breathing Trials (SBTs) Plan to perform spontaneous awakening trial today? Yes   Plan to perform spontaneous breathing trial today? Yes   Obvious barriers to extubation? Yes   C: Choice of Sedation RASS Goal: -1 Drowsy  Need for changes to sedation or analgesia regimen? No   D: Delirium CAM-ICU: Unable to perform secondary to Acute cognitive dysfunction   E: Early Mobility  Plan for early mobility? Yes   F: Family Engagement Plan for family engagement today?  Yes       Antibiotic Review: Awaiting culture results. Review of Invasive Devices:        Lilibeth Plan: Keep arterial line for hemodynamic monitoring and frequent ABGs    Prophylaxis:  VTE VTE covered by:    None       Stress Ulcer  covered bypantoprazole (PROTONIX) injection 40 mg [[id number]]       Subjective   HPI/24hr events: She remained intubated and sedated overnight. Hemoccult + overnight, continued on protonix BID, 2U FFP given for INR >6. Remains HD stable. Objective                            Vitals I/O      Most Recent Min/Max in 24hrs   Temp 98.1 °F (36.7 °C) Temp  Min: 97.3 °F (36.3 °C)  Max: 98.2 °F (36.8 °C)   Pulse 66 Pulse  Min: 65  Max: 101   Resp (!) 24 Resp  Min: 16  Max: 36   /98 BP  Min: 109/69  Max: 198/110   O2 Sat 100 % SpO2  Min: 92 %  Max: 100 %      Intake/Output Summary (Last 24 hours) at 10/15/2023 0341  Last data filed at 10/14/2023 2319  Gross per 24 hour   Intake 2649.49 ml   Output 600 ml   Net 2049.49 ml         Diet NPO     Invasive Monitoring Physical exam   Arterial Line  Lilibeth /63  Arterial Line BP  Min: 113/42  Max: 148/63   MAP 84 mmHg  Arterial Line MAP (mmHg)  Min: 63 mmHg  Max: 84 mmHg    Physical Exam  Skin:     General: Skin is warm. Capillary Refill: Capillary refill takes less than 2 seconds. Coloration: Skin is jaundiced. HENT:      Head: Normocephalic and atraumatic. Comments: + NGT  Neck:     Cardiovascular:      Rate and Rhythm: Normal rate and regular rhythm. Musculoskeletal:      Right lower le+ Pitting Edema present. Left lower le+ Pitting Edema present. Abdominal:      General: Abdomen is flat. Bowel sounds are decreased. Palpations: Abdomen is soft. Tenderness: There is no abdominal tenderness. Constitutional:       General: She is not in acute distress. Appearance: She is well-developed. She is obese. She is ill-appearing. Interventions: She is sedated, intubated and restrained. Pulmonary:      Effort: She is intubated. Breath sounds: Examination of the right-lower field reveals decreased breath sounds. Examination of the left-lower field reveals decreased breath sounds. Decreased breath sounds present. Comments: ACVC 12/400/40/6  Neurological:      Comments: Sedated on propofol   Genitourinary/Anorectal:     Comments: Voiding independently   Vitals and nursing note reviewed. Diagnostic Studies      EKG: NSR with infrequent PVCs on tele   Imaging:   CT head wo contrast   Final Result      No acute intracranial abnormality. Workstation performed: [id number]         XR abdomen 1 view kub   Final Result      Tip of the NG tube is coursing below the left hemidiaphragm. Workstation performed: [id number]         XR abdomen 1 vw portable   Final Result      Tip of the NG tube is coursing below the left hemidiaphragm. The distal aspect is coiled in the stomach and the tip terminates near the GE junction. Workstation performed: [id number]         XR chest portable ICU   Final Result      Tip of the NG tube is coursing below the left hemidiaphragm. The distal aspect is coiled in the stomach and the tip terminates near the GE junction. Workstation performed: [id number]         XR chest portable ICU   Final Result      No acute cardiopulmonary disease. NG tube in stomach. Workstation performed: [id number]         XR chest portable   Final Result      No acute cardiopulmonary disease. Workstation performed: [id number]         IR biopsy transjugular liver   Final Result      1. Transjugular liver biopsy with pressure measurements. 2. Pressure measurements as follows:   Right atrium: 12/6 mmHg (mean 8)   Free hepatic: 13/9 mmHg (mean 11)   Wedged hepatic: 22/18 mmHg (mean 20)      Plan:      Specimens sent for evaluation.       Workstation performed: [id number]         MRI abdomen w wo contrast and mrcp   Final Result      Nodular liver surface contour suggestive of cirrhosis with sequela portal hypertension including trace perihepatic ascites. No suspicious hepatic mass. A 5 mm pancreatic uncinate process cystic lesion without worrisome features. No main pancreatic duct dilatation. 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 For simple cyst(s) less than 1.5 cm, recommend followup    every 2 year for 2 times. After 4 years, no more followups. Recommend next followup in 2 years. Preferred imaging modality: abdomen MRI and MRCP with and without IV contrast, or triple phase abdomen CT with IV contrast, or abdomen MRI and MRCP without IV    contrast.      The recommendations regarding pancreatic findings assumes that patient does not have family history of pancreatic cancer nor have any symptoms potentially attributable to pancreatic cystic lesions (hyperamylasemia, recent-onset diabetes, severe    epigastric pain, weight loss, steatorrhea, or jaundice.) If these conditions are not true, then management should be deferred to judgement of specialists such as gastroenterologists or oncologic surgeons. REFERENCES:   Recommendations are based on recent consensus statements on management of pancreatic cystic lesions from [address] [facility], [address] [place] [address] of Radiology, the journal Pancreatology, and our own institutional consensus. [facility] Guidelines on the Diagnosis and Management of Asymptomatic Neoplastic Pancreatic Cysts. Gastroenterology 2015; 089:164-842 (AGA GUIDELINES)   International Consensus Guidelines for Management of Intraductal Papillary Mucinous Neoplasm and Mucinous Cystic Neoplasms of the Pancreas. Pancreatology Sep-Oct 2017;17(5):738-753. doi: 10.1016/j.yuan.2017.07.007. Epub 2017 Jul 13 [facility] paper).    American College of Radiology: White Paper: Management of Incidental Pancreatic Cysts: A White Paper of the ACR Incidental Findings Committee, 2017 Jul;14(0):911923. doi: 10.1016/j.jacr. 2017.03.010. Epub 2017 May 19. (ACR GUIDELINES.)      The study was marked in EPIC for significant notification. Workstation performed: [id number]         CT abdomen pelvis wo contrast   Final Result      Nodular liver morphology and sequela of portal venous hypertension suggesting cirrhosis. Minimal ascites around the liver and layering in the pelvis. Prominent lymph nodes at the katelynn hepatis presumably reactive to primary underlying liver dysfunction. No biliary ductal dilatation to suggest obstructive cause of jaundice. 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 Recommend nonemergent outpatient evaluation with high-resolution chest CT to exclude shiloh UIP pattern fibrosis. Workstation performed: [id number]         XR chest portable ICU    (Results Pending)   XR chest portable ICU    (Results Pending)      I have personally reviewed pertinent reports.        Medications:  Scheduled PRN   acetylcysteine, 100 mg/kg, Once  Albumin 25%, 25 g, Q6H  aztreonam, 1,000 mg, Q12H  chlorhexidine, 15 mL, Q12H DEON  escitalopram, 10 mg, Daily  insulin lispro, 1-6 Units, Q6H DEON  lactulose, 30 g, 4x Daily  levothyroxine, 175 mcg, Early Morning  methylPREDNISolone sodium succinate, 40 mg, Daily  BIRDIE ANTIFUNGAL, , BID  nystatin, , BID  octreotide, 100 mcg, Q8H DEON  pantoprazole, 40 mg, Q12H [address]  sodium bicarbonate, 1,300 mg, TID after meals      fentanyl citrate (PF), 50 mcg, Q4H PRN  hydrALAZINE, 20 mg, Q4H PRN  levalbuterol, 1.25 mg, Q4H PRN       Continuous    norepinephrine, 1-30 mcg/min  propofol, 5-50 mcg/kg/min, Last Rate: 25 mcg/kg/min (10/15/23 0143)         Labs:    CBC    Recent Labs     10/13/23  0922 10/14/23  0444 10/14/23  1623 10/14/23  2052 10/14/23  2340   WBC 10.08 7.84  --   --   --    HGB 9.6* 9.1* 8.8* 9.5*  --    HCT 29.1* 27.0* 26* 28*  --    * 107*  --   --  85* BMP    Recent Labs     10/14/23  1419 10/14/23  1623 10/14/23  1918 10/14/23  2052   SODIUM 141  --  145  --    K 3.4*  --  3.9  --    *  --  113*  --    CO2 18*   < > 15* 18*   AGAP 11  --  17  --    BUN 72*  --  72*  --    CREATININE 2.32*  --  2.28*  --    CALCIUM 9.9  --  9.6  --     < > = values in this interval not displayed.        Coags    Recent Labs     10/14/23  1918 10/14/23  2339   INR 6.11* 6.48*   PTT  --  70*        Additional Electrolytes  Recent Labs     10/14/23  0444 10/14/23  1623 10/14/23  2052   MG 2.2  --   --    PHOS 3.6  --   --    CAIONIZED  --  1.24 1.29          Blood Gas    Recent Labs     10/14/23  2340   PHART 7.358   FXY0HGD 33.7*   PO2ART 149.3*   EDX1UMD 18.5*   BEART -6.2   SOURCE Line, Arterial     Recent Labs     10/14/23  1918 10/14/23  2340   PHVEN 7.374  --    GXS6KUB 27.6*  --    PO2VEN 109.1*  --    JBP3KRF 15.7*  --    BEVEN -8.3  --    SOURCE  --  Line, Arterial    LFTs  Recent Labs     10/13/23  0920 10/14/23  0444   * 168*   * 92*   ALKPHOS 64 60   ALB 3.9 4.1   TBILI 17.73* 19.16*       Infectious  Recent Labs     10/14/23  1918   PROCALCITONI 0.38*     Glucose  Recent Labs     10/13/23  0920 10/14/23  0444 10/14/23  1419 10/14/23  1918   GLUC 131 135 183* [address] [place]

## 2023-10-15 NOTE — ASSESSMENT & PLAN NOTE
10/14 noted dark, tarry stools   Concern for GIB vs lactulose stool   Hemoccult + x3 10/15 AM     Plan:   Continue NPO, NGT   Protonix IV BID, cont octreotide   Unfortunately, not a candidate for scope given critical illness

## 2023-10-15 NOTE — ASSESSMENT & PLAN NOTE
Lab Results   Component Value Date    HGBA1C 7.0 (H) 07/20/2023       Recent Labs     10/14/23  0621 10/14/23  1143 10/14/23  1727 10/14/23  2345   POCGLU 127 171* 189* 190*         Blood Sugar Average: Last 72 hrs:  (P) 714.7235167122072637    Does not appear to be on home regimen for DM2  Q6h SSI for goal glucose 140-180

## 2023-10-15 NOTE — CONSULTS
[address] [facility]  Consult  Name: Anjelica Alt 81 y.o. female I MRN: [id number]  Unit/Bed#: -01 I Date of Admission: 10/2/2023   Date of Service: 10/14/2023 I Hospital Day: 12    Consults    Assessment/Plan   * Acute respiratory failure ([address])  Assessment & Plan  Progressive respiratory failure through the afternoon with tachypnea, sonorous respirations, forced/prolonged expirations refractory to xopenex and racemic epi   10/14 ETT for airway protection, respiratory insufficiency after speaking to the family at length, who were all in agreement    Plan:   Vent day 1  [address] 12/400/100/6   Check istat abg now   Wean fio2 as tolerated for goal spo2 > 90%  Scheduled oral care, HOB 30 degrees     Transaminitis  Assessment & Plan  10/2 admitted to [facility] after being sent to the ER from rehab for her broken ankle with sudden jaundice   10/2 CT CAP -- Nodular liver morphology and sequela of portal venous hypertension suggesting cirrhosis. Minimal ascites around the liver and layering in the pelvis. Prominent lymph nodes at the katelynn hepatis presumably reactive to primary underlying liver dysfunction. No biliary ductal dilatation to suggest obstructive cause of jaundice  10/3 MRI abdomen -- Nodular liver surface contour suggestive of cirrhosis with sequela portal hypertension including trace perihepatic ascites. No suspicious hepatic mass. A 5 mm pancreatic uncinate process cystic lesion without worrisome features. No main pancreatic duct dilatation. 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  Will consider FFP if s/s active bleeding   Ammonia levels continue to rise, though she is now having robust BM -- continue lactulose per NGT  Trend hepatic enzymes, ammonia, INR   Appreciate GI input   Avoid hypotension, hepatotoxic agents   Referral for transplant per GI    SIRS (systemic inflammatory response syndrome) (HCC)  Assessment & Plan  Unclear if this is 2/2 infectious source or reaction to worsening hepatic failure, respiratory failure requiring ETT   SIRS criteria met 10/14 with tachypnea, tachycardia, LA 3.5 -- Sepsis alert called   Admission UA 10/2: large leuks, positive nitrites, innum bacteria   10/13 BC x2 -- neg x24H   Urine culture pending   10/14 -- LA 3.5, procal 0.38  Will not given further crystalloid IVF 2/2 concern for volume overload on clinical exam and lack of hypotension, will continue instead with scheduled albumin   Afebrile, no leukocytosis, HD stable     Plan:   Trend UA, LA, procal   Given allergies, will treat with Aztreonam for now after conferring with pharmacy   Abx D1: Aztreonam D1    Acute metabolic encephalopathy  Assessment & Plan  In the setting of acute hepatic failure, worsening ammonia   10/14 ETT for airway protection, worsening respiratory status as above   No CTH since admission, though given progressively worse INR, will obtain CTH     Plan:  CTH pending   Serial neuro assessments   Maintain normothermia, normoglycemia   Ammonemia treatment as above     Type 2 diabetes mellitus [facility])  Assessment & Plan  Lab Results   Component Value Date    HGBA1C 7.0 (H) 07/20/2023       Recent Labs     10/13/23  2358 10/14/23  0621 10/14/23  1143 10/14/23  1727   POCGLU 134 127 171* 189*       Blood Sugar Average: Last 72 hrs:  (P) 152.375    Does not appear to be on home regimen for DM2  Q6h SSI for goal glucose 210-567      Metabolic acidosis  Assessment & Plan  Progressively worsening on serial BMP   Most recent serum bicarb 15, AG 17  Likely multifactorial etiology in the setting of worsening hepatic failure, lactic acidosis     Plan:   Volume overloaded on clinical exam -- will hold on further crystalloid   Now intubated, will continue to trend serially     Dark stools  Assessment & Plan  10/14 noted dark, tarry stools   Concern for GIB vs lactulose stool     Plan:   Continue NPO, NGT   Protonix IV BID   Check hemoccult   Check DIC panel, LDH, fibrinogen, ddimer   If hemoccult positive, will administer FFP given INR >6    VALENTIN (acute kidney injury) ([address])  Assessment & Plan  Creat baseline 0.5-0.6  Creat peak this admission: 3.28  In the setting of HRS vs contrast nephropathy vs decreased PO intake 2/2 acute illness   Previously requiring midodrine earlier in her stay, which has since been discontinued given borderline hypertension     Plan:   Creat slowly downtrending -- continue to trend   Urinary retention protocol   Hold home torsemide, though may require eventual dosing of Lasix for volume overload on exam (AM BNP pending)  Avoid hypotension, nephrotoxic agents     Hyponatremia  Assessment & Plan  sNa jill this admission 129  Hx of SIADH, 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, torsemide as OP   Previously on salt tabs per Nephrology     Plan:   sNa currently WNL, though uptrending in the setting of NPO   Trend     Closed right ankle fracture  Assessment & Plan  Prior to admission, was in rehab pending discharge to home independently following fx   Will need PT/OT when appropriate     Diffuse interstitial pulmonary fibrosis ([address])  Assessment & Plan  10/2 CTAP -- Reticular interstitial lung abnormality (REENA) noted in both lower lobes   Not on home meds, does not follow with Pulm as OP     ADRIA (obstructive sleep apnea)  Assessment & Plan  Per anesthesia noted from 2016, does not tolerate CPAP     Mood disorder ([address])  Assessment & [facility] held in the setting of [facility] lexapro continued      Acquired hypothyroidism  Assessment & Plan  Cont home synthroid   Check TSH    Class 2 severe obesity due to excess calories with serious comorbidity and body mass index (BMI) of 35.0 to 35.9 in adult   Assessment & Plan  Noted     Essential hypertension  Assessment & Plan  Home regimen: HCTZ, valsartan, sotalol, cardizem, torsemide -- all on hold given transaminitis and VALENTIN during admission     Plan:   Continue to hold above while on sedation   If persistently hypertensive, low threshold to add medications back slowly   Goal SBP <160    Mixed hyperlipidemia  Assessment & Plan  Hold home statin 2/2 transaminitis     Paroxysmal atrial fibrillation (HCC)  Assessment & Plan  Home regimen: Cardizem 360mg qd, sotalol BID  AC: Xarelto     Plan:   Hold Xarelto in the setting of supratherapeutic INR 2/2 hepatitis   Hold cardizem, sotalol given NPO initially -- will continue to hold given HR 80 and now post intubation on sedation   Goal HR <120  Continuous tele -- currently controlled AF           History of Present Illness     HPI: Jenny Kumar is an 80yo F with PMH ADRIA not on home CPAP, mood disorder on Lexapro, hypothyroidism, obesity, hypertension, hyperlipidemia, paroxysmal A-fib on Xarelto, diabetes type 2 presents as a transfer to critical care for worsening respiratory status requiring intubation. Clemens Kehr was initially admitted on 10/2 from rehab, where she was placed while recovering from her right ankle fracture, for sudden, seemingly unprovoked jaundice. Ultimately, she required liver biopsy, which is concerning for autoimmune hepatitis. During her stay, she has experienced hyponatremia, VALENTIN, worsening metabolic encephalopathy, worsening liver indices. In total, she has received 1 unit FFP, 5 doses of vitamin K. She is on day 4 of systemic steroids. Her previous lactulose retention enemas have been transitioned to lactulose by NGT.   This afternoon, she had a progressive worsening in her respiratory status with tachypnea, forced expiration. Earlier this evening, despite nebulizers and NT suctioning, her breathing difficulties continued. Ultimately, decision was made, along with family, to intubate her for airway protection, respiratory distress. Postintubation, concerns for blood in her stool, and Hemoccult is pending. Review of chart indicates that she had a urine sample on admission concerning for infection, cultures are pending and she is started on aztreonam for broad-spectrum coverage considering her allergies. Lab work reveals worsening metabolic acidosis, lactic acidosis 3.5. She is critically ill on mechanical ventilator, high risk for decompensation. I spoke with the patient's son and daughter-in-law and real-time, as well as the patient's daughter via telephone. We discussed the risks and benefits of intubation, and all parties agreed to proceed. Her family is aware that she is critically ill, and at high risk for decompensation and death. They confirm level 1 full CODE STATUS to me. History obtained from child, chart review, and unobtainable from patient due to mental status. Review of Systems   Unable to perform ROS: Intubated          Historical Information   Past Medical History:  No date:  Anxiety  No date: Carpal tunnel syndrome, bilateral  No date: Cholecystitis  No date: Chronic calculous cholecystitis      Comment:  last assessed 4/25/16  No date: History of cardioversion  No date: History of radiofrequency ablation procedure for cardiac   arrhythmia  No date: Hyperlipidemia  No date: Hypertension  7/20/2023: Hypokalemia  No date: Sleep apnea      Comment:  no trouble since Afib corrected Past Surgical History:  No date: APPENDECTOMY  11/15/2018: BACK SURGERY      Comment:  L4-5 Screws and rods  No date: BLADDER SUSPENSION  No date: CARDIOVERSION      Comment:  atrial - onset 2015 - x5 in 2014 and 2015  No date: CHOLECYSTECTOMY      Comment:  April 2016  No date:  HYSTERECTOMY  10/10/2023: IR BIOPSY TRANSJUGULAR LIVER  No date: KNEE ARTHROSCOPY W/ MENISCAL REPAIR; Bilateral      Comment:  therapeutic - last assessed 4/14/16  No date: KNEE SURGERY  4/26/2016: W[place] LAPAROSCOPY SURG CHOLECYSTECTOMY; N/A      Comment:  Procedure: CHOLECYSTECTOMY LAPAROSCOPIC;  Surgeon:                Luci Tobin MD;  Location:  MAIN OR;  Service:                General  7/31/2023: [place] NEUROPLASTY &/TRANSPOS MEDIAN NRV CARPAL [place] [facility];   Bilateral      Comment:  Procedure: RELEASE CARPAL TUNNEL, BILATERAL OPEN;                 Surgeon: Jose Mcgowan MD;  Location:  MAIN OR;                 Service: Orthopedics  No date: TONSILLECTOMY  No date: WISDOM TOOTH EXTRACTION   Current Outpatient Medications   Medication Instructions    atorvastatin (LIPITOR) 20 mg, Daily    diltiazem (CARDIZEM CD) 360 mg, Oral, Daily    Diovan 320 mg, Oral, Daily    escitalopram (LEXAPRO) 10 mg tablet TAKE 1 TABLET DAILY    gabapentin (NEURONTIN) 300 mg, Oral, 3 times daily    hydrochlorothiazide (HYDRODIURIL) 25 mg, Oral, Daily    HYDROcodone-acetaminophen (Norco) 5-325 mg per tablet 1 tablet, Oral, Daily at bedtime PRN    levothyroxine 175 mcg, Oral, Daily    liotrix (THYROLAR-1) 60 (12.5-50) MG (MCG) TABS 1 tablet, Daily    rivaroxaban (XARELTO) 20 mg, Oral, Daily with dinner    sodium chloride 2 g, Oral, 2 times daily with meals    sotalol (BETAPACE) 80 mg, Oral, 2 times daily    torsemide (DEMADEX) 5 mg, Oral, Daily    zolpidem (AMBIEN) 10 mg, Oral, Daily at bedtime PRN    Allergies   Allergen Reactions    Pneumovax [Pneumococcal Polysaccharide Vaccine] Hives    Medical Tape     Nuts - Food Allergy     Omnipaque [Iohexol] Hives    Other Hives     Pine nuts    Penicillins Hives    Sulfa Antibiotics Hives    Sulfur Other (See Comments)    Iodine - Food Allergy Rash    Latex Rash and Other (See Comments)      Social History     Tobacco Use    Smoking status: Never    Smokeless tobacco: Never   Vaping Use Vaping Use: Never used   Substance Use Topics    Alcohol use: Yes     Comment: social; occasional    Drug use: No    Family History   Problem Relation Age of Onset    Cancer Mother         ovarian    Heart disease Father         cardiac disorder    Cancer Father     Heart disease Brother     Heart disease Paternal Aunt     Heart disease Paternal Uncle         Objective                            Vitals I/O      Most Recent Min/Max in 24hrs   Temp 97.6 °F (36.4 °C) Temp  Min: 97.2 °F (36.2 °C)  Max: 98.2 °F (36.8 °C)   Pulse 98 Pulse  Min: 66  Max: 101   Resp 22 Resp  Min: 16  Max: 36   /60 BP  Min: 127/60  Max: 198/110   O2 Sat 98 % SpO2  Min: 92 %  Max: 100 %      Intake/Output Summary (Last 24 hours) at 10/14/2023 2211  Last data filed at 10/14/2023 1814  Gross per 24 hour   Intake 1956.66 ml   Output 600 ml   Net 1356.66 ml         Diet NPO     Invasive Monitoring Physical exam    Physical Exam  Eyes:      General: Scleral icterus present. Skin:     General: Skin is warm. Capillary Refill: Capillary refill takes less than 2 seconds. Coloration: Skin is jaundiced. HENT:      Head: Normocephalic and atraumatic. Neck:     Cardiovascular:      Rate and Rhythm: Normal rate. Rhythm irregularly irregular. Musculoskeletal:      Right lower le+ Pitting Edema present. Left lower le+ Pitting Edema present. Abdominal:      General: Abdomen is flat. Bowel sounds are decreased. Palpations: Abdomen is soft. Tenderness: There is no abdominal tenderness. Constitutional:       General: She is not in acute distress. Appearance: She is well-developed. She is obese. She is ill-appearing. She is not diaphoretic. Interventions: She is sedated, intubated and restrained. Pulmonary:      Effort: She is intubated. Breath sounds: Examination of the right-lower field reveals decreased breath sounds. Examination of the left-lower field reveals decreased breath sounds. Decreased breath sounds present. Comments: [address] 12/400/40/6  Neurological:      Comments: Withdrawals extremities from painful stimuli   Overbreathing ventilator on propofol    Genitourinary/Anorectal:     Comments: Voiding independently   Vitals and nursing note reviewed. Diagnostic Studies      EKG: AF, rate controlled   Imaging:   XR abdomen 1 view kub   Final Result      Tip of the NG tube is coursing below the left hemidiaphragm. Workstation performed: [id number]         XR abdomen 1 vw portable   Final Result      Tip of the NG tube is coursing below the left hemidiaphragm. The distal aspect is coiled in the stomach and the tip terminates near the GE junction. Workstation performed: [id number]         XR chest portable ICU   Final Result      Tip of the NG tube is coursing below the left hemidiaphragm. The distal aspect is coiled in the stomach and the tip terminates near the GE junction. Workstation performed: [id number]         XR chest portable ICU   Final Result      No acute cardiopulmonary disease. NG tube in stomach. Workstation performed: [id number]         XR chest portable   Final Result      No acute cardiopulmonary disease. Workstation performed: [id number]         IR biopsy transjugular liver   Final Result      1. Transjugular liver biopsy with pressure measurements. 2. Pressure measurements as follows:   Right atrium: 12/6 mmHg (mean 8)   Free hepatic: 13/9 mmHg (mean 11)   Wedged hepatic: 22/18 mmHg (mean 20)      Plan:      Specimens sent for evaluation. Workstation performed: [id number]         MRI abdomen w wo contrast and mrcp   Final Result      Nodular liver surface contour suggestive of cirrhosis with sequela portal hypertension including trace perihepatic ascites. No suspicious hepatic mass. A 5 mm pancreatic uncinate process cystic lesion without worrisome features.  No main pancreatic duct dilatation. 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 For simple cyst(s) less than 1.5 cm, recommend followup    every 2 year for 2 times. After 4 years, no more followups. Recommend next followup in 2 years. Preferred imaging modality: abdomen MRI and MRCP with and without IV contrast, or triple phase abdomen CT with IV contrast, or abdomen MRI and MRCP without IV    contrast.      The recommendations regarding pancreatic findings assumes that patient does not have family history of pancreatic cancer nor have any symptoms potentially attributable to pancreatic cystic lesions (hyperamylasemia, recent-onset diabetes, severe    epigastric pain, weight loss, steatorrhea, or jaundice.) If these conditions are not true, then management should be deferred to judgement of specialists such as gastroenterologists or oncologic surgeons. REFERENCES:   Recommendations are based on recent consensus statements on management of pancreatic cystic lesions from [address] [facility] Gastro[facility]ology Association, [employer] of Radiology, the journal Pancreatology, and our own institutional consensus. [facility] Guidelines on the Diagnosis and Management of Asymptomatic Neoplastic Pancreatic Cysts. Gastroenterology 2015; 825:209-478 (AGA GUIDELINES)   International Consensus Guidelines for Management of Intraductal Papillary Mucinous Neoplasm and Mucinous Cystic Neoplasms of the Pancreas. Pancreatology Sep-Oct 2017;17(5):738-753. doi: 10.1016/j.yuan.2017.07.007. Epub 2017 Jul 13 [facility] paper). American College of Radiology: White Paper: Management of Incidental Pancreatic Cysts: A White Paper of the ACR Incidental Findings Committee, 2017 Jul;14(7):911-923. doi: 10.1016/j.jacr. 2017.03.010. Epub 2017 May 19. (ACR GUIDELINES.)      The study was marked in EPIC for significant notification.       Workstation performed: [id number]         CT abdomen pelvis wo contrast   Final Result      Nodular liver morphology and sequela of portal venous hypertension suggesting cirrhosis. Minimal ascites around the liver and layering in the pelvis. Prominent lymph nodes at the katelynn hepatis presumably reactive to primary underlying liver dysfunction. No biliary ductal dilatation to suggest obstructive cause of jaundice. 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 Recommend nonemergent outpatient evaluation with high-resolution chest CT to exclude shiloh UIP pattern fibrosis. Workstation performed: [id number]         XR chest portable ICU    (Results Pending)   XR chest portable ICU    (Results Pending)   CT head wo contrast    (Results Pending)      I have personally reviewed pertinent reports.        Medications:  Scheduled PRN   acetylcysteine, 100 mg/kg, Once  Albumin 25%, 25 g, Q6H  aztreonam, 1,000 mg, Q12H  chlorhexidine, 15 mL, Q12H DEON  escitalopram, 10 mg, Daily  insulin lispro, 1-6 Units, Q6H DEON  lactulose, 30 g, 4x Daily  levothyroxine, 175 mcg, Early Morning  methylPREDNISolone sodium succinate, 40 mg, Daily  BIRDIE ANTIFUNGAL, , BID  nystatin, , BID  octreotide, 100 mcg, Q8H DEON  pantoprazole, 40 mg, Q12H [address]  sodium bicarbonate, 1,300 mg, TID after meals      fentanyl citrate (PF), 50 mcg, Q4H PRN  hydrALAZINE, 20 mg, Q4H PRN  levalbuterol, 1.25 mg, Q4H PRN       Continuous    norepinephrine, 1-30 mcg/min  propofol, 5-50 mcg/kg/min, Last Rate: 20 mcg/kg/min (10/14/23 2045)         Labs:    CBC    Recent Labs     10/13/23  0922 10/14/23  0444 10/14/23  1623 10/14/23  2052   WBC 10.08 7.84  --   --    HGB 9.6* 9.1* 8.8* 9.5*   HCT 29.1* 27.0* 26* 28*   * 107*  --   --      BMP    Recent Labs     10/14/23  1419 10/14/23  1623 10/14/23  1918 10/14/23  2052   SODIUM 141  --  145  --    K 3.4*  --  3.9  --    *  --  113*  --    CO2 18*   < > 15* 18*   AGAP 11  --  17  --    BUN 72*  --  72*  -- CREATININE 2.32*  --  2.28*  --    CALCIUM 9.9  --  9.6  --     < > = values in this interval not displayed. Coags    Recent Labs     10/14/23  0444 10/14/23  1918   INR 5.40* 6.11*        Additional Electrolytes  Recent Labs     10/14/23  0444 10/14/23  1623 10/14/23  2052   MG 2.2  --   --    PHOS 3.6  --   --    CAIONIZED  --  1.24 1.29          Blood Gas    No recent results  Recent Labs     10/14/23  1918   PHVEN 7.374   [id number] 27.6*   PO2VEN 109.1*   [id number] 15.7*   BEVEN -8.3    LFTs  Recent Labs     10/13/23  0920 10/14/23  0444   * 168*   * 92*   ALKPHOS 64 60   ALB 3.9 4.1   TBILI 17.73* 19.16*       Infectious  Recent Labs     10/14/23  1918   PROCALCITONI 0.38*     Glucose  Recent Labs     10/13/23  0920 10/14/23  0444 10/14/23  1419 10/14/23  1918   GLUC 131 135 183* 199*             Critical Care Time Delivered : Upon my evaluation, this patient had a high probability of imminent or life-threatening deterioration due to respiratory failure requiring ETT, worsening acute metabolic encephalopathy, which required my direct attention, intervention, and personal management. I have personally provided 45 minutes of critical care time, exclusive of procedures, teaching, family meetings, and any prior time recorded by providers other than myself.    [facility]

## 2023-10-15 NOTE — ASSESSMENT & PLAN NOTE
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

## 2023-10-15 NOTE — ASSESSMENT & PLAN NOTE
Progressive respiratory failure through the afternoon with tachypnea, sonorous respirations, forced/prolonged expirations refractory to xopenex and racemic epi   10/14 ETT for airway protection, respiratory insufficiency after speaking to the family at length, who were all in agreement    Plan:   Vent day 1  [facility] 12/400/100/6   Check istat abg now   Wean fio2 as tolerated for goal spo2 > 90%  Scheduled oral care, HOB 30 degrees

## 2023-10-15 NOTE — ASSESSMENT & PLAN NOTE
Prior to admission, was in rehab pending discharge to home independently following fx   Will need PT/OT when appropriate Pt has [facility] - if message below not viewed please call w information below:     Your phosphate level is low, magnesium and potassium low end of normal.  Please continue taking potassium supplement. Recommend add daily magnesium supplement Slo-mag or magnesium bisglycinate are two better absorbed formulations.  Work on incorporating foods beverages higher in potassium and magnesium  How have your blood pressures been running at home?  Blood sugars?  I would like you to stop taking the furosemide as it can increase renal excretion of potassium, magnesium and phosphorus. If home BP's are still high however, we will probably need to change the lisinopril-HCTZ to something stronger.    Please message or call with any questions or concerns!  Thank you!  Mirna Smith MD, MPH  [facility]

## 2023-10-15 NOTE — SEPSIS NOTE
Sepsis Note   Anthony Brian 81 y.o. female MRN: [id number]  Unit/Bed#: -01 Encounter: [id number]       Initial Sepsis Screening       Row Name 10/14/23 2129 10/14/23 2000             Is the patient's history suggestive of a new or worsening infection? --  -ES Yes (Proceed)  -ES       Suspected source of infection --  -ES urinary tract infection  -ES       Indicate SIRS criteria --  -ES Tachypnea > 20 resp per min; Tachycardia > 90 bpm  -ES       Are two or more of the above signs & symptoms of infection both present and new to the patient? --  -ES Yes (Proceed)  -ES       Assess for evidence of organ dysfunction: Are any of the below criteria present within 6 hours of suspected infection and SIRS criteria that are NOT considered to be chronic conditions? -- Lactate > 2. 0; Bilirubin > 2. 0;Creatinine > 2.0 AND > 0.5 above baseline;New need for invasive/non-invasive ventilation  -ES       Date of presentation of severe sepsis -- 10/14/23  -ES       Time of presentation of severe sepsis -- 2000  -ES       Sepsis Note: Click "NEXT" below (NOT "close") to generate sepsis note based on above information. -- YES (proceed by clicking "NEXT")  -ES                 User Key  (r) = Recorded By, (t) = Taken By, (c) = Cosigned By      [address] [place] [address] Provider Type    ES RAJ Mulligan Nurse Practitioner                    Default Flowsheet Data (last 720 hours)       Sepsis Reassess       Row Name 10/14/23 2137                   Repeat Volume Status and Tissue Perfusion Assessment Performed    Date of Reassessment: 10/14/23  -ES        Time of Reassessment: 2137  -ES        Sepsis Reassessment Note: Click "NEXT" below (NOT "close") to generate sepsis reassessment note.  YES (proceed by clicking "NEXT")  -ES        Repeat Volume Status and Tissue Perfusion Assessment Performed --                  User Key  (r) = Recorded By, (t) = Taken By, (c) = Cosigned By      [address] [place] [address] Name Provider Type    RAJ Hung Nurse Practitioner                    Body mass index is 37.09 kg/m².   Wt Readings from Last 1 Encounters:   10/02/23 98 kg (216 lb 0.8 oz)        Ideal body weight: 54.7 kg (120 lb 9.5 oz)  Adjusted ideal body weight: 72 kg (158 lb 12.4 oz)

## 2023-10-16 PROBLEM — E87.0 HYPERNATREMIA: Status: ACTIVE | Noted: 2023-01-01

## 2023-10-16 NOTE — PLAN OF CARE
Problem: MOBILITY - ADULT  Goal: Maintain or return to baseline ADL function  Description: INTERVENTIONS:  -  Assess patient's ability to carry out ADLs; assess patient's baseline for ADL function and identify physical deficits which impact ability to perform ADLs (bathing, care of mouth/teeth, toileting, grooming, dressing, etc.)  - Assess/evaluate cause of self-care deficits   - Assess range of motion  - Assess patient's mobility; develop plan if impaired  - Assess patient's need for assistive devices and provide as appropriate  - Encourage maximum independence but intervene and supervise when necessary  - Involve family in performance of ADLs  - Assess for home care needs following discharge   - Consider OT consult to assist with ADL evaluation and planning for discharge  - Provide patient education as appropriate  Outcome: Progressing  Goal: Maintains/Returns to pre admission functional level  Description: INTERVENTIONS:  - Perform BMAT or MOVE assessment daily.   - Set and communicate daily mobility goal to care team and patient/family/caregiver. - Collaborate with rehabilitation services on mobility goals if consulted  - Perform Range of Motion 2 times a day. - Reposition patient every 2 hours.   - Dangle patient 2 times a day  - Stand patient 2 times a day  - Ambulate patient 2 times a day  - Out of bed to chair 2 times a day   - Out of bed for meals 2 times a day  - Out of bed for toileting  - Record patient progress and toleration of activity level   Outcome: Progressing     Problem: PAIN - ADULT  Goal: Verbalizes/displays adequate comfort level or baseline comfort level  Description: Interventions:  - Encourage patient to monitor pain and request assistance  - Assess pain using appropriate pain scale  - Administer analgesics based on type and severity of pain and evaluate response  - Implement non-pharmacological measures as appropriate and evaluate response  - Consider cultural and social influences on pain and pain management  - Notify physician/advanced practitioner if interventions unsuccessful or patient reports new pain  Outcome: Progressing     Problem: INFECTION - ADULT  Goal: Absence or prevention of progression during hospitalization  Description: INTERVENTIONS:  - Assess and monitor for signs and symptoms of infection  - Monitor lab/diagnostic results  - Monitor all insertion sites, i.e. indwelling lines, tubes, and drains  - Monitor endotracheal if appropriate and nasal secretions for changes in amount and color  - [place] appropriate cooling/warming therapies per order  - Administer medications as ordered  - Instruct and encourage patient and family to use good hand hygiene technique  - Identify and instruct in appropriate isolation precautions for identified infection/condition  Outcome: Progressing  Goal: Absence of fever/infection during neutropenic period  Description: INTERVENTIONS:  - Monitor WBC    Outcome: Progressing     Problem: SAFETY ADULT  Goal: Maintain or return to baseline ADL function  Description: INTERVENTIONS:  -  Assess patient's ability to carry out ADLs; assess patient's baseline for ADL function and identify physical deficits which impact ability to perform ADLs (bathing, care of mouth/teeth, toileting, grooming, dressing, etc.)  - Assess/evaluate cause of self-care deficits   - Assess range of motion  - Assess patient's mobility; develop plan if impaired  - Assess patient's need for assistive devices and provide as appropriate  - Encourage maximum independence but intervene and supervise when necessary  - Involve family in performance of ADLs  - Assess for home care needs following discharge   - Consider OT consult to assist with ADL evaluation and planning for discharge  - Provide patient education as appropriate  Outcome: Progressing  Goal: Maintains/Returns to pre admission functional level  Description: INTERVENTIONS:  - Perform BMAT or MOVE assessment daily.   - Set and communicate daily mobility goal to care team and patient/family/caregiver. - Collaborate with rehabilitation services on mobility goals if consulted  - Perform Range of Motion 2 times a day. - Reposition patient every 2 hours.   - Dangle patient 2 times a day  - Stand patient 2 times a day  - Ambulate patient 2 times a day  - Out of bed to chair 2 times a day   - Out of bed for meals 2 times a day  - Out of bed for toileting  - Record patient progress and toleration of activity level   Outcome: Progressing  Goal: Patient will remain free of falls  Description: INTERVENTIONS:  - Educate patient/family on patient safety including physical limitations  - Instruct patient to call for assistance with activity   - Consult OT/PT to assist with strengthening/mobility   - Keep Call bell within reach  - Keep bed low and locked with side rails adjusted as appropriate  - Keep care items and personal belongings within reach  - Initiate and maintain comfort rounds  - Make Fall Risk Sign visible to staff  - Apply yellow socks and bracelet for high fall risk patients  - Consider moving patient to room near nurses station  Outcome: Progressing     Problem: DISCHARGE PLANNING  Goal: Discharge to home or other facility with appropriate resources  Description: INTERVENTIONS:  - Identify barriers to discharge w/patient and caregiver  - Arrange for needed discharge resources and transportation as appropriate  - Identify discharge learning needs (meds, wound care, etc.)  - Arrange for interpretive services to assist at discharge as needed  - Refer to Case Management Department for coordinating discharge planning if the patient needs post-hospital services based on physician/advanced practitioner order or complex needs related to functional status, cognitive ability, or social support system  Outcome: Progressing     Problem: Knowledge Deficit  Goal: Patient/family/caregiver demonstrates understanding of disease process, treatment plan, medications, and discharge instructions  Description: Complete learning assessment and assess knowledge base. Interventions:  - Provide teaching at level of understanding  - Provide teaching via preferred learning methods  Outcome: Progressing     Problem: Potential for Falls  Goal: Patient will remain free of falls  Description: INTERVENTIONS:  - Educate patient/family on patient safety including physical limitations  - Instruct patient to call for assistance with activity   - Consult OT/PT to assist with strengthening/mobility   - Keep Call bell within reach  - Keep bed low and locked with side rails adjusted as appropriate  - Keep care items and personal belongings within reach  - Initiate and maintain comfort rounds  - Make Fall Risk Sign visible to staff  - Apply yellow socks and bracelet for high fall risk patients  - Consider moving patient to room near nurses station  Outcome: Progressing     Problem: Nutrition/Hydration-ADULT  Goal: Nutrient/Hydration intake appropriate for improving, restoring or maintaining nutritional needs  Description: Monitor and assess patient's nutrition/hydration status for malnutrition. Collaborate with interdisciplinary team and initiate plan and interventions as ordered. Monitor patient's weight and dietary intake as ordered or per policy. Utilize nutrition screening tool and intervene as necessary. Determine patient's food preferences and provide high-protein, high-caloric foods as appropriate.      INTERVENTIONS:  - Monitor oral intake, urinary output, labs, and treatment plans  - Assess nutrition and hydration status and recommend course of action  - Evaluate amount of meals eaten  - Assist patient with eating if necessary   - Allow adequate time for meals  - Recommend/ encourage appropriate diets, oral nutritional supplements, and vitamin/mineral supplements  - Order, calculate, and assess calorie counts as needed  - Recommend, monitor, and adjust tube feedings and TPN/PPN based on assessed needs  - Assess need for intravenous fluids  - Provide specific nutrition/hydration education as appropriate  - Include patient/family/caregiver in decisions related to nutrition  Outcome: Progressing     Problem: Prexisting or High Potential for Compromised Skin Integrity  Goal: Skin integrity is maintained or improved  Description: INTERVENTIONS:  - Identify patients at risk for skin breakdown  - Assess and monitor skin integrity  - Assess and monitor nutrition and hydration status  - Monitor labs   - Assess for incontinence   - Turn and reposition patient  - Assist with mobility/ambulation  - Relieve pressure over bony prominences  - Avoid friction and shearing  - Provide appropriate hygiene as needed including keeping skin clean and dry  - Evaluate need for skin moisturizer/barrier cream  - Collaborate with interdisciplinary team   - Patient/family teaching  - Consider wound care consult   Outcome: Progressing     Problem: NEUROSENSORY - ADULT  Goal: Achieves stable or improved neurological status  Description: INTERVENTIONS  - Monitor and report changes in neurological status  - Monitor vital signs such as temperature, blood pressure, glucose, and any other labs ordered   - Initiate measures to prevent increased intracranial pressure  - Monitor for seizure activity and implement precautions if appropriate      Outcome: Progressing  Goal: Remains free of injury related to seizures activity  Description: INTERVENTIONS  - Maintain airway, patient safety  and administer oxygen as ordered  - Monitor patient for seizure activity, document and report duration and description of seizure to physician/advanced practitioner  - If seizure occurs,  ensure patient safety during seizure  - Reorient patient post seizure  - Seizure pads on all 4 side rails  - Instruct patient/family to notify RN of any seizure activity including if an aura is experienced  - Instruct patient/family to call for assistance with activity based on nursing assessment  - Administer anti-seizure medications if ordered    Outcome: Progressing     Problem: CARDIOVASCULAR - ADULT  Goal: Maintains optimal cardiac output and hemodynamic stability  Description: INTERVENTIONS:  - Monitor I/O, vital signs and rhythm  - Monitor for S/S and trends of decreased cardiac output  - Administer and titrate ordered vasoactive medications to optimize hemodynamic stability  - Assess quality of pulses, skin color and temperature  - Assess for signs of decreased coronary artery perfusion  - Instruct patient to report change in severity of symptoms  Outcome: Progressing     Problem: RESPIRATORY - ADULT  Goal: Achieves optimal ventilation and oxygenation  Description: INTERVENTIONS:  - Assess for changes in respiratory status  - Assess for changes in mentation and behavior  - Position to facilitate oxygenation and minimize respiratory effort  - Oxygen administered by appropriate delivery if ordered  - Initiate smoking cessation education as indicated  - Encourage broncho-pulmonary hygiene including cough, deep breathe, Incentive Spirometry  - Assess the need for suctioning and aspirate as needed  - Assess and instruct to report SOB or any respiratory difficulty  - Respiratory Therapy support as indicated  Outcome: Progressing     Problem: GASTROINTESTINAL - ADULT  Goal: Maintains or returns to baseline bowel function  Description: INTERVENTIONS:  - Assess bowel function  - Encourage oral fluids to ensure adequate hydration  - Administer IV fluids if ordered to ensure adequate hydration  - Administer ordered medications as needed  - Encourage mobilization and activity  - Consider nutritional services referral to assist patient with adequate nutrition and appropriate food choices  Outcome: Progressing     Problem: METABOLIC, FLUID AND ELECTROLYTES - ADULT  Goal: Electrolytes maintained within normal limits  Description: INTERVENTIONS:  - Monitor labs and assess patient for signs and symptoms of electrolyte imbalances  - Administer electrolyte replacement as ordered  - Monitor response to electrolyte replacements, including repeat lab results as appropriate  - Instruct patient on fluid and nutrition as appropriate  Outcome: Progressing  Goal: Fluid balance maintained  Description: INTERVENTIONS:  - Monitor labs   - Monitor I/O and WT  - Instruct patient on fluid and nutrition as appropriate  - Assess for signs & symptoms of volume excess or deficit  Outcome: Progressing  Goal: Glucose maintained within target range  Description: INTERVENTIONS:  - Monitor Blood Glucose as ordered  - Assess for signs and symptoms of hyperglycemia and hypoglycemia  - Administer ordered medications to maintain glucose within target range  - Assess nutritional intake and initiate nutrition service referral as needed  Outcome: Progressing     Problem: SKIN/TISSUE INTEGRITY - ADULT  Goal: Skin Integrity remains intact(Skin Breakdown Prevention)  Description: Assess:  -Inspect skin when repositioning, toileting, and assisting with ADLS  -Assess extremities for adequate circulation and sensation     Bed Management:  -Have minimal linens on bed & keep smooth, unwrinkled  -Change linens as needed when moist or perspiring  Toileting:  -Offer bedside commode    Activity:  -Encourage activity and walks on unit  -Encourage or provide ROM exercises   -Use appropriate equipment to lift or move patient in bed    Skin Care:  -Avoid use of baby powder, tape, friction and shearing, hot water or constrictive clothing  -Do not massage red bony areas    Next Steps:  Outcome: Progressing  Goal: Incision(s), wounds(s) or drain site(s) healing without S/S of infection  Description: INTERVENTIONS  - Assess and document dressing, incision, wound bed, drain sites and surrounding tissue  - Provide patient and family education  Outcome: Progressing  Goal: Pressure injury heals and does not worsen  Description: Interventions:  - Implement low air loss mattress or specialty surface (Criteria met)  - Apply silicone foam dressing  - Apply fecal or urinary incontinence containment device   - Utilize friction reducing device or surface for transfers   - Consider nutrition services referral as needed  Outcome: Progressing     Problem: HEMATOLOGIC - ADULT  Goal: Maintains hematologic stability  Description: INTERVENTIONS  - Assess for signs and symptoms of bleeding or hemorrhage  - Monitor labs  - Administer supportive blood products/factors as ordered and appropriate  Outcome: Progressing

## 2023-10-16 NOTE — ASSESSMENT & PLAN NOTE
10/15 Bronch concerning for DAH with increase in bloody secretions from in-line   Bronch specimens pending   High-dose steroids D2:  Solumedrol 500mg q12h x3 days   Appreciate rheumatology input

## 2023-10-16 NOTE — PROGRESS NOTES
Nutrition Note    Nutrition Summary:    Intubated 10/14. NPO and absent of nutrition support. NGT to suction. Per provider note, not a candidate for scope given critical illness. Since time of visit, EN ordered with Jevity 1.2 at 10 mL/hr with free water flushes at 100 mL q 6hrs. See below for RD recommendations with lower fiber product       Nutrition Recommendations:   Recommend adjusting EN regimen to Vital AF 1.2 , start at 10 mL/hr and advance rate by 10 mL q 4 hrs / as tolerated to goal rate of 45 mL/hr.   -Add prosource liquid protein ( no carb)once daily.   -At goal EN + modular protein + kcal from sedation provides: 1080 mL total volume, 1511 kcal, 96 g protein, 876 mL free water. minimum flush 30 mL q 4 hrs to maintain tube patency.    If euvolemic and absent of IVF, free water flushes at 80 mL q 4 hrs will meet hydration needs                           Edwardo Del Rio, RD

## 2023-10-16 NOTE — ASSESSMENT & PLAN NOTE
Mild, sNa 148   Likely in the setting of lack of free water intake with AMS and NPO   D5W @50mL/hr started overnight   Trend on serial bmp

## 2023-10-16 NOTE — PLAN OF CARE
Problem: MOBILITY - ADULT  Goal: Maintain or return to baseline ADL function  Description: INTERVENTIONS:  -  Assess patient's ability to carry out ADLs; assess patient's baseline for ADL function and identify physical deficits which impact ability to perform ADLs (bathing, care of mouth/teeth, toileting, grooming, dressing, etc.)  - Assess/evaluate cause of self-care deficits   - Assess range of motion  - Assess patient's mobility; develop plan if impaired  - Assess patient's need for assistive devices and provide as appropriate  - Encourage maximum independence but intervene and supervise when necessary  - Involve family in performance of ADLs  - Assess for home care needs following discharge   - Consider OT consult to assist with ADL evaluation and planning for discharge  - Provide patient education as appropriate  Outcome: Progressing  Goal: Maintains/Returns to pre admission functional level  Description: INTERVENTIONS:  - Perform BMAT or MOVE assessment daily.   - Set and communicate daily mobility goal to care team and patient/family/caregiver. - Collaborate with rehabilitation services on mobility goals if consulted  - Perform Range of Motion 2 times a day. - Reposition patient every 2 hours.   - Dangle patient 2 times a day  - Stand patient 2 times a day  - Ambulate patient 2 times a day  - Out of bed to chair 2 times a day   - Out of bed for meals 2 times a day  - Out of bed for toileting  - Record patient progress and toleration of activity level   Outcome: Progressing     Problem: PAIN - ADULT  Goal: Verbalizes/displays adequate comfort level or baseline comfort level  Description: Interventions:  - Encourage patient to monitor pain and request assistance  - Assess pain using appropriate pain scale  - Administer analgesics based on type and severity of pain and evaluate response  - Implement non-pharmacological measures as appropriate and evaluate response  - Consider cultural and social influences on pain and pain management  - Notify physician/advanced practitioner if interventions unsuccessful or patient reports new pain  Outcome: Progressing     Problem: INFECTION - ADULT  Goal: Absence or prevention of progression during hospitalization  Description: INTERVENTIONS:  - Assess and monitor for signs and symptoms of infection  - Monitor lab/diagnostic results  - Monitor all insertion sites, i.e. indwelling lines, tubes, and drains  - Monitor endotracheal if appropriate and nasal secretions for changes in amount and color  - Wyatt appropriate cooling/warming therapies per order  - Administer medications as ordered  - Instruct and encourage patient and family to use good hand hygiene technique  - Identify and instruct in appropriate isolation precautions for identified infection/condition  Outcome: Progressing  Goal: Absence of fever/infection during neutropenic period  Description: INTERVENTIONS:  - Monitor WBC    Outcome: Progressing     Problem: SAFETY ADULT  Goal: Maintain or return to baseline ADL function  Description: INTERVENTIONS:  -  Assess patient's ability to carry out ADLs; assess patient's baseline for ADL function and identify physical deficits which impact ability to perform ADLs (bathing, care of mouth/teeth, toileting, grooming, dressing, etc.)  - Assess/evaluate cause of self-care deficits   - Assess range of motion  - Assess patient's mobility; develop plan if impaired  - Assess patient's need for assistive devices and provide as appropriate  - Encourage maximum independence but intervene and supervise when necessary  - Involve family in performance of ADLs  - Assess for home care needs following discharge   - Consider OT consult to assist with ADL evaluation and planning for discharge  - Provide patient education as appropriate  Outcome: Progressing  Goal: Maintains/Returns to pre admission functional level  Description: INTERVENTIONS:  - Perform BMAT or MOVE assessment daily.   - Set and communicate daily mobility goal to care team and patient/family/caregiver. - Collaborate with rehabilitation services on mobility goals if consulted  - Perform Range of Motion 2 times a day. - Reposition patient every 2 hours.   - Dangle patient 2 times a day  - Stand patient 2 times a day  - Ambulate patient 2 times a day  - Out of bed to chair 2 times a day   - Out of bed for meals 2 times a day  - Out of bed for toileting  - Record patient progress and toleration of activity level   Outcome: Progressing  Goal: Patient will remain free of falls  Description: INTERVENTIONS:  - Educate patient/family on patient safety including physical limitations  - Instruct patient to call for assistance with activity   - Consult OT/PT to assist with strengthening/mobility   - Keep Call bell within reach  - Keep bed low and locked with side rails adjusted as appropriate  - Keep care items and personal belongings within reach  - Initiate and maintain comfort rounds  - Make Fall Risk Sign visible to staff  - Initiate/Maintain bed alarm  - Obtain necessary fall risk management equipment:  - Apply yellow socks and bracelet for high fall risk patients  - Consider moving patient to room near nurses station  Outcome: Progressing     Problem: DISCHARGE PLANNING  Goal: Discharge to home or other facility with appropriate resources  Description: INTERVENTIONS:  - Identify barriers to discharge w/patient and caregiver  - Arrange for needed discharge resources and transportation as appropriate  - Identify discharge learning needs (meds, wound care, etc.)  - Arrange for interpretive services to assist at discharge as needed  - Refer to Case Management Department for coordinating discharge planning if the patient needs post-hospital services based on physician/advanced practitioner order or complex needs related to functional status, cognitive ability, or social support system  Outcome: Progressing     Problem: Knowledge Deficit  Goal: Patient/family/caregiver demonstrates understanding of disease process, treatment plan, medications, and discharge instructions  Description: Complete learning assessment and assess knowledge base. Interventions:  - Provide teaching at level of understanding  - Provide teaching via preferred learning methods  Outcome: Progressing     Problem: Potential for Falls  Goal: Patient will remain free of falls  Description: INTERVENTIONS:  - Educate patient/family on patient safety including physical limitations  - Instruct patient to call for assistance with activity   - Consult OT/PT to assist with strengthening/mobility   - Keep Call bell within reach  - Keep bed low and locked with side rails adjusted as appropriate  - Keep care items and personal belongings within reach  - Initiate and maintain comfort rounds  - Make Fall Risk Sign visible to staff  - Initiate/Maintain bed alarm  - Obtain necessary fall risk management equipment:  - Apply yellow socks and bracelet for high fall risk patients  - Consider moving patient to room near nurses station  Outcome: Progressing     Problem: Nutrition/Hydration-ADULT  Goal: Nutrient/Hydration intake appropriate for improving, restoring or maintaining nutritional needs  Description: Monitor and assess patient's nutrition/hydration status for malnutrition. Collaborate with interdisciplinary team and initiate plan and interventions as ordered. Monitor patient's weight and dietary intake as ordered or per policy. Utilize nutrition screening tool and intervene as necessary. Determine patient's food preferences and provide high-protein, high-caloric foods as appropriate.      INTERVENTIONS:  - Monitor oral intake, urinary output, labs, and treatment plans  - Assess nutrition and hydration status and recommend course of action  - Evaluate amount of meals eaten  - Assist patient with eating if necessary   - Allow adequate time for meals  - Recommend/ encourage appropriate diets, oral nutritional supplements, and vitamin/mineral supplements  - Order, calculate, and assess calorie counts as needed  - Recommend, monitor, and adjust tube feedings and TPN/PPN based on assessed needs  - Assess need for intravenous fluids  - Provide specific nutrition/hydration education as appropriate  - Include patient/family/caregiver in decisions related to nutrition  Outcome: Progressing     Problem: Prexisting or High Potential for Compromised Skin Integrity  Goal: Skin integrity is maintained or improved  Description: INTERVENTIONS:  - Identify patients at risk for skin breakdown  - Assess and monitor skin integrity  - Assess and monitor nutrition and hydration status  - Monitor labs   - Assess for incontinence   - Turn and reposition patient  - Assist with mobility/ambulation  - Relieve pressure over bony prominences  - Avoid friction and shearing  - Provide appropriate hygiene as needed including keeping skin clean and dry  - Evaluate need for skin moisturizer/barrier cream  - Collaborate with interdisciplinary team   - Patient/family teaching  - Consider wound care consult   Outcome: Progressing     Problem: NEUROSENSORY - ADULT  Goal: Achieves stable or improved neurological status  Description: INTERVENTIONS  - Monitor and report changes in neurological status  - Monitor vital signs such as temperature, blood pressure, glucose, and any other labs ordered   - Initiate measures to prevent increased intracranial pressure  - Monitor for seizure activity and implement precautions if appropriate      Outcome: Progressing  Goal: Remains free of injury related to seizures activity  Description: INTERVENTIONS  - Maintain airway, patient safety  and administer oxygen as ordered  - Monitor patient for seizure activity, document and report duration and description of seizure to physician/advanced practitioner  - If seizure occurs,  ensure patient safety during seizure  - Reorient patient post seizure  - Seizure pads on all 4 side rails  - Instruct patient/family to notify RN of any seizure activity including if an aura is experienced  - Instruct patient/family to call for assistance with activity based on nursing assessment  - Administer anti-seizure medications if ordered    Outcome: Progressing     Problem: CARDIOVASCULAR - ADULT  Goal: Maintains optimal cardiac output and hemodynamic stability  Description: INTERVENTIONS:  - Monitor I/O, vital signs and rhythm  - Monitor for S/S and trends of decreased cardiac output  - Administer and titrate ordered vasoactive medications to optimize hemodynamic stability  - Assess quality of pulses, skin color and temperature  - Assess for signs of decreased coronary artery perfusion  - Instruct patient to report change in severity of symptoms  Outcome: Progressing     Problem: RESPIRATORY - ADULT  Goal: Achieves optimal ventilation and oxygenation  Description: INTERVENTIONS:  - Assess for changes in respiratory status  - Assess for changes in mentation and behavior  - Position to facilitate oxygenation and minimize respiratory effort  - Oxygen administered by appropriate delivery if ordered  - Initiate smoking cessation education as indicated  - Encourage broncho-pulmonary hygiene including cough, deep breathe, Incentive Spirometry  - Assess the need for suctioning and aspirate as needed  - Assess and instruct to report SOB or any respiratory difficulty  - Respiratory Therapy support as indicated  Outcome: Progressing     Problem: GASTROINTESTINAL - ADULT  Goal: Maintains or returns to baseline bowel function  Description: INTERVENTIONS:  - Assess bowel function  - Encourage oral fluids to ensure adequate hydration  - Administer IV fluids if ordered to ensure adequate hydration  - Administer ordered medications as needed  - Encourage mobilization and activity  - Consider nutritional services referral to assist patient with adequate nutrition and appropriate food choices  Outcome: Progressing     Problem: METABOLIC, FLUID AND ELECTROLYTES - ADULT  Goal: Electrolytes maintained within normal limits  Description: INTERVENTIONS:  - Monitor labs and assess patient for signs and symptoms of electrolyte imbalances  - Administer electrolyte replacement as ordered  - Monitor response to electrolyte replacements, including repeat lab results as appropriate  - Instruct patient on fluid and nutrition as appropriate  Outcome: Progressing  Goal: Fluid balance maintained  Description: INTERVENTIONS:  - Monitor labs   - Monitor I/O and WT  - Instruct patient on fluid and nutrition as appropriate  - Assess for signs & symptoms of volume excess or deficit  Outcome: Progressing  Goal: Glucose maintained within target range  Description: INTERVENTIONS:  - Monitor Blood Glucose as ordered  - Assess for signs and symptoms of hyperglycemia and hypoglycemia  - Administer ordered medications to maintain glucose within target range  - Assess nutritional intake and initiate nutrition service referral as needed  Outcome: Progressing     Problem: SKIN/TISSUE INTEGRITY - ADULT  Goal: Skin Integrity remains intact(Skin Breakdown Prevention)  Description: Assess:  -Perform Douglas assessment every shift  -Clean and moisturize skin every repositioning q2h  -Inspect skin when repositioning, toileting, and assisting with ADLS  -Assess extremities for adequate circulation and sensation     Bed Management:  -Have minimal linens on bed & keep smooth, unwrinkled  -Change linens as needed when moist or perspiring  -Keep HOB at 30 degrees     Toileting:  -Offer bedside commode  -Use incontinent care products after each incontinent episode such as bowel movements    Activity:  -Encourage or provide ROM exercises   -Turn and reposition patient every 2 Hours  -Use appropriate equipment to lift or move patient in bed    Skin Care:  -Avoid use of baby powder, tape, friction and shearing, hot water or constrictive clothing  -Relieve pressure over bony prominences using speciality bed, foot boots  -Do not massage red bony areas    Outcome: Progressing  Goal: Incision(s), wounds(s) or drain site(s) healing without S/S of infection  Description: INTERVENTIONS  - Assess and document dressing, incision, wound bed, drain sites and surrounding tissue  - Provide patient and family education  - Perform skin care/dressing changes every shift  Outcome: Progressing  Goal: Pressure injury heals and does not worsen  Description: Interventions:  - Implement low air loss mattress or specialty surface (Criteria met)  - Apply silicone foam dressing  - Apply fecal or urinary incontinence containment device   - Perform passive or active ROM every q2h  - Turn and reposition patient & offload bony prominences every 2 hours   - Utilize friction reducing device or surface for transfers   - Consider consults to  interdisciplinary teams such as cardiology, nephrology, gastroenterology  - Use incontinent care products after each incontinent episode such as calazine and ryley creams  - Consider nutrition services referral as needed  Outcome: Progressing     Problem: HEMATOLOGIC - ADULT  Goal: Maintains hematologic stability  Description: INTERVENTIONS  - Assess for signs and symptoms of bleeding or hemorrhage  - Monitor labs  - Administer supportive blood products/factors as ordered and appropriate  Outcome: Progressing

## 2023-10-16 NOTE — OCCUPATIONAL THERAPY NOTE
Occupational Therapy Cx/Discharge Note     Patient Name: Bobby Hess  HWSNT'F Date: 10/16/2023  Problem List  Principal Problem:    Acute respiratory failure ([address])  Active Problems:    Paroxysmal atrial fibrillation (HCC)    Mixed hyperlipidemia    Essential hypertension    Class 2 severe obesity due to excess calories with serious comorbidity and body mass index (BMI) of 35.0 to 35.9 in adult     Acquired hypothyroidism    Mood disorder (HCC)    ADRIA (obstructive sleep apnea)    Diffuse interstitial pulmonary fibrosis (HCC)    Closed right ankle fracture    Transaminitis    Type 2 diabetes mellitus ([address])    VALENTIN (acute kidney injury) ([address])    Acute metabolic encephalopathy    SIRS (systemic inflammatory response syndrome) (HCC)    GIB (gastrointestinal bleeding)    Metabolic acidosis    Acute cystitis    Diffuse pulmonary alveolar hemorrhage    DIC (disseminated intravascular coagulation) ([address])    Hypernatremia            10/16/23 1138   OT Last Visit   OT Visit Date 10/16/23   Note Type   Note type Cancelled Session   Additional Comments Pt currently intubated/sedated. Will D/C OT orders. Please reconsult if pt becomes medically appropriate.              Shobha Perkins, OTR/L

## 2023-10-16 NOTE — ASSESSMENT & PLAN NOTE
DIC panel sent 10/14 given confirmed GIB and thrombocytopenia   Fibrinogen <60  Ddimer 7.1  INR >6  Goals:   INR>2, Fibrinogen >200, Platelets >89, Hgb >7  Total product:   Vit K: x5  FFP: 10U  Cryo: 5U  Platelets: 1U  PRBC: 1U

## 2023-10-16 NOTE — PROGRESS NOTES
[address]  Progress Note  Name: Liana Bush  MRN: [id number]  Unit/Bed#: -01 I Date of Admission: 10/2/2023   Date of Service: 10/16/2023 I Hospital Day: 14    Assessment/Plan   * Acute respiratory failure [facility])  Assessment & Plan  Progressive respiratory failure through the afternoon with tachypnea, sonorous respirations, forced/prolonged expirations refractory to xopenex and racemic epi   10/14 ETT for airway protection, respiratory insufficiency after speaking to the family at length, who were all in agreement  10/15 Bronch with evidence of [facility]    Plan:   Vent day 2  ACVC 12/400/40/6   Sedation: propofol for goal RASS -1  Pain: fentanyl PRN  Wean fio2 as tolerated for goal spo2 > 90%  Scheduled oral care, HOB 30 degrees     Transaminitis  Assessment & Plan  10/2 admitted to [facility] after being sent to the ER from rehab for her broken ankle with sudden jaundice   10/2 CT CAP -- Nodular liver morphology and sequela of portal venous hypertension suggesting cirrhosis. Minimal ascites around the liver and layering in the pelvis. Prominent lymph nodes at the katelynn hepatis presumably reactive to primary underlying liver dysfunction. No biliary ductal dilatation to suggest obstructive cause of jaundice  10/3 MRI abdomen -- Nodular liver surface contour suggestive of cirrhosis with sequela portal hypertension including trace perihepatic ascites. No suspicious hepatic mass. A 5 mm pancreatic uncinate process cystic lesion without worrisome features. No main pancreatic duct dilatation.  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   Slowly uptrending transaminitis since admission   10/7 peak AST/ALT of 707/466  10/11 peak Tbili 20.19  Worsening INR, now to 6.11   10/15 add xifaxan     Plan:   Steroids D6: Solumedrol 500mg q12h for 3 days (D2/3)  Albumin 24g q6h   Octreotide q8h   Cont lactulose, xifaxan   NAC complete 10/15  Evidence of DAH -- see below   Trend hepatic enzymes, ammonia, INR   Appreciate GI input   Avoid hypotension, hepatotoxic agents   Referral for transplant per GI    SIRS (systemic inflammatory response syndrome) (HCC)  Assessment & Plan  Unclear if this is 2/2 infectious source or reaction to worsening hepatic failure, respiratory failure requiring ETT   SIRS criteria met 10/14 with tachypnea, tachycardia, LA 3.5 -- Sepsis alert called   Admission UA 10/2: large leuks, positive nitrites, innum bacteria   10/13 BC x2 -- neg x24H   10/14 -- LA 3.5, procal 0.38  Afebrile, no leukocytosis, HD stable   10/15 LA cleared     Plan:   UC + ecoli, klebsiella   Abx D3: Aztreonam D3  Trend fever, WBC curve, procal    Acute metabolic encephalopathy  Assessment & Plan  In the setting of acute hepatic failure, worsening ammonia   10/14 ETT for airway protection, worsening respiratory status as above   No CTH since admission, though given progressively worse INR, will obtain [facility]     Plan:  10/15 CTH -- neg   Now sedated on propofol for ETT  Serial neuro assessments   Maintain normothermia, normoglycemia   Ammonemia treatment as above     Type 2 diabetes mellitus [facility])  Assessment & Plan  Lab Results   Component Value Date    HGBA1C 7.0 (H) 07/20/2023       Recent Labs     10/15/23  1232 10/15/23  1405 10/15/23  1551 10/15/23  1807   POCGLU 133 147* 156* 136         Blood Sugar Average: Last 72 hrs:  (P) [id number]    Does not appear to be on home regimen for DM2  10/15 Insulin gtt for glucose 140-180 given SDS      Hypernatremia  Assessment & Plan  Mild, sNa 148   Likely in the setting of lack of free water intake with AMS and NPO   D5W @50mL/hr started overnight   Trend on serial bmp     DIC (disseminated intravascular coagulation) (HCC)  Assessment & Plan  DIC panel sent 10/14 given confirmed GIB and thrombocytopenia   Fibrinogen <60  Ddimer 7.1  INR >6  Goals: INR>2, Fibrinogen >200, Platelets >13, Hgb >7  Total product:   Vit K: x5  FFP: 10U  Cryo: 5U  Platelets: 1U  PRBC: 1U    Diffuse pulmonary alveolar hemorrhage  Assessment & Plan  10/15 Bronch concerning for DAH with increase in bloody secretions from in-line   Bronch specimens pending   High-dose steroids D2:  Solumedrol 500mg q12h x3 days   Appreciate rheumatology input    Metabolic acidosis  Assessment & Plan  Progressively worsening on serial BMP   Most recent serum bicarb 15, AG 17  Likely multifactorial etiology in the setting of worsening hepatic failure, lactic acidosis     Plan:   Volume overloaded on clinical exam -- will hold on further crystalloid   Now intubated, will continue to trend serially   CO2 improving slightly on trending labs     GIB (gastrointestinal bleeding)  Assessment & Plan  10/14 noted dark, tarry stools   Concern for GIB vs lactulose stool   Hemoccult + x3 10/15 AM     Plan:   Continue NPO, NGT   Protonix IV BID, cont octreotide   Unfortunately, not a candidate for scope given critical illness     VALENTIN (acute kidney injury) ([address])  Assessment & Plan  Creat baseline 0.5-0.6  Creat peak this admission: 3.28  In the setting of HRS vs contrast nephropathy vs decreased PO intake 2/2 acute illness   Previously requiring midodrine earlier in her stay, which has since been discontinued given borderline hypertension     Plan:   Creat slowly downtrending -- continue to trend   Urinary retention protocol   Hold home torsemide, though may require eventual dosing of Lasix for volume overload on exam  Avoid hypotension, nephrotoxic agents     Closed right ankle fracture  Assessment & Plan  Prior to admission, was in rehab pending discharge to home independently following fx   Will need PT/OT when appropriate     Diffuse interstitial pulmonary fibrosis ([address])  Assessment & Plan  10/2 CTAP -- Reticular interstitial lung abnormality (REENA) noted in both lower lobes   Not on home meds, does not follow with Pulm as OP     ADRIA (obstructive sleep apnea)  Assessment & Plan  Per anesthesia noted from 2016, does not tolerate CPAP     Mood disorder ([address])  Assessment & Plan  Home Jean Raja held in the setting of [address] held 10/15    Acquired hypothyroidism  Assessment & Plan  Cont home synthroid   Check TSH    Class 2 severe obesity due to excess calories with serious comorbidity and body mass index (BMI) of 35.0 to 35.9 in adult   Assessment & Plan  Noted     Essential hypertension  Assessment & Plan  Home regimen: HCTZ, valsartan, sotalol, cardizem, torsemide -- all on hold given transaminitis and VALENTIN during admission     Plan:   Continue to hold above while on sedation   If persistently hypertensive, low threshold to add medications back slowly   Goal SBP <160    Mixed hyperlipidemia  Assessment & Plan  Hold home statin 2/2 transaminitis     Paroxysmal atrial fibrillation (HCC)  Assessment & Plan  Home regimen: Cardizem 360mg qd, sotalol BID  AC: Xarelto     Plan:   Hold Xarelto in the setting of supratherapeutic INR 2/2 hepatitis   Hold cardizem, sotalol given NPO initially -- will continue to hold given HR 80 and now post intubation on sedation   Goal HR <120  Continuous tele -- currently controlled AF    Hyponatremia-resolved as of 10/15/2023  Assessment & Plan  sNa jill this admission 129  Hx of SIADH, 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 Yes  Need for changes to pain regimen? No   B: Both Spontaneous Awakening Trials (SATs) and Spontaneous Breathing Trials (SBTs) Plan to perform spontaneous awakening trial today? Yes   Plan to perform spontaneous breathing trial today? Yes   Obvious barriers to extubation?  Yes   C: Choice of Sedation RASS Goal: -3 Moderate Sedation  Need for changes to sedation or analgesia regimen? No   D: Delirium CAM-ICU: Unable to perform secondary to Acute cognitive dysfunction   E: Early Mobility  Plan for early mobility? Yes   F: Family Engagement Plan for family engagement today? Yes       Antibiotic Review: Awaiting culture results. Review of Invasive Devices:        Lilibeth Plan: Keep arterial line for hemodynamic monitoring and frequent ABGs    Prophylaxis:  VTE VTE covered by:    None       Stress Ulcer  covered bypantoprazole (PROTONIX) injection 40 mg [[id number]]       Subjective   HPI/24hr events: Urine culture + klebsiella and ecoli -- sensitivities pending. She continued on Aztreonam given allergies. Bronch yesterday concerning for [facility] -- specimen sent and SDS initiated. She is being transfused with PRN FFP and cryo in the setting of GIB and DAH. Remains HD stable. Ammonia remains elevated. After family conversation, she was transitioned to L2 Code Status yesterday afternoon. Overnight, received 1U PRBC, 4 FFP, 3 cryo, 1 platelet. Review of Systems   Unable to perform ROS: Intubated             Objective                            Vitals I/O      Most Recent Min/Max in 24hrs   Temp 97.5 °F (36.4 °C) Temp  Min: 97.1 °F (36.2 °C)  Max: 98.9 °F (37.2 °C)   Pulse 79 Pulse  Min: 56  Max: 139   Resp 14 Resp  Min: 12  Max: 75   BP (!) 129/111 BP  Min: 101/45  Max: 189/76   O2 Sat 100 % SpO2  Min: 95 %  Max: 100 %      Intake/Output Summary (Last 24 hours) at 10/16/2023 0433  Last data filed at 10/16/2023 0200  Gross per 24 hour   Intake 5261.79 ml   Output 25 ml   Net 5236.79 ml         Diet NPO     Invasive Monitoring Physical exam   Arterial Line  Lilibeth /67  Arterial Line BP  Min: 100/47  Max: 204/76   MAP 89 mmHg  Arterial Line MAP (mmHg)  Min: 64 mmHg  Max: 115 mmHg    Physical Exam  Eyes:      General: Scleral icterus present.       Comments: Pupils 2mm PERRLA BL   HENT:      Head: Normocephalic and atraumatic. Neck:     Cardiovascular:      Rate and Rhythm: Normal rate and regular rhythm. Musculoskeletal:      Right lower le+ Pitting Edema present. Left lower le+ Pitting Edema present. Abdominal:      General: Abdomen is flat. Bowel sounds are decreased. Palpations: Abdomen is soft. Tenderness: There is no abdominal tenderness. Constitutional:       General: She is not in acute distress. Appearance: She is well-developed. She is obese. She is ill-appearing. Interventions: She is sedated, intubated and restrained. Pulmonary:      Effort: She is intubated. Breath sounds: Examination of the right-lower field reveals decreased breath sounds. Examination of the left-lower field reveals decreased breath sounds. Decreased breath sounds present. Comments: ACVC 12/400/40/8, RR 16-20  Neurological:      Comments: Breathing over ventilator   Withdraws from pain x4 extremities    Genitourinary/Anorectal:     Comments: Voiding independently   Vitals and nursing note reviewed. Diagnostic Studies      EKG: AF, controlled rate on tele   Imaging:   CT head wo contrast   Final Result      No acute intracranial abnormality. Workstation performed: [id number]         XR chest portable ICU   Final Result      Cardiomegaly. No acute cardiopulmonary disease. Workstation performed: [id number]         XR abdomen 1 view kub   Final Result      Tip of the NG tube is coursing below the left hemidiaphragm. Workstation performed: [id number]         XR abdomen 1 vw portable   Final Result      Tip of the NG tube is coursing below the left hemidiaphragm. The distal aspect is coiled in the stomach and the tip terminates near the GE junction. Workstation performed: [id number]         XR chest portable ICU   Final Result      Tip of the NG tube is coursing below the left hemidiaphragm.  The distal aspect is coiled in the stomach and the tip terminates near the GE junction. Workstation performed: [id number]         XR chest portable ICU   Final Result      No acute cardiopulmonary disease. NG tube in stomach. Workstation performed: [id number]         XR chest portable   Final Result      No acute cardiopulmonary disease. Workstation performed: [id number]         IR biopsy transjugular liver   Final Result      1. Transjugular liver biopsy with pressure measurements. 2. Pressure measurements as follows:   Right atrium: 12/6 mmHg (mean 8)   Free hepatic: 13/9 mmHg (mean 11)   Wedged hepatic: 22/18 mmHg (mean 20)      Plan:      Specimens sent for evaluation. Workstation performed: [id number]         MRI abdomen w wo contrast and mrcp   Final Result      Nodular liver surface contour suggestive of cirrhosis with sequela portal hypertension including trace perihepatic ascites. No suspicious hepatic mass. A 5 mm pancreatic uncinate process cystic lesion without worrisome features. No main pancreatic duct dilatation. 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 For simple cyst(s) less than 1.5 cm, recommend followup    every 2 year for 2 times. After 4 years, no more followups. Recommend next followup in 2 years.  Preferred imaging modality: abdomen MRI and MRCP with and without IV contrast, or triple phase abdomen CT with IV contrast, or abdomen MRI and MRCP without IV    contrast.      The recommendations regarding pancreatic findings assumes that patient does not have family history of pancreatic cancer nor have any symptoms potentially attributable to pancreatic cystic lesions (hyperamylasemia, recent-onset diabetes, severe    epigastric pain, weight loss, steatorrhea, or jaundice.) If these conditions are not true, then management should be deferred to judgement of specialists such as gastroenterologists or oncologic surgeons. REFERENCES:   Recommendations are based on recent consensus statements on management of pancreatic cystic lesions from [address] [facility], [address] [place] [address],  of Radiology, the journal Pancreatology, and our own institutional consensus. [address] Guidelines on the Diagnosis and Management of Asymptomatic Neoplastic Pancreatic Cysts. Gastroenterology 2015; 929:957-922 (AGA GUIDELINES)   International Consensus Guidelines for Management of Intraductal Papillary Mucinous Neoplasm and Mucinous Cystic Neoplasms of the Pancreas. Pancreatology Sep-Oct 2017;17(5):738-753. doi: 10.1016/j.yuan.2017.07.007. Epub 2017 Jul 13 [facility] paper). American College of Radiology: White Paper: Management of Incidental Pancreatic Cysts: A White Paper of the ACR Incidental Findings Committee, 2017 Jul;14(7):911-923. doi: 10.1016/j.jacr. 2017.03.010. Epub 2017 May 19. (ACR GUIDELINES.)      The study was marked in EPIC for significant notification. Workstation performed: [id number]         CT abdomen pelvis wo contrast   Final Result      Nodular liver morphology and sequela of portal venous hypertension suggesting cirrhosis. Minimal ascites around the liver and layering in the pelvis. Prominent lymph nodes at the katelynn hepatis presumably reactive to primary underlying liver dysfunction. No biliary ductal dilatation to suggest obstructive cause of jaundice. 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 Recommend nonemergent outpatient evaluation with high-resolution chest CT to exclude shiloh UIP pattern fibrosis. Workstation performed: [id number]         XR chest portable ICU    (Results Pending)   CT chest wo contrast    (Results Pending)      I have personally reviewed pertinent reports.        Medications:  Scheduled PRN   Albumin 25%, 25 g, Q6H  aztreonam, 1,000 mg, Q12H  chlorhexidine, 15 mL, Q12H [address]  lactulose, 30 g, 4x Daily  levothyroxine, 175 mcg, Early Morning  methylPREDNISolone sodium succinate, 500 mg, Q12H [address]  BIRDIE ANTIFUNGAL, , BID  nystatin, , BID  octreotide, 100 mcg, Q8H DEON  pantoprazole, 40 mg, Q12H [address]  rifaximin, 550 mg, Q12H [address]  sodium bicarbonate, 1,300 mg, TID after meals      fentanyl citrate (PF), 50 mcg, Q4H PRN  hydrALAZINE, 20 mg, Q4H PRN  levalbuterol, 1.25 mg, Q4H PRN       Continuous    dextrose, 50 mL/hr, Last Rate: 50 mL/hr (10/16/23 0023)  fentaNYL, 50 mcg/hr, Last Rate: 50 mcg/hr (10/16/23 0015)  insulin regular (HumuLIN R,NovoLIN R) 1 Units/mL in sodium chloride 0.9 % 100 mL infusion, 0.3-21 Units/hr, Last Rate: 1 Units/hr (10/16/23 0416)  propofol, 5-50 mcg/kg/min, Last Rate: 5 mcg/kg/min (10/15/23 2330)         Labs:    CBC    Recent Labs     10/15/23  2220 10/16/23  0314   WBC 4.98 3.88*   HGB 7.0* 6.7*   HCT 21.7* 20.3*   PLT 55* 50*     BMP    Recent Labs     10/15/23  2235 10/16/23  0314   SODIUM 148* 147   K 4.2 3.5   * 113*   CO2 19* 20*   AGAP 15 14   BUN 70* 72*   CREATININE 2.28* 2.23*   CALCIUM 10.4* 10.7*       Coags    Recent Labs     10/14/23  2339 10/15/23  0423 10/15/23  2220 10/16/23  0350   INR 6.48*   < > 2.26* 2.39*   PTT 70*  --   --   --     < > = values in this interval not displayed. Additional Electrolytes  Recent Labs     10/14/23  2052 10/15/23  0423 10/15/23  1656 10/15/23  2235 10/16/23  0314   MG  --    < > 2.3 2.3 2.3   PHOS  --    < > 3.5  --  3.3   CAIONIZED 1.29  --   --   --  1.26    < > = values in this interval not displayed.           Blood Gas    Recent Labs     10/15/23  1657   PHART 7.366   UMU2ZNX 32.9*   PO2ART 212.5*   TFH2RFK 18.4*   BEART -6.2   SOURCE Line, Arterial     Recent Labs     10/14/23  1918 10/14/23  2340 10/15/23  1657   PHVEN 7.374  --   --    TDW2DZC 27.6*  --   --    PO2VEN 109.1*  --   --    NNN1DFG 15.7*  --   --    BEVEN -8.3  --   --    SOURCE  --    < > Line, Arterial    < > = values in this interval not displayed.     LFTs  Recent Labs     10/15/23  0423 10/16/23  0314   * 81*   AST 63* 36   ALKPHOS 45 45   ALB 4.2 4.6   TBILI 18.05* 14.53*       Infectious  Recent Labs     10/14/23  1918   PROCALCITONI 0.38*     Glucose  Recent Labs     10/15/23  1106 10/15/23  1656 10/15/23  2235 10/16/23  0314   GLUC 152* 157* 167* 1224 [address] [place] [address], [facility]

## 2023-10-16 NOTE — RESPIRATORY THERAPY NOTE
Patient Sedated for Asynchronism with the ventilator. ABG drawn from A-line, but run stat for ventilation analysis to maintain adequate ventilation during Asynchrony episode. Mechanical rate increased post result to correct acidosis.

## 2023-10-16 NOTE — PROGRESS NOTES
NEPHROLOGY PROGRESS NOTE   Jordi Cardoza 81 y.o. female MRN: [id number]  Unit/Bed#: -01 Encounter: [id number]  Reason for Consult: VALENTIN    ASSESSMENT/PLAN:  Acute Kidney Injury- likely secondary to ATN with hepatorenal syndrome / hypotension / losartan / elevated bilirubin / contrast induced nephropathy (10/2 & 5mL IV contrast 10/10 for liver biopsy)  Baseline creatinine 0.6-0.7  Admission creatinine 0.8  Creatinine peak 3.2   Creatinine now stable at 2.2 through the weekend   Serological work-up: Rheumatoid factor positive. P-ANCA was positive at 1 is to 160 but MPO and CA-3 antibody was negative. Atypical p-ANCA was negative. BEV was negative. 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   Imaging: no hydronephrosis  UA: dirty, trace blood and protein  Minimal urine output, monitor   Continue HRS cocktail with albumin & octreotide, s/p midodrine   No acute indication for RRT currently but will monitor  History of Hyponatremia- secondary to cirrhosis and lexapro and hctz, monitor with sodium bicarbonate tablets   Hypernatremia- likely secondary to poor oral intake  On D5W with stability  Metabolic Acidosis- on sodium bicarbonate tablets   Consider reducing/stopping if hypernatremia worsens  H/O Hypertension, now with Hypotension- BP improving  Hold losartan  Holding sotalol  Cirrhosis- GI on board  Secondary to autoimmune hepatitis / gabapentin  7. Volume Overload- consider IV diuretics to encourage urine output  8. Goals of Care- discussions ongoing    Disposition:  Consider diuretics. Critical care team to continue goals of care discussions today with family. Both parties agree with plan. SUBJECTIVE:  Patient intubated and sedated. Not tolerating vent per critical care ap.     OBJECTIVE:  Current Weight: Weight - Scale: 106 kg (234 lb 9.1 oz)  Vitals:    10/16/23 8072 10/16/23 0803 10/16/23 0812 10/16/23 0851   BP:  143/64 133/58 144/69   Pulse:  100 70 73   Resp:  18 18 18   Temp:  97.6 °F (36.4 °C) (!) 97.4 °F (36.3 °C) 97.6 °F (36.4 °C)   TempSrc:   Oral    SpO2: 98% 98% 98%    Weight:       Height:           Intake/Output Summary (Last 24 hours) at 10/16/2023 7689  Last data filed at 10/16/2023 0873  Gross per 24 hour   Intake 8016.17 ml   Output 25 ml   Net 7991.17 ml     General: intubated, sedated, paralyzed  Skin: jaundiced  Eyes: closed  ENMT: mm dry  Neck: no masses  Respiratory: coarse, wheeze  Cardiac: RRR  Extremities: + bilateral LE edema, anasarca   GI: soft, nd  Neuro: sedated    Medications:    Current Facility-Administered Medications:     albumin human (FLEXBUMIN) 25 % injection 25 g, 25 g, Intravenous, Q6H, YOUSUF LittleNP, Last Rate: 0 mL/hr at 10/16/23 0230, 25 g at 10/16/23 0854    aztreonam (AZACTAM) 1,000 mg in sodium chloride 0.9 % 50 mL IVPB, 1,000 mg, Intravenous, Q12H, YOUSUF LittleNP, Last Rate: 100 mL/hr at 10/16/23 0900, 1,000 mg at 10/16/23 0900    chlorhexidine (PERIDEX) 0.12 % oral rinse 15 mL, 15 mL, Mouth/Throat, Q12H [address], Elisa Baires CRNP, 15 mL at 10/16/23 0823    dextrose 5 % infusion, 50 mL/hr, Intravenous, Continuous, Elisa Baires CRNP, Last Rate: 50 mL/hr at 10/16/23 0757, 50 mL/hr at 10/16/23 0757    fentaNYL 1000 mcg in sodium chloride 0.9% 100mL infusion, 50 mcg/hr, Intravenous, Continuous, Elisa Lopezr, CRNP, Last Rate: 5 mL/hr at 10/16/23 0828, 50 mcg/hr at 10/16/23 0828    fentanyl citrate (PF) 100 MCG/2ML 50 mcg, 50 mcg, Intravenous, Q4H PRN, RAJ Bragg, 50 mcg at 10/16/23 0647    hydrALAZINE (APRESOLINE) injection 20 mg, 20 mg, Intravenous, Q4H PRN, RAJ Bragg, 20 mg at 10/15/23 0930    insulin regular (HumuLIN R,NovoLIN R) 1 Units/mL in sodium chloride 0.9 % 100 mL infusion, 0.3-21 Units/hr, Intravenous, Titrated, RAJ Porras, Last Rate: 1 mL/hr at 10/16/23 0820, 1 Units/hr at 10/16/23 0820    ipratropium (ATROVENT) 0.02 % inhalation solution 0.5 mg, 0.5 mg, Nebulization, TID PRN, RAJ Boyer, 0.5 mg at 10/16/23 0650    lactulose oral solution 30 g, 30 g, Oral, 4x Daily, YOUSUF LittleNP, 30 g at 10/16/23 0837    levalbuterol (Bhupinder Hopewell) inhalation solution 1.25 mg, 1.25 mg, Nebulization, Q4H PRN, YOUSUF ByoerNP, 1.25 mg at 10/16/23 0651    levothyroxine tablet 175 mcg, 175 mcg, Oral, Early Morning, RAJ Little, 175 mcg at 10/16/23 0522    methylPREDNISolone sodium succinate (Solu-MEDROL) 500 mg in sodium chloride 0.9 % 250 mL IVPB, 500 mg, Intravenous, Q12H DEON, RAJ Harmon, Last Rate: 250 mL/hr at 10/16/23 0920, 500 mg at 10/16/23 0920    moisture barrier miconazole 2% cream (aka BIRDIE MOISTURE BARRIER ANTIFUNGAL CREAM), , Topical, BID, RAJ Boyer, Given at 10/16/23 0857    nystatin (MYCOSTATIN) cream, , Topical, BID, Raysasha Solano, CRNP, Given at 10/15/23 1808    octreotide (SandoSTATIN) injection 100 mcg, 100 mcg, Subcutaneous, Q8H [address], RAJ Little, 100 mcg at 10/16/23 0526    pantoprazole (PROTONIX) injection 40 mg, 40 mg, Intravenous, Q12H [address], RAJ Little, 40 mg at 10/16/23 0842    propofol (DIPRIVAN) 1000 mg in 100 mL infusion (premix), 5-50 mcg/kg/min, Intravenous, Titrated, RAJ Harmon, Last Rate: 6.4 mL/hr at 10/16/23 0718, 10 mcg/kg/min at 10/16/23 0718    rifaximin (XIFAXAN) tablet 550 mg, 550 mg, Oral, Q12H [address], Amadatamiko Love, RAJ, 550 mg at 10/16/23 0827    sodium bicarbonate tablet 1,300 mg, 1,300 mg, Per NG Tube, TID after meals, Raynald RAJ Solano, 1,300 mg at 10/16/23 0827    Laboratory Results:  Results from last 7 days   Lab Units 10/16/23  0707 10/16/23  0314 10/15/23  2235 10/15/23  2220 10/15/23  1656 10/15/23  1602 10/15/23  1106 10/15/23  0423 10/14/23  2340 10/14/23  2052 10/14/23  1918 10/14/23  1623 10/14/23  1419 10/14/23  1419 10/14/23  0444 10/13/23  0916 10/13/23  0920 10/12/23  0230 10/11/23  0958 10/11/23  0016   WBC Thousand/uL  --  3.88*  --  4.98  --  8.77  --  6.13  --   --   --   --   --   --  7.84 10.08  --  12.79*  --  13.97*   HEMOGLOBIN g/dL  --  6.7*  --  7.0*  --  7.4*  7.4* 8.2* 7.7*  --   --   --   --   --   --  9.1* 9.6*  --  11.1*  --  11.7   I STAT HEMOGLOBIN g/dl 8.5*  --   --   --   --   --   --   --   --  9.5*  --  8.8*   < >  --   --   --   --   --   --   --    HEMATOCRIT %  --  20.3*  --  21.7*  --  22.7*  --  22.4*  --   --   --   --   --   --  27.0* 29.1*  --  33.2*  --  35.9   HEMATOCRIT, ISTAT % 25*  --   --   --   --   --   --   --   --  28*  --  26*  --   --   --   --   --   --   --   --    PLATELETS Thousands/uL  --  50*  --  55*  --  66*  --  70* 85*  --   --   --   --   --  107* 112*  --  130*  --  110*   POTASSIUM mmol/L  --  3.5 4.2  --  4.3  --  3.5 3.1*  --   --  3.9  --   --  3.4* 3.2*  --    < > 3.6   < > 3.8   CHLORIDE mmol/L  --  113* 114*  --  114*  --  113* 114*  --   --  113*  --   --  112* 111*  --    < > 105   < > 105   CO2 mmol/L  --  20* 19*  --  19*  --  20* 18*  --   --  15*  --   --  18* 17*  --    < > 18*   < > 15*   CO2, I-STAT mmol/L 22  --   --   --   --   --   --   --   --  18*  --  17*   < >  --   --   --   --   --   --   --    BUN mg/dL  --  72* 70*  --  72*  --  70* 72*  --   --  72*  --   --  72* 72*  --    < > 55*   < > 45*   CREATININE mg/dL  --  2.23* 2.28*  --  2.20*  --  2.05* 2.09*  --   --  2.28*  --   --  2.32* 2.53*  --    < > 3.28*   < > 2.92*   CALCIUM mg/dL  --  10.7* 10.4*  --  10.0  --  10.1 9.8  --   --  9.6  --   --  9.9 9.7  --    < > 8.4   < > 8.5   MAGNESIUM mg/dL  --  2.3 2.3  --  2.3  --   --  2.5  --   --   --   --   --   --  2.2  --   --  2.3  --  2.2   PHOSPHORUS mg/dL  --  3.3  --   --  3.5  --   --  2.3  --   --   --   --   --   --  3.6  --   --   --   --   --    GLUCOSE, ISTAT mg/dl 159*  --   --   --   --   --   --   --   --  178*  --  188*  --   --   --   --   --   --   --   --     < > = values in this interval not displayed. I have personally reviewed the blood work as stated above and in my note. I have personally reviewed critical care note.

## 2023-10-16 NOTE — PROGRESS NOTES
Evie Peterson  [id number]    89 y.o.  female      ASSESSMENT and PLAN    1. Acute liver injury, suspect due to autoimmune hepatitis -ANCA, RF, and anti-smooth muscle antibody elevated. Hepatitis panel nonreactive, EBV and CMV consistent with prior infection. Transjugular biopsy 10/10/2023, pathology Chronic hepatitis with severe activity (grade 4, Peace-Gino system), bridging necrosis and prominent plasma cells, consistent with autoimmune hepatitis. Minimal macrovesicular steatosis (less than 5%) CMV neg. Alk phos, ALT, AST fluctuating. Possible hepatorenal syndrome. Has progressed to respiratory failure, had bronchoscopy revealing pulmonary hemorrhage. Hepatic encephalopathy persists. No clinical signs of acute GI bleeding. UTI present. Some improvement with bilirubin peaking yesterday at 18 down to 14 range today. INR also improved but receiving blood products. Continue lactulose through NG tube  Trend LFTs, INR, ammonia  Continue IV steroids. Discussed with hepatology service-no role for azathioprine especially in absence of response to steroids. N-acetylcysteine protocol completed  Poor transplant candidate given age and comorbidities  Discussed status of patient's decompensated liver disease/liver failure and poor prognosis with family    Chief Complaint   Patient presents with    Abnormal Lab     Elevated liver enzymes from routine blood work done yesterday. SUBJECTIVE/HPI unresponsive on ventilator, no blood per NG tube and no melena reported.     /69   Pulse 91   Temp 97.7 °F (36.5 °C) (Oral)   Resp 18   Ht 5' 4" (1.626 m)   Wt 106 kg (234 lb)   SpO2 99%   BMI 40.17 kg/m²     PHYSICALEXAM  General appearance: Critically ill, intubated  Head: Normocephalic, without obvious abnormality, atraumatic  Lungs: Ventilated breath sounds  Heart: S1, S2 normal  Abdomen: soft, distended; bowel sounds decreased; no masses,  no organomegaly  Neurologic: Nonverbal, unresponsive cannot assess asterixis    Lab Results   Component Value Date    GLUCOSE 159 (H) 10/16/2023    CALCIUM 10.7 (H) 10/16/2023    K 3.5 10/16/2023    CO2 22 10/16/2023     (H) 10/16/2023    BUN 72 (H) 10/16/2023    CREATININE 2.23 (H) 10/16/2023     Lab Results   Component Value Date    WBC 4.75 10/16/2023    HGB 7.2 (L) 10/16/2023    HCT 22.1 (L) 10/16/2023    MCV 92 10/16/2023    PLT 76 (L) 10/16/2023     Lab Results   Component Value Date    ALT 81 (H) 10/16/2023    AST 36 10/16/2023    GGT 76 (H) 10/03/2023    ALKPHOS 45 10/16/2023     No results found for: "AMYLASE"  Lab Results   Component Value Date    LIPASE 57 10/02/2023     Lab Results   Component Value Date    IRON 203 10/03/2023    TIBC <258 10/03/2023    FERRITIN 164 10/03/2023     Lab Results   Component Value Date    INR 1.88 (H) 10/16/2023       Counseling / Coordination of Care  Total floor / unit time spent today 30 minutes.

## 2023-10-16 NOTE — PHYSICAL THERAPY NOTE
PHYSICAL THERAPY CANCELLATION NOTE    Patient Name: Kimberly Goldman  OQQIY'S Date: 10/16/2023       10/16/23 1134   Note Type   Note type Cancelled Session   Cancel Reasons Intubated/sedated   Additional Comments PT re-eval orders received, chart review performed. Pt intubated over the weekend, currently sedated. Not hemodynamically stable either for PT eval, will hold and f/u as appropriate       If pt continues to require intubation but appropriate to wean sedation and more hemodynamically stable, can initiate early mobility while intubated w/ PT/OT.        Claudia Corrigan, PT, DPT

## 2023-10-17 PROBLEM — E83.52 HYPERCALCEMIA: Status: ACTIVE | Noted: 2023-01-01

## 2023-10-17 NOTE — ASSESSMENT & PLAN NOTE
Ca 10.9  Unclear etiology    Plan  Trend BMP  Nephrology following, input appreciated   PTH, Vit D levels, ACE level and protein electrophoresis pending

## 2023-10-17 NOTE — ASSESSMENT & PLAN NOTE
Progressively worsening on serial BMP   Most recent serum bicarb 21, AG 15  Likely multifactorial etiology in the setting of worsening hepatic failure, lactic acidosis     Plan:   Volume overloaded on clinical exam -- will hold on further crystalloid   Continue Bicarb tabs  Now intubated, will continue to trend serially   CO2 improving slightly on trending labs No

## 2023-10-17 NOTE — PROGRESS NOTES
[facility]  Progress Note  Name: Acosta Zuñiga  MRN: [id number]  Unit/Bed#: -01 I Date of Admission: 10/2/2023   Date of Service: 10/17/2023 I Hospital Day: 15    Assessment/Plan   * Acute respiratory failure [facility])  Assessment & Plan  Progressive respiratory failure through the afternoon with tachypnea, sonorous respirations, forced/prolonged expirations refractory to xopenex and racemic epi   10/14 ETT for airway protection, respiratory insufficiency after speaking to the family at length, who were all in agreement  10/15 Bronch with evidence of [facility]  10/16 required vecuronium for vent asynchrony    Plan:   Vent day 3  ACVC 18/400/40/8   Sedation: propofol for goal RASS -1  Pain: fentanyl PRN  Wean fio2 as tolerated for goal spo2 > 90%  Scheduled oral care, HOB 30 degrees     Transaminitis  Assessment & Plan  10/2 admitted to [facility] after being sent to the ER from rehab for her broken ankle with sudden jaundice   10/2 CT CAP -- Nodular liver morphology and sequela of portal venous hypertension suggesting cirrhosis. Minimal ascites around the liver and layering in the pelvis. Prominent lymph nodes at the katelynn hepatis presumably reactive to primary underlying liver dysfunction. No biliary ductal dilatation to suggest obstructive cause of jaundice  10/3 MRI abdomen -- Nodular liver surface contour suggestive of cirrhosis with sequela portal hypertension including trace perihepatic ascites. No suspicious hepatic mass. A 5 mm pancreatic uncinate process cystic lesion without worrisome features. No main pancreatic duct dilatation.  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   Slowly uptrending transaminitis since admission   10/7 peak AST/ALT of 707/466  10/11 peak Tbili 20.19  Worsening INR, now to 6.11   10/15 add xifaxan     Plan:   Steroids D7: Solumedrol 500mg q12h for 3 days (D3/3)  Albumin 24g q6h   Octreotide q8h   Cont lactulose, xifaxan   NAC complete 10/15  Evidence of DAH -- see below   Trend hepatic enzymes, ammonia, INR   Appreciate GI input   Poor transplant candidate given age and comorbidities  Avoid hypotension, hepatotoxic agents   Referral for transplant per GI    SIRS (systemic inflammatory response syndrome) (HCC)  Assessment & Plan  Unclear if this is 2/2 infectious source or reaction to worsening hepatic failure, respiratory failure requiring ETT   SIRS criteria met 10/14 with tachypnea, tachycardia, LA 3.5 -- Sepsis alert called   Admission UA 10/2: large leuks, positive nitrites, innum bacteria   10/13 BC x2 -- neg x24H   10/14 -- LA 3.5, procal 0.38  Afebrile, no leukocytosis, HD stable   10/15 LA cleared     Plan:   UC + ecoli, klebsiella   Completed Aztreonam D3  Trend fever, WBC curve, procal    Acute metabolic encephalopathy  Assessment & Plan  In the setting of acute hepatic failure, worsening ammonia   10/14 ETT for airway protection, worsening respiratory status as above   No CTH since admission, though given progressively worse INR, will obtain [facility]     Plan:  10/15 CTH -- neg   Now sedated on propofol for ETT  Serial neuro assessments   Maintain normothermia, normoglycemia   Ammonemia treatment as above     Type 2 diabetes mellitus [facility])  Assessment & Plan  Lab Results   Component Value Date    HGBA1C 7.0 (H) 07/20/2023       Recent Labs     10/16/23  1758 10/16/23  2009 10/16/23  2206 10/17/23  0022   POCGLU 148* 150* 180* 173*         Blood Sugar Average: Last 72 hrs:  (P) [id number]    Does not appear to be on home regimen for DM2  10/15 Insulin gtt for glucose 140-180 given SDS      Hypercalcemia  Assessment & Plan  Ca 10.9  Unclear etiology    Plan  Trend BMP  Nephrology following, input appreciated   PTH, Vit D levels, ACE level and protein electrophoresis pending      Hypernatremia  Assessment & Plan  Mild, sNa 148   Likely in the setting of lack of free water intake with AMS and NPO   100cc q4H free water flushes added to TF  Trend on serial bmp     DIC (disseminated intravascular coagulation) ([address])  Assessment & Plan  DIC panel sent 10/14 given confirmed GIB and thrombocytopenia   Fibrinogen <60  Ddimer 7.1  INR >6  Goals:   INR<2, Fibrinogen >200, Platelets >91, Hgb >7  Total product:   Vit K: x6  FFP: 11U  Cryo: 5U  Platelets: 1U  PRBC: 1U    Diffuse pulmonary alveolar hemorrhage  Assessment & Plan  10/15 Bronch concerning for DAH with increase in bloody secretions from in-line   Bronch specimens pending   High-dose steroids D2:  Solumedrol 500mg q12h x3 days   Appreciate rheumatology input    Metabolic acidosis  Assessment & Plan  Progressively worsening on serial BMP   Most recent serum bicarb 21, AG 15  Likely multifactorial etiology in the setting of worsening hepatic failure, lactic acidosis     Plan:   Volume overloaded on clinical exam -- will hold on further crystalloid   Continue Bicarb tabs  Now intubated, will continue to trend serially   CO2 improving slightly on trending labs     GIB (gastrointestinal bleeding)  Assessment & Plan  10/14 noted dark, tarry stools   Concern for GIB vs lactulose stool   Hemoccult + x3 10/15 AM     Plan:   Continue NPO, NGT   Protonix IV BID, cont octreotide   Unfortunately, not a candidate for scope given critical illness     VALENTIN (acute kidney injury) ([address])  Assessment & Plan  Creat baseline 0.5-0.6  Creat peak this admission: 3.28  In the setting of HRS vs contrast nephropathy vs decreased PO intake 2/2 acute illness   Previously requiring midodrine earlier in her stay, which has since been discontinued given borderline hypertension   10/16 clarke placed 2/2 low UOP and strict I&Os    Plan:   Creat slowly downtrending -- continue to trend   Hold home torsemide, though may require eventual dosing of Lasix for volume overload on exam  Avoid hypotension, nephrotoxic agents     Closed right ankle fracture  Assessment & Plan  Prior to admission, was in rehab pending discharge to home independently following fx   Will need PT/OT when appropriate     Diffuse interstitial pulmonary fibrosis [facility])  Assessment & Plan  10/2 CTAP -- Reticular interstitial lung abnormality (REENA) noted in both lower lobes   Not on home meds, does not follow with Pulm as OP     ADRIA (obstructive sleep apnea)  Assessment & Plan  Per anesthesia noted from 2016, does not tolerate CPAP     Mood disorder ([address])  Assessment & [facility] held in the setting of [address] held 10/15    Acquired hypothyroidism  Assessment & Plan  Cont home synthroid   TSH 0.030    Class 2 severe obesity due to excess calories with serious comorbidity and body mass index (BMI) of 35.0 to 35.9 in adult   Assessment & Plan  Noted     Essential hypertension  Assessment & Plan  Home regimen: HCTZ, valsartan, sotalol, cardizem, torsemide -- all on hold given transaminitis and VALENTIN during admission     Plan:   Continue to hold above while on sedation   If persistently hypertensive, low threshold to add medications back slowly   Goal SBP <160    Mixed hyperlipidemia  Assessment & Plan  Hold home statin 2/2 transaminitis     Paroxysmal atrial fibrillation (HCC)  Assessment & Plan  Home regimen: Cardizem 360mg qd, sotalol BID  AC: Xarelto   10/16 Added 12.5mg lopressor for ectopy    Plan:   Hold Xarelto in the setting of supratherapeutic INR 2/2 hepatitis   Hold cardizem, sotalol given NPO initially -- will continue to hold given HR 80 and now post intubation on sedation   Goal HR <120  Continuous tele -- currently controlled AF             ICU Core Measures     Vented Patient  VAP Bundle  VAP bundle ordered     A: Assess, Prevent, and Manage Pain Has pain been assessed? Yes  Need for changes to pain regimen? No   B: Both Spontaneous Awakening Trials (SATs) and Spontaneous Breathing Trials (SBTs) Plan to perform spontaneous awakening trial today?  Yes   Plan to perform spontaneous breathing trial today? Yes   Obvious barriers to extubation? Yes   C: Choice of Sedation RASS Goal: -3 Moderate Sedation  Need for changes to sedation or analgesia regimen? No   D: Delirium CAM-ICU: Unable to perform secondary to Acute cognitive dysfunction   E: Early Mobility  Plan for early mobility? Yes   F: Family Engagement Plan for family engagement today? Yes       Antibiotic Review: Awaiting culture results. Review of Invasive Devices: [place] Plan: Continue for accurate I/O monitoring for 48 hours    [place] Plan: Keep arterial line for hemodynamic monitoring and frequent ABGs    Prophylaxis:  VTE VTE covered by:    None       Stress Ulcer  covered bypantoprazole (PROTONIX) injection 40 mg [[id number]]       Subjective   HPI: 81 y.o. F admitted 10/2 for sudden, unprovoked jaundice. On 10/10 liver biopsy consistent with autoimmune hepatitis. Hospital course has been complicated by VALENTIN, worsening metabolic encephalopathy, worsening liver indices, GI bleed and DIC. On 10/14 intubated for airway protection and respiratory distress. Completed 3 day course of Aztreonam for UTI. 24hr events:   Yesterday patient desynchronous with ventilator and breath stacking. Patient received propofol, fentanyl and albuterol without improvement. Ultimately required vecuronium.    Lactulose increased to 40g QID  Received PRN dose of 40mg IV lasix x2 doses yesterday  Currently NPO for RUQ US with doppler today to rule out portal vein thrombis  Received Vit K for INR 3.13    Review of Systems   Unable to perform ROS: Intubated           Objective                            Vitals I/O      Most Recent Min/Max in 24hrs   Temp (!) 96.6 °F (35.9 °C) Temp  Min: 96.1 °F (35.6 °C)  Max: 97.7 °F (36.5 °C)   Pulse (!) 108 Pulse  Min: 56  Max: 108   Resp 21 Resp  Min: 12  Max: 22   /61 BP  Min: 124/61  Max: 180/86   O2 Sat 98 % SpO2  Min: 98 %  Max: 100 %      Intake/Output Summary (Last 24 hours) at 10/17/2023 0125  Last data filed at 10/17/2023 0000  Gross per 24 hour   Intake 5945.48 ml   Output 1430 ml   Net 4515.48 ml         Diet NPO     Invasive Monitoring Physical exam   Arterial Line  [place] /58  Arterial Line BP  Min: 116/58  Max: 163/70   MAP 77 mmHg  Arterial Line MAP (mmHg)  Min: 77 mmHg  Max: 100 mmHg    Physical Exam  Eyes:      General: Scleral icterus present. Pupils: Pupils are equal, round, and reactive to light. Skin:     Coloration: Skin is jaundiced. Findings: Bruising present. HENT:      Head: Normocephalic. Mouth/Throat:      Mouth: Mucous membranes are moist.   Cardiovascular:      Rate and Rhythm: Regular rhythm. Tachycardia present. Musculoskeletal:      Right lower le+ Edema present. Left lower le+ Edema present. Abdominal:      General: Bowel sounds are decreased. There is distension. Tenderness: There is no abdominal tenderness. Constitutional:       Appearance: She is ill-appearing and toxic-appearing. Interventions: She is sedated and intubated. Pulmonary:      Effort: Tachypnea present. She is intubated. Breath sounds: Decreased breath sounds present. Comments: ACVC 18/400/40%/8  Neurological:      Comments: Breathing over ventilator. +gag +cough. Withdrawals from pain in b/l lower extremities. Genitourinary/Anorectal:  FoleyVitals and nursing note reviewed. Diagnostic Studies      EKG: Atrial fibrillation  Imaging:  XR chest portable ICU   Final Result by Lisa Todd MD ( [id number])      Low lung volumes with mild bibasilar atelectasis and trace effusions. Workstation performed: [id number]         CT chest wo contrast   Final Result by Clifford Nieto MD ( [id number])      New mild to moderate effusions.       Adjacent airspace consolidation in the lower lobes may be related to inflammatory infiltrates or atelectasis with additional mixed groundglass and consolidative patchy features in the right middle lobe and lingula. New upper abdominal ascites. The study was marked in [facility] for immediate notification. Workstation performed: [id number]         CT head wo contrast   Final Result by Lidia Harper MD (10/15 6509)      No acute intracranial abnormality. Workstation performed: [id number]         XR chest portable ICU   Final Result by Ling Sierra MD (18/14 8572)   Low-lying endotracheal tube, adjusted to satisfactory position on the follow-up images at the time of the interpretation. Satisfactory position of the endogastric tube. Unchanged cardiomegaly. Pulmonary vascular congestion. Cannot exclude mild interstitial pulmonary edema. Workstation performed: [id number]         XR chest portable ICU   Final Result by Hai Sewell DO (10/15 9879)      Cardiomegaly. No acute cardiopulmonary disease. Workstation performed: [id number]         XR abdomen 1 view kub   Final Result by Chaz Michaud MD (01/25 4020)      Tip of the NG tube is coursing below the left hemidiaphragm. Workstation performed: [id number]         XR abdomen 1 vw portable   Final Result by Chaz Michaud MD (04/55 6120)      Tip of the NG tube is coursing below the left hemidiaphragm. The distal aspect is coiled in the stomach and the tip terminates near the GE junction. Workstation performed: [id number]         XR chest portable ICU   Final Result by Chaz Michaud MD (10/14 1412)      Tip of the NG tube is coursing below the left hemidiaphragm. The distal aspect is coiled in the stomach and the tip terminates near the GE junction. Workstation performed: [id number]         XR chest portable ICU   Final Result by Yunior Miller MD (10/13 2412)      No acute cardiopulmonary disease. NG tube in stomach.          Workstation performed: [id number]         XR chest portable   Final Result by Karen Jama, DO (10/13 0768)      No acute cardiopulmonary disease. Workstation performed: [id number]         IR biopsy transjugular liver   Final Result by Dale Benjamin MD (10/10 5748)      1. Transjugular liver biopsy with pressure measurements. 2. Pressure measurements as follows:   Right atrium: 12/6 mmHg (mean 8)   Free hepatic: 13/9 mmHg (mean 11)   Wedged hepatic: 22/18 mmHg (mean 20)      Plan:      Specimens sent for evaluation. Workstation performed: [id number]         MRI abdomen w wo contrast and mrcp   Final Result by Any Killian MD (10/04 8187)      Nodular liver surface contour suggestive of cirrhosis with sequela portal hypertension including trace perihepatic ascites. No suspicious hepatic mass. A 5 mm pancreatic uncinate process cystic lesion without worrisome features. No main pancreatic duct dilatation. 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 For simple cyst(s) less than 1.5 cm, recommend followup    every 2 year for 2 times. After 4 years, no more followups. Recommend next followup in 2 years. Preferred imaging modality: abdomen MRI and MRCP with and without IV contrast, or triple phase abdomen CT with IV contrast, or abdomen MRI and MRCP without IV    contrast.      The recommendations regarding pancreatic findings assumes that patient does not have family history of pancreatic cancer nor have any symptoms potentially attributable to pancreatic cystic lesions (hyperamylasemia, recent-onset diabetes, severe    epigastric pain, weight loss, steatorrhea, or jaundice.) If these conditions are not true, then management should be deferred to judgement of specialists such as gastroenterologists or oncologic surgeons.       REFERENCES:   Recommendations are based on recent consensus statements on management of pancreatic cystic lesions from [address] [facility] Association, [employer] of Radiology, the journal Pancreatology, and our own institutional consensus. [address] Guidelines on the Diagnosis and Management of Asymptomatic Neoplastic Pancreatic Cysts. Gastroenterology 2015; 950:903-813 (AGA GUIDELINES)   International Consensus Guidelines for Management of Intraductal Papillary Mucinous Neoplasm and Mucinous Cystic Neoplasms of the Pancreas. Pancreatology Sep-Oct 2017;17(5):738-753. doi: 10.1016/j.yuan.2017.07.007. Epub 2017 Jul 13 [facility] paper). American College of Radiology: White Paper: Management of Incidental Pancreatic Cysts: A White Paper of the ACR Incidental Findings Committee, 2017 Jul;14(7):911-923. doi: 10.1016/j.jacr. 2017.03.010. Epub 2017 May 19. (ACR GUIDELINES.)      The study was marked in EPIC for significant notification. Workstation performed: [id number]         CT abdomen pelvis wo contrast   Final Result by Yvonna Aschoff, MD (10/02 1644)      Nodular liver morphology and sequela of portal venous hypertension suggesting cirrhosis. Minimal ascites around the liver and layering in the pelvis. Prominent lymph nodes at the katelynn hepatis presumably reactive to primary underlying liver dysfunction. No biliary ductal dilatation to suggest obstructive cause of jaundice. 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 Recommend nonemergent outpatient evaluation with high-resolution chest CT to exclude shiloh UIP pattern fibrosis. Workstation performed: [id number]         US right upper quadrant with liver dopplers    (Results Pending)      I have personally reviewed pertinent reports.        Medications:  Scheduled PRN   Albumin 25%, 25 g, Q6H  chlorhexidine, 15 mL, Q12H DEON  ipratropium, 0.5 mg, Q6H  lactulose, 40 g, 4x Daily  levalbuterol, 1.25 mg, Q6H  levothyroxine, 175 mcg, Early Morning  methylPREDNISolone sodium succinate, 500 mg, Q12H DEON  metoprolol tartrate, 12.5 mg, Q12H [address] [facility] ANTIFUNGAL, , BID  nystatin, , BID  octreotide, 100 mcg, Q8H DEON  pantoprazole, 40 mg, Q12H DEON  phytonadione, 10 mg, Once  rifaximin, 550 mg, Q12H [facility]  sodium bicarbonate, 1,300 mg, TID after meals      fentanyl citrate (PF), 50 mcg, Q4H PRN  hydrALAZINE, 20 mg, Q4H PRN       Continuous    fentaNYL, 50 mcg/hr, Last Rate: 50 mcg/hr (10/16/23 1409)  insulin regular (HumuLIN R,NovoLIN R) 1 Units/mL in sodium chloride 0.9 % 100 mL infusion, 0.3-21 Units/hr, Last Rate: 1 Units/hr (10/17/23 0024)  propofol, 5-50 mcg/kg/min, Last Rate: 10 mcg/kg/min (10/16/23 1759)         Labs:    CBC    Recent Labs     10/16/23  1750 10/17/23  0023   WBC 5.18 5.43   HGB 7.9* 8.0*   HCT 24.0* 24.2*   PLT 84* 84*     BMP    Recent Labs     10/16/23  0314 10/16/23  0707 10/16/23  1750   SODIUM 147  --  150*   K 3.5  --  3.4*   *  --  114*   CO2 20* 22 21   AGAP 14  --  15   BUN 72*  --  75*   CREATININE 2.23*  --  2.27*   CALCIUM 10.7*  --  10.9*       Coags    Recent Labs     10/16/23  1750 10/17/23  0023   INR 2.58* 3.13*        Additional Electrolytes  Recent Labs     10/15/23  1656 10/15/23  2235 10/16/23  0314 10/16/23  0707 10/16/23  0915   MG 2.3   < > 2.3  --  2.3   PHOS 3.5  --  3.3  --   --    CAIONIZED  --   --  1.26 1.37*  --     < > = values in this interval not displayed.           Blood Gas    Recent Labs     10/16/23  1750   PHART 7.382   ZLC1YXR 36.8   PO2ART 119.9   FMC9MUX 21.4*   BEART -3.3   SOURCE Line, Arterial     Recent Labs     10/16/23  1750   SOURCE Line, Arterial    LFTs  Recent Labs     10/15/23  0423 10/16/23  0314   * 81*   AST 63* 36   ALKPHOS 45 45   ALB 4.2 4.6   TBILI 18.05* 14.53*       Infectious  No recent results  Glucose  Recent Labs     10/15/23  1656 10/15/23  2235 10/16/23  0314 10/16/23  1750   GLUC 157* [address] [place]

## 2023-10-17 NOTE — PROCEDURES
Temporary HD Catheter    Date/Time: 10/17/2023 12:27 PM    Performed by: Helen Castro PA-C  Authorized by: Helen Castro PA-C    Patient location:  Bedside  Consent:     Consent obtained:  Verbal    Consent given by:  Healthcare agent (Son and daughter)    Risks discussed:  Arterial puncture, bleeding, incorrect placement, infection, nerve damage and pneumothorax    Alternatives discussed:  No treatment and observation  Universal protocol:     Site/side marked: yes      Immediately prior to procedure, a time out was called: yes      Patient identity confirmed:  Arm band and hospital-assigned identification number  Pre-procedure details:     Hand hygiene: Hand hygiene performed prior to insertion      Sterile barrier technique: All elements of maximal sterile technique followed      Skin preparation:  ChloraPrep  Indications:     Central line indications: dialysis    Anesthesia (see MAR for exact dosages): Anesthesia method:  Local infiltration    Local anesthetic:  Lidocaine 1% w/o epi  Procedure details:     Location:  Right internal jugular    Vessel type: vein      Laterality:  Right    Approach: percutaneous technique used      Patient position:  Flat    Catheter type:  Double lumen    Catheter size:  13.5 Fr    Catheter length:  15 cm    Landmarks identified: yes      Ultrasound guidance: yes      Ultrasound image availability:  Images available in PACS    Sterile ultrasound techniques: Sterile gel and sterile probe covers were used      Number of attempts:  1    Successful placement: yes      Vessel of catheter tip end:  SVC  Post-procedure details:     Post-procedure:  Dressing applied and line sutured    Assessment:  Blood return through all ports, free fluid flow, no pneumothorax on x-ray and placement verified by x-ray    Post-procedure complications: none      Patient tolerance of procedure:   Tolerated well, no immediate complications    Observer: Yes      Observer name:  Hua Appiah RN

## 2023-10-17 NOTE — ASSESSMENT & PLAN NOTE
Home regimen: Cardizem 360mg qd, sotalol BID  AC: Xarelto   10/16 Added 12.5mg lopressor for ectopy    Plan:   Hold Xarelto in the setting of supratherapeutic INR 2/2 hepatitis   Hold cardizem, sotalol given NPO initially -- will continue to hold given HR 80 and now post intubation on sedation   Goal HR <120  Continuous tele -- currently controlled AF

## 2023-10-17 NOTE — ASSESSMENT & PLAN NOTE
Mild, sNa 148   Likely in the setting of lack of free water intake with AMS and NPO   100cc q4H free water flushes added to TF  Trend on serial bmp

## 2023-10-17 NOTE — ASSESSMENT & PLAN NOTE
Lab Results   Component Value Date    HGBA1C 7.0 (H) 07/20/2023       Recent Labs     10/16/23  1758 10/16/23  2009 10/16/23  2206 10/17/23  0022   POCGLU 148* 150* 180* 173*         Blood Sugar Average: Last 72 hrs:  (P) [id number]    Does not appear to be on home regimen for DM2  10/15 Insulin gtt for glucose 140-180 given SDS

## 2023-10-17 NOTE — PLAN OF CARE
Problem: MOBILITY - ADULT  Goal: Maintain or return to baseline ADL function  Description: INTERVENTIONS:  -  Assess patient's ability to carry out ADLs; assess patient's baseline for ADL function and identify physical deficits which impact ability to perform ADLs (bathing, care of mouth/teeth, toileting, grooming, dressing, etc.)  - Assess/evaluate cause of self-care deficits   - Assess range of motion  - Assess patient's mobility; develop plan if impaired  - Assess patient's need for assistive devices and provide as appropriate  - Encourage maximum independence but intervene and supervise when necessary  - Involve family in performance of ADLs  - Assess for home care needs following discharge   - Consider OT consult to assist with ADL evaluation and planning for discharge  - Provide patient education as appropriate  Outcome: Progressing  Goal: Maintains/Returns to pre admission functional level  Description: INTERVENTIONS:  - Perform BMAT or MOVE assessment daily.   - Set and communicate daily mobility goal to care team and patient/family/caregiver. - Collaborate with rehabilitation services on mobility goals if consulted  - Perform Range of Motion 2 times a day. - Reposition patient every 2 hours.   - Dangle patient 2 times a day  - Stand patient 2 times a day  - Ambulate patient 2 times a day  - Out of bed to chair 2 times a day   - Out of bed for meals 2 times a day  - Out of bed for toileting  - Record patient progress and toleration of activity level   Outcome: Progressing     Problem: PAIN - ADULT  Goal: Verbalizes/displays adequate comfort level or baseline comfort level  Description: Interventions:  - Encourage patient to monitor pain and request assistance  - Assess pain using appropriate pain scale  - Administer analgesics based on type and severity of pain and evaluate response  - Implement non-pharmacological measures as appropriate and evaluate response  - Consider cultural and social influences on pain and pain management  - Notify physician/advanced practitioner if interventions unsuccessful or patient reports new pain  Outcome: Progressing     Problem: INFECTION - ADULT  Goal: Absence or prevention of progression during hospitalization  Description: INTERVENTIONS:  - Assess and monitor for signs and symptoms of infection  - Monitor lab/diagnostic results  - Monitor all insertion sites, i.e. indwelling lines, tubes, and drains  - Monitor endotracheal if appropriate and nasal secretions for changes in amount and color  - [place] appropriate cooling/warming therapies per order  - Administer medications as ordered  - Instruct and encourage patient and family to use good hand hygiene technique  - Identify and instruct in appropriate isolation precautions for identified infection/condition  Outcome: Progressing  Goal: Absence of fever/infection during neutropenic period  Description: INTERVENTIONS:  - Monitor WBC    Outcome: Progressing     Problem: SAFETY ADULT  Goal: Maintain or return to baseline ADL function  Description: INTERVENTIONS:  -  Assess patient's ability to carry out ADLs; assess patient's baseline for ADL function and identify physical deficits which impact ability to perform ADLs (bathing, care of mouth/teeth, toileting, grooming, dressing, etc.)  - Assess/evaluate cause of self-care deficits   - Assess range of motion  - Assess patient's mobility; develop plan if impaired  - Assess patient's need for assistive devices and provide as appropriate  - Encourage maximum independence but intervene and supervise when necessary  - Involve family in performance of ADLs  - Assess for home care needs following discharge   - Consider OT consult to assist with ADL evaluation and planning for discharge  - Provide patient education as appropriate  Outcome: Progressing  Goal: Maintains/Returns to pre admission functional level  Description: INTERVENTIONS:  - Perform BMAT or MOVE assessment daily.   - Set and communicate daily mobility goal to care team and patient/family/caregiver. - Collaborate with rehabilitation services on mobility goals if consulted  - Perform Range of Motion 2 times a day. - Reposition patient every 2 hours.   - Dangle patient 2 times a day  - Stand patient 2 times a day  - Ambulate patient 2 times a day  - Out of bed to chair 2 times a day   - Out of bed for meals 2 times a day  - Out of bed for toileting  - Record patient progress and toleration of activity level   Outcome: Progressing  Goal: Patient will remain free of falls  Description: INTERVENTIONS:  - Educate patient/family on patient safety including physical limitations  - Instruct patient to call for assistance with activity   - Consult OT/PT to assist with strengthening/mobility   - Keep Call bell within reach  - Keep bed low and locked with side rails adjusted as appropriate  - Keep care items and personal belongings within reach  - Initiate and maintain comfort rounds  - Make Fall Risk Sign visible to staff  - Offer Toileting every  Hours, in advance of need  - Initiate/Maintain alarm  - Obtain necessary fall risk management equipment:   - Apply yellow socks and bracelet for high fall risk patients  - Consider moving patient to room near nurses station  Outcome: Progressing     Problem: DISCHARGE PLANNING  Goal: Discharge to home or other facility with appropriate resources  Description: INTERVENTIONS:  - Identify barriers to discharge w/patient and caregiver  - Arrange for needed discharge resources and transportation as appropriate  - Identify discharge learning needs (meds, wound care, etc.)  - Arrange for interpretive services to assist at discharge as needed  - Refer to Case Management Department for coordinating discharge planning if the patient needs post-hospital services based on physician/advanced practitioner order or complex needs related to functional status, cognitive ability, or social support system  Outcome: Progressing     Problem: Knowledge Deficit  Goal: Patient/family/caregiver demonstrates understanding of disease process, treatment plan, medications, and discharge instructions  Description: Complete learning assessment and assess knowledge base. Interventions:  - Provide teaching at level of understanding  - Provide teaching via preferred learning methods  Outcome: Progressing     Problem: Potential for Falls  Goal: Patient will remain free of falls  Description: INTERVENTIONS:  - Educate patient/family on patient safety including physical limitations  - Instruct patient to call for assistance with activity   - Consult OT/PT to assist with strengthening/mobility   - Keep Call bell within reach  - Keep bed low and locked with side rails adjusted as appropriate  - Keep care items and personal belongings within reach  - Initiate and maintain comfort rounds  - Make Fall Risk Sign visible to staff  - Offer Toileting every  Hours, in advance of need  - Initiate/Maintain alarm  - Obtain necessary fall risk management equipment:   - Apply yellow socks and bracelet for high fall risk patients  - Consider moving patient to room near nurses station  Outcome: Progressing     Problem: Nutrition/Hydration-ADULT  Goal: Nutrient/Hydration intake appropriate for improving, restoring or maintaining nutritional needs  Description: Monitor and assess patient's nutrition/hydration status for malnutrition. Collaborate with interdisciplinary team and initiate plan and interventions as ordered. Monitor patient's weight and dietary intake as ordered or per policy. Utilize nutrition screening tool and intervene as necessary. Determine patient's food preferences and provide high-protein, high-caloric foods as appropriate.      INTERVENTIONS:  - Monitor oral intake, urinary output, labs, and treatment plans  - Assess nutrition and hydration status and recommend course of action  - Evaluate amount of meals eaten  - Assist patient with eating if necessary   - Allow adequate time for meals  - Recommend/ encourage appropriate diets, oral nutritional supplements, and vitamin/mineral supplements  - Order, calculate, and assess calorie counts as needed  - Recommend, monitor, and adjust tube feedings and TPN/PPN based on assessed needs  - Assess need for intravenous fluids  - Provide specific nutrition/hydration education as appropriate  - Include patient/family/caregiver in decisions related to nutrition  Outcome: Progressing     Problem: Prexisting or High Potential for Compromised Skin Integrity  Goal: Skin integrity is maintained or improved  Description: INTERVENTIONS:  - Identify patients at risk for skin breakdown  - Assess and monitor skin integrity  - Assess and monitor nutrition and hydration status  - Monitor labs   - Assess for incontinence   - Turn and reposition patient  - Assist with mobility/ambulation  - Relieve pressure over bony prominences  - Avoid friction and shearing  - Provide appropriate hygiene as needed including keeping skin clean and dry  - Evaluate need for skin moisturizer/barrier cream  - Collaborate with interdisciplinary team   - Patient/family teaching  - Consider wound care consult   Outcome: Progressing     Problem: NEUROSENSORY - ADULT  Goal: Achieves stable or improved neurological status  Description: INTERVENTIONS  - Monitor and report changes in neurological status  - Monitor vital signs such as temperature, blood pressure, glucose, and any other labs ordered   - Initiate measures to prevent increased intracranial pressure  - Monitor for seizure activity and implement precautions if appropriate      Outcome: Progressing  Goal: Remains free of injury related to seizures activity  Description: INTERVENTIONS  - Maintain airway, patient safety  and administer oxygen as ordered  - Monitor patient for seizure activity, document and report duration and description of seizure to physician/advanced practitioner  - If seizure occurs,  ensure patient safety during seizure  - Reorient patient post seizure  - Seizure pads on all 4 side rails  - Instruct patient/family to notify RN of any seizure activity including if an aura is experienced  - Instruct patient/family to call for assistance with activity based on nursing assessment  - Administer anti-seizure medications if ordered    Outcome: Progressing     Problem: CARDIOVASCULAR - ADULT  Goal: Maintains optimal cardiac output and hemodynamic stability  Description: INTERVENTIONS:  - Monitor I/O, vital signs and rhythm  - Monitor for S/S and trends of decreased cardiac output  - Administer and titrate ordered vasoactive medications to optimize hemodynamic stability  - Assess quality of pulses, skin color and temperature  - Assess for signs of decreased coronary artery perfusion  - Instruct patient to report change in severity of symptoms  Outcome: Progressing     Problem: RESPIRATORY - ADULT  Goal: Achieves optimal ventilation and oxygenation  Description: INTERVENTIONS:  - Assess for changes in respiratory status  - Assess for changes in mentation and behavior  - Position to facilitate oxygenation and minimize respiratory effort  - Oxygen administered by appropriate delivery if ordered  - Initiate smoking cessation education as indicated  - Encourage broncho-pulmonary hygiene including cough, deep breathe, Incentive Spirometry  - Assess the need for suctioning and aspirate as needed  - Assess and instruct to report SOB or any respiratory difficulty  - Respiratory Therapy support as indicated  Outcome: Progressing     Problem: GASTROINTESTINAL - ADULT  Goal: Maintains or returns to baseline bowel function  Description: INTERVENTIONS:  - Assess bowel function  - Encourage oral fluids to ensure adequate hydration  - Administer IV fluids if ordered to ensure adequate hydration  - Administer ordered medications as needed  - Encourage mobilization and activity  - Consider nutritional services referral to assist patient with adequate nutrition and appropriate food choices  Outcome: Progressing     Problem: METABOLIC, FLUID AND ELECTROLYTES - ADULT  Goal: Electrolytes maintained within normal limits  Description: INTERVENTIONS:  - Monitor labs and assess patient for signs and symptoms of electrolyte imbalances  - Administer electrolyte replacement as ordered  - Monitor response to electrolyte replacements, including repeat lab results as appropriate  - Instruct patient on fluid and nutrition as appropriate  Outcome: Progressing  Goal: Fluid balance maintained  Description: INTERVENTIONS:  - Monitor labs   - Monitor I/O and WT  - Instruct patient on fluid and nutrition as appropriate  - Assess for signs & symptoms of volume excess or deficit  Outcome: Progressing  Goal: Glucose maintained within target range  Description: INTERVENTIONS:  - Monitor Blood Glucose as ordered  - Assess for signs and symptoms of hyperglycemia and hypoglycemia  - Administer ordered medications to maintain glucose within target range  - Assess nutritional intake and initiate nutrition service referral as needed  Outcome: Progressing     Problem: SKIN/TISSUE INTEGRITY - ADULT  Goal: Skin Integrity remains intact(Skin Breakdown Prevention)  Description: Assess:  -Perform [place] assessment every   -Clean and moisturize skin every   -Inspect skin when repositioning, toileting, and assisting with ADLS  -Assess under medical devices such as  every   -Assess extremities for adequate circulation and sensation     Bed Management:  -Have minimal linens on bed & keep smooth, unwrinkled  -Change linens as needed when moist or perspiring  -Avoid sitting or lying in one position for more than  hours while in bed  -Keep HOB at degrees     Toileting:  -Offer bedside commode  -Assess for incontinence every   -Use incontinent care products after each incontinent episode such as     Activity:  -Mobilize patient  times a day  -Encourage activity and walks on unit  -Encourage or provide ROM exercises   -Turn and reposition patient every  Hours  -Use appropriate equipment to lift o move patient in bed  -Instruct/ Assist with weight shifting every  when out of bed in chair  -Consider limitation of chair time  hour intervals    Skin Care:  -Avoid use of baby powder, tape, friction and shearing, hot water or constrictive clothing  -Relieve pressure over bony prominences using   -Do not massage red bony areas    Next Steps:  -Teach patient strategies to minimize risks such as    -Consider consults to  interdisciplinary teams such as   Outcome: Progressing  Goal: Incision(s), wounds(s) or drain site(s) healing without S/S of infection  Description: INTERVENTIONS  - Assess and document dressing, incision, wound bed, drain sites and surrounding tissue  - Provide patient and family education  - Perform skin care/dressing changes every   Outcome: Progressing  Goal: Pressure injury heals and does not worsen  Description: Interventions:  - Implement low air loss mattress or specialty surface (Criteria met)  - Apply silicone foam dressing  - Instruct/assist with weight shifting every  minutes when in chair   - Limit chair time to  hour intervals  - Use special pressure reducing interventions such as  when in chair   - Apply fecal or urinary incontinence containment device   - Perform passive or active ROM every   - Turn and reposition patient & offload bony prominences every  hours   - Utilize friction reducing device or surface for transfers   - Consider consults to  interdisciplinary teams such as   - Use incontinent care products after each incontinent episode such as   - Consider nutrition services referral as needed  Outcome: Progressing     Problem: HEMATOLOGIC - ADULT  Goal: Maintains hematologic stability  Description: INTERVENTIONS  - Assess for signs and symptoms of bleeding or hemorrhage  - Monitor labs  - Administer supportive blood products/factors as ordered and appropriate  Outcome: Progressing

## 2023-10-17 NOTE — RESPIRATORY THERAPY NOTE
Patient placed in PCV+ as ordered due to Asynchronism with ventilator. Patient tolerating well at this time.

## 2023-10-17 NOTE — CASE MANAGEMENT
Case Management Progress Note    Patient name Hazel Julian  Location /-01 MRN [id number]  : 1940 Date 10/17/2023       LOS (days): 15  Geometric Mean LOS (GMLOS) (days): 4.80  Days to GMLOS:-9.8        OBJECTIVE:        Current admission status: Inpatient  Preferred Pharmacy:   [facility] [place] - [address]  [address] [place] [address]  Phone: [phone] Fax: [phone] for [facility], [address], [address] [facility] [address] [place] [address]  Phone: [phone] Fax: [phone] [address], [address] [facility] [address]  [facility] [address]  Phone: [phone] Fax: [phone]    [facility] [address] [place], [place] - [address]  [address]  [address] [address]  Phone: [phone] Fax: [phone]    Primary Care Provider: Guille Buchanan MD    Primary Insurance: MEDICARE  Secondary Insurance:  FOR LIFE    PROGRESS NOTE:  Acknowledge Pt is currently intubated. CM to follow as Pt progresses.

## 2023-10-17 NOTE — ASSESSMENT & PLAN NOTE
Unclear if this is 2/2 infectious source or reaction to worsening hepatic failure, respiratory failure requiring ETT   SIRS criteria met 10/14 with tachypnea, tachycardia, LA 3.5 -- Sepsis alert called   Admission UA 10/2: large leuks, positive nitrites, innum bacteria   10/13 BC x2 -- neg x24H   10/14 -- LA 3.5, procal 0.38  Afebrile, no leukocytosis, HD stable   10/15 LA cleared     Plan:   UC + ecoli, klebsiella   Completed Aztreonam D3  Trend fever, WBC curve, procal

## 2023-10-17 NOTE — ASSESSMENT & PLAN NOTE
Progressive respiratory failure through the afternoon with tachypnea, sonorous respirations, forced/prolonged expirations refractory to xopenex and racemic epi   10/14 ETT for airway protection, respiratory insufficiency after speaking to the family at length, who were all in agreement  10/15 Bronch with evidence of [facility]  10/16 required vecuronium for vent asynchrony    Plan:   Vent day 3  ACVC 18/400/40/8   Sedation: propofol for goal RASS -1  Pain: fentanyl PRN  Wean fio2 as tolerated for goal spo2 > 90%  Scheduled oral care, HOB 30 degrees

## 2023-10-17 NOTE — ASSESSMENT & PLAN NOTE
10/2 admitted to [facility] after being sent to the ER from rehab for her broken ankle with sudden jaundice   10/2 CT CAP -- Nodular liver morphology and sequela of portal venous hypertension suggesting cirrhosis. Minimal ascites around the liver and layering in the pelvis. Prominent lymph nodes at the katelynn hepatis presumably reactive to primary underlying liver dysfunction. No biliary ductal dilatation to suggest obstructive cause of jaundice  10/3 MRI abdomen -- Nodular liver surface contour suggestive of cirrhosis with sequela portal hypertension including trace perihepatic ascites. No suspicious hepatic mass. A 5 mm pancreatic uncinate process cystic lesion without worrisome features. No main pancreatic duct dilatation.  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

## 2023-10-17 NOTE — ASSESSMENT & PLAN NOTE
DIC panel sent 10/14 given confirmed GIB and thrombocytopenia   Fibrinogen <60  Ddimer 7.1  INR >6  Goals:   INR>2, Fibrinogen >200, Platelets >09, Hgb >7  Total product:   Vit K: x6  FFP: 11U  Cryo: 5U  Platelets: 1U  PRBC: 1U

## 2023-10-17 NOTE — PLAN OF CARE
Problem: MOBILITY - ADULT  Goal: Maintain or return to baseline ADL function  Description: INTERVENTIONS:  -  Assess patient's ability to carry out ADLs; assess patient's baseline for ADL function and identify physical deficits which impact ability to perform ADLs (bathing, care of mouth/teeth, toileting, grooming, dressing, etc.)  - Assess/evaluate cause of self-care deficits   - Assess range of motion  - Assess patient's mobility; develop plan if impaired  - Assess patient's need for assistive devices and provide as appropriate  - Encourage maximum independence but intervene and supervise when necessary  - Involve family in performance of ADLs  - Assess for home care needs following discharge   - Consider OT consult to assist with ADL evaluation and planning for discharge  - Provide patient education as appropriate  Outcome: Progressing  Goal: Maintains/Returns to pre admission functional level  Description: INTERVENTIONS:  - Perform BMAT or MOVE assessment daily.   - Set and communicate daily mobility goal to care team and patient/family/caregiver. - Collaborate with rehabilitation services on mobility goals if consulted  - Perform Range of Motion 2 times a day. - Reposition patient every 2 hours.   - Dangle patient 2 times a day  - Stand patient 2 times a day  - Ambulate patient 2 times a day  - Out of bed to chair 2 times a day   - Out of bed for meals 2 times a day  - Out of bed for toileting  - Record patient progress and toleration of activity level   Outcome: Progressing     Problem: PAIN - ADULT  Goal: Verbalizes/displays adequate comfort level or baseline comfort level  Description: Interventions:  - Encourage patient to monitor pain and request assistance  - Assess pain using appropriate pain scale  - Administer analgesics based on type and severity of pain and evaluate response  - Implement non-pharmacological measures as appropriate and evaluate response  - Consider cultural and social influences on pain and pain management  - Notify physician/advanced practitioner if interventions unsuccessful or patient reports new pain  Outcome: Progressing     Problem: INFECTION - ADULT  Goal: Absence or prevention of progression during hospitalization  Description: INTERVENTIONS:  - Assess and monitor for signs and symptoms of infection  - Monitor lab/diagnostic results  - Monitor all insertion sites, i.e. indwelling lines, tubes, and drains  - Monitor endotracheal if appropriate and nasal secretions for changes in amount and color  - [place] appropriate cooling/warming therapies per order  - Administer medications as ordered  - Instruct and encourage patient and family to use good hand hygiene technique  - Identify and instruct in appropriate isolation precautions for identified infection/condition  Outcome: Progressing  Goal: Absence of fever/infection during neutropenic period  Description: INTERVENTIONS:  - Monitor WBC    Outcome: Progressing     Problem: SAFETY ADULT  Goal: Maintain or return to baseline ADL function  Description: INTERVENTIONS:  -  Assess patient's ability to carry out ADLs; assess patient's baseline for ADL function and identify physical deficits which impact ability to perform ADLs (bathing, care of mouth/teeth, toileting, grooming, dressing, etc.)  - Assess/evaluate cause of self-care deficits   - Assess range of motion  - Assess patient's mobility; develop plan if impaired  - Assess patient's need for assistive devices and provide as appropriate  - Encourage maximum independence but intervene and supervise when necessary  - Involve family in performance of ADLs  - Assess for home care needs following discharge   - Consider OT consult to assist with ADL evaluation and planning for discharge  - Provide patient education as appropriate  Outcome: Progressing  Goal: Maintains/Returns to pre admission functional level  Description: INTERVENTIONS:  - Perform BMAT or MOVE assessment daily.   - Set and communicate daily mobility goal to care team and patient/family/caregiver. - Collaborate with rehabilitation services on mobility goals if consulted  - Perform Range of Motion 2 times a day. - Reposition patient every 2 hours.   - Dangle patient 2 times a day  - Stand patient 2 times a day  - Ambulate patient 2 times a day  - Out of bed to chair 2 times a day   - Out of bed for meals 2 times a day  - Out of bed for toileting  - Record patient progress and toleration of activity level   Outcome: Progressing  Goal: Patient will remain free of falls  Description: INTERVENTIONS:  - Educate patient/family on patient safety including physical limitations  - Instruct patient to call for assistance with activity   - Consult OT/PT to assist with strengthening/mobility   - Keep Call bell within reach  - Keep bed low and locked with side rails adjusted as appropriate  - Keep care items and personal belongings within reach  - Initiate and maintain comfort rounds  - Make Fall Risk Sign visible to staff  - Apply yellow socks and bracelet for high fall risk patients  - Consider moving patient to room near nurses station  Outcome: Progressing     Problem: DISCHARGE PLANNING  Goal: Discharge to home or other facility with appropriate resources  Description: INTERVENTIONS:  - Identify barriers to discharge w/patient and caregiver  - Arrange for needed discharge resources and transportation as appropriate  - Identify discharge learning needs (meds, wound care, etc.)  - Arrange for interpretive services to assist at discharge as needed  - Refer to Case Management Department for coordinating discharge planning if the patient needs post-hospital services based on physician/advanced practitioner order or complex needs related to functional status, cognitive ability, or social support system  Outcome: Progressing     Problem: Knowledge Deficit  Goal: Patient/family/caregiver demonstrates understanding of disease process, treatment plan, medications, and discharge instructions  Description: Complete learning assessment and assess knowledge base. Interventions:  - Provide teaching at level of understanding  - Provide teaching via preferred learning methods  Outcome: Progressing     Problem: Potential for Falls  Goal: Patient will remain free of falls  Description: INTERVENTIONS:  - Educate patient/family on patient safety including physical limitations  - Instruct patient to call for assistance with activity   - Consult OT/PT to assist with strengthening/mobility   - Keep Call bell within reach  - Keep bed low and locked with side rails adjusted as appropriate  - Keep care items and personal belongings within reach  - Initiate and maintain comfort rounds  - Make Fall Risk Sign visible to staff  - Apply yellow socks and bracelet for high fall risk patients  - Consider moving patient to room near nurses station  Outcome: Progressing     Problem: Nutrition/Hydration-ADULT  Goal: Nutrient/Hydration intake appropriate for improving, restoring or maintaining nutritional needs  Description: Monitor and assess patient's nutrition/hydration status for malnutrition. Collaborate with interdisciplinary team and initiate plan and interventions as ordered. Monitor patient's weight and dietary intake as ordered or per policy. Utilize nutrition screening tool and intervene as necessary. Determine patient's food preferences and provide high-protein, high-caloric foods as appropriate.      INTERVENTIONS:  - Monitor oral intake, urinary output, labs, and treatment plans  - Assess nutrition and hydration status and recommend course of action  - Evaluate amount of meals eaten  - Assist patient with eating if necessary   - Allow adequate time for meals  - Recommend/ encourage appropriate diets, oral nutritional supplements, and vitamin/mineral supplements  - Order, calculate, and assess calorie counts as needed  - Recommend, monitor, and adjust tube feedings and TPN/PPN based on assessed needs  - Assess need for intravenous fluids  - Provide specific nutrition/hydration education as appropriate  - Include patient/family/caregiver in decisions related to nutrition  Outcome: Progressing     Problem: Prexisting or High Potential for Compromised Skin Integrity  Goal: Skin integrity is maintained or improved  Description: INTERVENTIONS:  - Identify patients at risk for skin breakdown  - Assess and monitor skin integrity  - Assess and monitor nutrition and hydration status  - Monitor labs   - Assess for incontinence   - Turn and reposition patient  - Assist with mobility/ambulation  - Relieve pressure over bony prominences  - Avoid friction and shearing  - Provide appropriate hygiene as needed including keeping skin clean and dry  - Evaluate need for skin moisturizer/barrier cream  - Collaborate with interdisciplinary team   - Patient/family teaching  - Consider wound care consult   Outcome: Progressing     Problem: NEUROSENSORY - ADULT  Goal: Achieves stable or improved neurological status  Description: INTERVENTIONS  - Monitor and report changes in neurological status  - Monitor vital signs such as temperature, blood pressure, glucose, and any other labs ordered   - Initiate measures to prevent increased intracranial pressure  - Monitor for seizure activity and implement precautions if appropriate      Outcome: Progressing  Goal: Remains free of injury related to seizures activity  Description: INTERVENTIONS  - Maintain airway, patient safety  and administer oxygen as ordered  - Monitor patient for seizure activity, document and report duration and description of seizure to physician/advanced practitioner  - If seizure occurs,  ensure patient safety during seizure  - Reorient patient post seizure  - Seizure pads on all 4 side rails  - Instruct patient/family to notify RN of any seizure activity including if an aura is experienced  - Instruct patient/family to call for assistance with activity based on nursing assessment  - Administer anti-seizure medications if ordered    Outcome: Progressing     Problem: CARDIOVASCULAR - ADULT  Goal: Maintains optimal cardiac output and hemodynamic stability  Description: INTERVENTIONS:  - Monitor I/O, vital signs and rhythm  - Monitor for S/S and trends of decreased cardiac output  - Administer and titrate ordered vasoactive medications to optimize hemodynamic stability  - Assess quality of pulses, skin color and temperature  - Assess for signs of decreased coronary artery perfusion  - Instruct patient to report change in severity of symptoms  Outcome: Progressing     Problem: RESPIRATORY - ADULT  Goal: Achieves optimal ventilation and oxygenation  Description: INTERVENTIONS:  - Assess for changes in respiratory status  - Assess for changes in mentation and behavior  - Position to facilitate oxygenation and minimize respiratory effort  - Oxygen administered by appropriate delivery if ordered  - Initiate smoking cessation education as indicated  - Encourage broncho-pulmonary hygiene including cough, deep breathe, Incentive Spirometry  - Assess the need for suctioning and aspirate as needed  - Assess and instruct to report SOB or any respiratory difficulty  - Respiratory Therapy support as indicated  Outcome: Progressing     Problem: GASTROINTESTINAL - ADULT  Goal: Maintains or returns to baseline bowel function  Description: INTERVENTIONS:  - Assess bowel function  - Encourage oral fluids to ensure adequate hydration  - Administer IV fluids if ordered to ensure adequate hydration  - Administer ordered medications as needed  - Encourage mobilization and activity  - Consider nutritional services referral to assist patient with adequate nutrition and appropriate food choices  Outcome: Progressing     Problem: METABOLIC, FLUID AND ELECTROLYTES - ADULT  Goal: Electrolytes maintained within normal limits  Description: INTERVENTIONS:  - Monitor labs and assess patient for signs and symptoms of electrolyte imbalances  - Administer electrolyte replacement as ordered  - Monitor response to electrolyte replacements, including repeat lab results as appropriate  - Instruct patient on fluid and nutrition as appropriate  Outcome: Progressing  Goal: Fluid balance maintained  Description: INTERVENTIONS:  - Monitor labs   - Monitor I/O and WT  - Instruct patient on fluid and nutrition as appropriate  - Assess for signs & symptoms of volume excess or deficit  Outcome: Progressing  Goal: Glucose maintained within target range  Description: INTERVENTIONS:  - Monitor Blood Glucose as ordered  - Assess for signs and symptoms of hyperglycemia and hypoglycemia  - Administer ordered medications to maintain glucose within target range  - Assess nutritional intake and initiate nutrition service referral as needed  Outcome: Progressing     Problem: SKIN/TISSUE INTEGRITY - ADULT  Goal: Skin Integrity remains intact(Skin Breakdown Prevention)  Description: Assess:  -Inspect skin when repositioning, toileting, and assisting with ADLS  -Assess extremities for adequate circulation and sensation     Bed Management:  -Have minimal linens on bed & keep smooth, unwrinkled  -Change linens as needed when moist or perspiring    Toileting:  -Offer bedside commode    Activity:  -Encourage activity and walks on unit  -Encourage or provide ROM exercises   -Use appropriate equipment to lift or move patient in bed    Skin Care:  -Avoid use of baby powder, tape, friction and shearing, hot water or constrictive clothing  -Do not massage red bony areas    Next Steps:  Outcome: Progressing  Goal: Incision(s), wounds(s) or drain site(s) healing without S/S of infection  Description: INTERVENTIONS  - Assess and document dressing, incision, wound bed, drain sites and surrounding tissue  - Provide patient and family education  Outcome: Progressing  Goal: Pressure injury heals and does not worsen  Description: Interventions:  - Implement low air loss mattress or specialty surface (Criteria met)  - Apply silicone foam dressing  - Apply fecal or urinary incontinence containment device   - Utilize friction reducing device or surface for transfers   - Consider nutrition services referral as needed  Outcome: Progressing     Problem: HEMATOLOGIC - ADULT  Goal: Maintains hematologic stability  Description: INTERVENTIONS  - Assess for signs and symptoms of bleeding or hemorrhage  - Monitor labs  - Administer supportive blood products/factors as ordered and appropriate  Outcome: Progressing

## 2023-10-17 NOTE — PHYSICAL THERAPY NOTE
PHYSICAL THERAPY CANCELLATION NOTE      Patient Name: Shamar Henderson  YVLDY'R Date: 10/17/2023       10/17/23 [id number]   Note Type   Note type Cancelled Session   Cancel Reasons Intubated/sedated   Additional Comments Pt still intubated, sedated, just received temp HD cath this PM, will continue to hold on PT eval at this time       America Sic, PT, DPT

## 2023-10-17 NOTE — PROGRESS NOTES
NEPHROLOGY PROGRESS NOTE   Jenny Kumar 81 y.o. female MRN: [id number]  Unit/Bed#: -01 Encounter: [id number]  Reason for Consult: VALENTIN    ASSESSMENT/PLAN:  Acute Kidney Injury- likely secondary to ATN with hepatorenal syndrome / hypotension / losartan / elevated bilirubin / contrast induced nephropathy (10/2 & 5mL IV contrast 10/10 for liver biopsy)  Baseline creatinine 0.6-0.7  Admission creatinine 0.8  Creatinine peak 3.2   Creatinine now stable at 2.2 since the weekend   Serological work-up: Rheumatoid factor positive. P-ANCA was positive at 1 is to 160 but MPO and HI-3 antibody was negative. Atypical p-ANCA was negative. BEV was negative. 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   Imaging: no hydronephrosis  UA: dirty, trace blood and protein  Minimal urine output, monitor   Continue HRS cocktail with albumin & octreotide, s/p midodrine   No acute indication for RRT currently but will monitor  History of Hyponatremia- secondary to cirrhosis and lexapro and hctz   Hypernatremia- likely secondary to poor oral intake  Free water deficit is 5.5L, will start D5W at 100ml/hr  Reduce sodium bicarbonate tablets  Hypercalcemia- unclear etiology, maybe secondary to immobilization and VALENTIN  Workup: ionized calcium 1.34, PTH _, PTHrp _, vitamin D _, vitamin D 1 25 _, ACE level _, spep _, urine immunofixation negative, flc ratio 7.65  Metabolic Acidosis- on sodium bicarbonate tablets   Consider reducing/stopping if hypernatremia worsens  Hypokalemia- replete as needed, give 40meq BID today  H/O Hypertension, now with Hypotension- BP improving  Hold losartan  Holding sotalol per critical care  Cirrhosis- GI on board  Secondary to autoimmune hepatitis / gabapentin  8. Volume Overload- consider IV diuretics to encourage urine output  9. Sepsis/ DIC- per critical care team  10. Hyperammonemia- start CRRT   A. Consent obtained from daughter via phone 10/17/23  11. Goals of Care- discussions ongoing    Disposition:  Goals of care ongoing. Reduce sodium bicarbonate tablets. D5W at 100ml/hr. Awaiting hypercalcemia workup. KCl liquid 40meq BID. SUBJECTIVE:  Patient intubated and sedated.      OBJECTIVE:  Current Weight: Weight - Scale: 106 kg (234 lb)  Vitals:    10/17/23 0935 10/17/23 1013 10/17/23 1054 10/17/23 1126   BP:       Pulse:  100 99    Resp:       Temp:  (!) 97.3 °F (36.3 °C) (!) 97 °F (36.1 °C)    TempSrc:       SpO2: 96% 96% 98% 97%   Weight:       Height:           Intake/Output Summary (Last 24 hours) at 10/17/2023 1142  Last data filed at 10/17/2023 1112  Gross per 24 hour   Intake 1831.11 ml   Output 1415 ml   Net 416.11 ml     General: sedated, intubated  Skin: jaundiced  Eyes: closed  ENMT: mm moist  Neck: no masses  Respiratory: coarse  Cardiac: RRR  Extremities: + edema, + anasarca  GI: soft distended  Neuro: sedated    Medications:    Current Facility-Administered Medications:     albumin human (FLEXBUMIN) 25 % injection 25 g, 25 g, Intravenous, Q6H, RAJ Little, Last Rate: 0 mL/hr at 10/16/23 0230, 25 g at 10/17/23 0232    albuterol inhalation solution 2.5 mg, 2.5 mg, Nebulization, Q6H PRN, Gayla Proctor MD    chlorhexidine (PERIDEX) 0.12 % oral rinse 15 mL, 15 mL, Mouth/Throat, Q12H [facility], RAJ Little, 15 mL at 10/17/23 1023    dextrose 5 % infusion, 100 mL/hr, Intravenous, Continuous, [address] [place], [place] [id number], Last Rate: 100 mL/hr at 10/17/23 1025, 100 mL/hr at 10/17/23 1025    fentaNYL 1000 mcg in sodium chloride 0.9% 100mL infusion, 50 mcg/hr, Intravenous, Continuous, Elisa Viry, CRNP, Last Rate: 5 mL/hr at 10/17/23 0847, 50 mcg/hr at 10/17/23 0847    fentanyl citrate (PF) 100 MCG/2ML 50 mcg, 50 mcg, Intravenous, Q4H PRN, RAJ Sam, 100 mcg at 10/17/23 0932    furosemide (LASIX) injection 40 mg, 40 mg, Intravenous, BID (diuretic), Janine Cardoza PA-C    hydrALAZINE (APRESOLINE) injection 20 mg, 20 mg, Intravenous, Q4H PRN, RAJ Shirley, 20 mg at 10/15/23 0930    insulin regular (HumuLIN R,NovoLIN R) 1 Units/mL in sodium chloride 0.9 % 100 mL infusion, 0.3-21 Units/hr, Intravenous, Titrated, RAJ Wyman, Last Rate: 1 mL/hr at 10/17/23 1018, 1 Units/hr at 10/17/23 1018    ipratropium (ATROVENT) 0.02 % inhalation solution 0.5 mg, 0.5 mg, Nebulization, Q6H, Misael Shin MD, 0.5 mg at 10/17/23 0727    lactulose oral solution 40 g, 40 g, Oral, 4x Daily, Janine Cardoza PA-C, 40 g at 10/17/23 1023    levalbuterol (Tally Clause) inhalation solution 1.25 mg, 1.25 mg, Nebulization, Q6H, Janine Cardoza PA-C, 1.25 mg at 10/17/23 1821    levothyroxine tablet 175 mcg, 175 mcg, Oral, Early Morning, RAJ Little, 175 mcg at 10/17/23 0507    magnesium sulfate 2 g/50 mL IVPB (premix) 2 g, 2 g, Intravenous, Once, Janine Cardoza PA-C, Last Rate: 25 mL/hr at 10/17/23 1007, 2 g at 10/17/23 1007    methylPREDNISolone sodium succinate (Solu-MEDROL) 500 mg in sodium chloride 0.9 % 250 mL IVPB, 500 mg, Intravenous, Q12H DEON, RAJ Wyman, Last Rate: 250 mL/hr at 10/17/23 1026, 500 mg at 10/17/23 1026    metoprolol tartrate (LOPRESSOR) partial tablet 12.5 mg, 12.5 mg, Oral, Q12H [address], MURALI Madden-C, 12.5 mg at 10/17/23 1026    moisture barrier miconazole 2% cream (aka BIRDIE MOISTURE BARRIER ANTIFUNGAL CREAM), , Topical, BID, Loly YOUSUF GriffinNP, Given at 10/17/23 1040    norepinephrine (LEVOPHED) 4 mg (STANDARD CONCENTRATION) IV in sodium chloride 0.9% 250 mL, 1-30 mcg/min, Intravenous, Titrated, Janine Cardoza PA-C, Last Rate: 3.8 mL/hr at 10/17/23 1053, 1 mcg/min at 10/17/23 1053    nystatin (MYCOSTATIN) cream, , Topical, BID, LolyYOUSUF GiangNP, Given at 10/17/23 1040    octreotide (SandoSTATIN) injection 100 mcg, 100 mcg, Subcutaneous, Q8H [address], Elisa Baires, RAJ, 100 mcg at 10/17/23 0508    pantoprazole (PROTONIX) injection 40 mg, 40 mg, Intravenous, Q12H [address], RAJ Little, 40 mg at 10/17/23 0946    potassium chloride oral solution 40 mEq, 40 mEq, Oral, BID, [facility], PAESTEBAN, 40 mEq at 10/17/23 1026    propofol (DIPRIVAN) 1000 mg in 100 mL infusion (premix), 5-50 mcg/kg/min, Intravenous, Titrated, RAJ Easley, Last Rate: 19.1 mL/hr at 10/17/23 1105, 30 mcg/kg/min at 10/17/23 1105    rifaximin (XIFAXAN) tablet 550 mg, 550 mg, Oral, Q12H DEON, RAJ Valdivia, 550 mg at 10/17/23 1023    sodium bicarbonate tablet 1,300 mg, 1,300 mg, Per NG Tube, BID after meals, [facility], PAKoryC    vasopressin (PITRESSIN) 20 Units in sodium chloride 0.9 % 100 mL infusion, 0.04 Units/min, Intravenous, Continuous, Mihir Vega PA-C, Stopped at 10/17/23 1110    Laboratory Results:  Results from last 7 days   Lab Units 10/17/23  0920 10/17/23  0824 10/17/23  0456 10/17/23  0023 10/16/23  1750 10/16/23  0915 10/16/23  0707 10/16/23  0314 10/15/23  2235 10/15/23  2220 10/15/23  1656 10/15/23  1602 10/15/23  1106 10/15/23  0423 10/14/23  2340 10/14/23  2052 10/14/23  1918 10/14/23  1623 10/14/23  1419 10/14/23  0444   WBC Thousand/uL  --   --  5.65 5.43 5.18 4.75  --  3.88*  --  4.98  --  8.77  --  6.13  --   --   --   --   --  7.84   HEMOGLOBIN g/dL  --   --  8.1* 8.0* 7.9* 7.2*  --  6.7*  --  7.0*  --  7.4*  7.4* 8.2* 7.7*  --   --   --   --   --  9.1*   I STAT HEMOGLOBIN g/dl 8.2*  --   --   --   --   --  8.5*  --   --   --   --   --   --   --   --  9.5*  --  8.8*   < >  --    HEMATOCRIT %  --   --  23.9* 24.2* 24.0* 22.1*  --  20.3*  --  21.7*  --  22.7*  --  22.4*  --   --   --   --   --  27.0*   HEMATOCRIT, ISTAT % 24*  --   --   --   --   --  25*  --   --   --   --   --   --   --   --  28*  --  26*   < >  --    PLATELETS Thousands/uL  --   --  84* 84* 84* 76*  --  50*  --  55*  --  66*  --  70*   < >  --   --   --   --  107*   POTASSIUM mmol/L  --  3.2*  --   --  3.4*  --   --  3.5 4.2  --  4.3  --  3.5 3.1*  --   --    < >  --    < > 3.2*   CHLORIDE mmol/L  --  115*  --   --  114*  --   --  113* 114*  --  114*  --  113* 114*  --   --    < >  --    < > 111*   CO2 mmol/L  --  20*  --   --  21  --   --  20* 19*  --  19*  --  20* 18*  --   --    < >  --    < > 17*   CO2, I-STAT mmol/L 22  --   --   --   --   --  22  --   --   --   --   --   --   --   --  18*  --  17*   < >  --    BUN mg/dL  --  77*  --   --  75*  --   --  72* 70*  --  72*  --  70* 72*  --   --    < >  --    < > 72*   CREATININE mg/dL  --  2.48*  --   --  2.27*  --   --  2.23* 2.28*  --  2.20*  --  2.05* 2.09*  --   --    < >  --    < > 2.53*   CALCIUM mg/dL  --  11.2*  --   --  10.9*  --   --  10.7* 10.4*  --  10.0  --  10.1 9.8  --   --    < >  --    < > 9.7   MAGNESIUM mg/dL  --   --  2.5  --   --  2.3  --  2.3 2.3  --  2.3  --   --  2.5  --   --   --   --   --  2.2   PHOSPHORUS mg/dL  --   --  3.2  --   --   --   --  3.3  --   --  3.5  --   --  2.3  --   --   --   --   --  3.6   GLUCOSE, ISTAT mg/dl 132  --   --   --   --   --  159*  --   --   --   --   --   --   --   --  178*  --  188*  --   --     < > = values in this interval not displayed.      I have personally reviewed the blood work as stated above and in my note

## 2023-10-17 NOTE — PROGRESS NOTES
Progress note - [facility]   Roosevelt Bourgeois 81 y.o. female MRN: [id number]  Unit/Bed#: -01 Encounter: [id number]    ASSESSMENT and PLAN    61-year-old female who presented with abnormal LFTs, finding of autoimmune hepatitis on liver biopsy who has had clinical decline with acute mental status changes, hypoxia requiring intubation, now in the ICU. Autoimmune hepatitis, concern for acute liver failure    Transjugular biopsy on October 10 showed chronic hepatitis with severe activity, bridging necrosis and prominent plasma cells. Clinically decompensated now critically ill in the ICU on ventilator with ongoing altered mental status. Given her elevated INR and worsening mental status in setting of her pathology findings, I am concerned she has progressed to acute liver failure. 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 I spoke with the daughter at length and explained that she is not a transplant candidate due to her age and we will continue to treat with maximal supportive measures, though prognosis in the setting is overall quite poor. ID recommendations appreciated regarding CRRT    After discussion with ID, will check CMV PCR to ensure there is no reactivation that could have led to her AMS, though suspicion was not high. Follow up results of US with dopplers    Discussed with ICU, if planning to drop IV steroids, can go down to prednisone 40. Continue to monitor LFTs. Chief Complaint   Patient presents with    Abnormal Lab     Elevated liver enzymes from routine blood work done yesterday. SUBJECTIVE  Patient seen today, remains critically ill on ventilator and has poor mental status despite maximal medical management. Seen by nephrology who are going to attempt a course of CRRT to try and clear her ammonia. Given her positive CMV IgG/IgM, I spoke with infectious disease regarding possible empiric valganciclovir. It was felt that this likely was an old exposure but could check a PCR to be sure. I spoke with the daughter at the bedside at length. Temp:  [96.1 °F (35.6 °C)-97.7 °F (36.5 °C)] 97 °F (36.1 °C)  HR:  [] 99  Resp:  [17-22] 17  BP: (124-127)/(61-70) 127/70  Arterial Line BP: ()/(44-71) 103/56  FiO2 (%):  [40] 40     PHYSICAL EXAM  General Appearance: intubated in the ICU  GI:  non-distended  Rectal: Deferred  Musculoskeletal: superficial bruising from prior IV sticks  Skin:  No jaundice  Neuro: intubated in ICU, ongoing ams    LABS & STUDIES  Lab Results   Component Value Date    GLUCOSE 132 10/17/2023    CALCIUM 11.2 (H) 10/17/2023    K 3.2 (L) 10/17/2023    CO2 22 10/17/2023     (H) 10/17/2023    BUN 77 (H) 10/17/2023    CREATININE 2.48 (H) 10/17/2023    CREATININE 2.27 (H) 10/16/2023    CREATININE 2.23 (H) 10/16/2023     Lab Results   Component Value Date    WBC 7.62 10/17/2023    WBC 5.65 10/17/2023    WBC 5.43 10/17/2023    HGB 8.0 (L) 10/17/2023    HGB 8.2 (L) 10/17/2023    HGB 8.1 (L) 10/17/2023    MCV 93 10/17/2023    PLT 81 (L) 10/17/2023    PLT 84 (L) 10/17/2023    PLT 84 (L) 10/17/2023     Lab Results   Component Value Date    ALT 61 (H) 10/17/2023    ALT 81 (H) 10/16/2023     (H) 10/15/2023    AST 26 10/17/2023    AST 36 10/16/2023    AST 63 (H) 10/15/2023    GGT 76 (H) 10/03/2023    ALKPHOS 39 10/17/2023    ALKPHOS 45 10/16/2023    ALKPHOS 45 10/15/2023    TBILI 13.09 (H) 10/17/2023    TBILI 14.53 (H) 10/16/2023    TBILI 18.05 (H) 10/15/2023     No results found for: "AMYLASE"  Lab Results   Component Value Date    LIPASE 57 10/02/2023     Lab Results   Component Value Date    IRON 203 10/03/2023    TIBC <258 10/03/2023    FERRITIN 164 10/03/2023     Lab Results   Component Value Date    INR 3.64 (H) 10/17/2023    INR 3.38 (H) 10/17/2023    INR 3.13 (H) 10/17/2023       XR chest portable ICU    Result Date: 10/17/2023  Narrative: CHEST INDICATION:   ETT placement. COMPARISON: CXR 10/16/2023 and chest CT 10/15/2023. EXAM PERFORMED/VIEWS:  XR CHEST PORTABLE ICU. FINDINGS: ET tube 4 cm above the roverto. NG tube in stomach. Mild cardiomegaly. Mild pulmonary venous congestion. Small effusions with bibasilar atelectasis. No pneumothorax. Upper abdomen normal. Bones normal for age. Impression: Mild pulmonary venous congestion with small effusions and bibasilar atelectasis. Workstation performed: [id number]     Echo complete w/ contrast if indicated    Result Date: 10/16/2023  Narrative:   Left Ventricle: Left ventricular cavity size is normal. Wall thickness is normal. The left ventricular ejection fraction is 55%. Systolic function is normal. Wall motion is normal. Diastolic function is moderately abnormal, consistent with grade II (pseudonormal) relaxation. Right Ventricle: Right ventricular cavity size is moderately dilated. Systolic function is moderately reduced. Left Atrium: The atrium is mildly dilated. Right Atrium: The atrium is moderately dilated. Tricuspid Valve: There is severe regurgitation. XR chest portable ICU    Result Date: 10/16/2023  Narrative: CHEST INDICATION:   s/p intubation. COMPARISON: Same date at 6:36 p.m. EXAM PERFORMED/VIEWS:  XR CHEST PORTABLE ICU FINDINGS: The endotracheal tube terminates a few millimeters above the roverto. The endogastric tube terminates below the diaphragm beyond the imaged limits. Redemonstrated is unchanged enlargement of the cardiomediastinal silhouette. There is pulmonary vascular congestion. Cannot exclude trace interstitial pulmonary edema. There is no pleural effusion or pneumothorax. Osseous structures appear within normal limits for patient age. Impression: Low-lying endotracheal tube, adjusted to satisfactory position on the follow-up images at the time of the interpretation. Satisfactory position of the endogastric tube. Unchanged cardiomegaly. Pulmonary vascular congestion.  Cannot exclude mild interstitial pulmonary edema. Workstation performed: [id number]     CT chest wo contrast    Result Date: 10/16/2023  Narrative: CT CHEST WITHOUT IV CONTRAST INDICATION:   a/c resp failure,. COMPARISON: Chest x-ray from 10/15/2023; CT abdomen from 10/2/2023; subsequent chest x-ray from 10/16/2023 TECHNIQUE: CT examination of the chest was performed without intravenous contrast. Multiplanar 2D reformatted images were created from the source data. Respiratory artifacts limit the examination. This examination, like all CT scans performed in the [facility], was performed utilizing techniques to minimize radiation dose exposure, including the use of iterative reconstruction and automated exposure control. Radiation dose length product (DLP) for this visit:  613.01 mGy-cm FINDINGS: Endotracheal tube is approximately 1.5 cm above the roverto. This appears to been repositioned on a subsequent chest x-ray on 10/16/2023 being higher above the roverto. Nasogastric tube is present in the stomach. LUNGS: Patchy groundglass opacity consolidation is noted inferiorly in the right middle lobe with some areas of consolidation. Minimal patchy groundglass opacities are noted in both upper lobes. Small bandlike consolidation in the lingula. Denser bandlike consolidations are noted in both lung bases probably related to infiltrate or atelectasis. Air bronchograms are seen. The major bronchi are otherwise grossly unremarkable without obvious focal lesion. PLEURA: Mild to moderate pleural effusions are seen bilaterally. HEART/GREAT VESSELS: Coronary calcifications. No thoracic aortic aneurysm. The ascending aorta measures 3.9 cm at the level of the right pulmonary artery. MEDIASTINUM AND AZAEL:  Unremarkable. CHEST WALL AND LOWER NECK: Possible fatty lesion indents the chest wall musculature on the left with distortion although arm positioning is different with the left arm up and right arm down.  This appears somewhat more symmetric on abdomen CT with arm positioning similar on both sides. It is possible this is related to differences in muscular positioning. Correlation with any palpable abnormality anterior and inferior to the scapula is advised. VISUALIZED STRUCTURES IN THE UPPER ABDOMEN:  Unremarkable. OSSEOUS STRUCTURES: Perihepatic and perisplenic ascites is seen with some edema in the upper abdominal fat. Some fluid in the lesser sac is also identified. The patient is status post cholecystectomy. This ascites does appear new since the CT of 10/2/2023. Hepatic contour is not as well appreciated due to streak artifacts but with slightly nodular better assessed on the prior abdominal CT. Impression: New mild to moderate effusions. Adjacent airspace consolidation in the lower lobes may be related to inflammatory infiltrates or atelectasis with additional mixed groundglass and consolidative patchy features in the right middle lobe and lingula. New upper abdominal ascites. The study was marked in [facility] for immediate notification. Workstation performed: [id number]     XR chest portable ICU    Result Date: 10/16/2023  Narrative: CHEST INDICATION:   hypoxia. COMPARISON: CXR 10/15/2023 and 10/14/2023, chest CT 10/15/2023. EXAM PERFORMED/VIEWS:  XR CHEST PORTABLE ICU. FINDINGS: ET tube 4 cm above the roverto. NG tube below the diaphragm. Mild cardiomegaly. Low lung volumes with mild bibasilar atelectasis and trace effusions. No pneumothorax. Upper abdomen normal. Bones normal for age. Impression: Low lung volumes with mild bibasilar atelectasis and trace effusions. Workstation performed: [id number]     XR chest portable ICU    Result Date: 10/15/2023  Narrative: CHEST INDICATION:  Tachypnea. COMPARISON: 10/14/2023 EXAM PERFORMED/VIEWS:  XR CHEST PORTABLE ICU FINDINGS: -Enteric tube courses to the stomach. Heart shadow is enlarged but unchanged from prior exam. Uncoiled thoracic aorta. The lungs are clear.   No pneumothorax or large pleural effusion. Osseous structures appear within normal limits for patient age. Impression: Cardiomegaly. No acute cardiopulmonary disease. Workstation performed: [id number]     Bronchoscopy    Result Date: 10/15/2023  Narrative: Table formatting from the original result was not included. [address] [facility] [address] [place],[address] [id number] [phone] DATE OF SERVICE: 10/15/23 PHYSICIAN(S): Attending: Sudarshan Garcia MD Fellow: No Staff Documented INDICATION: Hemoptysis POST-OP DIAGNOSIS: See the impression below. PREPROCEDURE: Standard airway preparation completed per respiratory therapy protocol. Informed consent was obtained. Images reviewed prior to the procedure. A Time Out was performed. No suspicion or identified risk for TB or other airborne infectious disease; bronchoscopy procedure being performed for diagnostic purposes. PROCEDURE: Bronchoscopy DETAILS OF PROCEDURE: Patient was taken to the procedure room where a time out was performed to confirm correct patient and correct procedure. The patient was already under sedation prior to the procedure. The patient's blood pressure, ECG, heart rate, level of consciousness, oxygen and respirations were monitored throughout the procedure. The patient's estimated blood loss was moderate (5+ mL). The scope was introduced through the endotracheal tube. The procedure was not difficult. The patient tolerated the procedure well. There were no apparent adverse events. ANESTHESIA INFORMATION: ASA: ASA status cannot be found on the log. Anesthesia Type: Anesthesia type cannot be found on the log. FINDINGS: The bronchoscope was advanced to the ET tube. Immediately there was frothy sanguinous secretions within the ET tube that were suctioned and cleared. The main roverto was visualized. All airways were covered with sanguinous frothy secretions. This was suctioned and cleared however reaccumulated.   A bilateral airway survey was performed to the level of the segmental bronchi. There was no identified active bleeding, endobronchial lesions or extrinsic compression. The bronchoscope was then wedged into the right middle lobe for serial BAL. The serial aliquots were suggestive of diffuse alveolar hemorrhage (see media images). Bronchoalveolar lavage was performed x3 in the RML with 180 mL of saline instilled and a total return of 40 mL; the fluid appeared bloody SPECIMENS: * Cannot find log *      Impression: Bronchoscopy with bronchoalveolar lavage-serial aliquots suggestive of diffuse alveolar hemorrhage. No identified active bleeding, no endobronchial lesions or extrinsic compression RECOMMENDATION: Increase Solu-Medrol to 500 twice daily Repeat ANCA  hemoglobin, platelets, transfuse as needed for hemoglobin less than 7 and platelets less than 50. Reverse INR Send BAL for cytology, pan culture, cell count differential Kay Lima MD    CT head wo contrast    Result Date: 10/15/2023  Narrative: CT BRAIN - WITHOUT CONTRAST INDICATION:   AMs. COMPARISON: 7/20/2023. TECHNIQUE:  CT examination of the brain was performed. Multiplanar 2D reformatted images were created from the source data. Radiation dose length product (DLP) for this visit:  874.52 mGy-cm . This examination, like all CT scans performed in the [facility], was performed utilizing techniques to minimize radiation dose exposure, including the use of iterative  reconstruction and automated exposure control. IMAGE QUALITY:  Diagnostic. FINDINGS: Slightly limited evaluation secondary to overlying beam hardening artifact. Findings below within limitation. PARENCHYMA:  No intracranial mass, mass effect or midline shift. No CT signs of acute infarction. No acute parenchymal hemorrhage. VENTRICLES AND EXTRA-AXIAL SPACES:  Ventricles and extra-axial CSF spaces are prominent commensurate with the degree of volume loss. No hydrocephalus.   No acute extra-axial hemorrhage. CNS atherosclerosis. VISUALIZED ORBITS: Normal visualized orbits. PARANASAL SINUSES: Normal visualized paranasal sinuses. CALVARIUM AND EXTRACRANIAL SOFT TISSUES: No acute calvarial abnormality. Partially visualized nasoenteric and endotracheal catheters in situ. No acute calvarial abnormality. Impression: No acute intracranial abnormality. Workstation performed: [id number]     XR abdomen 1 view kub    Result Date: 10/14/2023  Narrative: ABDOMEN INDICATION:   ngt. COMPARISON: 10/14/2023 VIEWS:  AP supine FINDINGS: Tip of the NG tube is coursing below the left hemidiaphragm. No discernible free air on this supine study. Upright or left lateral decubitus imaging is more sensitive to detect subtle free air in the appropriate setting. Impression: Tip of the NG tube is coursing below the left hemidiaphragm. Workstation performed: [id number]     XR abdomen 1 vw portable    Result Date: 10/14/2023  Narrative: ABDOMEN INDICATION:   ngt. COMPARISON: CT  10/2/2023 VIEWS:  AP supine FINDINGS: Tip of the NG tube is coursing below the left hemidiaphragm. The distal aspect is coiled in the stomach and the tip terminates near the GE junction. There is a nonobstructive bowel gas pattern. No discernible free air on this supine study. Upright or left lateral decubitus imaging is more sensitive to detect subtle free air in the appropriate setting. No pathologic calcifications or soft tissue masses. Visualized lung bases are clear. Lumbar postoperative change. Impression: Tip of the NG tube is coursing below the left hemidiaphragm. The distal aspect is coiled in the stomach and the tip terminates near the GE junction. Workstation performed: [id number]     XR chest portable ICU    Result Date: 10/14/2023  Narrative: CHEST INDICATION:   NGT. COMPARISON:  None EXAM PERFORMED/VIEWS:  XR CHEST PORTABLE ICU FINDINGS: Tip of the NG tube is coursing below the left hemidiaphragm.  The distal aspect is coiled in the stomach and the tip terminates near the GE junction. Heart shadow is enlarged but unchanged from prior exam. The lungs are clear. No pneumothorax or pleural effusion. No acute osseous abnormality identified within limitations of portable radiography,     Impression: Tip of the NG tube is coursing below the left hemidiaphragm. The distal aspect is coiled in the stomach and the tip terminates near the GE junction. Workstation performed: [id number]     XR chest portable ICU    Result Date: 10/13/2023  Narrative: CHEST INDICATION:   ngt placement. COMPARISON: CXR 10/13/2023 and 7/20/2023, abdomen CT  10/02/2023. EXAM PERFORMED/VIEWS:  XR CHEST PORTABLE ICU. FINDINGS: NG tube in stomach. Mild cardiomegaly. Lungs clear. No effusion or pneumothorax. Upper abdomen normal. Cholecystectomy. Bones normal for age. Impression: No acute cardiopulmonary disease. NG tube in stomach. Workstation performed: [id number]     XR chest portable    Result Date: 10/13/2023  Narrative: CHEST INDICATION:  AMS, rule out infection. COMPARISON: 7/20/2023 EXAM PERFORMED/VIEWS:  XR CHEST PORTABLE FINDINGS:  The patient is rotated to the right. Cardiomediastinal silhouette appears stable. The lungs are clear. No pneumothorax or pleural effusion. Osseous structures appear within normal limits for patient age. Impression: No acute cardiopulmonary disease. Workstation performed: [id number]     IR biopsy transjugular liver    Result Date: 10/10/2023  Narrative: PROCEDURE: Image-guided transjugular liver biopsy Procedural Personnel Attending physician(s): Dr. Carlos Garcia Pre-procedure diagnosis: Elevated LFTs Post-procedure diagnosis: Same Indication: Patient with elevated LFTs and elevated INR presents for transjugular liver biopsy.  PROCEDURE SUMMARY: - Percutaneous ultrasound and fluoro-guided transjugular liver biopsy with pressure measurements PROCEDURE DETAILS: Pre-procedure Consent: Informed consent for the procedure including risks, benefits and alternatives was obtained and time-out was performed prior to the procedure. Preparation: The site was prepared and draped using maximal sterile barrier technique including cutaneous antisepsis. Anesthesia/sedation Level of anesthesia/sedation: Moderate sedation (conscious sedation) Anesthesia/sedation administered by: IR nurse under attending supervision with continuous monitoring of the patient's level of consciousness and physiologic status Total intra-service sedation time (minutes): 30 Transjugular liver biopsy with pressure measurements Local anesthesia was administered. Under ultrasound guidance, a 21-gauge needle was used to gain access into the right internal jugular vein. An angled catheter was advanced over a 0.035 inch wire into the right atrium and right atrial pressure was measured. The angled glide catheter was used to selectively cannulate the right hepatic vein. The angled glide catheter was exchanged for a balloon occlusion catheter which was advanced into the right hepatic vein. Free hepatic vein and wedged hepatic vein pressure measurements were made. The balloon occlusion catheter was removed and transjugular liver biopsy sheath advanced into the right hepatic vein over an Amplatz wire. 4 core needle samples were obtained using an 18-gauge core biopsy needle. Needle and sheath were removed. Hemostasis at the right IJV access site was obtained using manual pressure. Contrast Contrast agent: Omnipaque Contrast volume (mL): 5 Radiation Dose Fluoroscopy time (minutes):  15 Reference air kerma (mGy): 123 Additional Details Specimens removed: 4 core needle samples placed into formalin. Estimated blood loss (mL): 5 Complications: No immediate complications. Impression: 1. Transjugular liver biopsy with pressure measurements.  2. Pressure measurements as follows: Right atrium: 12/6 mmHg (mean 8) Free hepatic: 13/9 mmHg (mean 11) Wedged hepatic: 22/18 mmHg (mean 20) Plan: Specimens sent for evaluation. Workstation performed: [id number]     MRI abdomen w wo contrast and mrcp    Result Date: 10/4/2023  Narrative: MRI OF THE ABDOMEN WITH AND WITHOUT CONTRAST WITH MRCP INDICATION: 83 years / Female acute painless jaundice, transaminitis, and positive AFP tumor marker. COMPARISON: CT abdomen pelvis 10/2/2023. TECHNIQUE:  Multiplanar/multisequence MRI of the abdomen with 3D MRCP was performed before and after administration of contrast. Imaging performed on 1.5T MRI IV Contrast:  10 mL of Gadobutrol injection (SINGLE-DOSE) FINDINGS: LOWER CHEST:   Mild cardiomegaly. LIVER: Nodular liver surface contour, suggestive of cirrhosis. No suspicious mass. Subcentimeter hepatic cyst in the right hepatic lobe (8/13). The hepatic veins and portal veins are patent. BILE DUCTS:  No intrahepatic or extrahepatic bile duct dilation. Common bile duct is normal in caliber. No choledocholithiasis, biliary stricture or suspicious mass. GALLBLADDER: Surgically absent. PANCREAS: A 5 mm pancreatic cystic lesion in the pancreatic uncinate process (6/40) without worrisome features such as suspicious enhancement or solid components. Normal intrinsic T1 hyperintensity of the pancreas. No main pancreatic duct dilatation. ADRENAL GLANDS:  Unremarkable. SPLEEN:  Normal. KIDNEYS/PROXIMAL URETERS:  No hydroureteronephrosis. No suspicious renal mass. Left renal sinus cyst. BOWEL:   No dilated loops of bowel. PERITONEUM/RETROPERITONEUM: Trace perihepatic ascites. LYMPH NODES: Enlarged katelynn hepatis lymph nodes measuring up to 1.5 cm in short axis (17/64), likely reactive in the setting of cirrhosis. VASCULAR STRUCTURES:  No aneurysm. ABDOMINAL WALL: Small fat-containing umbilical hernia. OSSEOUS STRUCTURES:  No suspicious osseous lesion. Degenerative change of the visualized spine. Grade 1 anterolisthesis L4-L5. Lumbosacral spinal fusion hardware is noted.      Impression: Nodular liver surface contour suggestive of cirrhosis with sequela portal hypertension including trace perihepatic ascites. No suspicious hepatic mass. A 5 mm pancreatic uncinate process cystic lesion without worrisome features. No main pancreatic duct dilatation. 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 For simple cyst(s) less than 1.5 cm, recommend followup every 2 year for 2 times. After 4 years, no more followups. Recommend next followup in 2 years. Preferred imaging modality: abdomen MRI and MRCP with and without IV contrast, or triple phase abdomen CT with IV contrast, or abdomen MRI and MRCP without IV  contrast. The recommendations regarding pancreatic findings assumes that patient does not have family history of pancreatic cancer nor have any symptoms potentially attributable to pancreatic cystic lesions (hyperamylasemia, recent-onset diabetes, severe epigastric pain, weight loss, steatorrhea, or jaundice.) If these conditions are not true, then management should be deferred to judgement of specialists such as gastroenterologists or oncologic surgeons. REFERENCES: Recommendations are based on recent consensus statements on management of pancreatic cystic lesions from [address] [facility], [employer] of Radiology, the journal Pancreatology, and our own institutional consensus. [address] [facility] Guidelines on the Diagnosis and Management of Asymptomatic Neoplastic Pancreatic Cysts. Gastroenterology 2015; 785:053-574 (AGA GUIDELINES) International Consensus Guidelines for Management of Intraductal Papillary Mucinous Neoplasm and Mucinous Cystic Neoplasms of the Pancreas. Pancreatology Sep-Oct 2017;17(5):738-753. doi: 10.1016/j.yuan.2017.07.007. Epub 2017 Jul 13 [facility] paper).  American College of Radiology: White Paper: Management of Incidental Pancreatic Cysts: A White Paper of the ACR Incidental Findings Committee, 2017 Jul;14(7):911-923. doi: 10.1016/j.jacr. 2017.03.010. Epub 2017 May 19. (ACR GUIDELINES.) The study was marked in EPIC for significant notification. Workstation performed: [id number]     CT abdomen pelvis wo contrast    Result Date: 10/2/2023  Narrative: CT ABDOMEN AND PELVIS WITHOUT IV CONTRAST INDICATION:   New onset jaundice. COMPARISON: CT of the lumbar spine dated 1/23/2019. Ultrasound dated 7/20/2023. TECHNIQUE:  CT examination of the abdomen and pelvis was performed without intravenous contrast. Multiplanar 2D reformatted images were created from the source data. This examination, like all CT scans performed in the [address] [place] [address], was performed utilizing techniques to minimize radiation dose exposure, including the use of iterative reconstruction and automated exposure control. Radiation dose length product (DLP) for this visit:  792.82 mGy-cm Enteric contrast was administered. FINDINGS: ABDOMEN LOWER CHEST: Peripheral reticulation and subpleural banding in both lower lobes without shiloh bronchiolectasis or honeycombing. No acute consolidations at the lung bases. Suspect biatrial enlargement of the heart. No acute findings in the visualized portions of the lower chest. LIVER/BILIARY TREE: Liver size is within normal limits. Contour appears micronodular. Parenchyma is mildly heterogeneous, but there is no focal hepatic mass. Main portal vein is normal in caliber. Recannulization of the umbilical vein noted suggesting portal venous hypertension. Wispy gastroesophageal and perigastric varices are present. Intrahepatic and extrahepatic biliary tree appears within normal limits given prior cholecystectomy. No ductal dilatation. GALLBLADDER: Gallbladder is surgically absent. SPLEEN: Borderline splenomegaly measuring 12.5 cm in the long axis with flattening of the hilar concavity. No focal splenic masses. PANCREAS:  Unremarkable. ADRENAL GLANDS:  Unremarkable. KIDNEYS/URETERS:  Unremarkable. No hydronephrosis.  STOMACH AND BOWEL: There is colonic diverticulosis without evidence of acute diverticulitis. APPENDIX: Not well demonstrated. No evidence for acute appendicitis. ABDOMINOPELVIC CAVITY: Mild perihepatic ascites. Mild free fluid layering in the pelvis, also. Minimal hazy edema in the mesenteric fat. No encapsulated collections or free air. Prominent lymph nodes at the katelynn hepatis. Periportal node measuring 19 mm x 13 mm (5/26). Portacaval node measuring 22 mm x 13 mm (5/29 appears increased in size from 2019. VESSELS: Sequelae of portal venous hypertension, discussed above. Abdominal aorta and branch vessels show atherosclerotic calcifications with involvement of the SMA and renal ostia. Cannot assess degree of stenosis without contrast. No aneurysms. PELVIS REPRODUCTIVE ORGANS: Surgical changes of prior hysterectomy. No suspicious adnexal masses. URINARY BLADDER: Bladder base shows mild findings suggesting inferior descent though there is no shiloh cystocele. No wall thickening or perivesicular inflammation. No endoluminal stones. ABDOMINAL WALL/INGUINAL REGIONS: Small amount of fat in the left inguinal canal. Minuscule fat-containing umbilical hernia. OSSEOUS STRUCTURES: Suspect prior L5 laminectomy with L4-S1 posterior fixation. No evidence for hardware failure. No acute fracture or destructive osseous lesion. Impression: Nodular liver morphology and sequela of portal venous hypertension suggesting cirrhosis. Minimal ascites around the liver and layering in the pelvis. Prominent lymph nodes at the katelynn hepatis presumably reactive to primary underlying liver dysfunction. No biliary ductal dilatation to suggest obstructive cause of jaundice. 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 Recommend nonemergent outpatient evaluation with high-resolution chest CT to exclude shiloh UIP pattern fibrosis.  Workstation performed: [id number]

## 2023-10-18 NOTE — PROCEDURES
POC Cardiac US    Date/Time: 10/18/2023 12:31 AM    Performed by: RAJ Baptiste  Authorized by: Molly Fraire, [address] [place] [address]    Patient location:  ICU  Other Assisting Provider: No    Procedure details:     Exam Type:  Diagnostic    Indications: hypotension and suspected volume depletion      Assessment / Evaluation for: cardiac function, pericardial effusion and intravascular volume status      Exam Type: follow-up exam      Image quality: diagnostic      Image availability:  Not saved  Patient Details:     Cardiac Rhythm:  Regular    Medications: norepinephrine infusion      Mechanical ventilation: Yes    Cardiac findings:     Echo technique: limited 2D and M-mode      Views obtained: parasternal long axis, parasternal short axis and apical      Pericardial effusion: trace      Tamponade physiology: absent      Wall motion: normal      LV systolic function: normal      RV dilation: moderate    Pulmonary findings:     B-lines: 1 to 3    IVC findings:     IVC Size: dilated    Interpretation:     Fluid Status:  Hypervolemic     Preformed in the setting of escalating vasopressor requirments. Known history of tricuspid regurgitation with RV systolic dysfunction. Noted to have moderately dilated RV. Suspect acute hypervolemia, will favor further vasopressor usage in the setting of multi organ system failure.   [address] [place] [address]

## 2023-10-18 NOTE — DEATH NOTE
INPATIENT DEATH NOTE  Jolene Vega 81 y.o. female MRN: [id number]  Unit/Bed#: -01 Encounter: [id number]    Date, Time and Cause of Death    Date of Death: 10/18/23  Time of Death:  2:45 PM  Preliminary Cause of Death: Multi-organ failure with heart failure ([address])  Entered by: Maria De Jesus Bell PA-C[SB1.1]       Attribution       SB1.1 Caridad Spurling, PA-C 10/18/23 14:46                 PHYSICAL EXAM:  Unresponsive to noxious stimuli, Spontaneous respirations absent, Breath sounds absent, Carotid pulse absent, Heart sounds absent, Pupillary light reflex absent, and Corneal blink reflex absent    Medical Examiner notification criteria:  NONE APPLICABLE   Medical Examiner's office notified?:  Yes   Medical Examiner accepted case?:  No  Name of Medical Examiner: [facility]         Autopsy Options:  Post-mortem examination declined by next of kin    Primary Service Attending Physician notified?:  yes - Attending:  Santi Flores,     Physician/Resident responsible for completing Discharge Summary:  Maria De Jesus Bell PA-C

## 2023-10-18 NOTE — PROCEDURES
Central Line Insertion    Date/Time: 10/18/2023 12:34 AM    Performed by: RAJ Chi  Authorized by: Viri Fuentes, [address] [place] [address]    Patient location:  ICU  Consent:     Consent obtained:  Verbal    Consent given by:  Healthcare agent (daughter, Xavier Levy)    Risks discussed:  Arterial puncture, incorrect placement, bleeding, infection and pneumothorax    Alternatives discussed:  No treatment, delayed treatment and alternative treatment  Universal protocol:     Patient identity confirmed:  Arm band  Pre-procedure details:     Hand hygiene: Hand hygiene performed prior to insertion      Sterile barrier technique: All elements of maximal sterile technique followed      Skin preparation:  ChloraPrep    Skin preparation agent: Skin preparation agent completely dried prior to procedure    Indications:     Central line indications: medications requiring central line and hemodynamic monitoring      Site selection rationale:  RIJ preoccupied by temp HD line  Anesthesia (see MAR for exact dosages): Anesthesia method:  None  Procedure details:     Location:  Left internal jugular    Vessel type: vein      Laterality:  Left    Approach: percutaneous endoscopic technique used      Patient position:  Reverse Trendelenburg    Catheter type:  Triple lumen 20cm    Catheter size:  7 Fr    Landmarks identified: yes      Ultrasound guidance: yes      Ultrasound image availability:  Images available in PACS    Sterile ultrasound techniques: Sterile gel and sterile probe covers were used      Manometry confirmation: yes      Number of attempts:  1    Successful placement: yes    Post-procedure details:     Post-procedure:  Dressing applied and line sutured    Assessment:  Blood return through all ports, no pneumothorax on x-ray, placement verified by x-ray and free fluid flow    Post-procedure complications: none      Patient tolerance of procedure: Tolerated well, no immediate complications    Observer:  Yes Observer name:  Mely Wong RN, Maria Del Rosario Mccullough RN  Comments:      Prior to [facility] insertion. INR noted to be 5. 14. Additional 10mg of Vitamin K and 2 units of FFP ordered and transfusing prior to insertion attempt. Bleeding minimalized. Site without active oozing, line insertion uncomplicated.   [facility]

## 2023-10-18 NOTE — ASSESSMENT & PLAN NOTE
In the setting of acute hepatic failure, worsening ammonia   10/14 ETT for airway protection, worsening respiratory status as above   No CTH since admission, though given progressively worse INR, will obtain [facility]     Plan:  10/15 CTH -- neg   Now sedated on propofol for ETT  Serial neuro assessments   Maintain normothermia, normoglycemia   Ammonemia treatment as above   EEG ordered for concern for subclinical seizures in setting of elevated Ammonia

## 2023-10-18 NOTE — ASSESSMENT & PLAN NOTE
10/15 Bronch concerning for DAH with increase in bloody secretions from in-line   Bronch specimens pending   High-dose steroids D2:  Solumedrol 500mg q12h x3 days   Transitioning to prednisone in AM  Appreciate rheumatology input

## 2023-10-18 NOTE — PROGRESS NOTES
[facility]  Critical Care Progress Note  Name: Vick Doan  MRN: [id number]  Unit/Bed#: -01 I Date of Admission: 10/2/2023   Date of Service: 10/18/2023 I Hospital Day: 16    Assessment/Plan   * Acute respiratory failure [facility])  Assessment & Plan  Progressive respiratory failure through the afternoon with tachypnea, sonorous respirations, forced/prolonged expirations refractory to xopenex and racemic epi   10/14 ETT for airway protection, respiratory insufficiency after speaking to the family at length, who were all in agreement  10/15 Bronch with evidence of [facility]  10/16 required vecuronium for vent asynchrony    Plan:   Vent day 4  ACVC 18/400/40/8   Sedation: propofol for goal RASS -1  Pain: fentanyl PRN  Wean fio2 as tolerated for goal spo2 > 90%  Scheduled oral care, HOB 30 degrees   *With vent desynchrony, increased inter thoracic pressure prompting hemodynamic instability, s/p versed/vec overnight into 10/18 AM*    Transaminitis  Assessment & Plan  10/2 admitted to [facility] after being sent to the ER from rehab for her broken ankle with sudden jaundice   10/2 CT CAP -- Nodular liver morphology and sequela of portal venous hypertension suggesting cirrhosis. Minimal ascites around the liver and layering in the pelvis. Prominent lymph nodes at the katelynn hepatis presumably reactive to primary underlying liver dysfunction. No biliary ductal dilatation to suggest obstructive cause of jaundice  10/3 MRI abdomen -- Nodular liver surface contour suggestive of cirrhosis with sequela portal hypertension including trace perihepatic ascites. No suspicious hepatic mass. A 5 mm pancreatic uncinate process cystic lesion without worrisome features. No main pancreatic duct dilatation.  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   Slowly uptrending transaminitis since admission 10/7 peak AST/ALT of 707/466  10/11 peak Tbili 20.19  Worsening INR, now to 6.11   10/15 add xifaxan     Plan:   Steroids D7: Solumedrol 500mg q12h for 3 days (D3/3) completed 10/17  Transitioning to Prednisone 40mg 10/18  Stopped Albumin given net positive, consider bolus dosing given hypertension  Continue Octreotide, Xifaxan   Lactulose increased overnight given decreased Stool output, Q2H Lactulose via NGT, Q4H Enemas   NAC complete 10/15  Evidence of DAH -- see below   Trend hepatic enzymes, ammonia, INR   Appreciate GI input   Poor transplant candidate given age and comorbidities  Autoimmune serologies pending   Avoid hypotension, hepatotoxic agents   Referral for transplant per GI    SIRS (systemic inflammatory response syndrome) ([address])  Assessment & Plan  Unclear if this is 2/2 infectious source or reaction to worsening hepatic failure, respiratory failure requiring ETT   SIRS criteria met 10/14 with tachypnea, tachycardia, LA 3.5 -- Sepsis alert called   Admission UA 10/2: large leuks, positive nitrites, innum bacteria   10/13 BC x2 -- neg x24H   10/14 -- LA 3.5, procal 0.38  Afebrile, no leukocytosis, HD stable   10/15 LA cleared     Plan:   UC + ecoli, klebsiella   Completed Aztreonam D3   Trend fever, WBC curve, procal  Continue off additional ABX for now    Acute metabolic encephalopathy  Assessment & Plan  In the setting of acute hepatic failure, worsening ammonia   10/14 ETT for airway protection, worsening respiratory status as above   No CTH since admission, though given progressively worse INR, will obtain [facility]     Plan:  10/15 CTH -- neg   Now sedated on propofol for ETT  Serial neuro assessments   Maintain normothermia, normoglycemia   Ammonemia treatment as above   EEG ordered for concern for subclinical seizures in setting of elevated Ammonia    Type 2 diabetes mellitus [facility])  Assessment & Plan  Lab Results   Component Value Date    HGBA1C 7.0 (H) 07/20/2023       Recent Labs     10/17/23  1224 10/17/23  1445 10/17/23  1638 10/17/23  1859   POCGLU 162* 177* 151* 166*         Blood Sugar Average: Last 72 hrs:  (P) [id number]    Does not appear to be on home regimen for DM2  10/15 Insulin gtt for glucose 140-180 given steroid use  Q2H Accuchecks  Anticipate decreased Insulin requirements given decreasing steroids starting 10/18      Hypercalcemia  Assessment & Plan  Ca 10.9  Unclear etiology    Plan  Trend BMP  Continue current dialysate rate, watch carefully   Nephrology following, input appreciated   PTH, Vit D levels, ACE level and protein electrophoresis pending      Hypernatremia  Assessment & Plan  Mild, Na 148   Likely in the setting of lack of free water intake with AMS and NPO   FWF with tube feeds  Trend on serial bmp, concern for overcorrection given CVVH    DIC (disseminated intravascular coagulation) ([address])  Assessment & Plan  DIC panel sent 10/14 given confirmed GIB and thrombocytopenia   Fibrinogen <60  Ddimer 7.1  INR >6  Goals:   INR>2, Fibrinogen >100, Platelets >03, Hgb >7  Total product:   Vit K: x6  FFP: 11U  Cryo: 5U  Platelets: 1U  PRBC: 1U    Diffuse pulmonary alveolar hemorrhage  Assessment & Plan  10/15 Bronch concerning for DAH with increase in bloody secretions from in-line   Bronch specimens pending   High-dose steroids D2: Solumedrol 500mg q12h x3 days   Transitioning to prednisone in AM  Appreciate rheumatology input    Metabolic acidosis  Assessment & Plan  Progressively worsening on serial BMP   Last bicarb 20  Likely multifactorial etiology in the setting of worsening hepatic failure, lactic acidosis     Plan:   Volume overloaded on clinical exam -- will hold on further crystalloid.  If hypovolemic will give IV albumin  Bicarb tabs discontinued  CRRT started 10/17, trending metabolic panel  Consider restarting bicarb tabs if needed    GIB (gastrointestinal bleeding)  Assessment & Plan  10/14 noted dark, tarry stools   Concern for GIB vs lactulose stool   Hemoccult + x3 10/15 AM     Plan:   Continue NPO, NGT   Protonix IV BID, cont octreotide   Unfortunately, not a candidate for scope given critical illness   No overt concern for bleeding at this time, likely in setting of caogulopathy    VALENTIN (acute kidney injury) ([address])  Assessment & Plan  Creat baseline 0.5-0.6  Creat peak this admission: 3.28  In the setting of HRS vs contrast nephropathy vs decreased PO intake 2/2 acute illness   Previously requiring midodrine earlier in her stay, which has since been discontinued given borderline hypertension   10/16 clarke placed 2/2 low UOP and strict I&Os    Plan:   CRRT started 10/17 in setting of refractory hyperammonemia   Creat slowly downtrending -- continue to trend   Hold home torsemide, though may require eventual dosing of Lasix for volume overload on exam  Avoid hypotension, nephrotoxic agents     Closed right ankle fracture  Assessment & Plan  Prior to admission, was in rehab pending discharge to home independently following fx   Will need PT/OT when appropriate     Diffuse interstitial pulmonary fibrosis ([address])  Assessment & Plan  10/2 CTAP -- Reticular interstitial lung abnormality (REENA) noted in both lower lobes   Not on home meds, does not follow with Pulm as OP     ADRIA (obstructive sleep apnea)  Assessment & Plan  Per anesthesia noted from 2016, does not tolerate CPAP     Mood disorder ([address])  Assessment & Plan  Holding home ambien and lexapro in setting of worsening AMS    Acquired hypothyroidism  Assessment & Plan  Cont home synthroid   TSH 0.030    Class 2 severe obesity due to excess calories with serious comorbidity and body mass index (BMI) of 35.0 to 35.9 in adult   Assessment & Plan  Noted     Essential hypertension  Assessment & Plan  Home regimen: HCTZ, valsartan, sotalol, cardizem, torsemide -- all on hold given transaminitis and VALENTIN during admission     Plan:   Continue to hold above while on sedation, vasopressors    Mixed hyperlipidemia  Assessment & Plan  Hold home statin 2/2 transaminitis     Paroxysmal atrial fibrillation (HCC)  Assessment & Plan  Home regimen: Cardizem 360mg qd, sotalol BID  AC: Xarelto   10/16 Added 12.5mg lopressor for ectopy    Plan:   Hold Xarelto in the setting of supratherapeutic INR 2/2 hepatitis   Hold cardizem, sotalol given NPO initially  Holding sotalol, can consider restarting, however with great caution given vasopressor requirements  Goal HR <120  Continuous tele -- currently controlled AF      ICU Core Measures     Vented Patient  VAP Bundle  VAP bundle ordered     A: Assess, Prevent, and Manage Pain Has pain been assessed? Yes  Need for changes to pain regimen? No   B: Both Spontaneous Awakening Trials (SATs) and Spontaneous Breathing Trials (SBTs) Plan to perform spontaneous awakening trial today? No secondary to depressed mentation  Plan to perform spontaneous breathing trial today? No secondary to depressed mentation  Obvious barriers to extubation? Yes   C: Choice of Sedation RASS Goal: -5 Unarousable or 0 Alert and Calm  Need for changes to sedation or analgesia regimen? No   D: Delirium CAM-ICU: Unable to perform secondary to Acute cognitive dysfunction   E: Early Mobility  Plan for early mobility? NA   F: Family Engagement Plan for family engagement today? Yes     Antibiotic Review: Patient on appropriate coverage based on culture data. Review of Invasive Devices: Castillo Plan: Continue for accurate I/O monitoring for 48 hours  Central access plan: Patient has multiple central venous catheters. Medications requiring central line HD cath in place.   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 Completed 500mg BID solumedrol for DAH, autoimmune hepatitis. Plan to start daily prednisone. Started on CVVH to assist in ammonia clearance. With CVVH initation, with worsening vasopressor requirments requiring LIJ CVC and vasopressin initiation. Given 2u FFP and 10mg Vitamin K for INR of 5.14. Increased Lactulose to 30g via NGT Q2H and Lactulose enemas Q4H to assist in ammonia clearance. Trending Ammonia, CVVH labs  INR continuin to rise, trend coags  2u FFP given with 10mg Vitamin K overnight hoa line placement  With worsening vasopressor requirments, favor vasopressors to assist volume removal given POCUS exam  Ongoing goals of care discussions, prognosis remains poor. Discussed with daughter, Alma Delia Tijerina overnight. Discussed rising ammonia level and risk of seizures to come. *Requiring 10 of Vec, 2 of versed for vent desynchrony again. With elevated inter thoracic pressures/auto PEEP causing hemodynamic instability and hypotension. *    Review of Systems   Unable to perform ROS: Intubated      Objective                            Vitals I/O      Most Recent Min/Max in 24hrs   Temp (!) 95.4 °F (35.2 °C) Temp  Min: 95.4 °F (35.2 °C)  Max: 97.5 °F (36.4 °C)   Pulse 101 Pulse  Min: 84  Max: 124   Resp 17 Resp  Min: 16  Max: 22   /77 BP  Min: 99/61  Max: 134/77   O2 Sat 99 % SpO2  Min: 95 %  Max: 100 %      Intake/Output Summary (Last 24 hours) at 10/18/2023 0451  Last data filed at 10/18/2023 0300  Gross per 24 hour   Intake 4359.16 ml   Output 3564 ml   Net 795.16 ml         No diet orders on file     Invasive Monitoring Physical exam   Arterial Line  Lilibeth BP 89/56  Arterial Line BP  Min: 76/52  Max: 142/80   MAP 69 mmHg  Arterial Line MAP (mmHg)  Min: 57 mmHg  Max: 103 mmHg    Physical Exam  Skin:     General: Skin is warm and dry. Capillary Refill: Capillary refill takes less than 2 seconds.       Coloration: Skin is jaundiced. HENT:      Mouth/Throat:      Mouth: Mucous membranes are dry. Neck:      Vascular: Central line present. Cardiovascular:      Rate and Rhythm: Tachycardia present. Rhythm irregular. Heart sounds: Normal heart sounds. Musculoskeletal:      Right lower le+ Edema present. Left lower le+ Edema present. Abdominal:      Palpations: Abdomen is soft. Tenderness: There is no abdominal tenderness. There is no guarding. Constitutional:       Appearance: She is ill-appearing. Interventions: She is intubated and restrained. Pulmonary:      Effort: Tachypnea present. She is intubated. Breath sounds: Rhonchi present. Psychiatric:         Behavior: Behavior is uncooperative. Neurological:      GCS: GCS eye subscore is 1. GCS verbal subscore is 1. GCS motor subscore is 1. Comments: GCS 3T   Genitourinary/Anorectal:  FoleyVitals and nursing note reviewed. Diagnostic Studies    EKG: A fib rate 95  Imaging: CXR unchanged from prior   I have personally reviewed pertinent reports.        Medications:  Scheduled PRN   chlorhexidine, 15 mL, Q12H DEON  ipratropium, 0.5 mg, Q6H  lactulose, 30 g, Q1H  lactulose, 200 g, Q4H  levalbuterol, 1.25 mg, Q6H  levothyroxine, 175 mcg, Early Morning  metoprolol tartrate, 12.5 mg, Q12H [address]  BIRDIE ANTIFUNGAL, , BID  nystatin, , BID  octreotide, 100 mcg, Q8H DEON  pantoprazole, 40 mg, Q12H [address]  predniSONE, 40 mg, Daily  rifaximin, 550 mg, Q12H [address]  sodium chloride, 500 mL, Once      albuterol, 2.5 mg, Q6H PRN  fentanyl citrate (PF), 50 mcg, Q4H PRN  hydrALAZINE, 20 mg, Q4H PRN       Continuous    fentaNYL, 50 mcg/hr, Last Rate: 50 mcg/hr (10/18/23 0427)  insulin regular (HumuLIN R,NovoLIN R) 1 Units/mL in sodium chloride 0.9 % 100 mL infusion, 0.3-21 Units/hr, Last Rate: 1 Units/hr (10/18/23 0400)  norepinephrine, 1-30 mcg/min, Last Rate: 7 mcg/min (10/18/23 5306)  NxStage K 4/Ca 3, 20,000 mL  propofol, 5-50 mcg/kg/min, Last Rate: 20 mcg/kg/min (10/18/23 0051)  sodium chloride (HYPERTONIC) infusion 1.8%, 50 mL/hr  vasopressin, 0.04 Units/min, Last Rate: 0.04 Units/min (10/18/23 0045)         Labs:    CBC    Recent Labs     10/17/23  1643 10/17/23  2246   WBC 13.73* 13.60*   HGB 8.6* 9.2*   HCT 26.2* 28.3*   * 108*     BMP    Recent Labs     10/18/23  0056 10/18/23  0354   SODIUM 145 142   K 4.3 3.9   * 109*   CO2 18* 17*   AGAP 15 16   BUN 48* 40*   CREATININE 1.97* 1.74*   CALCIUM 10.6* 10.3*       Coags    Recent Labs     10/17/23  1643 10/17/23  2246   INR 3.82* 5.12*        Additional Electrolytes  Recent Labs     10/18/23  0056 10/18/23  0354   MG 2.5 2.3   PHOS 3.3 3.0   CAIONIZED 1.21 1.19          Blood Gas    Recent Labs     10/17/23  0824   PHART 7.314*   [id number] 37.4   PO2ART 111.7   [id number] 18.6*   BEART -7.0   SOURCE Line, Arterial     Recent Labs     10/17/23  0824   SOURCE Line, Arterial    LFTs  Recent Labs     10/17/23  0456   ALT 61*   AST 26   ALKPHOS 39   ALB 4.7   TBILI 13.09*       Infectious  Recent Labs     10/17/23  0456   PROCALCITONI 0.40*     Glucose  Recent Labs     10/17/23  1643 10/17/23  1923 10/18/23  0056 10/18/23  0354   GLUC 151* 146* 170* 159*        Critical Care Time Delivered : Upon my evaluation, this patient had a high probability of imminent or life-threatening deterioration due to multiorgan system failure, shock, hepatorenal failure, which required my direct attention, intervention, and personal management. I have personally provided 90 minutes of critical care time, exclusive of procedures, teaching, family meetings, and any prior time recorded by providers other than myself.      [address] [facility]

## 2023-10-18 NOTE — ASSESSMENT & PLAN NOTE
Unclear if this is 2/2 infectious source or reaction to worsening hepatic failure, respiratory failure requiring ETT   SIRS criteria met 10/14 with tachypnea, tachycardia, LA 3.5 -- Sepsis alert called   Admission UA 10/2: large leuks, positive nitrites, innum bacteria   10/13 BC x2 -- neg x24H   10/14 -- LA 3.5, procal 0.38  Afebrile, no leukocytosis, HD stable   10/15 LA cleared     Plan:   UC + ecoli, klebsiella   Completed Aztreonam D3   Trend fever, WBC curve, procal  Continue off additional ABX for now

## 2023-10-18 NOTE — QUICK NOTE
Attempt at placing NGT with JENSEN Roca. Patient unable to follow instruction to swallow, and thus placement was not possible. She is protecting her airway, yelling out during procedure. Small amount of blood noted in R nare after attempt, hemostasis appears to have been achieved organically. Given difficulty with placement, and small blood in nare, will abort attempts tonight. Continue with lactulose retention enemas via rectal tube.
Had a family meeting with Son Nelda Villatoro, daughter Xavier Levy and their significant others. Included Dr. Tyler Barth from . They were updated on clinical status and plan, dw them that she has a low likelihood of coming out of the acute liver failure. All questions answered and they expressed understanding. Decision made at this time to make Level 2. DW them also about possible need for CVC in the near future, discussed risks/benefits. They are in agreement that if a CVC is needed, they would be alright with it.
Ongoing discussions with critical care overnight. CVVH effluent dose was increased to help with rising ammonia levels. However serum sodium level was dropping rather quickly. We administered normal saline infusion x2 and 1.8% saline at 50 cc/h for 3 hours. Repeat BMP pending at this stage.
Patient with persistently elevated ammonium level despite maximal medical treatment. Discussed with critical care medicine there is an indication for renal replacement therapy in the setting of intractable ammonium level despite medical treatment.   The initial recommendation is typically IHD x1 treatment however patient is currently on pressor support so I will initiate CRRT/CVVH      The typical recommendations for ammonium removal are as follows  - Blood flow rate 250 mL to increase to 300 mL as tolerated  - Replacement fluid 50 mL/kg/h however given hypernatremia and probable lower lean body weight I am going to initiate 4000 mL/min which is just 40 mL/kg/h but increase depending upon sodium and ammonium level and tolerance  - Keep even  - Line placement per critical care medicine      I would monitor labs closely at least every 4 hours initially in terms of basic metabolic profile and then ammonium level 6 to 12 hours    Discussed with critical care medicine
RR decreased to 10 on ventilator to ensure patient capable of breathing above and beyond ventilator as paralytic given in AM. Patient breathing at a rate of 12-16 with vent RR set to 10.
Received a call from Bang Marquez, patient's son, asking for an update. I reviewed her positive hemoccult test, transfusion of 2U FFP and concern for ongoing GIB. I reiterated that her BP is stable, but her overall clinical condition is concerning. He was appreciative of the update and had no further questions.
Received a call from patient's son. I provided an update regarding ongoing transfusions of blood products to meet coagulopathy goals, continued dark stools, initiation of D5W overnight. I also briefly reviewed by read of the CT chest and he is aware formal read is pending. He was appreciative of the update and had no further questions.
Reviewed case with hepatology Dr. Erin Cardoso    Does not believe Demadex can cause this severe liver disease. Gabapentin though may be causing this. The timeline of being off it for 2 or 3 weeks does not exclude it. Probably should have liver biopsy once INR decreased (Xarelto washed out).   He does recommend giving vitamin K to help expedite this
Spent a large portion of time at the bedside with the patient's son, daughter in law, daughter, and granddaughters x2. We reviewed events leading to Shoshana's hospitalization, intubation, hepatic insult and now sequale of such. We reviewed pending plans for CT chest to eval for DAH, as well as attempts to transfuse blood products in an effort to thwart ongoing coagulopathy. We discussed her critical illness, and her unfortunate inability to undergo GI scope for GIB given such. We reviewed L2 Code status, and spoke briefly about possibility of pressor requirements and continued critical illness moving forward. All parties were appreciative of the update and had no further questions.
done

## 2023-10-18 NOTE — DISCHARGE SUMMARY
[address] [facility]  Discharge- Meli Pappas 1940, 81 y.o. female MRN: [id number]  Unit/Bed#: -01 Encounter: [id number]  Primary Care Provider: Joel Santiago MD   Date and time admitted to hospital: 10/2/2023  2:50 PM    No new Assessment & Plan notes have been filed under this hospital service since the last note was generated. Service: Critical Care/ICU        Medical Problems       Resolved Problems  Date Reviewed: 10/18/2023            Resolved    Hyponatremia 10/15/2023     Resolved by  RAJ Alanis          Admission Date:   Admission Orders (From admission, onward)       Ordered        10/02/23 1723  INPATIENT ADMISSION  Once                            Admitting Diagnosis: Jaundice [R17]  Thrombocytopenia ([address]) [D69.6]  Elevated transaminase level [R74.01]  Elevated bilirubin [R17]    HPI:   As per RAJ Alanis "Meli Pappas is an 82yo F with PMH ADRIA not on home CPAP, mood disorder on Lexapro, hypothyroidism, obesity, hypertension, hyperlipidemia, paroxysmal A-fib on Xarelto, diabetes type 2 presents as a transfer to critical care for worsening respiratory status requiring intubation. Renay Powell was initially admitted on 10/2 from rehab, where she was placed while recovering from her right ankle fracture, for sudden, seemingly unprovoked jaundice. Ultimately, she required liver biopsy, which is concerning for autoimmune hepatitis. During her stay, she has experienced hyponatremia, VALENTIN, worsening metabolic encephalopathy, worsening liver indices. In total, she has received 1 unit FFP, 5 doses of vitamin K. She is on day 4 of systemic steroids. Her previous lactulose retention enemas have been transitioned to lactulose by NGT. This afternoon, she had a progressive worsening in her respiratory status with tachypnea, forced expiration. Earlier this evening, despite nebulizers and NT suctioning, her breathing difficulties continued.   Ultimately, decision was made, along with family, to intubate her for airway protection, respiratory distress. Postintubation, concerns for blood in her stool, and Hemoccult is pending. Review of chart indicates that she had a urine sample on admission concerning for infection, cultures are pending and she is started on aztreonam for broad-spectrum coverage considering her allergies. Lab work reveals worsening metabolic acidosis, lactic acidosis 3.5. She is critically ill on mechanical ventilator, high risk for decompensation. "    Procedures Performed:   Orders Placed This Encounter   Procedures    Central Line    Intubation    POC Cardiac US    Temporary HD Catheter       Summary of Hospital Course: On 10/15 patient developed DIC and concerns for possible DAH on bronchoscopy. She was transitioned to Solu-Medrol 500 mg IV twice daily for treatment of DAH. Throughout admission she received approximately 7 doses of vitamin K, 12 units of FFP, 6 units of cryoprecipitate, 1 unit PRBC, 1 unit of platelets for treatment of DIC. While intubated patient had multiple episodes of ventilator dyssynchrony requiring extra sedation and as needed vecuronium. She was given albumin and octreotide to help improve HRS in the setting of autoimmune hepatitis. She was treated with increasing doses of lactulose and rifaximin to attempt to decrease hyperammonemia without success and was ultimately started on CVVH to assist in clearance of ammonia. On 10/17 patient developed hypotension while on CVVH requiring high-dose vasopressors, had worsening INR, fibrinogen, lactic acidosis, and pneumonia. Despite maximal medical treatments she continued to decline and was deemed to not be a candidate for liver transplant due to age and comorbidities. A goals of care discussion was held with the family on 10/18 and the decision was made to transition to comfort care. She passed away at [phone] with family at her bedside.     Significant Findings, Care, Treatment and Services Provided:   MRI abdomen w wo contrast and mrcp    Result Date: 10/4/2023  Impression: Nodular liver surface contour suggestive of cirrhosis with sequela portal hypertension including trace perihepatic ascites. No suspicious hepatic mass. A 5 mm pancreatic uncinate process cystic lesion without worrisome features. No main pancreatic duct dilatation. 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 For simple cyst(s) less than 1.5 cm, recommend followup every 2 year for 2 times. After 4 years, no more followups. Recommend next followup in 2 years. Preferred imaging modality: abdomen MRI and MRCP with and without IV contrast, or triple phase abdomen CT with IV contrast, or abdomen MRI and MRCP without IV  contrast. The recommendations regarding pancreatic findings assumes that patient does not have family history of pancreatic cancer nor have any symptoms potentially attributable to pancreatic cystic lesions (hyperamylasemia, recent-onset diabetes, severe epigastric pain, weight loss, steatorrhea, or jaundice.) If these conditions are not true, then management should be deferred to judgement of specialists such as gastroenterologists or oncologic surgeons. REFERENCES: Recommendations are based on recent consensus statements on management of pancreatic cystic lesions from [address] [facility], [employer], the journal Pancreatology, and our own institutional consensus. [facility] Guidelines on the Diagnosis and Management of Asymptomatic Neoplastic Pancreatic Cysts. Gastroenterology 2015; 861:313-626 (AGA GUIDELINES) International Consensus Guidelines for Management of Intraductal Papillary Mucinous Neoplasm and Mucinous Cystic Neoplasms of the Pancreas. Pancreatology Sep-Oct 2017;17(5):738-753. doi: 10.1016/j.yuan.2017.07.007. Epub 2017 Jul 13 [facility] paper).  [employer]: White Paper: Management of Incidental Pancreatic Cysts: A White Paper of the ACR Incidental Findings Committee, 2017 Jul;14(7):911923. doi: 10.1016/j.jacr. 2017.03.010. Epub 2017 May 19. (ACR GUIDELINES.) The study was marked in EPIC for significant notification. Workstation performed: [id number]     CT abdomen pelvis wo contrast    Result Date: 10/2/2023  Impression: Nodular liver morphology and sequela of portal venous hypertension suggesting cirrhosis. Minimal ascites around the liver and layering in the pelvis. Prominent lymph nodes at the katelynn hepatis presumably reactive to primary underlying liver dysfunction. No biliary ductal dilatation to suggest obstructive cause of jaundice. 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 Recommend nonemergent outpatient evaluation with high-resolution chest CT to exclude shiloh UIP pattern fibrosis.  Workstation performed: [id number]        Complications: N/A    Condition at Time of Death:     Final Diagnosis: Multisystem organ failure    Date, Time and Cause of Death    Date of Death: 10/18/23  Time of Death:  2:45 PM  Preliminary Cause of Death: Multi-organ failure with heart failure ([address])  Entered by: Melina Wilkes PA-C[SB1.1]       Attribution       SB1.1 Rosangela Ching PA-C 10/18/23 14:46            PCP: Randon Mohs, MD    Disposition:

## 2023-10-18 NOTE — PLAN OF CARE
Problem: SPIRITUAL CARE  Goal: Patient feels balance and connection with others and/or higher power that empowers the self during times of loss, guilt and fear  Description: INTERVENTIONS:  - Create safety for patient through empathic presence and non-judgmental listening  - Encourage patient to explore his/her values, beliefs and/or spiritual images and practices  - Encourage use of breath work, imagery, meditation, relaxation, reiki to ease distress and provide healing  - Encourage use of cultural and spiritual celebrations and rituals  - Facilitate discussion that helps patient sort out spiritual concerns  - Help patient identify where meaning/hope/comfort & strength are in his/her life  - Refer patient to [facility] for assistance, as appropriate  - Respond to patient/family need for prayer/ritual/sacrament/ceremony  Outcome: Progressing     Problem: DECISION MAKING  Goal: Pt/Family able to effectively weigh alternatives and participate in decision making related to treatment and care  Description: INTERVENTIONS:  - Identify decision maker  - Determine when there are differences among patient's view, family's view, and healthcare provider's view of patient condition and care goals  - Facilitate patient/family articulation of goals for care  - Help patient/family identify pros/cons of alternative solutions  - Provide information as requested by patient/family  - Respect patient/family rights related to privacy and sharing information   - Serve as a liaison between patient, family and health care team  - Initiate consults as appropriate (Ethics Team, Palliative Care, Family Care Conference, etc.)  Outcome: Progressing     Problem: DEATH & DYING  Goal: Pt/Family communicate acceptance of impending death and expresses psychological comfort and peace  Description: INTERVENTIONS:  - Assess patient/family anxiety and grief process related to end of life issues  - Provide emotional, spiritual and psychosocial support  - Provide information about the patient’s health status with consideration of family and cultural values  - Communicate willingness to discuss death and facilitate grief process  with patient/family as appropriate  - Emphasize sustaining relationships within family system and community, or sid/spiritual traditions  - Initiate Spiritual Care, Pastoral care or other ancillary consults as needed  - Refer to community support groups as appropriate  Outcome: Progressing

## 2023-10-18 NOTE — ACP (ADVANCE CARE PLANNING)
Advanced Care Planning Note     Yumiko Garcia 81 y.o. female MRN: [id number]    Unit/Bed#: -01 Encounter: [id number]    Yumiko Garcia is a 80 yo female that presented today secondary to having an acute condition requiring critical care provider evaluation. The patient has chronic comorbidities, including but not limited to HTN, HLD, ADRIA, obesity and now is inflicted with following acute conditions: autoimmune hepatitis, acute liver failure, acute renal failure, DIC, heart failure, acute hypoxic respiratory failure, multifactorial shock, severe refractory hepatic encephalopathy. Due to the severity of the patient's acute condition and/or the extent of chronic conditions, there is need for advanced care planning at this time. Please see my previous documentation in regards to the patient's current condition, assessment, and treatment plan. During this discussion with the patient and/or family members, it was established that all stake holders were agreeable and understood the rationale for advanced care planning to occur at this time. The following individuals were present & participated in the face to face conversation I had about the patient's advanced directives & advanced care planning: Daughter Kristen Marino), Son Nicolle Arnold), Daughter-In-Law Ladbetzaida Mauricio). Please see my following comments for details of the conversation & decisions that were made:    Family updated regarding overnight and early morning events. We discussed her rising vasopressor requirement, worsening DIC, rising ammonia levels, ongoing ventilator dyssynchrony requiring paralytic, and worsening hypoxia. I explained the role of a BIS monitor and that the patient's BIS remains between 9-12 on only fentanyl 50 mcg/hr. Both Kristen Marino and Nicolle Arnold expressed concerns over the patient's mental status and low BIS reading.  I explained that her encephalopathy was likely secondary to her continued hyperammonemia which we have not yet been able to control despite maximal medical management and initiation of CVVH. Both Harmony Mathis and Jimbo Curtis asked for insight regarding what her neurologic outcome would be if we were able to bring ammonia levels back down. I explained that while it's impossible to know what her recovery would be, her prognosis is quite poor given her prolonged hyperammonemia, sedation, and requirement of paralytic. At that point we shifted our discussion to quality of life. When asked what Starla Medina would consider an acceptable quality of life, all parties felt strongly that if she was unable to return home with independence, she wouldn't be happy. Harmony Mathis and Jimbo Curtis explained that even her short stay at rehab after she broke her ankle was extremely draining on the patient. At that point, Fabiola Nova, and Ajay Santana all decided that because her chances of full independent recovery are quite low, focusing on comfort-focused care would be more in line with the patient's wishes. Harmony Mathis stated that she would like to see if her kids want to come say goodbye and then they will make the transition to level 4, comfort care. For now, we will continue current treatments with no escalation in care. Total time spent, (30) minutes (1115 to 1200).     CODE STATUS: Level 2 - DNAR: but accepts endotracheal intubation  POA:    POLST:      SIGNATURE: Daniel Palumbo PA-C  DATE: October 18, 2023  TIME: 11:51 AM

## 2023-10-18 NOTE — ASSESSMENT & PLAN NOTE
Mild, Na 148   Likely in the setting of lack of free water intake with AMS and NPO   FWF with tube feeds  Trend on serial bmp, concern for overcorrection given CVVH

## 2023-10-18 NOTE — ASSESSMENT & PLAN NOTE
Lab Results   Component Value Date    HGBA1C 7.0 (H) 07/20/2023       Recent Labs     10/17/23  1228 10/17/23  1445 10/17/23  1638 10/17/23  1859   POCGLU 162* 177* 151* 166*         Blood Sugar Average: Last 72 hrs:  (P) [id number]    Does not appear to be on home regimen for DM2  10/15 Insulin gtt for glucose 140-180 given steroid use  Q2H Accuchecks  Anticipate decreased Insulin requirements given decreasing steroids starting 10/18

## 2023-10-18 NOTE — ASSESSMENT & PLAN NOTE
10/2 admitted to [facility] after being sent to the ER from rehab for her broken ankle with sudden jaundice   10/2 CT CAP -- Nodular liver morphology and sequela of portal venous hypertension suggesting cirrhosis. Minimal ascites around the liver and layering in the pelvis. Prominent lymph nodes at the katelynn hepatis presumably reactive to primary underlying liver dysfunction. No biliary ductal dilatation to suggest obstructive cause of jaundice  10/3 MRI abdomen -- Nodular liver surface contour suggestive of cirrhosis with sequela portal hypertension including trace perihepatic ascites. No suspicious hepatic mass. A 5 mm pancreatic uncinate process cystic lesion without worrisome features. No main pancreatic duct dilatation.  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   Slowly uptrending transaminitis since admission   10/7 peak AST/ALT of 707/466  10/11 peak Tbili 20.19  Worsening INR, now to 6.11   10/15 add xifaxan     Plan:   Steroids D7: Solumedrol 500mg q12h for 3 days (D3/3) completed 10/17  Transitioning to Prednisone 40mg 10/18  Stopped Albumin given net positive, consider bolus dosing given hypertension  Continue Octreotide, Xifaxan   Lactulose increased overnight given decreased Stool output, Q2H Lactulose via NGT, Q4H Enemas   NAC complete 10/15  Evidence of DAH -- see below   Trend hepatic enzymes, ammonia, INR   Appreciate GI input   Poor transplant candidate given age and comorbidities  Autoimmune serologies pending   Avoid hypotension, hepatotoxic agents   Referral for transplant per GI

## 2023-10-18 NOTE — ASSESSMENT & PLAN NOTE
Home regimen: Cardizem 360mg qd, sotalol BID  AC: Xarelto   10/16 Added 12.5mg lopressor for ectopy    Plan:   Hold Xarelto in the setting of supratherapeutic INR 2/2 hepatitis   Hold cardizem, sotalol given NPO initially  Holding sotalol, can consider restarting, however with great caution given vasopressor requirements  Goal HR <120  Continuous tele -- currently controlled AF

## 2023-10-18 NOTE — ASSESSMENT & PLAN NOTE
Progressive respiratory failure through the afternoon with tachypnea, sonorous respirations, forced/prolonged expirations refractory to xopenex and racemic epi   10/14 ETT for airway protection, respiratory insufficiency after speaking to the family at length, who were all in agreement  10/15 Bronch with evidence of [facility]  10/16 required vecuronium for vent asynchrony    Plan:   Vent day 4  ACVC 18/400/40/8   Sedation: propofol for goal RASS -1  Pain: fentanyl PRN  Wean fio2 as tolerated for goal spo2 > 90%  Scheduled oral care, HOB 30 degrees   *With vent desynchrony, increased inter thoracic pressure prompting hemodynamic instability, s/p versed/vec overnight into 10/18 AM*

## 2023-10-18 NOTE — PLAN OF CARE
Problem: MOBILITY - ADULT  Goal: Maintain or return to baseline ADL function  Description: INTERVENTIONS:  -  Assess patient's ability to carry out ADLs; assess patient's baseline for ADL function and identify physical deficits which impact ability to perform ADLs (bathing, care of mouth/teeth, toileting, grooming, dressing, etc.)  - Assess/evaluate cause of self-care deficits   - Assess range of motion  - Assess patient's mobility; develop plan if impaired  - Assess patient's need for assistive devices and provide as appropriate  - Encourage maximum independence but intervene and supervise when necessary  - Involve family in performance of ADLs  - Assess for home care needs following discharge   - Consider OT consult to assist with ADL evaluation and planning for discharge  - Provide patient education as appropriate  10/18/2023 1215 by Porfirio Nayak RN  Outcome: Progressing  10/18/2023 0757 by Porfirio Nayak RN  Outcome: Progressing  Goal: Maintains/Returns to pre admission functional level  Description: INTERVENTIONS:  - Perform BMAT or MOVE assessment daily.   - Set and communicate daily mobility goal to care team and patient/family/caregiver. - Collaborate with rehabilitation services on mobility goals if consulted  - Perform Range of Motion 2 times a day. - Reposition patient every 2 hours.   - Dangle patient 2 times a day  - Stand patient 2 times a day  - Ambulate patient 2 times a day  - Out of bed to chair 2 times a day   - Out of bed for meals 2 times a day  - Out of bed for toileting  - Record patient progress and toleration of activity level   10/18/2023 1215 by Porfirio Nayak RN  Outcome: Progressing  10/18/2023 0757 by Porfirio Nayak RN  Outcome: Progressing     Problem: PAIN - ADULT  Goal: Verbalizes/displays adequate comfort level or baseline comfort level  Description: Interventions:  - Encourage patient to monitor pain and request assistance  - Assess pain using appropriate pain scale  - Administer analgesics based on type and severity of pain and evaluate response  - Implement non-pharmacological measures as appropriate and evaluate response  - Consider cultural and social influences on pain and pain management  - Notify physician/advanced practitioner if interventions unsuccessful or patient reports new pain  10/18/2023 1215 by Carissa Mays RN  Outcome: Progressing  10/18/2023 0757 by Carissa Mays RN  Outcome: Progressing     Problem: INFECTION - ADULT  Goal: Absence or prevention of progression during hospitalization  Description: INTERVENTIONS:  - Assess and monitor for signs and symptoms of infection  - Monitor lab/diagnostic results  - Monitor all insertion sites, i.e. indwelling lines, tubes, and drains  - Monitor endotracheal if appropriate and nasal secretions for changes in amount and color  - [place] appropriate cooling/warming therapies per order  - Administer medications as ordered  - Instruct and encourage patient and family to use good hand hygiene technique  - Identify and instruct in appropriate isolation precautions for identified infection/condition  10/18/2023 1215 by Carissa Mays RN  Outcome: Progressing  10/18/2023 0757 by Carissa Mays RN  Outcome: Progressing  Goal: Absence of fever/infection during neutropenic period  Description: INTERVENTIONS:  - Monitor WBC    10/18/2023 1215 by Carissa Mays RN  Outcome: Progressing  10/18/2023 0757 by Carissa Mays RN  Outcome: Progressing     Problem: SAFETY ADULT  Goal: Maintain or return to baseline ADL function  Description: INTERVENTIONS:  -  Assess patient's ability to carry out ADLs; assess patient's baseline for ADL function and identify physical deficits which impact ability to perform ADLs (bathing, care of mouth/teeth, toileting, grooming, dressing, etc.)  - Assess/evaluate cause of self-care deficits   - Assess range of motion  - Assess patient's mobility; develop plan if impaired  - Assess patient's need for assistive devices and provide as appropriate  - Encourage maximum independence but intervene and supervise when necessary  - Involve family in performance of ADLs  - Assess for home care needs following discharge   - Consider OT consult to assist with ADL evaluation and planning for discharge  - Provide patient education as appropriate  10/18/2023 1215 by Aaron Roper RN  Outcome: Progressing  10/18/2023 0757 by Aaron Roper RN  Outcome: Progressing  Goal: Maintains/Returns to pre admission functional level  Description: INTERVENTIONS:  - Perform BMAT or MOVE assessment daily.   - Set and communicate daily mobility goal to care team and patient/family/caregiver. - Collaborate with rehabilitation services on mobility goals if consulted  - Perform Range of Motion 2 times a day. - Reposition patient every 2 hours.   - Dangle patient 2 times a day  - Stand patient 2 times a day  - Ambulate patient 2 times a day  - Out of bed to chair 2 times a day   - Out of bed for meals 2 times a day  - Out of bed for toileting  - Record patient progress and toleration of activity level   10/18/2023 1215 by Aaron Roper RN  Outcome: Progressing  10/18/2023 0757 by Aaron Roper RN  Outcome: Progressing  Goal: Patient will remain free of falls  Description: INTERVENTIONS:  - Educate patient/family on patient safety including physical limitations  - Instruct patient to call for assistance with activity   - Consult OT/PT to assist with strengthening/mobility   - Keep Call bell within reach  - Keep bed low and locked with side rails adjusted as appropriate  - Keep care items and personal belongings within reach  - Initiate and maintain comfort rounds  - Make Fall Risk Sign visible to staff  - Offer Toileting every 2 Hours, in advance of need  - Initiate/Maintain bed alarm  - Obtain necessary fall risk management equipment  - Apply yellow socks and bracelet for high fall risk patients  - Consider moving patient to room near nurses station  10/18/2023 1215 by Silvia Porras RN  Outcome: Progressing  10/18/2023 0757 by Silvia Porras RN  Outcome: Progressing     Problem: DISCHARGE PLANNING  Goal: Discharge to home or other facility with appropriate resources  Description: INTERVENTIONS:  - Identify barriers to discharge w/patient and caregiver  - Arrange for needed discharge resources and transportation as appropriate  - Identify discharge learning needs (meds, wound care, etc.)  - Arrange for interpretive services to assist at discharge as needed  - Refer to Case Management Department for coordinating discharge planning if the patient needs post-hospital services based on physician/advanced practitioner order or complex needs related to functional status, cognitive ability, or social support system  10/18/2023 1215 by Silvia Porras RN  Outcome: Progressing  10/18/2023 0757 by Silvia Porras RN  Outcome: Progressing     Problem: Knowledge Deficit  Goal: Patient/family/caregiver demonstrates understanding of disease process, treatment plan, medications, and discharge instructions  Description: Complete learning assessment and assess knowledge base.   Interventions:  - Provide teaching at level of understanding  - Provide teaching via preferred learning methods  10/18/2023 1215 by Silvia Porras RN  Outcome: Progressing  10/18/2023 0757 by Silvia Porras RN  Outcome: Progressing     Problem: Potential for Falls  Goal: Patient will remain free of falls  Description: INTERVENTIONS:  - Educate patient/family on patient safety including physical limitations  - Instruct patient to call for assistance with activity   - Consult OT/PT to assist with strengthening/mobility   - Keep Call bell within reach  - Keep bed low and locked with side rails adjusted as appropriate  - Keep care items and personal belongings within reach  - Initiate and maintain comfort rounds  - Make Fall Risk Sign visible to staff  - Offer Toileting every 2 Hours, in advance of need  - Initiate/Maintain bed alarm  - Obtain necessary fall risk management equipment  - Apply yellow socks and bracelet for high fall risk patients  - Consider moving patient to room near nurses station  10/18/2023 1215 by Joyce Black RN  Outcome: Progressing  10/18/2023 0757 by Joyce Black RN  Outcome: Progressing     Problem: Nutrition/Hydration-ADULT  Goal: Nutrient/Hydration intake appropriate for improving, restoring or maintaining nutritional needs  Description: Monitor and assess patient's nutrition/hydration status for malnutrition. Collaborate with interdisciplinary team and initiate plan and interventions as ordered. Monitor patient's weight and dietary intake as ordered or per policy. Utilize nutrition screening tool and intervene as necessary. Determine patient's food preferences and provide high-protein, high-caloric foods as appropriate.      INTERVENTIONS:  - Monitor oral intake, urinary output, labs, and treatment plans  - Assess nutrition and hydration status and recommend course of action  - Evaluate amount of meals eaten  - Assist patient with eating if necessary   - Allow adequate time for meals  - Recommend/ encourage appropriate diets, oral nutritional supplements, and vitamin/mineral supplements  - Order, calculate, and assess calorie counts as needed  - Recommend, monitor, and adjust tube feedings and TPN/PPN based on assessed needs  - Assess need for intravenous fluids  - Provide specific nutrition/hydration education as appropriate  - Include patient/family/caregiver in decisions related to nutrition  10/18/2023 1215 by Joyce Black RN  Outcome: Progressing  10/18/2023 0757 by Joyce Black RN  Outcome: Progressing     Problem: Prexisting or High Potential for Compromised Skin Integrity  Goal: Skin integrity is maintained or improved  Description: INTERVENTIONS:  - Identify patients at risk for skin breakdown  - Assess and monitor skin integrity  - Assess and monitor nutrition and hydration status  - Monitor labs   - Assess for incontinence   - Turn and reposition patient  - Assist with mobility/ambulation  - Relieve pressure over bony prominences  - Avoid friction and shearing  - Provide appropriate hygiene as needed including keeping skin clean and dry  - Evaluate need for skin moisturizer/barrier cream  - Collaborate with interdisciplinary team   - Patient/family teaching  - Consider wound care consult   10/18/2023 1215 by Ladi Zavala RN  Outcome: Progressing  10/18/2023 0757 by Ladi Zavala RN  Outcome: Progressing     Problem: NEUROSENSORY - ADULT  Goal: Achieves stable or improved neurological status  Description: INTERVENTIONS  - Monitor and report changes in neurological status  - Monitor vital signs such as temperature, blood pressure, glucose, and any other labs ordered   - Initiate measures to prevent increased intracranial pressure  - Monitor for seizure activity and implement precautions if appropriate      10/18/2023 1215 by Ladi Zavala RN  Outcome: Progressing  10/18/2023 0757 by Ladi Zavala RN  Outcome: Progressing  Goal: Remains free of injury related to seizures activity  Description: INTERVENTIONS  - Maintain airway, patient safety  and administer oxygen as ordered  - Monitor patient for seizure activity, document and report duration and description of seizure to physician/advanced practitioner  - If seizure occurs,  ensure patient safety during seizure  - Reorient patient post seizure  - Seizure pads on all 4 side rails  - Instruct patient/family to notify RN of any seizure activity including if an aura is experienced  - Instruct patient/family to call for assistance with activity based on nursing assessment  - Administer anti-seizure medications if ordered    10/18/2023 1215 by Ladi Zavala RN  Outcome: Progressing  10/18/2023 0757 by Yue Hart JENSEN Infante  Outcome: Progressing     Problem: CARDIOVASCULAR - ADULT  Goal: Maintains optimal cardiac output and hemodynamic stability  Description: INTERVENTIONS:  - Monitor I/O, vital signs and rhythm  - Monitor for S/S and trends of decreased cardiac output  - Administer and titrate ordered vasoactive medications to optimize hemodynamic stability  - Assess quality of pulses, skin color and temperature  - Assess for signs of decreased coronary artery perfusion  - Instruct patient to report change in severity of symptoms  10/18/2023 1215 by Ladi Zavala RN  Outcome: Progressing  10/18/2023 0757 by Ladi Zavala RN  Outcome: Progressing     Problem: RESPIRATORY - ADULT  Goal: Achieves optimal ventilation and oxygenation  Description: INTERVENTIONS:  - Assess for changes in respiratory status  - Assess for changes in mentation and behavior  - Position to facilitate oxygenation and minimize respiratory effort  - Oxygen administered by appropriate delivery if ordered  - Initiate smoking cessation education as indicated  - Encourage broncho-pulmonary hygiene including cough, deep breathe, Incentive Spirometry  - Assess the need for suctioning and aspirate as needed  - Assess and instruct to report SOB or any respiratory difficulty  - Respiratory Therapy support as indicated  10/18/2023 1215 by Ladi Zavala RN  Outcome: Progressing  10/18/2023 0757 by Ladi Zavala RN  Outcome: Progressing     Problem: GASTROINTESTINAL - ADULT  Goal: Maintains or returns to baseline bowel function  Description: INTERVENTIONS:  - Assess bowel function  - Encourage oral fluids to ensure adequate hydration  - Administer IV fluids if ordered to ensure adequate hydration  - Administer ordered medications as needed  - Encourage mobilization and activity  - Consider nutritional services referral to assist patient with adequate nutrition and appropriate food choices  10/18/2023 1215 by Ladi Zavala RN  Outcome: Progressing  10/18/2023 0757 by Terrence Gonsalez RN  Outcome: Progressing     Problem: METABOLIC, FLUID AND ELECTROLYTES - ADULT  Goal: Electrolytes maintained within normal limits  Description: INTERVENTIONS:  - Monitor labs and assess patient for signs and symptoms of electrolyte imbalances  - Administer electrolyte replacement as ordered  - Monitor response to electrolyte replacements, including repeat lab results as appropriate  - Instruct patient on fluid and nutrition as appropriate  10/18/2023 1215 by Terrence Gonsalez RN  Outcome: Progressing  10/18/2023 0757 by Terrence Gonsalez RN  Outcome: Progressing  Goal: Fluid balance maintained  Description: INTERVENTIONS:  - Monitor labs   - Monitor I/O and WT  - Instruct patient on fluid and nutrition as appropriate  - Assess for signs & symptoms of volume excess or deficit  10/18/2023 1215 by Terrence Gonsalez RN  Outcome: Progressing  10/18/2023 0757 by Terrence Gonsalez RN  Outcome: Progressing  Goal: Glucose maintained within target range  Description: INTERVENTIONS:  - Monitor Blood Glucose as ordered  - Assess for signs and symptoms of hyperglycemia and hypoglycemia  - Administer ordered medications to maintain glucose within target range  - Assess nutritional intake and initiate nutrition service referral as needed  10/18/2023 1215 by Terrence Gonsalez RN  Outcome: Progressing  10/18/2023 0757 by Terrence Gonsalez RN  Outcome: Progressing     Problem: SKIN/TISSUE INTEGRITY - ADULT  Goal: Skin Integrity remains intact(Skin Breakdown Prevention)  Description: Assess:  -Perform [place] assessment every shift  -Clean and moisturize skin every shift  -Inspect skin when repositioning, toileting, and assisting with ADLS  -Assess under medical devices such as scds every shift  -Assess extremities for adequate circulation and sensation     Bed Management:  -Have minimal linens on bed & keep smooth, unwrinkled  -Change linens as needed when moist or perspiring  -Avoid sitting or lying in one position for more than 2 hours while in bed  -Keep HOB at 30 degrees     Toileting:  -Offer bedside commode  -Assess for incontinence every 2 hours  -Use incontinent care products after each incontinent episode     Activity:  -Mobilize patient 4 times a day  -Encourage activity and walks on unit  -Encourage or provide ROM exercises   -Turn and reposition patient every 2 Hours  -Use appropriate equipment to lift or move patient in bed  Skin Care:  -Avoid use of baby powder, tape, friction and shearing, hot water or constrictive clothing  -Relieve pressure over bony prominences using allevyns  -Do not massage red bony areas    Next Steps:  -Teach patient strategies to minimize risks    -Consider consults to  interdisciplinary teams   10/18/2023 1215 by Juancarlos Parks RN  Outcome: Progressing  10/18/2023 0757 by Juancarlos Parks RN  Outcome: Progressing  Goal: Incision(s), wounds(s) or drain site(s) healing without S/S of infection  Description: INTERVENTIONS  - Assess and document dressing, incision, wound bed, drain sites and surrounding tissue  - Provide patient and family education  - Perform skin care/dressing changes every shift  10/18/2023 1215 by Juancarlos Parks RN  Outcome: Progressing  10/18/2023 0757 by Juancarlos Parks RN  Outcome: Progressing  Goal: Pressure injury heals and does not worsen  Description: Interventions:  - Implement low air loss mattress or specialty surface (Criteria met)  - Apply silicone foam dressing  - Apply fecal or urinary incontinence containment device   - Perform passive or active ROM every 4 hours  - Turn and reposition patient & offload bony prominences every 2 hours   - Utilize friction reducing device or surface for transfers   - Consider consults to  interdisciplinary teams   - Use incontinent care products after each incontinent episode   - Consider nutrition services referral as needed  10/18/2023 1215 by Cyndie Aase Karthikeyan Collins RN  Outcome: Progressing  10/18/2023 0757 by Ladi Zavala RN  Outcome: Progressing     Problem: HEMATOLOGIC - ADULT  Goal: Maintains hematologic stability  Description: INTERVENTIONS  - Assess for signs and symptoms of bleeding or hemorrhage  - Monitor labs  - Administer supportive blood products/factors as ordered and appropriate  10/18/2023 1215 by Ladi Zavala RN  Outcome: Progressing  10/18/2023 0757 by Ladi Zavala RN  Outcome: Progressing

## 2023-10-18 NOTE — ASSESSMENT & PLAN NOTE
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

## 2023-10-18 NOTE — ASSESSMENT & PLAN NOTE
Progressively worsening on serial BMP   Last bicarb 20  Likely multifactorial etiology in the setting of worsening hepatic failure, lactic acidosis     Plan:   Volume overloaded on clinical exam -- will hold on further crystalloid.  If hypovolemic will give IV albumin  Bicarb tabs discontinued  CRRT started 10/17, trending metabolic panel  Consider restarting bicarb tabs if needed

## 2023-10-18 NOTE — ASSESSMENT & PLAN NOTE
Ca 10.9  Unclear etiology    Plan  Trend BMP  Continue current dialysate rate, watch carefully   Nephrology following, input appreciated   PTH, Vit D levels, ACE level and protein electrophoresis pending

## 2023-10-18 NOTE — ASSESSMENT & PLAN NOTE
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

## 2023-10-18 NOTE — PLAN OF CARE
Problem: MOBILITY - ADULT  Goal: Maintain or return to baseline ADL function  Description: INTERVENTIONS:  -  Assess patient's ability to carry out ADLs; assess patient's baseline for ADL function and identify physical deficits which impact ability to perform ADLs (bathing, care of mouth/teeth, toileting, grooming, dressing, etc.)  - Assess/evaluate cause of self-care deficits   - Assess range of motion  - Assess patient's mobility; develop plan if impaired  - Assess patient's need for assistive devices and provide as appropriate  - Encourage maximum independence but intervene and supervise when necessary  - Involve family in performance of ADLs  - Assess for home care needs following discharge   - Consider OT consult to assist with ADL evaluation and planning for discharge  - Provide patient education as appropriate  Outcome: Progressing  Goal: Maintains/Returns to pre admission functional level  Description: INTERVENTIONS:  - Perform BMAT or MOVE assessment daily.   - Set and communicate daily mobility goal to care team and patient/family/caregiver. - Collaborate with rehabilitation services on mobility goals if consulted  - Perform Range of Motion 2 times a day. - Reposition patient every 2 hours.   - Dangle patient 2 times a day  - Stand patient 2 times a day  - Ambulate patient 2 times a day  - Out of bed to chair 2 times a day   - Out of bed for meals 2 times a day  - Out of bed for toileting  - Record patient progress and toleration of activity level   Outcome: Progressing     Problem: PAIN - ADULT  Goal: Verbalizes/displays adequate comfort level or baseline comfort level  Description: Interventions:  - Encourage patient to monitor pain and request assistance  - Assess pain using appropriate pain scale  - Administer analgesics based on type and severity of pain and evaluate response  - Implement non-pharmacological measures as appropriate and evaluate response  - Consider cultural and social influences on pain and pain management  - Notify physician/advanced practitioner if interventions unsuccessful or patient reports new pain  Outcome: Progressing     Problem: INFECTION - ADULT  Goal: Absence or prevention of progression during hospitalization  Description: INTERVENTIONS:  - Assess and monitor for signs and symptoms of infection  - Monitor lab/diagnostic results  - Monitor all insertion sites, i.e. indwelling lines, tubes, and drains  - Monitor endotracheal if appropriate and nasal secretions for changes in amount and color  - Gate appropriate cooling/warming therapies per order  - Administer medications as ordered  - Instruct and encourage patient and family to use good hand hygiene technique  - Identify and instruct in appropriate isolation precautions for identified infection/condition  Outcome: Progressing  Goal: Absence of fever/infection during neutropenic period  Description: INTERVENTIONS:  - Monitor WBC    Outcome: Progressing     Problem: SAFETY ADULT  Goal: Maintain or return to baseline ADL function  Description: INTERVENTIONS:  -  Assess patient's ability to carry out ADLs; assess patient's baseline for ADL function and identify physical deficits which impact ability to perform ADLs (bathing, care of mouth/teeth, toileting, grooming, dressing, etc.)  - Assess/evaluate cause of self-care deficits   - Assess range of motion  - Assess patient's mobility; develop plan if impaired  - Assess patient's need for assistive devices and provide as appropriate  - Encourage maximum independence but intervene and supervise when necessary  - Involve family in performance of ADLs  - Assess for home care needs following discharge   - Consider OT consult to assist with ADL evaluation and planning for discharge  - Provide patient education as appropriate  Outcome: Progressing  Goal: Maintains/Returns to pre admission functional level  Description: INTERVENTIONS:  - Perform BMAT or MOVE assessment daily.   - Set and communicate daily mobility goal to care team and patient/family/caregiver. - Collaborate with rehabilitation services on mobility goals if consulted  - Perform Range of Motion 2 times a day. - Reposition patient every 2 hours.   - Dangle patient 2 times a day  - Stand patient 2 times a day  - Ambulate patient 2 times a day  - Out of bed to chair 2 times a day   - Out of bed for meals 2 times a day  - Out of bed for toileting  - Record patient progress and toleration of activity level   Outcome: Progressing  Goal: Patient will remain free of falls  Description: INTERVENTIONS:  - Educate patient/family on patient safety including physical limitations  - Instruct patient to call for assistance with activity   - Consult OT/PT to assist with strengthening/mobility   - Keep Call bell within reach  - Keep bed low and locked with side rails adjusted as appropriate  - Keep care items and personal belongings within reach  - Initiate and maintain comfort rounds  - Make Fall Risk Sign visible to staff  - Offer Toileting every 2 Hours, in advance of need  - Initiate/Maintain bed alarm  - Obtain necessary fall risk management equipment:   - Apply yellow socks and bracelet for high fall risk patients  - Consider moving patient to room near nurses station  Outcome: Progressing     Problem: DISCHARGE PLANNING  Goal: Discharge to home or other facility with appropriate resources  Description: INTERVENTIONS:  - Identify barriers to discharge w/patient and caregiver  - Arrange for needed discharge resources and transportation as appropriate  - Identify discharge learning needs (meds, wound care, etc.)  - Arrange for interpretive services to assist at discharge as needed  - Refer to Case Management Department for coordinating discharge planning if the patient needs post-hospital services based on physician/advanced practitioner order or complex needs related to functional status, cognitive ability, or social support system  Outcome: Progressing     Problem: Knowledge Deficit  Goal: Patient/family/caregiver demonstrates understanding of disease process, treatment plan, medications, and discharge instructions  Description: Complete learning assessment and assess knowledge base. Interventions:  - Provide teaching at level of understanding  - Provide teaching via preferred learning methods  Outcome: Progressing     Problem: Potential for Falls  Goal: Patient will remain free of falls  Description: INTERVENTIONS:  - Educate patient/family on patient safety including physical limitations  - Instruct patient to call for assistance with activity   - Consult OT/PT to assist with strengthening/mobility   - Keep Call bell within reach  - Keep bed low and locked with side rails adjusted as appropriate  - Keep care items and personal belongings within reach  - Initiate and maintain comfort rounds  - Make Fall Risk Sign visible to staff  - Offer Toileting every 2 Hours, in advance of need  - Initiate/Maintain bed alarm  - Obtain necessary fall risk management equipment:   - Apply yellow socks and bracelet for high fall risk patients  - Consider moving patient to room near nurses station  Outcome: Progressing     Problem: Nutrition/Hydration-ADULT  Goal: Nutrient/Hydration intake appropriate for improving, restoring or maintaining nutritional needs  Description: Monitor and assess patient's nutrition/hydration status for malnutrition. Collaborate with interdisciplinary team and initiate plan and interventions as ordered. Monitor patient's weight and dietary intake as ordered or per policy. Utilize nutrition screening tool and intervene as necessary. Determine patient's food preferences and provide high-protein, high-caloric foods as appropriate.      INTERVENTIONS:  - Monitor oral intake, urinary output, labs, and treatment plans  - Assess nutrition and hydration status and recommend course of action  - Evaluate amount of meals eaten  - Assist patient with eating if necessary   - Allow adequate time for meals  - Recommend/ encourage appropriate diets, oral nutritional supplements, and vitamin/mineral supplements  - Order, calculate, and assess calorie counts as needed  - Recommend, monitor, and adjust tube feedings and TPN/PPN based on assessed needs  - Assess need for intravenous fluids  - Provide specific nutrition/hydration education as appropriate  - Include patient/family/caregiver in decisions related to nutrition  Outcome: Progressing     Problem: Prexisting or High Potential for Compromised Skin Integrity  Goal: Skin integrity is maintained or improved  Description: INTERVENTIONS:  - Identify patients at risk for skin breakdown  - Assess and monitor skin integrity  - Assess and monitor nutrition and hydration status  - Monitor labs   - Assess for incontinence   - Turn and reposition patient  - Assist with mobility/ambulation  - Relieve pressure over bony prominences  - Avoid friction and shearing  - Provide appropriate hygiene as needed including keeping skin clean and dry  - Evaluate need for skin moisturizer/barrier cream  - Collaborate with interdisciplinary team   - Patient/family teaching  - Consider wound care consult   Outcome: Progressing     Problem: NEUROSENSORY - ADULT  Goal: Achieves stable or improved neurological status  Description: INTERVENTIONS  - Monitor and report changes in neurological status  - Monitor vital signs such as temperature, blood pressure, glucose, and any other labs ordered   - Initiate measures to prevent increased intracranial pressure  - Monitor for seizure activity and implement precautions if appropriate      Outcome: Progressing  Goal: Remains free of injury related to seizures activity  Description: INTERVENTIONS  - Maintain airway, patient safety  and administer oxygen as ordered  - Monitor patient for seizure activity, document and report duration and description of seizure to physician/advanced practitioner  - If seizure occurs,  ensure patient safety during seizure  - Reorient patient post seizure  - Seizure pads on all 4 side rails  - Instruct patient/family to notify RN of any seizure activity including if an aura is experienced  - Instruct patient/family to call for assistance with activity based on nursing assessment  - Administer anti-seizure medications if ordered    Outcome: Progressing     Problem: CARDIOVASCULAR - ADULT  Goal: Maintains optimal cardiac output and hemodynamic stability  Description: INTERVENTIONS:  - Monitor I/O, vital signs and rhythm  - Monitor for S/S and trends of decreased cardiac output  - Administer and titrate ordered vasoactive medications to optimize hemodynamic stability  - Assess quality of pulses, skin color and temperature  - Assess for signs of decreased coronary artery perfusion  - Instruct patient to report change in severity of symptoms  Outcome: Progressing     Problem: RESPIRATORY - ADULT  Goal: Achieves optimal ventilation and oxygenation  Description: INTERVENTIONS:  - Assess for changes in respiratory status  - Assess for changes in mentation and behavior  - Position to facilitate oxygenation and minimize respiratory effort  - Oxygen administered by appropriate delivery if ordered  - Initiate smoking cessation education as indicated  - Encourage broncho-pulmonary hygiene including cough, deep breathe, Incentive Spirometry  - Assess the need for suctioning and aspirate as needed  - Assess and instruct to report SOB or any respiratory difficulty  - Respiratory Therapy support as indicated  Outcome: Progressing     Problem: GASTROINTESTINAL - ADULT  Goal: Maintains or returns to baseline bowel function  Description: INTERVENTIONS:  - Assess bowel function  - Encourage oral fluids to ensure adequate hydration  - Administer IV fluids if ordered to ensure adequate hydration  - Administer ordered medications as needed  - Encourage mobilization and activity  - Consider nutritional services referral to assist patient with adequate nutrition and appropriate food choices  Outcome: Progressing     Problem: METABOLIC, FLUID AND ELECTROLYTES - ADULT  Goal: Electrolytes maintained within normal limits  Description: INTERVENTIONS:  - Monitor labs and assess patient for signs and symptoms of electrolyte imbalances  - Administer electrolyte replacement as ordered  - Monitor response to electrolyte replacements, including repeat lab results as appropriate  - Instruct patient on fluid and nutrition as appropriate  Outcome: Progressing  Goal: Fluid balance maintained  Description: INTERVENTIONS:  - Monitor labs   - Monitor I/O and WT  - Instruct patient on fluid and nutrition as appropriate  - Assess for signs & symptoms of volume excess or deficit  Outcome: Progressing  Goal: Glucose maintained within target range  Description: INTERVENTIONS:  - Monitor Blood Glucose as ordered  - Assess for signs and symptoms of hyperglycemia and hypoglycemia  - Administer ordered medications to maintain glucose within target range  - Assess nutritional intake and initiate nutrition service referral as needed  Outcome: Progressing     Problem: SKIN/TISSUE INTEGRITY - ADULT  Goal: Skin Integrity remains intact(Skin Breakdown Prevention)  Description: Assess:  -Perform Douglas assessment every   -Clean and moisturize skin every   -Inspect skin when repositioning, toileting, and assisting with ADLS  -Assess under medical devices such as  every   -Assess extremities for adequate circulation and sensation     Bed Management:  -Have minimal linens on bed & keep smooth, unwrinkled  -Change linens as needed when moist or perspiring  -Avoid sitting or lying in one position for more than  hours while in bed  -Keep HOB at degrees     Toileting:  -Offer bedside commode  -Assess for incontinence every   -Use incontinent care products after each incontinent episode such as     Activity:  -Mobilize patient  times a day  -Encourage activity and walks on unit  -Encourage or provide ROM exercises   -Turn and reposition patient every  Hours  -Use appropriate equipment to lift or move patient in bed  -Instruct/ Assist with weight shifting every  when out of bed in chair  -Consider limitation of chair time  hour intervals    Skin Care:  -Avoid use of baby powder, tape, friction and shearing, hot water or constrictive clothing  -Relieve pressure over bony prominences using   -Do not massage red bony areas    Next Steps:  -Teach patient strategies to minimize risks such as    -Consider consults to  interdisciplinary teams such as   Outcome: Progressing  Goal: Incision(s), wounds(s) or drain site(s) healing without S/S of infection  Description: INTERVENTIONS  - Assess and document dressing, incision, wound bed, drain sites and surrounding tissue  - Provide patient and family education  - Perform skin care/dressing changes every   Outcome: Progressing  Goal: Pressure injury heals and does not worsen  Description: Interventions:  - Implement low air loss mattress or specialty surface (Criteria met)  - Apply silicone foam dressing  - Instruct/assist with weight shifting every  minutes when in chair   - Limit chair time to  hour intervals  - Use special pressure reducing interventions such as  when in chair   - Apply fecal or urinary incontinence containment device   - Perform passive or active ROM every   - Turn and reposition patient & offload bony prominences every  hours   - Utilize friction reducing device or surface for transfers   - Consider consults to  interdisciplinary teams such as   - Use incontinent care products after each incontinent episode such as   - Consider nutrition services referral as needed  Outcome: Progressing     Problem: HEMATOLOGIC - ADULT  Goal: Maintains hematologic stability  Description: INTERVENTIONS  - Assess for signs and symptoms of bleeding or hemorrhage  - Monitor labs  - Administer supportive blood products/factors as ordered and appropriate  Outcome: Progressing

## 2023-10-18 NOTE — ASSESSMENT & PLAN NOTE
DIC panel sent 10/14 given confirmed GIB and thrombocytopenia   Fibrinogen <60  Ddimer 7.1  INR >6  Goals:   INR>2, Fibrinogen >100, Platelets >75, Hgb >7  Total product:   Vit K: x6  FFP: 11U  Cryo: 5U  Platelets: 1U  PRBC: 1U

## 2023-10-18 NOTE — ASSESSMENT & PLAN NOTE
Home regimen: HCTZ, valsartan, sotalol, cardizem, torsemide -- all on hold given transaminitis and VALENTIN during admission     Plan:   Continue to hold above while on sedation, vasopressors

## 2023-10-19 LAB
ATRIAL RATE: 82 BPM
ATRIAL RATE: 82 BPM
C-ANCA TITR SER IF: ABNORMAL TITER
KAPPA LC FREE SER-MCNC: 59.3 MG/L (ref 3.3–19.4)
KAPPA LC FREE/LAMBDA FREE SER: 1.5 {RATIO} (ref 0.26–1.65)
LAMBDA LC FREE SERPL-MCNC: 39.5 MG/L (ref 5.7–26.3)
MYELOPEROXIDASE AB SER IA-ACNC: <0.2 UNITS (ref 0–0.9)
P AXIS: 49 DEGREES
P-ANCA ATYPICAL TITR SER IF: ABNORMAL TITER
P-ANCA TITR SER IF: ABNORMAL TITER
PR INTERVAL: 178 MS
PROTEINASE3 AB SER IA-ACNC: <0.2 UNITS (ref 0–0.9)
QRS AXIS: 24 DEGREES
QRS AXIS: 26 DEGREES
QRSD INTERVAL: 90 MS
QRSD INTERVAL: 94 MS
QT INTERVAL: 304 MS
QT INTERVAL: 462 MS
QTC INTERVAL: 398 MS
QTC INTERVAL: 539 MS
T WAVE AXIS: 155 DEGREES
T WAVE AXIS: 18 DEGREES
VENTRICULAR RATE: 103 BPM
VENTRICULAR RATE: 82 BPM

## 2023-10-20 LAB
ATRIAL RATE: 102 BPM
ATRIAL RATE: 95 BPM
QRS AXIS: 41 DEGREES
QRS AXIS: 48 DEGREES
QRSD INTERVAL: 90 MS
QRSD INTERVAL: 92 MS
QT INTERVAL: 350 MS
QT INTERVAL: 446 MS
QTC INTERVAL: 425 MS
QTC INTERVAL: 560 MS
T WAVE AXIS: -18 DEGREES
T WAVE AXIS: 93 DEGREES
VENTRICULAR RATE: 89 BPM
VENTRICULAR RATE: 95 BPM

## 2023-10-23 LAB
PTH RELATED PROT SERPL-SCNC: <2 PMOL/L
SCAN RESULT: NORMAL

## 2023-10-24 LAB
MYCOBACTERIUM SPEC CULT: NORMAL
RHODAMINE-AURAMINE STN SPEC: NORMAL

## 2023-10-30 LAB
FUNGUS SPEC CULT: NORMAL
P JIROVECII AG SPEC QL IF: NORMAL
SCAN RESULT: NORMAL

## 2023-11-06 LAB — FUNGUS SPEC CULT: NORMAL

## 2023-11-07 LAB
MYCOBACTERIUM SPEC CULT: NORMAL
RHODAMINE-AURAMINE STN SPEC: NORMAL

## 2023-11-13 LAB — FUNGUS SPEC CULT: NORMAL

## 2023-11-21 LAB
MYCOBACTERIUM SPEC CULT: NORMAL
RHODAMINE-AURAMINE STN SPEC: NORMAL

## 2023-11-28 LAB
MYCOBACTERIUM SPEC CULT: NORMAL
RHODAMINE-AURAMINE STN SPEC: NORMAL

## 2023-12-05 LAB
MYCOBACTERIUM SPEC CULT: NORMAL
RHODAMINE-AURAMINE STN SPEC: NORMAL

## 2024-05-30 NOTE — PLAN OF CARE
Problem: Potential for Falls  Goal: Patient will remain free of falls  Description: INTERVENTIONS:  - Educate patient/family on patient safety including physical limitations  - Instruct patient to call for assistance with activity   - Consult OT/PT to assist with strengthening/mobility   - Keep Call bell within reach  - Keep bed low and locked with side rails adjusted as appropriate  - Keep care items and personal belongings within reach  - Initiate and maintain comfort rounds  - Make Fall Risk Sign visible to staff  - Offer Toileting every  Hours, in advance of need  - Initiate/Maintain alarm  - Obtain necessary fall risk management equipment:   - Apply yellow socks and bracelet for high fall risk patients  - Consider moving patient to room near nurses station  Outcome: Progressing     Problem: MOBILITY - ADULT  Goal: Maintain or return to baseline ADL function  Description: INTERVENTIONS:  -  Assess patient's ability to carry out ADLs; assess patient's baseline for ADL function and identify physical deficits which impact ability to perform ADLs (bathing, care of mouth/teeth, toileting, grooming, dressing, etc.)  - Assess/evaluate cause of self-care deficits   - Assess range of motion  - Assess patient's mobility; develop plan if impaired  - Assess patient's need for assistive devices and provide as appropriate  - Encourage maximum independence but intervene and supervise when necessary  - Involve family in performance of ADLs  - Assess for home care needs following discharge   - Consider OT consult to assist with ADL evaluation and planning for discharge  - Provide patient education as appropriate  Outcome: Progressing alert

## 2024-11-12 NOTE — PROGRESS NOTES
Assessment/Plan:         Diagnoses and all orders for this visit:    Shoulder injury, left, initial encounter  Comments:  Use SalonPas patches to the area   Orders:  -     XR shoulder 2+ vw left; Future  -     HYDROcodone-acetaminophen (NORCO) 5-325 mg per tablet; Take 1 tablet by mouth every 6 (six) hours as needed for painMax Daily Amount: 4 tablets    Abnormal gait due to muscle weakness  -     Ambulatory referral to Physical Therapy; Future          Subjective:      Patient ID: Paty Del Cid is a [de-identified] y o  female  Acute visit for L shoulder pain    Fell on ice 4 days ago  Severe pain and has been unable to sleep  Cookie Cagey forward onto her palms  Incidentally requests PT for knees S/P TKR  Used Tylenol Arthritis without benefit      The following portions of the patient's history were reviewed and updated as appropriate: allergies, current medications, past family history, past medical history, past social history, past surgical history and problem list     Review of Systems   Respiratory: Negative  Cardiovascular: Negative  Musculoskeletal: Positive for arthralgias  Negative for back pain and joint swelling  All other systems reviewed and are negative  Objective:      /70   Pulse 56   Temp (!) 96 3 °F (35 7 °C)   Ht 5' 3 5" (1 613 m)   Wt 96 9 kg (213 lb 9 6 oz)   BMI 37 24 kg/m²          Physical Exam  Vitals signs and nursing note reviewed  Musculoskeletal:      Left shoulder: She exhibits decreased range of motion and tenderness  She exhibits no swelling, no effusion and no deformity 
12-Nov-2024 10:45

## 2025-03-14 NOTE — PLAN OF CARE
I reviewed the H and P and examined the patient and there are no changes.      Problem: MOBILITY - ADULT  Goal: Maintain or return to baseline ADL function  Description: INTERVENTIONS:  -  Assess patient's ability to carry out ADLs; assess patient's baseline for ADL function and identify physical deficits which impact ability to perform ADLs (bathing, care of mouth/teeth, toileting, grooming, dressing, etc.)  - Assess/evaluate cause of self-care deficits   - Assess range of motion  - Assess patient's mobility; develop plan if impaired  - Assess patient's need for assistive devices and provide as appropriate  - Encourage maximum independence but intervene and supervise when necessary  - Involve family in performance of ADLs  - Assess for home care needs following discharge   - Consider OT consult to assist with ADL evaluation and planning for discharge  - Provide patient education as appropriate  Outcome: Progressing  Goal: Maintains/Returns to pre admission functional level  Description: INTERVENTIONS:  - Perform BMAT or MOVE assessment daily.   - Set and communicate daily mobility goal to care team and patient/family/caregiver. - Collaborate with rehabilitation services on mobility goals if consulted  - Perform Range of Motion 3 times a day. - Reposition patient every 3 hours.   - Dangle patient 3 times a day  - Stand patient 3 times a day  - Ambulate patient 3 times a day  - Out of bed to chair 3 times a day   - Out of bed for meals 3 times a day  - Out of bed for toileting  - Record patient progress and toleration of activity level   Outcome: Progressing     Problem: PAIN - ADULT  Goal: Verbalizes/displays adequate comfort level or baseline comfort level  Description: Interventions:  - Encourage patient to monitor pain and request assistance  - Assess pain using appropriate pain scale  - Administer analgesics based on type and severity of pain and evaluate response  - Implement non-pharmacological measures as appropriate and evaluate response  - Consider cultural and social influences on pain and pain management  - Notify physician/advanced practitioner if interventions unsuccessful or patient reports new pain  Outcome: Progressing     Problem: INFECTION - ADULT  Goal: Absence or prevention of progression during hospitalization  Description: INTERVENTIONS:  - Assess and monitor for signs and symptoms of infection  - Monitor lab/diagnostic results  - Monitor all insertion sites, i.e. indwelling lines, tubes, and drains  - Monitor endotracheal if appropriate and nasal secretions for changes in amount and color  - [place] appropriate cooling/warming therapies per order  - Administer medications as ordered  - Instruct and encourage patient and family to use good hand hygiene technique  - Identify and instruct in appropriate isolation precautions for identified infection/condition  Outcome: Progressing  Goal: Absence of fever/infection during neutropenic period  Description: INTERVENTIONS:  - Monitor WBC    Outcome: Progressing     Problem: SAFETY ADULT  Goal: Maintain or return to baseline ADL function  Description: INTERVENTIONS:  -  Assess patient's ability to carry out ADLs; assess patient's baseline for ADL function and identify physical deficits which impact ability to perform ADLs (bathing, care of mouth/teeth, toileting, grooming, dressing, etc.)  - Assess/evaluate cause of self-care deficits   - Assess range of motion  - Assess patient's mobility; develop plan if impaired  - Assess patient's need for assistive devices and provide as appropriate  - Encourage maximum independence but intervene and supervise when necessary  - Involve family in performance of ADLs  - Assess for home care needs following discharge   - Consider OT consult to assist with ADL evaluation and planning for discharge  - Provide patient education as appropriate  Outcome: Progressing  Goal: Maintains/Returns to pre admission functional level  Description: INTERVENTIONS:  - Perform BMAT or MOVE assessment daily.   - Set and communicate daily mobility goal to care team and patient/family/caregiver. - Collaborate with rehabilitation services on mobility goals if consulted  - Perform Range of Motion 3 times a day. - Reposition patient every 3 hours.   - Dangle patient 3 times a day  - Stand patient 3 times a day  - Ambulate patient 3 times a day  - Out of bed to chair 3 times a day   - Out of bed for meals 3 times a day  - Out of bed for toileting  - Record patient progress and toleration of activity level   Outcome: Progressing  Goal: Patient will remain free of falls  Description: INTERVENTIONS:  - Educate patient/family on patient safety including physical limitations  - Instruct patient to call for assistance with activity   - Consult OT/PT to assist with strengthening/mobility   - Keep Call bell within reach  - Keep bed low and locked with side rails adjusted as appropriate  - Keep care items and personal belongings within reach  - Initiate and maintain comfort rounds  - Make Fall Risk Sign visible to staff  - Offer Toileting every 2 Hours, in advance of need  - Initiate/Maintain alarm  - Obtain necessary fall risk management equipment  - Apply yellow socks and bracelet for high fall risk patients  - Consider moving patient to room near nurses station  Outcome: Progressing     Problem: DISCHARGE PLANNING  Goal: Discharge to home or other facility with appropriate resources  Description: INTERVENTIONS:  - Identify barriers to discharge w/patient and caregiver  - Arrange for needed discharge resources and transportation as appropriate  - Identify discharge learning needs (meds, wound care, etc.)  - Arrange for interpretive services to assist at discharge as needed  - Refer to Case Management Department for coordinating discharge planning if the patient needs post-hospital services based on physician/advanced practitioner order or complex needs related to functional status, cognitive ability, or social support system  Outcome: Progressing     Problem: Knowledge Deficit  Goal: Patient/family/caregiver demonstrates understanding of disease process, treatment plan, medications, and discharge instructions  Description: Complete learning assessment and assess knowledge base.   Interventions:  - Provide teaching at level of understanding  - Provide teaching via preferred learning methods  Outcome: Progressing     Problem: Potential for Falls  Goal: Patient will remain free of falls  Description: INTERVENTIONS:  - Educate patient/family on patient safety including physical limitations  - Instruct patient to call for assistance with activity   - Consult OT/PT to assist with strengthening/mobility   - Keep Call bell within reach  - Keep bed low and locked with side rails adjusted as appropriate  - Keep care items and personal belongings within reach  - Initiate and maintain comfort rounds  - Make Fall Risk Sign visible to staff  - Offer Toileting every 2 Hours, in advance of need  - Initiate/Maintain alarm  - Obtain necessary fall risk management equipment  - Apply yellow socks and bracelet for high fall risk patients  - Consider moving patient to room near nurses station  Outcome: Progressing

## (undated) DEVICE — ACE WRAP 3 IN STERILE

## (undated) DEVICE — SUT ETHILON 4-0 PS-2 18 IN 1667H

## (undated) DEVICE — GLOVE UNDER SRG SKINSENSE 7.5

## (undated) DEVICE — BANDAGE, ESMARK LF STR 4"X9'(20/CS): Brand: CYPRESS

## (undated) DEVICE — OCCLUSIVE GAUZE STRIP,3% BISMUTH TRIBROMOPHENATE IN PETROLATUM BLEND: Brand: XEROFORM

## (undated) DEVICE — INTENDED FOR TISSUE SEPARATION, AND OTHER PROCEDURES THAT REQUIRE A SHARP SURGICAL BLADE TO PUNCTURE OR CUT.: Brand: BARD-PARKER ® CARBON RIB-BACK BLADES

## (undated) DEVICE — NEPTUNE E-SEP SMOKE EVACUATION PENCIL, COATED, 70MM BLADE, PUSH BUTTON SWITCH: Brand: NEPTUNE E-SEP

## (undated) DEVICE — NEEDLE 25G X 1 1/2

## (undated) DEVICE — GAUZE SPONGES,16 PLY: Brand: CURITY

## (undated) DEVICE — CURITY STRETCH BANDAGE: Brand: CURITY

## (undated) DEVICE — CUFF TOURNIQUET 18 X 5.5 IN QUICK CONNECT 2BLA

## (undated) DEVICE — STERILE BETHLEHEM PLASTIC HAND: Brand: CARDINAL HEALTH